# Patient Record
Sex: MALE | Race: WHITE | NOT HISPANIC OR LATINO | Employment: OTHER | URBAN - NONMETROPOLITAN AREA
[De-identification: names, ages, dates, MRNs, and addresses within clinical notes are randomized per-mention and may not be internally consistent; named-entity substitution may affect disease eponyms.]

---

## 2022-04-14 ENCOUNTER — OFFICE VISIT (OUTPATIENT)
Dept: CHIROPRACTIC MEDICINE | Facility: OTHER | Age: 57
End: 2022-04-14
Attending: CHIROPRACTOR
Payer: COMMERCIAL

## 2022-04-14 DIAGNOSIS — M99.03 SEGMENTAL AND SOMATIC DYSFUNCTION OF LUMBAR REGION: Primary | ICD-10-CM

## 2022-04-14 DIAGNOSIS — M99.02 SEGMENTAL AND SOMATIC DYSFUNCTION OF THORACIC REGION: ICD-10-CM

## 2022-04-14 DIAGNOSIS — M54.50 ACUTE BILATERAL LOW BACK PAIN WITHOUT SCIATICA: ICD-10-CM

## 2022-04-14 PROCEDURE — 98940 CHIROPRACT MANJ 1-2 REGIONS: CPT | Mod: AT | Performed by: CHIROPRACTOR

## 2022-04-14 PROCEDURE — 99202 OFFICE O/P NEW SF 15 MIN: CPT | Mod: 25 | Performed by: CHIROPRACTOR

## 2022-04-18 NOTE — PROGRESS NOTES
Subjective Finding:    Chief compalint: Patient presents with:  Back Pain  , Pain Scale: 5/10, Intensity: sharp, Duration: 2 months, Radiating: .    Date of injury:     Activities that the pain restricts:   Home/household/hobbies/social activities: yes.  Work duties: yes.  Sleep: yes.  Makes symptoms better: rest.  Makes symptoms worse: activity, lumbar extension and lumbar flexion.  Have you seen anyone else for the symptoms? No.  Work related: no.  Automobile related injury: no.    Objective and Assessment:    Posture Analysis:   High shoulder: .  Head tilt: .  High iliac crest: .  Head carriage: neutral.  Thoracic Kyphosis: neutral.  Lumbar Lordosis: forward.    Lumbar Range of Motion: extension decreased.  Cervical Range of Motion: .  Thoracic Range of Motion: .  Extremity Range of Motion: .    Palpation:   Quad lumb: bilateral, referred pain: no    Segmental dysfunction pre-treatment and treatment area: T5, T7, T12, L2 and L3.    Assessment post-treatment:  Cervical: ROM increased.  Thoracic: ROM increased.  Lumbar: ROM increased.    Comments: .      Complicating Factors: .    Procedure(s):  CMT:  99628 Chiropractic manipulative treatment 1-2 regions performed   Thoracic: Diversified, See above for level, Prone and Lumbar: Diversified, See above for level, Side posture    Modalities:  None performed this visit    Therapeutic procedures:  None    Plan:  Treatment plan: 2 times per week for 2 weeks.  Instructed patient: stretch as instructed at visit.  Short term goals: reduce pain.  Long term goals: increase ADL.  Prognosis: excellent.

## 2022-04-19 ENCOUNTER — OFFICE VISIT (OUTPATIENT)
Dept: CHIROPRACTIC MEDICINE | Facility: OTHER | Age: 57
End: 2022-04-19
Attending: CHIROPRACTOR
Payer: COMMERCIAL

## 2022-04-19 DIAGNOSIS — M99.01 SEGMENTAL AND SOMATIC DYSFUNCTION OF CERVICAL REGION: Primary | ICD-10-CM

## 2022-04-19 DIAGNOSIS — M99.03 SEGMENTAL AND SOMATIC DYSFUNCTION OF LUMBAR REGION: ICD-10-CM

## 2022-04-19 DIAGNOSIS — M54.2 CERVICALGIA: ICD-10-CM

## 2022-04-19 DIAGNOSIS — M99.02 SEGMENTAL AND SOMATIC DYSFUNCTION OF THORACIC REGION: ICD-10-CM

## 2022-04-19 PROCEDURE — 98940 CHIROPRACT MANJ 1-2 REGIONS: CPT | Mod: AT | Performed by: CHIROPRACTOR

## 2022-04-20 NOTE — PROGRESS NOTES
Subjective Finding:    Chief compalint: Patient presents with:  Back Pain: Neck pain    , Pain Scale: 5/10, Intensity: sharp, Duration: 2 months, Radiating: .    Date of injury:     Activities that the pain restricts:   Home/household/hobbies/social activities: yes.  Work duties: yes.  Sleep: yes.  Makes symptoms better: rest.  Makes symptoms worse: activity, lumbar extension and lumbar flexion.  Have you seen anyone else for the symptoms? No.  Work related: no.  Automobile related injury: no.    Objective and Assessment:    Posture Analysis:   High shoulder: .  Head tilt: .  High iliac crest: .  Head carriage: neutral.  Thoracic Kyphosis: neutral.  Lumbar Lordosis: forward.    Lumbar Range of Motion: extension decreased.  Cervical Range of Motion: .  Thoracic Range of Motion: .  Extremity Range of Motion: .    Palpation:   Quad lumb: bilateral, referred pain: no    Segmental dysfunction pre-treatment and treatment area: T5, T7, T12, L2 and L3.    Assessment post-treatment:  Cervical: ROM increased.  Thoracic: ROM increased.  Lumbar: ROM increased.    Comments: .      Complicating Factors: .    Procedure(s):  CMT:  36217 Chiropractic manipulative treatment 1-2 regions performed   Thoracic: Diversified, See above for level, Prone and Lumbar: Diversified, See above for level, Side posture    Modalities:  None performed this visit    Therapeutic procedures:  None    Plan:  Treatment plan: 2 times per week for 2 weeks.  Instructed patient: stretch as instructed at visit.  Short term goals: reduce pain.  Long term goals: increase ADL.  Prognosis: excellent.

## 2022-05-10 ENCOUNTER — OFFICE VISIT (OUTPATIENT)
Dept: CHIROPRACTIC MEDICINE | Facility: OTHER | Age: 57
End: 2022-05-10
Attending: CHIROPRACTOR
Payer: COMMERCIAL

## 2022-05-10 DIAGNOSIS — M54.50 ACUTE BILATERAL LOW BACK PAIN WITHOUT SCIATICA: ICD-10-CM

## 2022-05-10 DIAGNOSIS — M99.02 SEGMENTAL AND SOMATIC DYSFUNCTION OF THORACIC REGION: ICD-10-CM

## 2022-05-10 DIAGNOSIS — M99.03 SEGMENTAL AND SOMATIC DYSFUNCTION OF LUMBAR REGION: Primary | ICD-10-CM

## 2022-05-10 PROCEDURE — 98940 CHIROPRACT MANJ 1-2 REGIONS: CPT | Mod: AT | Performed by: CHIROPRACTOR

## 2022-05-11 NOTE — PROGRESS NOTES
Subjective Finding:    Chief compalint: Patient presents with:  Back Pain  , Pain Scale: 5/10, Intensity: sharp, Duration: 2 months, Radiating: .    Date of injury:     Activities that the pain restricts:   Home/household/hobbies/social activities: yes.  Work duties: yes.  Sleep: yes.  Makes symptoms better: rest.  Makes symptoms worse: activity, lumbar extension and lumbar flexion.  Have you seen anyone else for the symptoms? No.  Work related: no.  Automobile related injury: no.    Objective and Assessment:    Posture Analysis:   High shoulder: .  Head tilt: .  High iliac crest: .  Head carriage: neutral.  Thoracic Kyphosis: neutral.  Lumbar Lordosis: forward.    Lumbar Range of Motion: extension decreased.  Cervical Range of Motion: .  Thoracic Range of Motion: .  Extremity Range of Motion: .    Palpation:   Quad lumb: bilateral, referred pain: no    Segmental dysfunction pre-treatment and treatment area: T5, T7, T12, L2 and L3.    Assessment post-treatment:  Cervical: ROM increased.  Thoracic: ROM increased.  Lumbar: ROM increased.    Comments: .      Complicating Factors: .    Procedure(s):  CMT:  45336 Chiropractic manipulative treatment 1-2 regions performed   Thoracic: Diversified, See above for level, Prone and Lumbar: Diversified, See above for level, Side posture    Modalities:  None performed this visit    Therapeutic procedures:  None    Plan:  Treatment plan: 2 times per week for 2 weeks.  Instructed patient: stretch as instructed at visit.  Short term goals: reduce pain.  Long term goals: increase ADL.  Prognosis: excellent.

## 2022-10-18 ENCOUNTER — TRANSFERRED RECORDS (OUTPATIENT)
Dept: HEALTH INFORMATION MANAGEMENT | Facility: CLINIC | Age: 57
End: 2022-10-18

## 2022-10-18 LAB
ALT SERPL-CCNC: 7 U/L (ref 18–65)
AST SERPL-CCNC: 10 U/L (ref 10–40)
CHOLESTEROL (EXTERNAL): 172 MG/DL
CREATININE (EXTERNAL): 0.65 MG/DL (ref 0.8–1.5)
GFR ESTIMATED (EXTERNAL): >60 ML/MIN/1.73M2
GLUCOSE (EXTERNAL): 131 MG/DL (ref 60–99)
HDLC SERPL-MCNC: 30 MG/DL
LDL CHOLESTEROL CALCULATED (EXTERNAL): 122 MG/DL
POTASSIUM (EXTERNAL): 4.2 MMOL/L (ref 3.5–5.1)
TRIGLYCERIDES (EXTERNAL): 100 MG/DL
TSH SERPL-ACNC: 0.58 UIU/ML (ref 0.35–4.8)

## 2022-10-21 ENCOUNTER — TRANSFERRED RECORDS (OUTPATIENT)
Dept: HEALTH INFORMATION MANAGEMENT | Facility: CLINIC | Age: 57
End: 2022-10-21

## 2022-10-24 ENCOUNTER — TRANSFERRED RECORDS (OUTPATIENT)
Dept: HEALTH INFORMATION MANAGEMENT | Facility: CLINIC | Age: 57
End: 2022-10-24

## 2022-10-25 ENCOUNTER — TRANSFERRED RECORDS (OUTPATIENT)
Dept: HEALTH INFORMATION MANAGEMENT | Facility: CLINIC | Age: 57
End: 2022-10-25

## 2022-11-01 ENCOUNTER — TRANSFERRED RECORDS (OUTPATIENT)
Dept: HEALTH INFORMATION MANAGEMENT | Facility: CLINIC | Age: 57
End: 2022-11-01

## 2022-11-04 ENCOUNTER — TELEPHONE (OUTPATIENT)
Dept: ONCOLOGY | Facility: OTHER | Age: 57
End: 2022-11-04

## 2022-11-14 ENCOUNTER — TELEPHONE (OUTPATIENT)
Dept: ONCOLOGY | Facility: OTHER | Age: 57
End: 2022-11-14

## 2022-11-22 ENCOUNTER — CARE COORDINATION (OUTPATIENT)
Dept: ONCOLOGY | Facility: OTHER | Age: 57
End: 2022-11-22

## 2022-11-22 ENCOUNTER — TELEPHONE (OUTPATIENT)
Dept: ONCOLOGY | Facility: OTHER | Age: 57
End: 2022-11-22

## 2022-11-22 ENCOUNTER — ONCOLOGY VISIT (OUTPATIENT)
Dept: ONCOLOGY | Facility: OTHER | Age: 57
End: 2022-11-22
Attending: INTERNAL MEDICINE
Payer: COMMERCIAL

## 2022-11-22 VITALS
TEMPERATURE: 97.3 F | SYSTOLIC BLOOD PRESSURE: 100 MMHG | OXYGEN SATURATION: 99 % | HEIGHT: 68 IN | HEART RATE: 71 BPM | DIASTOLIC BLOOD PRESSURE: 60 MMHG | RESPIRATION RATE: 20 BRPM | BODY MASS INDEX: 21.15 KG/M2 | WEIGHT: 139.55 LBS

## 2022-11-22 DIAGNOSIS — C15.5 MALIGNANT NEOPLASM OF LOWER THIRD OF ESOPHAGUS (H): Primary | ICD-10-CM

## 2022-11-22 LAB
ALBUMIN SERPL BCG-MCNC: 3.6 G/DL (ref 3.5–5.2)
ALP SERPL-CCNC: 68 U/L (ref 40–129)
ALT SERPL W P-5'-P-CCNC: 9 U/L (ref 10–50)
ANION GAP SERPL CALCULATED.3IONS-SCNC: 9 MMOL/L (ref 7–15)
AST SERPL W P-5'-P-CCNC: 9 U/L (ref 10–50)
BASOPHILS # BLD AUTO: 0 10E3/UL (ref 0–0.2)
BASOPHILS NFR BLD AUTO: 0 %
BILIRUB SERPL-MCNC: 0.4 MG/DL
BUN SERPL-MCNC: 15 MG/DL (ref 8–23)
CALCIUM SERPL-MCNC: 9 MG/DL (ref 8.6–10)
CHLORIDE SERPL-SCNC: 103 MMOL/L (ref 98–107)
CREAT SERPL-MCNC: 0.59 MG/DL (ref 0.67–1.17)
DEPRECATED HCO3 PLAS-SCNC: 28 MMOL/L (ref 22–29)
EOSINOPHIL # BLD AUTO: 0.1 10E3/UL (ref 0–0.7)
EOSINOPHIL NFR BLD AUTO: 1 %
ERYTHROCYTE [DISTWIDTH] IN BLOOD BY AUTOMATED COUNT: 13.2 % (ref 10–15)
GFR SERPL CREATININE-BSD FRML MDRD: >90 ML/MIN/1.73M2
GLUCOSE SERPL-MCNC: 96 MG/DL (ref 70–99)
HCT VFR BLD AUTO: 37 % (ref 40–53)
HGB BLD-MCNC: 12.4 G/DL (ref 13.3–17.7)
IMM GRANULOCYTES # BLD: 0 10E3/UL
IMM GRANULOCYTES NFR BLD: 0 %
LYMPHOCYTES # BLD AUTO: 1.7 10E3/UL (ref 0.8–5.3)
LYMPHOCYTES NFR BLD AUTO: 15 %
MCH RBC QN AUTO: 30.8 PG (ref 26.5–33)
MCHC RBC AUTO-ENTMCNC: 33.5 G/DL (ref 31.5–36.5)
MCV RBC AUTO: 92 FL (ref 78–100)
MONOCYTES # BLD AUTO: 0.7 10E3/UL (ref 0–1.3)
MONOCYTES NFR BLD AUTO: 6 %
NEUTROPHILS # BLD AUTO: 9 10E3/UL (ref 1.6–8.3)
NEUTROPHILS NFR BLD AUTO: 78 %
NRBC # BLD AUTO: 0 10E3/UL
NRBC BLD AUTO-RTO: 0 /100
PLATELET # BLD AUTO: 321 10E3/UL (ref 150–450)
POTASSIUM SERPL-SCNC: 4.8 MMOL/L (ref 3.4–5.3)
PROT SERPL-MCNC: 6.6 G/DL (ref 6.4–8.3)
RBC # BLD AUTO: 4.02 10E6/UL (ref 4.4–5.9)
SODIUM SERPL-SCNC: 140 MMOL/L (ref 136–145)
WBC # BLD AUTO: 11.7 10E3/UL (ref 4–11)

## 2022-11-22 PROCEDURE — 36415 COLL VENOUS BLD VENIPUNCTURE: CPT | Performed by: INTERNAL MEDICINE

## 2022-11-22 PROCEDURE — 85025 COMPLETE CBC W/AUTO DIFF WBC: CPT | Performed by: INTERNAL MEDICINE

## 2022-11-22 PROCEDURE — 99205 OFFICE O/P NEW HI 60 MIN: CPT | Performed by: INTERNAL MEDICINE

## 2022-11-22 PROCEDURE — 80053 COMPREHEN METABOLIC PANEL: CPT | Performed by: INTERNAL MEDICINE

## 2022-11-22 RX ORDER — IBUPROFEN 200 MG
200 TABLET ORAL EVERY 4 HOURS PRN
COMMUNITY
End: 2023-06-28

## 2022-11-22 ASSESSMENT — PAIN SCALES - GENERAL: PAINLEVEL: SEVERE PAIN (7)

## 2022-11-22 ASSESSMENT — PATIENT HEALTH QUESTIONNAIRE - PHQ9: SUM OF ALL RESPONSES TO PHQ QUESTIONS 1-9: 11

## 2022-11-22 NOTE — NURSING NOTE
"Oncology Rooming Note    November 22, 2022 11:08 AM   Kenneth Matthew is a 57 year old male who presents for:    Chief Complaint   Patient presents with     Oncology Clinic Visit     New Consult Esophageal Cancer       Initial Vitals: /60   Pulse 71   Temp 97.3  F (36.3  C) (Tympanic)   Resp 20   Ht 1.715 m (5' 7.5\")   Wt 63.3 kg (139 lb 8.8 oz)   SpO2 99%   BMI 21.53 kg/m   Estimated body mass index is 21.53 kg/m  as calculated from the following:    Height as of this encounter: 1.715 m (5' 7.5\").    Weight as of this encounter: 63.3 kg (139 lb 8.8 oz). Body surface area is 1.74 meters squared.  Severe Pain (7) Comment: Data Unavailable   No LMP for male patient.  Allergies reviewed: Yes  Medications reviewed: Yes    Medications: Medication refills not needed today.  Pharmacy name entered into [employer]: [facility] [id number] [id number]       Patient was assessed using the NCCN psychosocial distress thermometer. Patient rated the score as a 6. Patient rated current stressors as per scanned form. Stressors will be brought to the attention of provider or Oncology RN Care Coordinator for a score of 6 or greater or per nurses discretion.               Yahaira Rodriguez LPN            "

## 2022-11-22 NOTE — PROGRESS NOTES
Follow up needed for patient's psychosocial distress screen of 6/10 related to eating, fatigue, pain, & tingling in hands/feet.  and other staff notified and will contact patient.     This patient also scored an 11 on his PHQ-9.     SW went and spoke with patient and explained role in the unit. He was quiet but stated he understood. He stated there were no issues or concerns at this time. Most of his issues are surrounding his diagnosis and what the treatment planning will be. SW gave him information on [facility] and explained each one. He nodded in acknowledgement.   Stated he would reach out with any other questions or concerns.  SW gave patient contact information.    ANDREWS Shukla

## 2022-11-22 NOTE — TELEPHONE ENCOUNTER
I called patient and they had a 5:15 appointment for Pet scan at [facility]. Patient declined getting pet scan done sooner as he would like to wait until the December 14th as previously scheduled.   Yahaira Rodriguez LPN

## 2022-11-22 NOTE — PROGRESS NOTES
MEDICAL ONCOLOGY CONSULT NOTE  Nov 22, 2022    Reason for consult: esophageal cancer    HISTORY OF PRESENT ILLNESS  Kenneth Matthew is a 57 year old male with PMH as stated below who is seen in the oncology clinic for consultation of esophageal cancer    His history in short is as follows    Mr. Matthew states he noticed difficulty swallowing initially starting June 2022. Over the last 1 year he has unintentionally lost 60 pounds.     10/21/2022: CT chest, abdomen and pelvis: Diffuse wall thickening of the distal esophagus with associated mediastinal adenopathy, and upper abdominal  Conglomerate lymphadenopathy. Constellation of findings are highly concerning for underlying esophageal carcinoma with metastatic disease.     11/1/2022: EGD: A large ulcerating mass with no bleeding and no stigmata of recent bleeding was found   Lower third from the incisors. The mass was partially obstructing and partially circumferential involving  two thirds.     Biopsy showed poorly differentiated favoring adenocarcinoma. Her 2 negative.     He denies any nausea or vomiting. He is not able to eat any solid food at this point. He is mostly eating semisolid and liquid food. Denies any coughing or choking. Denies any hemoptysis or hematemesis.   He is a current  per day smoker. He does complain of worsening back pain in the lower and midback.     REVIEW OF SYSTEMS  A 12-point ROS negative except as in HPI      Current Outpatient Medications   Medication Sig Dispense Refill     ibuprofen (ADVIL/MOTRIN) 200 MG tablet Take 200 mg by mouth every 4 hours as needed for pain         No Known Allergies  Immunization History   Administered Date(s) Administered     COVID-19 Vaccine 12+ (Pfizer) 08/28/2021, 09/18/2021     Tdap (Adacel,Boostrix) 12/10/2021       Past Medical History:   Diagnosis Date     Cancer (H)      Mixed hearing loss, bilateral        History reviewed. No pertinent surgical history.    SOCIAL HISTORY  History   Smoking Status  "    Every Day     Packs/day: 0.50     Years: 46.00     Types: Cigarettes     Start date: 1976   Smokeless Tobacco     Never    Social History    Substance and Sexual Activity      Alcohol use: Not Currently        Comment: no alcohol in last 6 months     History   Drug Use Unknown       FAMILY HISTORY  Family History   Problem Relation Age of Onset     Breast Cancer Mother      Hyperlipidemia Mother      Diabetes Mother      Coronary Artery Disease Mother      Cerebrovascular Disease Father      Hyperlipidemia Father      Coronary Artery Disease Father      Myocardial Infarction Father      Hyperlipidemia Brother      Hypertension Brother      Diabetes Brother      Asthma Sister      Breast Cancer Sister      Hyperlipidemia Sister      Diabetes Sister      Hyperlipidemia Sister      Diabetes Sister      Hyperlipidemia Sister      Suicide Sister      Anesthesia Reaction Sister      Breast Cancer Sister      Hyperlipidemia Sister      Diabetes Sister      Breast Cancer Niece      Skin Cancer Niece      Thyroid Disease Niece      Breast Cancer Niece      Leukemia Nephew      Colon Cancer No family hx of      Prostate Cancer No family hx of      Genetic Disorder No family hx of        PHYSICAL EXAMINATION  /60   Pulse 71   Temp 97.3  F (36.3  C) (Tympanic)   Resp 20   Ht 1.715 m (5' 7.5\")   Wt 63.3 kg (139 lb 8.8 oz)   SpO2 99%   BMI 21.53 kg/m    Wt Readings from Last 2 Encounters:   11/22/22 63.3 kg (139 lb 8.8 oz)     Physical Exam  Constitutional:       Appearance: Normal appearance.   Eyes:      Conjunctiva/sclera: Conjunctivae normal.   Pulmonary:      Effort: Pulmonary effort is normal.   Abdominal:      General: Abdomen is flat.      Palpations: Abdomen is soft.   Lymphadenopathy:      Cervical:      Right cervical: No superficial or deep cervical adenopathy.     Left cervical: No superficial or deep cervical adenopathy.      Upper Body:      Right upper body: No supraclavicular or axillary " adenopathy.      Left upper body: No supraclavicular or axillary adenopathy.   Neurological:      General: No focal deficit present.      Mental Status: He is alert and oriented to person, place, and time. Mental status is at baseline.     Laboratory and Imaging:     Latest Reference Range & Units 11/22/22 12:06   Sodium 136 - 145 mmol/L 140   Potassium 3.4 - 5.3 mmol/L 4.8   Urea Nitrogen 8.0 - 23.0 mg/dL 15.0   Creatinine 0.67 - 1.17 mg/dL 0.59 (L)   Alkaline Phosphatase 40 - 129 U/L 68   ALT 10 - 50 U/L 9 (L)   AST 10 - 50 U/L 9 (L)   WBC 4.0 - 11.0 10e3/uL 11.7 (H)   Hemoglobin 13.3 - 17.7 g/dL 12.4 (L)   Hematocrit 40.0 - 53.0 % 37.0 (L)   Platelet Count 150 - 450 10e3/uL 321   (L): Data is abnormally low  (H): Data is abnormally high    ASSESSMENT AND PLAN    1. Adenocarcinoma of the esophagus    Discussed imgaging and pathology. Given the findings of mediastinal adenopathy and upper abdominal adenopathy with primary in the lower third of the esophagus unlikely to be surgical. Will get PET scan for complete staging.     He is having difficulty swallowing and lost a significant amount of weight. Will refer for PEG tube placement and will also refer to radiation oncology for consideration for radiation. Nutrition consult also placed.     Dicussed options for treatment will most likely be systemic therapy with and without immunotherapy.      Follow up in 3 weeks.     Total time spent on the patient was 60 minutes which included time spent reviewing lab results, performing history and physical, reviewing imaging results with the patient, documenting history and physical and ordering followup labs and scans.      Nika Barnhart MD

## 2022-11-23 ENCOUNTER — VIRTUAL VISIT (OUTPATIENT)
Dept: RADIATION ONCOLOGY | Facility: HOSPITAL | Age: 57
End: 2022-11-23
Payer: COMMERCIAL

## 2022-11-23 DIAGNOSIS — C80.1 CANCER (H): Primary | ICD-10-CM

## 2022-11-23 DIAGNOSIS — C15.5 MALIGNANT NEOPLASM OF LOWER THIRD OF ESOPHAGUS (H): Primary | ICD-10-CM

## 2022-11-23 NOTE — PROGRESS NOTES
ANMOL from Dr. Barnhart to have pathology consult to review block and slide from [facility] on 11-1-22 specimen # [id number]. TC to [facility] pathology for clarification on path report. Awaiting return call.

## 2022-12-01 PROCEDURE — 88321 CONSLTJ&REPRT SLD PREP ELSWR: CPT | Performed by: PATHOLOGY

## 2022-12-03 NOTE — PROGRESS NOTES
[facility]    RADIATION ONCOLOGY CONSULTATION      Kenneth Matthew  is referred by Dr.Aparna Barnhart for a Radiation Oncology consultation.    PRIMARY PHYSICIAN: Rohan Garland     Cancer Staging   No matching staging information was found for the patient.    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 Completion of staging with a PET/CT is pending but his disease appears to be localized and as such, he is not a candidate for definitive/curative treatment.  An endoscopic ultrasound has not been done and so a true T stage has not been determined but he appears to have locally advanced disease with at least N2 disease based on imaging thus far. The indications for, logistics, risks, benefits and side effects (both short and long-term) of a course of radiotherapy with concurrent chemotherapy was explained to him in detail.  After full discussion, the patient verbalized understanding and acceptance of these and desire to move forward with treatment.  He understands that the caveat is we have to wait for the findings from his PET/CT before moving forward with any treatment planning or delivery.      PLAN: We will await the results of his PET/CT and as long as no metastatic disease is identified, schedule him for a CT simulation as quickly as possible thereafter.  We will coordinate with Dr. Barnhart for the delivery of concurrent chemotherapy. I anticipate his radiotherapy regimen will be 50.4 Gy delivered over 28 fractions of 1.8 Gy each using an IMRT technique and delivered with concurrent systemic therapy.    Nutrition visit completed 12/05/2022  PET scan pending 12/14- patient declined having scan rescheduled for sooner date   PEG tube placement ordered - appointment on 12/08/2022 to meet with surgery  ________________________________________________________________  HISTORY OF PRESENT ILLNESS: Miguel Abinh is a  57-year-old male patient who presents for radiation therapy consultation for poorly differentiated favoring adenocarcinoma of the distal esophagus with conglomerate lymphadenopathy within the upper abdomen.  History appears that he began to have difficulty swallowing in June 2022, reports a 60 pound weight loss over the last year.  He is a current tobacco user. Drinking 2 protein drinks per day. Eating soft foods. History as below    He presented with a 60 lb weight loss and reports of difficulty swallowing since 06/2022.     10/18/2022 chest x-ray: mediastinal adenopathy better seen on subsequent CT.  Small hiatal hernia.    10/21/2022 CT chest abdomen pelvis with IV and oral contrast: Diffuse esophageal wall thickening in the distal third of the esophagus with associated mediastinal lymphadenopathy, there is a subcarinal node measuring 30 x 31 mm.  No axillary or supraclavicular lymphadenopathy.  There is conglomerate lymphadenopathy in the upper abdomen involving the periaortic, portocaval, and lesser sac lymph nodes, one in which that measures 29 x 28 mm.  There is mild circumferential wall thickening of the urinary bladder.  No suspicious lytic or blastic lesions.    10/24/2022 abdomen pelvis radiograph 3 views: Unremarkable appearance of the visualized abdomen    11/01/2022 upper GI endoscopy: Stomach atrium biopsy, Stomach fundus biopsy negative.  Esophagus biopsy: Poorly differentiated non-small cell carcinoma, favor adenocarcinoma, HER2 negative by FISH.  Findings of a large ulcerating mass with no bleeding and no stigmata of recent bleeding was found in the lower third of the esophagus, 35 to 40 cm from the incisors.  Mass was partially obstructing and partially circumferential involving two thirds of the lumen circumference.  The mass just extends slightly below the GE junction but appears to arise from the esophagus    11/01/2022 Colonoscopy: Transverse colon, and sigmoid colon biopsies: Tubular  "adenoma.    11/22/2022 Medical oncology visit: Systemic therapy pending PET results/ complete staging, nutrition referral placed, PEG tube referral placed.     12/14/2022 PET scan scheduled, pending results. Patient offered and declined a sooner date.     Today, he reports feeling well overall. He is still smoking about half a pack per day. He declines assistance with smoking cessation today, favoring he will \"try to quit on his own\" first.     Scheduling Conflicts: chemotherapy  Systemic Therapy: pending  Port: pending  Pacemaker: no  Hx of autoimmune disorders: no  Previous Radiation Therapy: no    Past Medical History:   Diagnosis Date     Cancer (H)      Esophageal cancer (H) 11/01/2022     Esophageal thickening 10/25/2022     Mixed hearing loss, bilateral      Tobacco use disorder 12/27/2019       Past Surgical History:   Procedure Laterality Date     COLONOSCOPY  11/01/2022    [facility]     Upper GI Endoscopy  11/01/2022       Family History   Problem Relation Age of Onset     Breast Cancer Mother      Hyperlipidemia Mother      Diabetes Mother      Coronary Artery Disease Mother      Cerebrovascular Disease Father      Hyperlipidemia Father      Coronary Artery Disease Father      Myocardial Infarction Father      Hyperlipidemia Brother      Hypertension Brother      Diabetes Brother      Asthma Sister      Breast Cancer Sister      Hyperlipidemia Sister      Diabetes Sister      Hyperlipidemia Sister      Diabetes Sister      Hyperlipidemia Sister      Suicide Sister      Anesthesia Reaction Sister      Breast Cancer Sister      Hyperlipidemia Sister      Diabetes Sister      Breast Cancer Niece      Skin Cancer Niece      Thyroid Disease Niece      Breast Cancer Niece      Leukemia Nephew      Colon Cancer No family hx of      Prostate Cancer No family hx of      Genetic Disorder No family hx of        Social History     Tobacco Use     Smoking status: Every Day     Packs/day: 0.50     Years: 46.00     Pack " "years: 23.00     Types: Cigarettes     Start date: 1976     Smokeless tobacco: Never     Tobacco comments:     Trying to quit   Substance Use Topics     Alcohol use: Not Currently     Comment: no alcohol in last 6 months         CURRENT MEDICATIONS:   Current Outpatient Medications   Medication     ibuprofen (ADVIL/MOTRIN) 200 MG tablet     No current facility-administered medications for this visit.         ALLERGIES:  No Known Allergies    Review of Systems   Constitutional: Positive for malaise/fatigue and weight loss. Negative for chills and fever.   Respiratory: Negative for cough and shortness of breath.    Cardiovascular: Negative for chest pain.   Gastrointestinal: Positive for abdominal pain and heartburn. Negative for constipation, diarrhea, nausea and vomiting.   Musculoskeletal: Positive for back pain (\"directly behind tumor\").   Psychiatric/Behavioral: The patient has insomnia.      VITAL SIGNS:  There were no vitals taken for this visit.  Wt Readings from Last 4 Encounters:   11/22/22 63.3 kg (139 lb 8.8 oz)       PHYSICAL EXAM:  Constitutional: awake, alert, cooperative, no apparent distress, and appears stated age  Eyes: Lids and lashes normal  ENT: Normocephalic, without obvious abnormality, atraumatic  Hematologic / Lymphatic: no cervical lymphadenopathy and no supraclavicular lymphadenopathy  Respiratory: lungs diminished bilaterally, slight rhonchi present   Cardiovascular: Normal apical impulse, regular rate and rhythm, normal S1 and S2, no S3 or S4, and no murmur noted  GI: Normal bowel sounds, soft, non-distended, non-tender  Skin: no redness, warmth, or swelling  Musculoskeletal: Thin. Tone is normal.  Neurologic: Awake, alert, oriented to name, place and time. Gait is normal.  Neuropsychiatric: General: normal, calm and normal eye contact        NAATLEE Mao CNP, MD  Radiation Oncologist    I saw this patient while providing locum tenens coverage.         "

## 2022-12-03 NOTE — H&P (VIEW-ONLY)
[facility]    RADIATION ONCOLOGY CONSULTATION      Kenneth Matthew  is referred by Dr.Aparna Barnhart for a Radiation Oncology consultation.    PRIMARY PHYSICIAN: Rohan Garland     Cancer Staging   No matching staging information was found for the patient.    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 Completion of staging with a PET/CT is pending but his disease appears to be localized and as such, he is not a candidate for definitive/curative treatment.  An endoscopic ultrasound has not been done and so a true T stage has not been determined but he appears to have locally advanced disease with at least N2 disease based on imaging thus far. The indications for, logistics, risks, benefits and side effects (both short and long-term) of a course of radiotherapy with concurrent chemotherapy was explained to him in detail.  After full discussion, the patient verbalized understanding and acceptance of these and desire to move forward with treatment.  He understands that the caveat is we have to wait for the findings from his PET/CT before moving forward with any treatment planning or delivery.      PLAN: We will await the results of his PET/CT and as long as no metastatic disease is identified, schedule him for a CT simulation as quickly as possible thereafter.  We will coordinate with Dr. Barnhart for the delivery of concurrent chemotherapy. I anticipate his radiotherapy regimen will be 50.4 Gy delivered over 28 fractions of 1.8 Gy each using an IMRT technique and delivered with concurrent systemic therapy.    Nutrition visit completed 12/05/2022  PET scan pending 12/14- patient declined having scan rescheduled for sooner date   PEG tube placement ordered - appointment on 12/08/2022 to meet with surgery  ________________________________________________________________  HISTORY OF PRESENT ILLNESS: Miguel Abinh is a  57-year-old male patient who presents for radiation therapy consultation for poorly differentiated favoring adenocarcinoma of the distal esophagus with conglomerate lymphadenopathy within the upper abdomen.  History appears that he began to have difficulty swallowing in June 2022, reports a 60 pound weight loss over the last year.  He is a current tobacco user. Drinking 2 protein drinks per day. Eating soft foods. History as below    He presented with a 60 lb weight loss and reports of difficulty swallowing since 06/2022.     10/18/2022 chest x-ray: mediastinal adenopathy better seen on subsequent CT.  Small hiatal hernia.    10/21/2022 CT chest abdomen pelvis with IV and oral contrast: Diffuse esophageal wall thickening in the distal third of the esophagus with associated mediastinal lymphadenopathy, there is a subcarinal node measuring 30 x 31 mm.  No axillary or supraclavicular lymphadenopathy.  There is conglomerate lymphadenopathy in the upper abdomen involving the periaortic, portocaval, and lesser sac lymph nodes, one in which that measures 29 x 28 mm.  There is mild circumferential wall thickening of the urinary bladder.  No suspicious lytic or blastic lesions.    10/24/2022 abdomen pelvis radiograph 3 views: Unremarkable appearance of the visualized abdomen    11/01/2022 upper GI endoscopy: Stomach atrium biopsy, Stomach fundus biopsy negative.  Esophagus biopsy: Poorly differentiated non-small cell carcinoma, favor adenocarcinoma, HER2 negative by FISH.  Findings of a large ulcerating mass with no bleeding and no stigmata of recent bleeding was found in the lower third of the esophagus, 35 to 40 cm from the incisors.  Mass was partially obstructing and partially circumferential involving two thirds of the lumen circumference.  The mass just extends slightly below the GE junction but appears to arise from the esophagus    11/01/2022 Colonoscopy: Transverse colon, and sigmoid colon biopsies: Tubular  "adenoma.    11/22/2022 Medical oncology visit: Systemic therapy pending PET results/ complete staging, nutrition referral placed, PEG tube referral placed.     12/14/2022 PET scan scheduled, pending results. Patient offered and declined a sooner date.     Today, he reports feeling well overall. He is still smoking about half a pack per day. He declines assistance with smoking cessation today, favoring he will \"try to quit on his own\" first.     Scheduling Conflicts: chemotherapy  Systemic Therapy: pending  Port: pending  Pacemaker: no  Hx of autoimmune disorders: no  Previous Radiation Therapy: no    Past Medical History:   Diagnosis Date     Cancer (H)      Esophageal cancer (H) 11/01/2022     Esophageal thickening 10/25/2022     Mixed hearing loss, bilateral      Tobacco use disorder 12/27/2019       Past Surgical History:   Procedure Laterality Date     COLONOSCOPY  11/01/2022    [facility]     Upper GI Endoscopy  11/01/2022       Family History   Problem Relation Age of Onset     Breast Cancer Mother      Hyperlipidemia Mother      Diabetes Mother      Coronary Artery Disease Mother      Cerebrovascular Disease Father      Hyperlipidemia Father      Coronary Artery Disease Father      Myocardial Infarction Father      Hyperlipidemia Brother      Hypertension Brother      Diabetes Brother      Asthma Sister      Breast Cancer Sister      Hyperlipidemia Sister      Diabetes Sister      Hyperlipidemia Sister      Diabetes Sister      Hyperlipidemia Sister      Suicide Sister      Anesthesia Reaction Sister      Breast Cancer Sister      Hyperlipidemia Sister      Diabetes Sister      Breast Cancer Niece      Skin Cancer Niece      Thyroid Disease Niece      Breast Cancer Niece      Leukemia Nephew      Colon Cancer No family hx of      Prostate Cancer No family hx of      Genetic Disorder No family hx of        Social History     Tobacco Use     Smoking status: Every Day     Packs/day: 0.50     Years: 46.00     Pack " "years: 23.00     Types: Cigarettes     Start date: 1976     Smokeless tobacco: Never     Tobacco comments:     Trying to quit   Substance Use Topics     Alcohol use: Not Currently     Comment: no alcohol in last 6 months         CURRENT MEDICATIONS:   Current Outpatient Medications   Medication     ibuprofen (ADVIL/MOTRIN) 200 MG tablet     No current facility-administered medications for this visit.         ALLERGIES:  No Known Allergies    Review of Systems   Constitutional: Positive for malaise/fatigue and weight loss. Negative for chills and fever.   Respiratory: Negative for cough and shortness of breath.    Cardiovascular: Negative for chest pain.   Gastrointestinal: Positive for abdominal pain and heartburn. Negative for constipation, diarrhea, nausea and vomiting.   Musculoskeletal: Positive for back pain (\"directly behind tumor\").   Psychiatric/Behavioral: The patient has insomnia.      VITAL SIGNS:  There were no vitals taken for this visit.  Wt Readings from Last 4 Encounters:   11/22/22 63.3 kg (139 lb 8.8 oz)       PHYSICAL EXAM:  Constitutional: awake, alert, cooperative, no apparent distress, and appears stated age  Eyes: Lids and lashes normal  ENT: Normocephalic, without obvious abnormality, atraumatic  Hematologic / Lymphatic: no cervical lymphadenopathy and no supraclavicular lymphadenopathy  Respiratory: lungs diminished bilaterally, slight rhonchi present   Cardiovascular: Normal apical impulse, regular rate and rhythm, normal S1 and S2, no S3 or S4, and no murmur noted  GI: Normal bowel sounds, soft, non-distended, non-tender  Skin: no redness, warmth, or swelling  Musculoskeletal: Thin. Tone is normal.  Neurologic: Awake, alert, oriented to name, place and time. Gait is normal.  Neuropsychiatric: General: normal, calm and normal eye contact        NATALEE Mao CNP, MD  Radiation Oncologist    I saw this patient while providing locum tenens coverage.         "

## 2022-12-05 ENCOUNTER — ONCOLOGY VISIT (OUTPATIENT)
Dept: RADIATION ONCOLOGY | Facility: HOSPITAL | Age: 57
End: 2022-12-05
Attending: STUDENT IN AN ORGANIZED HEALTH CARE EDUCATION/TRAINING PROGRAM
Payer: COMMERCIAL

## 2022-12-05 ENCOUNTER — CARE COORDINATION (OUTPATIENT)
Dept: ONCOLOGY | Facility: OTHER | Age: 57
End: 2022-12-05

## 2022-12-05 ENCOUNTER — HOSPITAL ENCOUNTER (OUTPATIENT)
Dept: EDUCATION SERVICES | Facility: HOSPITAL | Age: 57
Discharge: HOME | End: 2022-12-05
Attending: FAMILY MEDICINE
Payer: COMMERCIAL

## 2022-12-05 VITALS
BODY MASS INDEX: 21.99 KG/M2 | HEART RATE: 65 BPM | DIASTOLIC BLOOD PRESSURE: 64 MMHG | WEIGHT: 140.1 LBS | HEIGHT: 67 IN | SYSTOLIC BLOOD PRESSURE: 108 MMHG | TEMPERATURE: 97.9 F | RESPIRATION RATE: 19 BRPM | OXYGEN SATURATION: 98 %

## 2022-12-05 VITALS — WEIGHT: 140.1 LBS | HEIGHT: 68 IN | BODY MASS INDEX: 21.23 KG/M2

## 2022-12-05 DIAGNOSIS — C15.5 MALIGNANT NEOPLASM OF LOWER THIRD OF ESOPHAGUS (H): Primary | ICD-10-CM

## 2022-12-05 PROCEDURE — 99204 OFFICE O/P NEW MOD 45 MIN: CPT | Performed by: STUDENT IN AN ORGANIZED HEALTH CARE EDUCATION/TRAINING PROGRAM

## 2022-12-05 PROCEDURE — 999N000107 HC STATISTIC NUTRITIONAL THERAPY NO CHARGE: Performed by: DIETITIAN, REGISTERED

## 2022-12-05 PROCEDURE — G0463 HOSPITAL OUTPT CLINIC VISIT: HCPCS

## 2022-12-05 ASSESSMENT — ENCOUNTER SYMPTOMS
ABDOMINAL PAIN: 1
DIARRHEA: 0
SHORTNESS OF BREATH: 0
HEARTBURN: 1
VOMITING: 0
INSOMNIA: 1
COUGH: 0
NAUSEA: 0
CHILLS: 0
FEVER: 0
CONSTIPATION: 0
WEIGHT LOSS: 1
BACK PAIN: 1

## 2022-12-05 ASSESSMENT — PAIN SCALES - GENERAL: PAINLEVEL: SEVERE PAIN (7)

## 2022-12-05 NOTE — PROGRESS NOTES
"[facility]  Medical Nutrition Therapy    Visit Type: Initial Assessment    Kenneth Matthew referred by Dr. Nika Barnhart  for MNT related to Malignant neoplasm of lower third of esophagus     Patient accompanied by himself.    Nutrition Assessment:  Anthropometrics  Height: .  171.5 cm (5' 7.5\") BMI:    21.62   Weight:  63.5 kg (140 lb 1.6 oz) BSA:  1.74   IBW (kg):  Male: 68         Wt Readings from Last 10 Encounters:   12/05/22 63.5 kg (140 lb 1.6 oz)   12/05/22 63.5 kg (140 lb 1.6 oz)   11/22/22 63.3 kg (139 lb 8.8 oz)       Nutrition History  Pt seen related to esophageal cancer, difficulty swallowing. Swallowing difficulty began in June 2022. Pt reports reduced intake and weight loss.. Certain foods are easier to swallow- mostly softer foods (Chicken and rice, oatmeal, premier protein shakes, peanut butter and crackers, cheesecake). Harder to tolerated really cold foods such as ice cream. Weight loss of about 70lbs in the last year. Pt reported weight in 12/2021 was 207lb (180-200lb usual weight). Weight stable since last appointment with oncology about 2 weeks ago. Pt has still been working at [employer]. He is trying to quit smoking- sucking on hard candy. Has appointment with surgery this week to discuss feeding tube placement.    Food Recall  Tries to drink 3 premier protein drinks per day. Morning, Lunch, supper (3 shakes will be 480 kcal, 90g protein)  Oatmeal- every night for supper  Maybe snacks- applesauce, pudding, cheese tesfaye,  Not much water intake    Physical Activity  Works at [employer]      Nutrition Prescription- weight used to estimate needs 63.5kg  Energy:   1900-2225kcal (30-35g/kg)   Protein:   75-95g (1.2-1.5g/kg)    Fluid:   1900-2225ml (1ml/kcal)          Nutrition Diagnosis:  Malnutrition related to swallowing difficulty/reduced intake as evidenced by weight loss of 32% weight loss in 1 year.    Nutrition Intervention:  Reviewed nutrition recommendations to increase " calorie/protein intake. Encouraged Ensure/Boost plus to get more calories compared to premier protein shakes. Reviewed feeding tube booklet- general review of use and care. Nutrition needs. Encouraged pt to continue to swallow as long as he is safe to do so.    Nutrition Goals:   Adequate intake oral/TF to meet nutrition needs to maintain weight/promote healthy weight gain    Nutrition Follow Up / Monitoring:   TF placement, intake, weight, labs    Nutrition Recommendations:  TF Recs  3 cartons of Isosource 1.5 daily- bolus feeds  1125kcal, 51g protein, 573ml free water  240ml water 3-4 times per day- depending on oral intake.  Plan to continue to follow pt and adjust as oral intake changes.    Will plan use [facility] as a formula supplier. Order to be placed once feeding tube is placed/scheduled.  TF bags, Would like home care nurse to help at the beginning       Patient to follow-up with RD in 1-3 weeks.  Patient has RD contact information to call/email if needed.

## 2022-12-05 NOTE — PROGRESS NOTES
"Radiation Oncology Rooming Note    December 5, 2022 10:45 AM   Kenneth Matthew is a 57 year old male who presents for:    Chief Complaint   Patient presents with     Consult     Radiation Oncology Consult     Initial Vitals: /64 (BP Location: Right arm, Patient Position: Chair, Cuff Size: Adult Regular)   Pulse 65   Temp 97.9  F (36.6  C) (Tympanic)   Resp 19   Ht 1.702 m (5' 7\")   Wt 63.5 kg (140 lb 1.6 oz)   SpO2 98%   BMI 21.94 kg/m   Estimated body mass index is 21.94 kg/m  as calculated from the following:    Height as of this encounter: 1.702 m (5' 7\").    Weight as of this encounter: 63.5 kg (140 lb 1.6 oz). Body surface area is 1.73 meters squared.  Severe Pain (7) Comment: Data Unavailable   No LMP for male patient.  Allergies reviewed: Yes  Medications reviewed: Yes      Patient was assessed using the NCCN psychosocial distress thermometer. Patient rated the score as a six. Patient rated current stressors as changes in urination, eating, fatigue, indigestion, pain, sleep, and tingling in hands/feet. Stressors will be brought to the attention of provider or Oncology RN Care Coordinator for a score of 6 or greater or per nurses discretion.     Patient received folder containing radiation therapy site specific informational pamphlet, radiation site specific side effects chart, radiation therapy web sites for patient research hand out, starting radiation treatment handout, what to expect with radiation handout, financial letter, vaccines during cancer treatment hand out, mental health services and providers packet, radiation therapy business card,  business card, and [employer] application. States he received advance directives information at the medical oncology consult and he is working on filling them out.    Patient also watched the RT answers head and neck cancer video while in clinic today. Patient also met with dietician today before his appointment.        Casie Mcgill, " LPN

## 2022-12-05 NOTE — PROGRESS NOTES
Care Coordination Note:    SW received call from patient's spouse. She was wondering about financial packets that patient had gotten at his med onc appointment the other week. We went through the forms and discussed some things to expect moving forward. Patient's spouse stated she would like to get the [employer] application as well because they will be on a very limited and low income. SW put the number for patient financial on the application and advised patient to call them with what income proofs would work for them, since she is private pay . She acknowledged her understanding.   Patient has a consult with RT today, so SW gave form to RT nurse to give to him.  Patient and his spouse have my contact information for any further questions or concerns.    ANDREWS Shukla

## 2022-12-06 ENCOUNTER — DOCUMENTATION ONLY (OUTPATIENT)
Dept: RADIATION ONCOLOGY | Facility: HOSPITAL | Age: 57
End: 2022-12-06

## 2022-12-07 DIAGNOSIS — C15.5 MALIGNANT NEOPLASM OF LOWER THIRD OF ESOPHAGUS (H): Primary | ICD-10-CM

## 2022-12-07 LAB — BKR LAB AP ADDL TEST(S) ADDED: YES

## 2022-12-08 ENCOUNTER — OFFICE VISIT (OUTPATIENT)
Dept: SURGERY | Facility: OTHER | Age: 57
End: 2022-12-08
Attending: INTERNAL MEDICINE
Payer: COMMERCIAL

## 2022-12-08 ENCOUNTER — PREP FOR PROCEDURE (OUTPATIENT)
Dept: SURGERY | Facility: OTHER | Age: 57
End: 2022-12-08

## 2022-12-08 VITALS
HEART RATE: 63 BPM | HEIGHT: 67 IN | WEIGHT: 140 LBS | BODY MASS INDEX: 21.97 KG/M2 | TEMPERATURE: 96.7 F | OXYGEN SATURATION: 99 %

## 2022-12-08 DIAGNOSIS — C15.5 MALIGNANT NEOPLASM OF LOWER THIRD OF ESOPHAGUS (H): ICD-10-CM

## 2022-12-08 DIAGNOSIS — C15.9 ESOPHAGEAL CANCER (H): Primary | ICD-10-CM

## 2022-12-08 DIAGNOSIS — F17.200 NICOTINE DEPENDENCE, UNCOMPLICATED, UNSPECIFIED NICOTINE PRODUCT TYPE: Primary | ICD-10-CM

## 2022-12-08 PROCEDURE — 99203 OFFICE O/P NEW LOW 30 MIN: CPT | Performed by: SURGERY

## 2022-12-08 ASSESSMENT — PAIN SCALES - GENERAL: PAINLEVEL: SEVERE PAIN (6)

## 2022-12-08 NOTE — PATIENT INSTRUCTIONS
Nicotine (Nicorette) Oral Lozenge     Uses  For quitting smoking.    Instructions  Dissolve the tablet completely in your mouth, and swallow the medicine as it dissolves. Do not chew or swallow the tablet whole.    Change the location of the medicine in the mouth with each dose to avoid irritation.    Do not eat or drink for 15 minutes before and while using this medicine.    Store at room temperature in a dry place. Do not keep in the bathroom.    Keep the medicine away from heat and light.    Please tell your doctor and pharmacist about all the medicines you take. Include both prescription and over-the-counter medicines. Also tell them about any vitamins, herbal medicines, or anything else you take for your health.    If your symptoms do not improve or they worsen while on this medicine, contact your doctor.    It is very important that you continue using this medicine for the full number of days that it is prescribed. Please do not stop the medicine even if you start to feel better after the first few days.    This medicine contains sodium. If you are on a low sodium diet, consider this as part of your total sodium diet.    Do not use more than 20 doses in 24 hours.    Cautions  Tell your doctor and pharmacist if you ever had an allergic reaction to a medicine. Symptoms of an allergic reaction can include trouble breathing, skin rash, itching, swelling, or severe dizziness.    Do not use the medication any more than instructed.    Avoid smoking while on this medicine. Smoking may increase your risk for stroke, heart attack, blood clots, high blood pressure, and other diseases of the heart and blood vessels.    Tell the doctor or pharmacist if you are pregnant, planning to be pregnant, or breastfeeding.    This medicine may contain aspartame (phenylalanine). Check with your doctor or pharmacist if you have a medical condition that requires you to limit or avoid this ingredient.    Ask your pharmacist if this  medicine can interact with any of your other medicines. Be sure to tell them about all the medicines you take.    Please tell all your doctors and dentists that you are on this medicine before they provide care.    Do not start or stop any other medicines without first speaking to your doctor or pharmacist.    Side Effects  The following is a list of some common side effects from this medicine. Please speak with your doctor about what you should do if you experience these or other side effects.      headaches    hiccups    mouth sores or irritation    nausea    sore throat    stomach upset or abdominal pain    Call your doctor or get medical help right away if you notice any of these more serious side effects:      chest pain    confusion    dizziness    swelling of the legs, feet, and hands    severe or persistent headache    fast or irregular heart beats    mood changes    feeling of numbness or tingling in your hands and feet    slurred speech    weakness on one side of the body    A few people may have an allergic reaction to this medicine. Symptoms can include difficulty breathing, skin rash, itching, swelling, or severe dizziness. If you notice any of these symptoms, seek medical help quickly.    Extra  Please speak with your doctor, nurse, or pharmacist if you have any questions about this medicine.        [email or web address]  IMPORTANT NOTE: This document tells you briefly how to take your medicine, but it does not tell you all there is to know about it. Your doctor or pharmacist may give you other documents about your medicine. Please talk to them if you have any questions. Always follow their advice. There is a more complete description of this medicine available in English. Scan this code on your smartphone or tablet or use the web address below. You can also ask your pharmacist for a printout. If you have any questions, please ask your pharmacist.   2021 [employer]      7537-0881 [employer]. All rights reserved. This information is not intended as a substitute for professional medical care. Always follow your healthcare professional's instructions.

## 2022-12-12 ENCOUNTER — TELEPHONE (OUTPATIENT)
Dept: SURGERY | Facility: OTHER | Age: 57
End: 2022-12-12

## 2022-12-12 NOTE — TELEPHONE ENCOUNTER
MTM referral from: [facility] visit (referral by provider)    MTM referral outreach attempt #2 on December 12, 2022 at 11:00 AM      Outcome: Patient not reachable after several attempts, will route to [facility] Pharmacist/Provider as an FYI.  [facility] scheduling number is [phone].  Thank you for the referral.    Loan Palomares [facility]

## 2022-12-13 ENCOUNTER — TELEPHONE (OUTPATIENT)
Dept: PET IMAGING | Facility: HOSPITAL | Age: 57
End: 2022-12-13

## 2022-12-14 ENCOUNTER — ANESTHESIA EVENT (OUTPATIENT)
Dept: SURGERY | Facility: HOSPITAL | Age: 57
End: 2022-12-14
Payer: COMMERCIAL

## 2022-12-14 ENCOUNTER — HOSPITAL ENCOUNTER (OUTPATIENT)
Dept: PET IMAGING | Facility: HOSPITAL | Age: 57
Discharge: HOME | End: 2022-12-14
Attending: INTERNAL MEDICINE | Admitting: INTERNAL MEDICINE
Payer: COMMERCIAL

## 2022-12-14 DIAGNOSIS — C15.5 MALIGNANT NEOPLASM OF LOWER THIRD OF ESOPHAGUS (H): ICD-10-CM

## 2022-12-14 PROCEDURE — 343N000001 HC RX 343: Performed by: INTERNAL MEDICINE

## 2022-12-14 PROCEDURE — A9552 F18 FDG: HCPCS | Performed by: INTERNAL MEDICINE

## 2022-12-14 PROCEDURE — 78815 PET IMAGE W/CT SKULL-THIGH: CPT | Mod: PI

## 2022-12-14 RX ORDER — POLYETHYLENE GLYCOL 3350 17 G
2 POWDER IN PACKET (EA) ORAL
Qty: 108 LOZENGE | Refills: 1 | Status: SHIPPED | OUTPATIENT
Start: 2022-12-14 | End: 2023-02-08

## 2022-12-14 RX ADMIN — FLUDEOXYGLUCOSE F-18 9.88 MCI.: 500 INJECTION, SOLUTION INTRAVENOUS at 09:45

## 2022-12-14 NOTE — PROGRESS NOTES
[facility] Surgery Consultation    CC:  Esophgeal cancer            Weight loss     HPI:  This 57 year old year old male is seen at the request of Dr. Barnhart for evaluation of weight loss, recent diagnosis of advanced stage adenocarcinoma of the esophagus. He had been noting some difficulty swallowing. This prompted him to have an upper endoscopy which showed a mass at GE junction. Was able to get by this with the endoscope to complete exam. He is a smoker, rarely drinks. This was done on 11/1. He has been seen by oncology and they would like him to have better feeding access. He has not been seen by a surgical oncologist. He is working at [employer]. He denies prior abdominal surgery.     Past Medical History:   Diagnosis Date     Cancer (H)      Esophageal cancer (H) 11/01/2022     Esophageal thickening 10/25/2022     Mixed hearing loss, bilateral      Tobacco use disorder 12/27/2019       Past Surgical History:   Procedure Laterality Date     COLONOSCOPY  11/01/2022    [facility]     Upper GI Endoscopy  11/01/2022       No Known Allergies    Current Outpatient Medications   Medication     nicotine (COMMIT) 2 MG lozenge     nicotine (NICORETTE) 4 MG lozenge     ibuprofen (ADVIL/MOTRIN) 200 MG tablet     No current facility-administered medications for this visit.       HABITS:    Social History     Tobacco Use     Smoking status: Every Day     Packs/day: 0.50     Years: 46.00     Pack years: 23.00     Types: Cigarettes     Start date: 1976     Smokeless tobacco: Never     Tobacco comments:     Trying to quit   Vaping Use     Vaping Use: Never used   Substance Use Topics     Alcohol use: Not Currently     Comment: no alcohol in last 6 months     Drug use: Never       Family History   Problem Relation Age of Onset     Breast Cancer Mother      Hyperlipidemia Mother      Diabetes Mother      Coronary Artery Disease Mother      Cerebrovascular Disease Father      Hyperlipidemia Father      Coronary Artery Disease  "Father      Myocardial Infarction Father      Hyperlipidemia Brother      Hypertension Brother      Diabetes Brother      Asthma Sister      Breast Cancer Sister      Hyperlipidemia Sister      Diabetes Sister      Hyperlipidemia Sister      Diabetes Sister      Hyperlipidemia Sister      Suicide Sister      Anesthesia Reaction Sister      Breast Cancer Sister      Hyperlipidemia Sister      Diabetes Sister      Breast Cancer Niece      Skin Cancer Niece      Thyroid Disease Niece      Breast Cancer Niece      Leukemia Nephew      Colon Cancer No family hx of      Prostate Cancer No family hx of      Genetic Disorder No family hx of        REVIEW OF SYSTEMS:  Ten point review of systems negative except those mentioned in the HPI.     OBJECTIVE:    Pulse 63   Temp (!) 96.7  F (35.9  C)   Ht 1.702 m (5' 7\")   Wt 63.5 kg (140 lb)   SpO2 99%   BMI 21.93 kg/m      GENERAL: Generally appears well, in no distress with appropriate affect.  HEENT:   Sclerae anicteric - normocephalic atraumatic   Respiratory:  No acute distress, no splinting   Cardiovascular:  Regular Rate and Rhythm  Abdomen: no scars.   :  deferred  Extremities:  Extremities normal. No deformities, edema, or skin discoloration.  Skin:  no suspicious lesions or rashes  Neurological: grossly intact  Psych:  Alert, oriented, affect appropriate with normal decision making ability.    IMPRESSION:    Advanced stage esophageal carcinoma with plans for neoadjuvant chemo-radiation. Will ask oncology if will need port as well. Discussed potential complications of feeding tube including leaking, dislodgement, injury to colon. He would like to proceed. I would think should still be able to pass endoscope as was able to get through a month ago.      PLAN:    PEG.     Nba Dalton MD,     12/14/2022  1:44 PM      "

## 2022-12-14 NOTE — ANESTHESIA PREPROCEDURE EVALUATION
Anesthesia Pre-Procedure Evaluation    Patient: Kenneth Matthew   MRN: [id number] : 1965        Procedure : Procedure(s):  INSERTION, PEG TUBE          Past Medical History:   Diagnosis Date     Cancer (H)      Esophageal cancer (H) 2022     Esophageal thickening 10/25/2022     Mixed hearing loss, bilateral      Tobacco use disorder 2019      Past Surgical History:   Procedure Laterality Date     COLONOSCOPY  2022    [facility]     Upper GI Endoscopy  2022      No Known Allergies   Social History     Tobacco Use     Smoking status: Every Day     Packs/day: 0.50     Years: 46.00     Pack years: 23.00     Types: Cigarettes     Start date:      Smokeless tobacco: Never     Tobacco comments:     Trying to quit   Substance Use Topics     Alcohol use: Not Currently     Comment: no alcohol in last 6 months      Wt Readings from Last 1 Encounters:   22 63.5 kg (140 lb)        Anesthesia Evaluation   Pt has had prior anesthetic. Type: MAC.    No history of anesthetic complications       ROS/MED HX  ENT/Pulmonary:     (+) CARYN risk factors, snores loudly, tobacco use (1/2 ppd), Current use, 23  Pack-Year Hx,      Neurologic:  - neg neurologic ROS     Cardiovascular:  - neg cardiovascular ROS     METS/Exercise Tolerance: 4 - Raking leaves, gardening    Hematologic:  - neg hematologic  ROS     Musculoskeletal:  - neg musculoskeletal ROS     GI/Hepatic: Comment: Dysphagia, difficulty swallowing    (+) esophageal disease,     Renal/Genitourinary:  - neg Renal ROS     Endo:  - neg endo ROS     Psychiatric/Substance Use:     (+) alcohol abuse (hx of; none since )     Infectious Disease:  - neg infectious disease ROS     Malignancy:   (+) Malignancy, History of Other.Other CA esophageal, plan for chemo and radiation Active status post Radiation and Chemo.    Other:  - neg other ROS          Physical Exam    Airway        Mallampati: II   TM distance: > 3 FB   Neck ROM: full   Mouth  opening: > 3 cm    Respiratory Devices and Support         Dental       (+) loose and missing    B=Bridge, C=Chipped, L=Loose, M=Missing    Cardiovascular   cardiovascular exam normal       Rhythm and rate: regular and normal     Pulmonary   pulmonary exam normal        breath sounds clear to auscultation           OUTSIDE LABS:  CBC:   Lab Results   Component Value Date    WBC 11.7 (H) 11/22/2022    HGB 12.4 (L) 11/22/2022    HCT 37.0 (L) 11/22/2022     11/22/2022     BMP:   Lab Results   Component Value Date     11/22/2022    POTASSIUM 4.8 11/22/2022    CHLORIDE 103 11/22/2022    CO2 28 11/22/2022    BUN 15.0 11/22/2022    CR 0.59 (L) 11/22/2022    GLC 96 11/22/2022     COAGS: No results found for: PTT, INR, FIBR  POC: No results found for: BGM, HCG, HCGS  HEPATIC:   Lab Results   Component Value Date    ALBUMIN 3.6 11/22/2022    PROTTOTAL 6.6 11/22/2022    ALT 9 (L) 11/22/2022    AST 9 (L) 11/22/2022    ALKPHOS 68 11/22/2022    BILITOTAL 0.4 11/22/2022     OTHER:   Lab Results   Component Value Date    NICHOLAS 9.0 11/22/2022       Anesthesia Plan    ASA Status:  4   NPO Status:  NPO Appropriate    Anesthesia Type: MAC.     - Reason for MAC: straight local not clinically adequate              Consents    Anesthesia Plan(s) and associated risks, benefits, and realistic alternatives discussed. Questions answered and patient/representative(s) expressed understanding.     - Discussed: Risks, Benefits and Alternatives for BOTH SEDATION and the PROCEDURE were discussed     - Discussed with:  Patient      - Extended Intubation/Ventilatory Support Discussed: No.      - Patient is DNR/DNI Status: No    Use of blood products discussed: No .     Postoperative Care    Pain management: IV analgesics.   PONV prophylaxis: Ondansetron (or other 5HT-3)     Comments:    Other Comments: Oncology H&P, no interval changes    Risks and benefits of MAC anesthetic discussed including dental damage, aspiration, loss of airway,  conversion to general anesthetic, CV complications, MI, stroke, death. Pt wishes to proceed.             NATALEE Argueta CRNA

## 2022-12-15 LAB
PATH REPORT.COMMENTS IMP SPEC: ABNORMAL
PATH REPORT.COMMENTS IMP SPEC: YES
PATH REPORT.FINAL DX SPEC: ABNORMAL
PATH REPORT.MICROSCOPIC SPEC OTHER STN: ABNORMAL
PATH REPORT.RELEVANT HX SPEC: ABNORMAL
PATH REPORT.SITE OF ORIGIN SPEC: ABNORMAL

## 2022-12-15 PROCEDURE — 88313 SPECIAL STAINS GROUP 2: CPT | Mod: 26 | Performed by: PATHOLOGY

## 2022-12-15 PROCEDURE — 88313 SPECIAL STAINS GROUP 2: CPT | Mod: TC

## 2022-12-15 PROCEDURE — 88360 TUMOR IMMUNOHISTOCHEM/MANUAL: CPT | Mod: 26 | Performed by: PATHOLOGY

## 2022-12-19 DIAGNOSIS — C15.5 MALIGNANT NEOPLASM OF LOWER THIRD OF ESOPHAGUS (H): Primary | ICD-10-CM

## 2022-12-19 NOTE — PROGRESS NOTES
Order placed for Brain MR to rule out metastatic disease to the brain, patient aware. Patient offered a simulation date on 12/29/22 at 10:30 am. He wishes to meet with medical oncology prior to making a decision regarding radiation therapy. He plans to discuss his decision tomorrow with us after he meets with Dr. Barnhart.

## 2022-12-20 ENCOUNTER — ONCOLOGY VISIT (OUTPATIENT)
Dept: ONCOLOGY | Facility: OTHER | Age: 57
End: 2022-12-20
Attending: INTERNAL MEDICINE
Payer: COMMERCIAL

## 2022-12-20 ENCOUNTER — ONCOLOGY VISIT (OUTPATIENT)
Dept: RADIATION ONCOLOGY | Facility: HOSPITAL | Age: 57
End: 2022-12-20
Payer: COMMERCIAL

## 2022-12-20 ENCOUNTER — TELEPHONE (OUTPATIENT)
Dept: NUTRITION | Facility: HOSPITAL | Age: 57
End: 2022-12-20

## 2022-12-20 ENCOUNTER — TRANSFERRED RECORDS (OUTPATIENT)
Dept: HEALTH INFORMATION MANAGEMENT | Facility: CLINIC | Age: 57
End: 2022-12-20

## 2022-12-20 VITALS
SYSTOLIC BLOOD PRESSURE: 118 MMHG | DIASTOLIC BLOOD PRESSURE: 72 MMHG | RESPIRATION RATE: 18 BRPM | TEMPERATURE: 99.3 F | WEIGHT: 136.69 LBS | BODY MASS INDEX: 20.72 KG/M2 | OXYGEN SATURATION: 98 % | HEIGHT: 68 IN | HEART RATE: 71 BPM

## 2022-12-20 DIAGNOSIS — C15.5 MALIGNANT NEOPLASM OF LOWER THIRD OF ESOPHAGUS (H): Primary | ICD-10-CM

## 2022-12-20 PROCEDURE — 99215 OFFICE O/P EST HI 40 MIN: CPT | Performed by: INTERNAL MEDICINE

## 2022-12-20 RX ORDER — NICOTINE 21 MG/24HR
1 PATCH, TRANSDERMAL 24 HOURS TRANSDERMAL EVERY 24 HOURS
Qty: 14 PATCH | Refills: 0 | Status: SHIPPED | OUTPATIENT
Start: 2022-12-20 | End: 2023-02-08 | Stop reason: DRUGHIGH

## 2022-12-20 RX ORDER — HYDROCODONE BITARTRATE AND ACETAMINOPHEN 5; 217 MG/10ML; MG/10ML
10 SOLUTION ORAL EVERY 8 HOURS
Qty: 473 ML | Refills: 0 | Status: ON HOLD | OUTPATIENT
Start: 2022-12-20 | End: 2022-12-22

## 2022-12-20 ASSESSMENT — PAIN SCALES - GENERAL: PAINLEVEL: SEVERE PAIN (7)

## 2022-12-20 NOTE — PROGRESS NOTES
Discussed patient's case with medical oncologist, decision to proceed with systemic therapy first was discussed due to metastatic disease shown on PET scan,  with the understanding that radiation therapy can be added and when necessary.  We did discuss this plan with patient who would like to proceed with systemic therapy prior to radiation.

## 2022-12-20 NOTE — Clinical Note
Order for FOLFOX nivo placed for Mr. Matthew  Can you please let Dr. Dalton office know that Mr. Matthew is ok to do the port same day as peg  Also he will need treatment teaching.   Follow up with provider before cycle 2.

## 2022-12-20 NOTE — NURSING NOTE
"Oncology Rooming Note    December 20, 2022 10:27 AM   Kenneth Matthew is a 57 year old male who presents for:    Chief Complaint   Patient presents with     Oncology Clinic Visit     Follow up. Malignant neoplasm of lower third of esophagus      Initial Vitals: /72   Pulse 71   Temp 99.3  F (37.4  C) (Tympanic)   Resp 18   Ht 1.715 m (5' 7.5\")   Wt 62 kg (136 lb 11 oz)   SpO2 98%   BMI 21.09 kg/m   Estimated body mass index is 21.09 kg/m  as calculated from the following:    Height as of this encounter: 1.715 m (5' 7.5\").    Weight as of this encounter: 62 kg (136 lb 11 oz). Body surface area is 1.72 meters squared.  Severe Pain (7) Comment: Data Unavailable   No LMP for male patient.  Allergies reviewed: Yes  Medications reviewed: Yes    Medications: Medication refills not needed today.  Pharmacy name entered into [employer] ([employer]viously [employer]): [facility] - [place], [place] - [address]     Clinical concerns: pharmacy has been out of nicotine gum, wondering if could get patches?  Dr. Barnhart was notified.      Syeda Muir LPN              "

## 2022-12-21 ENCOUNTER — CARE COORDINATION (OUTPATIENT)
Dept: ONCOLOGY | Facility: OTHER | Age: 57
End: 2022-12-21

## 2022-12-21 NOTE — PROGRESS NOTES
MEDICAL ONCOLOGY FOLLOW UP NOTE  Dec 20, 2022    Reason for Follow up: esophageal cancer    HISTORY OF PRESENT ILLNESS  Kenneth Matthew is a 57 year old male with PMH as stated below who is seen in the oncology clinic for consultation of esophageal cancer    His history in short is as follows    Mr. Matthew states he noticed difficulty swallowing initially starting June 2022. Over the last 1 year he has unintentionally lost 60 pounds.     10/21/2022: CT chest, abdomen and pelvis: Diffuse wall thickening of the distal esophagus with associated mediastinal adenopathy, and upper abdominal  Conglomerate lymphadenopathy. Constellation of findings are highly concerning for underlying esophageal carcinoma with metastatic disease.     11/1/2022: EGD: A large ulcerating mass with no bleeding and no stigmata of recent bleeding was found   Lower third from the incisors. The mass was partially obstructing and partially circumferential involving  two thirds.     Biopsy showed poorly differentiated favoring adenocarcinoma. Her 2 negative.     12/14/2022: PET scan: Distal esophageal neoplasm with metastatic involvement the  mediastinum, left hilum and retroperitoneal lymph nodes of the upperabdomen. Also likely localized metastatic involvement of thestomach/GE junction. 2 cm intramural distal esophageal/GE junction mass. Circumferential wall thickening with increased FDG uptake in the distal esophagus adjacent portions of the stomach/GE junction may represent localized disease however could also represent localized inflammation. Metastatic involvement to the subcarinal nodes, left infrahilar and  pericaval/paraesophageal lymph nodes of the lower thorax. Metastatic involvement to the retroperitoneal lymph nodes of the upper abdomen. These lymph nodes have increased in size dating back to be  10/21/2022 CT scan consistent with worsening disease. Representative  nodes are described above.    Interim history    Doing well. Denies any  abdominal pain. He has been trying to eat but has lost some weight. Continues to have back pain. Denies any nausea or vomiting.     REVIEW OF SYSTEMS  A 12-point ROS negative except as in HPI      Current Outpatient Medications   Medication Sig Dispense Refill     HYDROcodone-Acetaminophen 5-217 MG/10ML SOLN Take 10 mLs by mouth every 8 hours for 30 days 473 mL 0     ibuprofen (ADVIL/MOTRIN) 200 MG tablet Take 200 mg by mouth every 4 hours as needed for pain       nicotine (NICODERM CQ) 21 MG/24HR 24 hr patch Place 1 patch onto the skin every 24 hours 14 patch 0     nicotine (COMMIT) 2 MG lozenge Place 1 lozenge (2 mg) inside cheek every hour as needed for smoking cessation (Patient not taking: Reported on 12/20/2022) 108 lozenge 1     nicotine (NICORETTE) 4 MG lozenge Place 1 lozenge (4 mg) inside cheek every hour as needed for smoking cessation (Patient not taking: Reported on 12/20/2022) 108 lozenge 1       No Known Allergies  Immunization History   Administered Date(s) Administered     COVID-19 Vaccine 12+ (Pfizer) 08/28/2021, 09/18/2021     Tdap (Adacel,Boostrix) 12/10/2021       Past Medical History:   Diagnosis Date     Cancer (H)      Esophageal cancer (H) 11/01/2022     Esophageal thickening 10/25/2022     Mixed hearing loss, bilateral      Tobacco use disorder 12/27/2019       Past Surgical History:   Procedure Laterality Date     COLONOSCOPY  11/01/2022    [facility]     Upper GI Endoscopy  11/01/2022       SOCIAL HISTORY  History   Smoking Status     Every Day     Packs/day: 0.50     Years: 46.00     Types: Cigarettes     Start date: 1976   Smokeless Tobacco     Never    Social History    Substance and Sexual Activity      Alcohol use: Not Currently        Comment: no alcohol in last 6 months     History   Drug Use Unknown       FAMILY HISTORY  Family History   Problem Relation Age of Onset     Breast Cancer Mother      Hyperlipidemia Mother      Diabetes Mother      Coronary Artery Disease Mother       "Cerebrovascular Disease Father      Hyperlipidemia Father      Coronary Artery Disease Father      Myocardial Infarction Father      Hyperlipidemia Brother      Hypertension Brother      Diabetes Brother      Asthma Sister      Breast Cancer Sister      Hyperlipidemia Sister      Diabetes Sister      Hyperlipidemia Sister      Diabetes Sister      Hyperlipidemia Sister      Suicide Sister      Anesthesia Reaction Sister      Breast Cancer Sister      Hyperlipidemia Sister      Diabetes Sister      Breast Cancer Niece      Skin Cancer Niece      Thyroid Disease Niece      Breast Cancer Niece      Leukemia Nephew      Colon Cancer No family hx of      Prostate Cancer No family hx of      Genetic Disorder No family hx of        PHYSICAL EXAMINATION  /72   Pulse 71   Temp 99.3  F (37.4  C) (Tympanic)   Resp 18   Ht 1.715 m (5' 7.5\")   Wt 62 kg (136 lb 11 oz)   SpO2 98%   BMI 21.09 kg/m    Wt Readings from Last 2 Encounters:   12/20/22 62 kg (136 lb 11 oz)   12/08/22 63.5 kg (140 lb)     Physical Exam  Constitutional:       Appearance: Normal appearance.   Eyes:      Conjunctiva/sclera: Conjunctivae normal.   Pulmonary:      Effort: Pulmonary effort is normal.   Abdominal:      General: Abdomen is flat.      Palpations: Abdomen is soft.   Neurological:      General: No focal deficit present.      Mental Status: He is alert and oriented to person, place, and time. Mental status is at baseline.     Laboratory and Imaging:     Latest Reference Range & Units 11/22/22 12:06   WBC 4.0 - 11.0 10e3/uL 11.7 (H)   Hemoglobin 13.3 - 17.7 g/dL 12.4 (L)   Hematocrit 40.0 - 53.0 % 37.0 (L)   Platelet Count 150 - 450 10e3/uL 321   (H): Data is abnormally high  (L): Data is abnormally low    RESULT FOR IMMUNOHISTOCHEMICAL VENTANA CLONE  PD-L1 ASSAY  COMBINED POSITIVE SCORE (CPS): <1%    ASSESSMENT AND PLAN    1. Adenocarcinoma of the esophagus    Discussed imgaging and pathology. Given the findings of mediastinal " adenopathy and upper abdominal adenopathy with primary in the lower third of the esophagus unlikely to be surgical.     PET scan did show metastatic disease to non regional adenopathy therefore stage IV disease. PET scan was discussed with radiation oncology and not felt to be a candidate for concurrent chemoRT but palliative RT.     I would recommend starting with systemic therapy for now and then considering palliative RT down the line. He is getting a PEG with surgery on 12/21/2022.     Discussed options for systemic therapy and I would recommend FOLFOX. Discussed option of adding Nivolumab. Based on checkmate 649 there was some survival benefit with intention to treat population. Therefore recommended additing it. His PDL-1 CPS was <1%    Expected side effects which include but not limited to nasuea, vomiting, diarrhea, low blood counts, infection, kidney or liver toxicity, allergic reactions, neuropathy     Expected side effects of Nivolumab discussed with includes but not limited to pneumonitis, colitis, hepatitis, Encephalitis, myocarditis, adrenal insufficiency. He is agreeable to start. Orders for FOLFOX with NIvolumab placed.     He is scheduled to get PEG and PORT on 12/29/2022. Will also get treatment teaching.     Follow up after cycle 2.     Total time spent on the patient was 40 minutes which included time spent reviewing lab results, performing history and physical, reviewing imaging results with the patient, documenting history and physical and plan      Nika Barnhart MD

## 2022-12-21 NOTE — PROGRESS NOTES
Orders placed for TF formula and supplies. Will fax to [facility]  when signed by provider.    See RD note from 12/5/22 for assessment.    Nutrition Recommendations:  TF Recs  3 cartons of Isosource 1.5 daily- bolus feeds (1 cartons TID)  1125kcal, 51g protein, 573ml free water  240ml water 3-4 times per day- depending on oral intake.      Shelbie Pimentel, RD

## 2022-12-22 ENCOUNTER — APPOINTMENT (OUTPATIENT)
Dept: GENERAL RADIOLOGY | Facility: HOSPITAL | Age: 57
End: 2022-12-22
Attending: SURGERY
Payer: COMMERCIAL

## 2022-12-22 ENCOUNTER — HOSPITAL ENCOUNTER (OUTPATIENT)
Facility: HOSPITAL | Age: 57
Discharge: HOME | End: 2022-12-22
Attending: SURGERY | Admitting: SURGERY
Payer: COMMERCIAL

## 2022-12-22 ENCOUNTER — ANESTHESIA (OUTPATIENT)
Dept: SURGERY | Facility: HOSPITAL | Age: 57
End: 2022-12-22
Payer: COMMERCIAL

## 2022-12-22 VITALS
HEART RATE: 73 BPM | DIASTOLIC BLOOD PRESSURE: 50 MMHG | OXYGEN SATURATION: 99 % | WEIGHT: 136 LBS | SYSTOLIC BLOOD PRESSURE: 105 MMHG | RESPIRATION RATE: 18 BRPM | BODY MASS INDEX: 21.35 KG/M2 | HEIGHT: 67 IN | TEMPERATURE: 98.1 F

## 2022-12-22 DIAGNOSIS — C15.5 MALIGNANT NEOPLASM OF LOWER THIRD OF ESOPHAGUS (H): ICD-10-CM

## 2022-12-22 LAB — GLUCOSE BLDC GLUCOMTR-MCNC: 89 MG/DL (ref 70–99)

## 2022-12-22 PROCEDURE — 999N000141 HC STATISTIC PRE-PROCEDURE NURSING ASSESSMENT: Performed by: SURGERY

## 2022-12-22 PROCEDURE — 258N000003 HC RX IP 258 OP 636: Performed by: NURSE ANESTHETIST, CERTIFIED REGISTERED

## 2022-12-22 PROCEDURE — 250N000011 HC RX IP 250 OP 636: Performed by: NURSE ANESTHETIST, CERTIFIED REGISTERED

## 2022-12-22 PROCEDURE — 360N000083 HC SURGERY LEVEL 3 W/ FLUORO, PER MIN: Performed by: SURGERY

## 2022-12-22 PROCEDURE — 250N000011 HC RX IP 250 OP 636: Performed by: SURGERY

## 2022-12-22 PROCEDURE — 250N000013 HC RX MED GY IP 250 OP 250 PS 637: Performed by: SURGERY

## 2022-12-22 PROCEDURE — 36561 INSERT TUNNELED CV CATH: CPT | Performed by: SURGERY

## 2022-12-22 PROCEDURE — 250N000009 HC RX 250: Performed by: NURSE ANESTHETIST, CERTIFIED REGISTERED

## 2022-12-22 PROCEDURE — 999N000180 XR SURGERY CARM FLUORO LESS THAN 5 MIN: Mod: TC

## 2022-12-22 PROCEDURE — 258N000003 HC RX IP 258 OP 636: Performed by: SURGERY

## 2022-12-22 PROCEDURE — 272N000001 HC OR GENERAL SUPPLY STERILE: Performed by: SURGERY

## 2022-12-22 PROCEDURE — 710N000012 HC RECOVERY PHASE 2, PER MINUTE: Performed by: SURGERY

## 2022-12-22 PROCEDURE — C1788 PORT, INDWELLING, IMP: HCPCS | Performed by: SURGERY

## 2022-12-22 PROCEDURE — 250N000009 HC RX 250: Performed by: SURGERY

## 2022-12-22 PROCEDURE — 82962 GLUCOSE BLOOD TEST: CPT

## 2022-12-22 PROCEDURE — 370N000017 HC ANESTHESIA TECHNICAL FEE, PER MIN: Performed by: SURGERY

## 2022-12-22 PROCEDURE — 36561 INSERT TUNNELED CV CATH: CPT | Performed by: NURSE ANESTHETIST, CERTIFIED REGISTERED

## 2022-12-22 PROCEDURE — 43246 EGD PLACE GASTROSTOMY TUBE: CPT | Performed by: SURGERY

## 2022-12-22 DEVICE — PORT-IMPLANTABLE POWER PORT: Type: IMPLANTABLE DEVICE | Site: CHEST | Status: FUNCTIONAL

## 2022-12-22 RX ORDER — BUPIVACAINE HYDROCHLORIDE AND EPINEPHRINE 2.5; 5 MG/ML; UG/ML
INJECTION, SOLUTION INFILTRATION; PERINEURAL PRN
Status: DISCONTINUED | OUTPATIENT
Start: 2022-12-22 | End: 2022-12-22 | Stop reason: HOSPADM

## 2022-12-22 RX ORDER — FENTANYL CITRATE 50 UG/ML
25 INJECTION, SOLUTION INTRAMUSCULAR; INTRAVENOUS
Status: DISCONTINUED | OUTPATIENT
Start: 2022-12-22 | End: 2022-12-22 | Stop reason: HOSPADM

## 2022-12-22 RX ORDER — LIDOCAINE 40 MG/G
CREAM TOPICAL
Status: DISCONTINUED | OUTPATIENT
Start: 2022-12-22 | End: 2022-12-22 | Stop reason: HOSPADM

## 2022-12-22 RX ORDER — NALOXONE HYDROCHLORIDE 0.4 MG/ML
0.4 INJECTION, SOLUTION INTRAMUSCULAR; INTRAVENOUS; SUBCUTANEOUS
Status: DISCONTINUED | OUTPATIENT
Start: 2022-12-22 | End: 2022-12-22 | Stop reason: HOSPADM

## 2022-12-22 RX ORDER — BUPIVACAINE HYDROCHLORIDE AND EPINEPHRINE 2.5; 5 MG/ML; UG/ML
INJECTION, SOLUTION EPIDURAL; INFILTRATION; INTRACAUDAL; PERINEURAL
Status: DISCONTINUED
Start: 2022-12-22 | End: 2022-12-22 | Stop reason: HOSPADM

## 2022-12-22 RX ORDER — CEFAZOLIN SODIUM/WATER 2 G/20 ML
2 SYRINGE (ML) INTRAVENOUS SEE ADMIN INSTRUCTIONS
Status: DISCONTINUED | OUTPATIENT
Start: 2022-12-22 | End: 2022-12-22 | Stop reason: HOSPADM

## 2022-12-22 RX ORDER — CEFAZOLIN SODIUM/WATER 2 G/20 ML
2 SYRINGE (ML) INTRAVENOUS
Status: COMPLETED | OUTPATIENT
Start: 2022-12-22 | End: 2022-12-22

## 2022-12-22 RX ORDER — FENTANYL CITRATE 50 UG/ML
50 INJECTION, SOLUTION INTRAMUSCULAR; INTRAVENOUS EVERY 5 MIN PRN
Status: DISCONTINUED | OUTPATIENT
Start: 2022-12-22 | End: 2022-12-22 | Stop reason: HOSPADM

## 2022-12-22 RX ORDER — HEPARIN SODIUM 1000 [USP'U]/ML
INJECTION, SOLUTION INTRAVENOUS; SUBCUTANEOUS PRN
Status: DISCONTINUED | OUTPATIENT
Start: 2022-12-22 | End: 2022-12-22 | Stop reason: HOSPADM

## 2022-12-22 RX ORDER — SODIUM CHLORIDE, SODIUM LACTATE, POTASSIUM CHLORIDE, CALCIUM CHLORIDE 600; 310; 30; 20 MG/100ML; MG/100ML; MG/100ML; MG/100ML
INJECTION, SOLUTION INTRAVENOUS CONTINUOUS
Status: DISCONTINUED | OUTPATIENT
Start: 2022-12-22 | End: 2022-12-22 | Stop reason: HOSPADM

## 2022-12-22 RX ORDER — NALOXONE HYDROCHLORIDE 0.4 MG/ML
0.2 INJECTION, SOLUTION INTRAMUSCULAR; INTRAVENOUS; SUBCUTANEOUS
Status: DISCONTINUED | OUTPATIENT
Start: 2022-12-22 | End: 2022-12-22 | Stop reason: HOSPADM

## 2022-12-22 RX ORDER — LIDOCAINE HYDROCHLORIDE 20 MG/ML
INJECTION, SOLUTION INFILTRATION; PERINEURAL PRN
Status: DISCONTINUED | OUTPATIENT
Start: 2022-12-22 | End: 2022-12-22

## 2022-12-22 RX ORDER — ONDANSETRON 4 MG/1
4 TABLET, ORALLY DISINTEGRATING ORAL EVERY 30 MIN PRN
Status: DISCONTINUED | OUTPATIENT
Start: 2022-12-22 | End: 2022-12-22 | Stop reason: HOSPADM

## 2022-12-22 RX ORDER — FENTANYL CITRATE 50 UG/ML
25 INJECTION, SOLUTION INTRAMUSCULAR; INTRAVENOUS EVERY 5 MIN PRN
Status: DISCONTINUED | OUTPATIENT
Start: 2022-12-22 | End: 2022-12-22 | Stop reason: HOSPADM

## 2022-12-22 RX ORDER — FENTANYL CITRATE 50 UG/ML
50 INJECTION, SOLUTION INTRAMUSCULAR; INTRAVENOUS EVERY 5 MIN PRN
Status: COMPLETED | OUTPATIENT
Start: 2022-12-22 | End: 2022-12-22

## 2022-12-22 RX ORDER — ONDANSETRON 2 MG/ML
4 INJECTION INTRAMUSCULAR; INTRAVENOUS EVERY 30 MIN PRN
Status: DISCONTINUED | OUTPATIENT
Start: 2022-12-22 | End: 2022-12-22 | Stop reason: HOSPADM

## 2022-12-22 RX ORDER — OXYCODONE HCL 5 MG/5 ML
5 SOLUTION, ORAL ORAL EVERY 6 HOURS PRN
Qty: 100 ML | Refills: 0 | Status: SHIPPED | OUTPATIENT
Start: 2022-12-22 | End: 2023-01-09

## 2022-12-22 RX ORDER — PROPOFOL 10 MG/ML
INJECTION, EMULSION INTRAVENOUS CONTINUOUS PRN
Status: DISCONTINUED | OUTPATIENT
Start: 2022-12-22 | End: 2022-12-22

## 2022-12-22 RX ORDER — MEPERIDINE HYDROCHLORIDE 25 MG/ML
12.5 INJECTION INTRAMUSCULAR; INTRAVENOUS; SUBCUTANEOUS
Status: DISCONTINUED | OUTPATIENT
Start: 2022-12-22 | End: 2022-12-22 | Stop reason: HOSPADM

## 2022-12-22 RX ORDER — HYDRALAZINE HYDROCHLORIDE 20 MG/ML
2.5-5 INJECTION INTRAMUSCULAR; INTRAVENOUS EVERY 10 MIN PRN
Status: DISCONTINUED | OUTPATIENT
Start: 2022-12-22 | End: 2022-12-22 | Stop reason: HOSPADM

## 2022-12-22 RX ORDER — OXYCODONE HCL 5 MG/5 ML
5 SOLUTION, ORAL ORAL EVERY 4 HOURS PRN
Status: COMPLETED | OUTPATIENT
Start: 2022-12-22 | End: 2022-12-22

## 2022-12-22 RX ADMIN — HYDROMORPHONE HYDROCHLORIDE 0.5 MG: 1 INJECTION, SOLUTION INTRAMUSCULAR; INTRAVENOUS; SUBCUTANEOUS at 12:44

## 2022-12-22 RX ADMIN — PROPOFOL 100 MCG/KG/MIN: 10 INJECTION, EMULSION INTRAVENOUS at 12:44

## 2022-12-22 RX ADMIN — PROPOFOL 50 MG: 10 INJECTION, EMULSION INTRAVENOUS at 13:32

## 2022-12-22 RX ADMIN — FENTANYL CITRATE 50 MCG: 50 INJECTION INTRAMUSCULAR; INTRAVENOUS at 12:16

## 2022-12-22 RX ADMIN — LIDOCAINE HYDROCHLORIDE 40 MG: 20 INJECTION, SOLUTION INFILTRATION; PERINEURAL at 12:44

## 2022-12-22 RX ADMIN — SODIUM CHLORIDE, POTASSIUM CHLORIDE, SODIUM LACTATE AND CALCIUM CHLORIDE: 600; 310; 30; 20 INJECTION, SOLUTION INTRAVENOUS at 11:58

## 2022-12-22 RX ADMIN — OXYCODONE HYDROCHLORIDE 5 MG: 5 SOLUTION ORAL at 14:25

## 2022-12-22 RX ADMIN — Medication 2 G: at 12:33

## 2022-12-22 RX ADMIN — LIDOCAINE HYDROCHLORIDE 40 MG: 20 INJECTION, SOLUTION INFILTRATION; PERINEURAL at 12:43

## 2022-12-22 RX ADMIN — PROPOFOL 50 MG: 10 INJECTION, EMULSION INTRAVENOUS at 13:28

## 2022-12-22 RX ADMIN — PROPOFOL 50 MG: 10 INJECTION, EMULSION INTRAVENOUS at 13:36

## 2022-12-22 RX ADMIN — FENTANYL CITRATE 50 MCG: 50 INJECTION INTRAMUSCULAR; INTRAVENOUS at 11:55

## 2022-12-22 ASSESSMENT — ACTIVITIES OF DAILY LIVING (ADL)
ADLS_ACUITY_SCORE: 35
ADLS_ACUITY_SCORE: 33
ADLS_ACUITY_SCORE: 35

## 2022-12-22 ASSESSMENT — LIFESTYLE VARIABLES: TOBACCO_USE: 1

## 2022-12-22 NOTE — PLAN OF CARE
Patient and responsible adult given discharge instructions with no questions regarding instructions. Layton score 19. Pain level 0/10.  Discharged from unit via home. Patient discharged to home

## 2022-12-22 NOTE — DISCHARGE INSTRUCTIONS
You may clamp drainage bag after 4 hours which is 6 pm.     At 6 pm clamp the tube. Await home care for further instructions. Move clamp up and down the tube to move to different positions.               Post-Anesthesia Patient Instructions    IMMEDIATELY FOLLOWING SURGERY:  Do not drive or operate machinery for the first twenty four hours after surgery.  Do not make any important decisions for twenty four hours after surgery or while taking narcotic pain medications or sedatives.  If you develop intractable nausea and vomiting or a severe headache please notify your doctor immediately.    FOLLOW-UP:  Please make an appointment with your surgeon as instructed. You do not need to follow up with anesthesia unless specifically instructed to do so.    WOUND CARE INSTRUCTIONS (if applicable):  Keep a dry clean dressing on the anesthesia/puncture wound site if there is drainage.  Once the wound has quit draining you may leave it open to air.  Generally you should leave the bandage intact for twenty four hours unless there is drainage.  If the epidural site drains for more than 36-48 hours please call the anesthesia department.    QUESTIONS?:  Please feel free to call your physician or the hospital  if you have any questions, and they will be happy to assist you.

## 2022-12-22 NOTE — ANESTHESIA POSTPROCEDURE EVALUATION
Patient: Kenneth Matthew    Procedure: Procedure(s):  ESOPHAGOGASTRODUODENOSCOPY, WITH PERCUTANEOUS ENDOSCOPIC GASTROSTOMY TUBE INSERTION  POWER PORT PLACEMENT       Anesthesia Type:  MAC    Note:  Disposition: Outpatient   Postop Pain Control: Uneventful            Sign Out: Well controlled pain   PONV: No   Neuro/Psych: Uneventful            Sign Out: Acceptable/Baseline neuro status   Airway/Respiratory: Uneventful            Sign Out: Acceptable/Baseline resp. status   CV/Hemodynamics: Uneventful            Sign Out: Acceptable CV status; No obvious hypovolemia; No obvious fluid overload   Other NRE: NONE   DID A NON-ROUTINE EVENT OCCUR? No           Last vitals:  Vitals Value Taken Time   /50 12/22/22 1456   Temp 98.1  F (36.7  C) 12/22/22 1455   Pulse 73 12/22/22 1456   Resp 18 12/22/22 1455   SpO2 95 % 12/22/22 1457   Vitals shown include unvalidated device data.    Electronically Signed By: NATALEE Hawkins CRNA  December 22, 2022  3:23 PM

## 2022-12-22 NOTE — ANESTHESIA CARE TRANSFER NOTE
Patient: Kenneth Matthew    Procedure: Procedure(s):  ESOPHAGOGASTRODUODENOSCOPY, WITH PERCUTANEOUS ENDOSCOPIC GASTROSTOMY TUBE INSERTION  POWER PORT PLACEMENT       Diagnosis: Esophageal cancer (H) [C15.9]  Diagnosis Additional Information: No value filed.    Anesthesia Type:   MAC     Note:    Oropharynx: oropharynx clear of all foreign objects  Level of Consciousness: drowsy  Oxygen Supplementation: nasal cannula  Level of Supplemental Oxygen (L/min / FiO2): 2  Independent Airway: airway patency satisfactory and stable  Dentition: dentition unchanged  Vital Signs Stable: post-procedure vital signs reviewed and stable  Report to RN Given: handoff report given  Patient transferred to: Phase II    Handoff Report: Identifed the Patient, Identified the Reponsible Provider, Reviewed the pertinent medical history, Discussed the surgical course, Reviewed Intra-OP anesthesia mangement and issues during anesthesia, Set expectations for post-procedure period and Allowed opportunity for questions and acknowledgement of understanding      Vitals:  Vitals Value Taken Time   BP     Temp     Pulse     Resp     SpO2         Electronically Signed By: NATALEE Ordonez CRNA  December 22, 2022  1:51 PM

## 2022-12-22 NOTE — OP NOTE
[facility]   Operative Note    Pre-operative diagnosis: Esophageal cancer (H) [C15.9]   Post-operative diagnosis Same    Procedure: Procedure(s):  ESOPHAGOGASTRODUODENOSCOPY, WITH PERCUTANEOUS ENDOSCOPIC GASTROSTOMY TUBE INSERTION  POWER PORT PLACEMENT   Surgeon(s): Surgeon(s) and Role:     * Nba Dalton MD - Primary   Estimated blood loss: * No values recorded between 12/22/2022 12:56 PM and 12/22/2022  1:46 PM *    Specimens: * No specimens in log *   Findings: Port placed in right internal jugular without issue,  There was friable mass in esophagus was able to pass scope by with minimal difficulty. Bolster at aprox 2.5 cm.    .      DESCRIPTION OF PROCEDURE:  With the patient in the supine position on the operating table, general anesthesia was induced.  The requisite timeout pause was observed during which the patient's correct identity and planned procedure were confirmed by the operating room personnel in attendance. With the use of Ultrasound and with the patient in slight trendelenburg position the right  IJ was accessed and wire was confirmed to be in the SVC by XR. Then again with the use of local anesthetic a 5cm incision was made in the right chest and a subcutaneous pocket was created, then with the use of a tunneling device the catheter was tunneled to the neck incision. Then using seldinger technique the Right internal jugular vein was dilated under fluoroscopy and the split sheath left in place. The catheter was then introduced and pulled back into position right at the level of the right mainstem bronchus. The catheter aspirated easily. The port was then secured to the catheter with the provided locking system and secured in the pocket with 3-0 prolene suture. With the use of a smith needle the port was accessed and aspirated easily. The port was then flushed with heparinized saline.  The skin was then closed in layers with 3-0 vicryl and 4-0 monocryl suture and derma bond was  placed over the top.       We then turned our attention to the PEG tube placement.The fiberoptic endoscope was introduced and negotiated through the cricopharyngeus without difficulty.  The esophagus was examined and was noted to have a friable mass in the distal esophagus with some bleeding noted.  The stomach was entered and with insufflation, the site of the endoscope was identified by percutaneous illumination.  This site on the abdomen was prepped and draped sterilely and local anesthesia was obtained with infiltration of 1% Xylocaine.  A small stab incision was made and the needle was introduced percutaneously to the lumen of the stomach under direct vision.  A wire was passed and retrieved with the snare forceps and brought through the oropharynx in a retrograde fashion.  The gastrostomy tube was lubricated and passed over the wire in standard Seldinger fashion.  It was brought through the anterior abdominal wall and the mushroom seated firmly.  The bolster was applied at aprox 2.5 cm and affixed with 2-0 nylon sutures.  The endoscope was reintroduced and the site of gastrostomy placement was visualized and hemostasis was adequate.  Air was aspirated and the endoscope was withdrawn.  The wound site was cleansed and sterile dressings were applied.  There was minimal blood loss and there were no apparent complications.  The procedure was satisfactorily tolerated and the patient was returned to the recovery room in satisfactory condition.          Nba Dalton MD

## 2022-12-27 ENCOUNTER — TELEPHONE (OUTPATIENT)
Dept: NUTRITION | Facility: HOSPITAL | Age: 57
End: 2022-12-27

## 2022-12-27 NOTE — PROGRESS NOTES
Pt called in regards to swelling in his feet. He is questioning if is related to starting the tube feeding formula and increased sodium intake. He has a difficult time elevating his legs so he bought some compression socks to help. Wondering if he can soak his feet. Pt reports no hx of heart disease/failure.    Pt has worked up to 2-3 cartons of Isosource 1.5 daily (60ml free water flush before and after each bolus) and drinks about 2 protein drinks, some juice, 6oz gatorade per day. Minimal extra free water. Sometimes having some chicken and rice soup. Encouraged pt to find low sodium soup. Could try to cut back a little on formula or protein shake to see if there is any improvement.    Encouraged pt to call his PCP or oncology provider to notify them and ask about other recommendations for the swelling in his feet.    Shelbie Pimentel RD

## 2022-12-28 ENCOUNTER — TELEPHONE (OUTPATIENT)
Dept: SURGERY | Facility: OTHER | Age: 57
End: 2022-12-28

## 2022-12-28 NOTE — TELEPHONE ENCOUNTER
Patient called stating Dr Dalton put in a feeding tube and now is leaking around the base of it. Asking if he should be concerned? Please call patient back at [phone]

## 2023-01-01 ENCOUNTER — CARE COORDINATION (OUTPATIENT)
Dept: ONCOLOGY | Facility: OTHER | Age: 58
End: 2023-01-01

## 2023-01-01 ENCOUNTER — LAB (OUTPATIENT)
Dept: ONCOLOGY | Facility: OTHER | Age: 58
End: 2023-01-01
Attending: NURSE PRACTITIONER
Payer: COMMERCIAL

## 2023-01-01 ENCOUNTER — INFUSION THERAPY VISIT (OUTPATIENT)
Dept: INFUSION THERAPY | Facility: OTHER | Age: 58
End: 2023-01-01
Attending: NURSE PRACTITIONER
Payer: COMMERCIAL

## 2023-01-01 ENCOUNTER — PATIENT OUTREACH (OUTPATIENT)
Dept: ONCOLOGY | Facility: OTHER | Age: 58
End: 2023-01-01

## 2023-01-01 ENCOUNTER — INFUSION THERAPY VISIT (OUTPATIENT)
Dept: INFUSION THERAPY | Facility: OTHER | Age: 58
End: 2023-01-01
Payer: COMMERCIAL

## 2023-01-01 ENCOUNTER — INFUSION THERAPY VISIT (OUTPATIENT)
Dept: INFUSION THERAPY | Facility: OTHER | Age: 58
End: 2023-01-01
Attending: STUDENT IN AN ORGANIZED HEALTH CARE EDUCATION/TRAINING PROGRAM
Payer: COMMERCIAL

## 2023-01-01 ENCOUNTER — TELEPHONE (OUTPATIENT)
Dept: NUTRITION | Facility: HOSPITAL | Age: 58
End: 2023-01-01

## 2023-01-01 ENCOUNTER — ONCOLOGY VISIT (OUTPATIENT)
Dept: ONCOLOGY | Facility: OTHER | Age: 58
End: 2023-01-01
Attending: INTERNAL MEDICINE
Payer: COMMERCIAL

## 2023-01-01 ENCOUNTER — TELEPHONE (OUTPATIENT)
Dept: PET IMAGING | Facility: HOSPITAL | Age: 58
End: 2023-01-01

## 2023-01-01 ENCOUNTER — DOCUMENTATION ONLY (OUTPATIENT)
Dept: INFUSION THERAPY | Facility: OTHER | Age: 58
End: 2023-01-01

## 2023-01-01 ENCOUNTER — LAB (OUTPATIENT)
Dept: LAB | Facility: OTHER | Age: 58
End: 2023-01-01
Attending: NURSE PRACTITIONER
Payer: COMMERCIAL

## 2023-01-01 ENCOUNTER — INFUSION THERAPY VISIT (OUTPATIENT)
Dept: INFUSION THERAPY | Facility: OTHER | Age: 58
End: 2023-01-01
Attending: INTERNAL MEDICINE
Payer: COMMERCIAL

## 2023-01-01 ENCOUNTER — TELEPHONE (OUTPATIENT)
Dept: FAMILY MEDICINE | Facility: OTHER | Age: 58
End: 2023-01-01

## 2023-01-01 ENCOUNTER — TELEPHONE (OUTPATIENT)
Dept: ONCOLOGY | Facility: OTHER | Age: 58
End: 2023-01-01

## 2023-01-01 ENCOUNTER — OFFICE VISIT (OUTPATIENT)
Dept: SURGERY | Facility: OTHER | Age: 58
End: 2023-01-01
Attending: SURGERY
Payer: COMMERCIAL

## 2023-01-01 ENCOUNTER — ONCOLOGY VISIT (OUTPATIENT)
Dept: ONCOLOGY | Facility: OTHER | Age: 58
End: 2023-01-01
Attending: FAMILY MEDICINE
Payer: COMMERCIAL

## 2023-01-01 ENCOUNTER — HOSPITAL ENCOUNTER (OUTPATIENT)
Dept: PET IMAGING | Facility: HOSPITAL | Age: 58
Discharge: HOME | End: 2023-10-04
Attending: NURSE PRACTITIONER | Admitting: NURSE PRACTITIONER
Payer: COMMERCIAL

## 2023-01-01 ENCOUNTER — HOSPITAL ENCOUNTER (OUTPATIENT)
Dept: EDUCATION SERVICES | Facility: HOSPITAL | Age: 58
Discharge: HOME | End: 2023-12-20
Attending: INTERNAL MEDICINE
Payer: COMMERCIAL

## 2023-01-01 ENCOUNTER — HOSPITAL ENCOUNTER (OUTPATIENT)
Dept: EDUCATION SERVICES | Facility: HOSPITAL | Age: 58
Discharge: HOME | End: 2023-08-30
Attending: NURSE PRACTITIONER
Payer: COMMERCIAL

## 2023-01-01 ENCOUNTER — MEDICAL CORRESPONDENCE (OUTPATIENT)
Dept: HEALTH INFORMATION MANAGEMENT | Facility: HOSPITAL | Age: 58
End: 2023-01-01

## 2023-01-01 ENCOUNTER — HOSPITAL ENCOUNTER (OUTPATIENT)
Dept: EDUCATION SERVICES | Facility: HOSPITAL | Age: 58
Discharge: HOME | End: 2023-08-16
Attending: INTERNAL MEDICINE
Payer: COMMERCIAL

## 2023-01-01 VITALS
TEMPERATURE: 96.6 F | OXYGEN SATURATION: 94 % | DIASTOLIC BLOOD PRESSURE: 60 MMHG | BODY MASS INDEX: 20.7 KG/M2 | WEIGHT: 136.6 LBS | RESPIRATION RATE: 20 BRPM | HEIGHT: 68 IN | SYSTOLIC BLOOD PRESSURE: 100 MMHG | HEART RATE: 63 BPM

## 2023-01-01 VITALS
DIASTOLIC BLOOD PRESSURE: 62 MMHG | HEIGHT: 68 IN | BODY MASS INDEX: 20.88 KG/M2 | HEART RATE: 73 BPM | RESPIRATION RATE: 20 BRPM | SYSTOLIC BLOOD PRESSURE: 110 MMHG | OXYGEN SATURATION: 98 % | TEMPERATURE: 97.5 F | WEIGHT: 137.79 LBS

## 2023-01-01 VITALS — DIASTOLIC BLOOD PRESSURE: 74 MMHG | SYSTOLIC BLOOD PRESSURE: 134 MMHG | HEART RATE: 54 BPM

## 2023-01-01 VITALS
TEMPERATURE: 97.1 F | WEIGHT: 133.8 LBS | DIASTOLIC BLOOD PRESSURE: 60 MMHG | BODY MASS INDEX: 21 KG/M2 | HEART RATE: 64 BPM | OXYGEN SATURATION: 98 % | HEIGHT: 67 IN | SYSTOLIC BLOOD PRESSURE: 90 MMHG

## 2023-01-01 VITALS
HEART RATE: 65 BPM | DIASTOLIC BLOOD PRESSURE: 73 MMHG | WEIGHT: 139.77 LBS | BODY MASS INDEX: 21.57 KG/M2 | SYSTOLIC BLOOD PRESSURE: 127 MMHG

## 2023-01-01 VITALS
BODY MASS INDEX: 19.79 KG/M2 | DIASTOLIC BLOOD PRESSURE: 57 MMHG | HEIGHT: 67 IN | OXYGEN SATURATION: 98 % | TEMPERATURE: 97.8 F | HEART RATE: 71 BPM | SYSTOLIC BLOOD PRESSURE: 96 MMHG | WEIGHT: 126.1 LBS

## 2023-01-01 VITALS
DIASTOLIC BLOOD PRESSURE: 52 MMHG | OXYGEN SATURATION: 98 % | SYSTOLIC BLOOD PRESSURE: 96 MMHG | TEMPERATURE: 97.6 F | HEART RATE: 72 BPM

## 2023-01-01 VITALS — SYSTOLIC BLOOD PRESSURE: 114 MMHG | DIASTOLIC BLOOD PRESSURE: 74 MMHG | HEART RATE: 63 BPM

## 2023-01-01 VITALS
BODY MASS INDEX: 21.72 KG/M2 | HEART RATE: 58 BPM | DIASTOLIC BLOOD PRESSURE: 70 MMHG | RESPIRATION RATE: 18 BRPM | OXYGEN SATURATION: 96 % | TEMPERATURE: 98.2 F | WEIGHT: 140 LBS | SYSTOLIC BLOOD PRESSURE: 118 MMHG

## 2023-01-01 VITALS
HEIGHT: 67 IN | WEIGHT: 127.7 LBS | OXYGEN SATURATION: 95 % | TEMPERATURE: 96.8 F | HEART RATE: 65 BPM | BODY MASS INDEX: 20.04 KG/M2 | SYSTOLIC BLOOD PRESSURE: 101 MMHG | DIASTOLIC BLOOD PRESSURE: 64 MMHG

## 2023-01-01 VITALS
BODY MASS INDEX: 20.98 KG/M2 | SYSTOLIC BLOOD PRESSURE: 90 MMHG | OXYGEN SATURATION: 97 % | HEIGHT: 68 IN | TEMPERATURE: 98 F | DIASTOLIC BLOOD PRESSURE: 58 MMHG | WEIGHT: 138.45 LBS | HEART RATE: 67 BPM | RESPIRATION RATE: 20 BRPM

## 2023-01-01 VITALS
BODY MASS INDEX: 20.88 KG/M2 | DIASTOLIC BLOOD PRESSURE: 54 MMHG | TEMPERATURE: 97.1 F | OXYGEN SATURATION: 98 % | HEART RATE: 72 BPM | HEIGHT: 67 IN | SYSTOLIC BLOOD PRESSURE: 100 MMHG | WEIGHT: 133 LBS

## 2023-01-01 VITALS
DIASTOLIC BLOOD PRESSURE: 61 MMHG | HEART RATE: 67 BPM | SYSTOLIC BLOOD PRESSURE: 101 MMHG | WEIGHT: 128.97 LBS | BODY MASS INDEX: 19.9 KG/M2

## 2023-01-01 VITALS
RESPIRATION RATE: 20 BRPM | OXYGEN SATURATION: 96 % | TEMPERATURE: 97.1 F | DIASTOLIC BLOOD PRESSURE: 55 MMHG | WEIGHT: 132.4 LBS | HEIGHT: 68 IN | SYSTOLIC BLOOD PRESSURE: 86 MMHG | BODY MASS INDEX: 20.07 KG/M2 | HEART RATE: 66 BPM

## 2023-01-01 VITALS
HEART RATE: 80 BPM | TEMPERATURE: 99.4 F | OXYGEN SATURATION: 97 % | SYSTOLIC BLOOD PRESSURE: 104 MMHG | DIASTOLIC BLOOD PRESSURE: 62 MMHG | WEIGHT: 138.89 LBS | BODY MASS INDEX: 21.05 KG/M2 | HEIGHT: 68 IN | RESPIRATION RATE: 20 BRPM

## 2023-01-01 VITALS — WEIGHT: 138.89 LBS | HEIGHT: 67 IN | BODY MASS INDEX: 21.8 KG/M2

## 2023-01-01 VITALS
BODY MASS INDEX: 20.02 KG/M2 | WEIGHT: 132.1 LBS | DIASTOLIC BLOOD PRESSURE: 58 MMHG | HEIGHT: 68 IN | OXYGEN SATURATION: 92 % | HEART RATE: 65 BPM | SYSTOLIC BLOOD PRESSURE: 98 MMHG | TEMPERATURE: 97.3 F

## 2023-01-01 VITALS
HEART RATE: 63 BPM | DIASTOLIC BLOOD PRESSURE: 60 MMHG | SYSTOLIC BLOOD PRESSURE: 100 MMHG | OXYGEN SATURATION: 96 % | RESPIRATION RATE: 20 BRPM | WEIGHT: 138.67 LBS | BODY MASS INDEX: 21.02 KG/M2 | TEMPERATURE: 96.2 F | HEIGHT: 68 IN

## 2023-01-01 VITALS
DIASTOLIC BLOOD PRESSURE: 62 MMHG | BODY MASS INDEX: 19.25 KG/M2 | SYSTOLIC BLOOD PRESSURE: 110 MMHG | OXYGEN SATURATION: 98 % | HEART RATE: 72 BPM | HEIGHT: 68 IN | TEMPERATURE: 97.6 F | WEIGHT: 127 LBS

## 2023-01-01 VITALS — SYSTOLIC BLOOD PRESSURE: 125 MMHG | HEART RATE: 64 BPM | DIASTOLIC BLOOD PRESSURE: 79 MMHG

## 2023-01-01 VITALS
BODY MASS INDEX: 19.75 KG/M2 | OXYGEN SATURATION: 96 % | WEIGHT: 130.29 LBS | HEIGHT: 68 IN | DIASTOLIC BLOOD PRESSURE: 60 MMHG | TEMPERATURE: 96.8 F | RESPIRATION RATE: 20 BRPM | SYSTOLIC BLOOD PRESSURE: 100 MMHG | HEART RATE: 70 BPM

## 2023-01-01 VITALS
OXYGEN SATURATION: 98 % | HEART RATE: 54 BPM | RESPIRATION RATE: 19 BRPM | TEMPERATURE: 97 F | BODY MASS INDEX: 21.36 KG/M2 | WEIGHT: 138.45 LBS | SYSTOLIC BLOOD PRESSURE: 145 MMHG | DIASTOLIC BLOOD PRESSURE: 72 MMHG

## 2023-01-01 VITALS
SYSTOLIC BLOOD PRESSURE: 98 MMHG | OXYGEN SATURATION: 96 % | BODY MASS INDEX: 21.65 KG/M2 | TEMPERATURE: 97.8 F | HEART RATE: 69 BPM | WEIGHT: 139.55 LBS | DIASTOLIC BLOOD PRESSURE: 60 MMHG

## 2023-01-01 VITALS
WEIGHT: 139.55 LBS | SYSTOLIC BLOOD PRESSURE: 100 MMHG | BODY MASS INDEX: 21.15 KG/M2 | HEART RATE: 66 BPM | HEIGHT: 68 IN | TEMPERATURE: 96.8 F | DIASTOLIC BLOOD PRESSURE: 60 MMHG | OXYGEN SATURATION: 97 % | RESPIRATION RATE: 20 BRPM

## 2023-01-01 VITALS — WEIGHT: 127.65 LBS | BODY MASS INDEX: 19.7 KG/M2

## 2023-01-01 VITALS — DIASTOLIC BLOOD PRESSURE: 58 MMHG | HEART RATE: 51 BPM | SYSTOLIC BLOOD PRESSURE: 93 MMHG

## 2023-01-01 VITALS — DIASTOLIC BLOOD PRESSURE: 70 MMHG | SYSTOLIC BLOOD PRESSURE: 111 MMHG | HEART RATE: 52 BPM

## 2023-01-01 VITALS — HEART RATE: 55 BPM | DIASTOLIC BLOOD PRESSURE: 67 MMHG | SYSTOLIC BLOOD PRESSURE: 104 MMHG

## 2023-01-01 VITALS — HEART RATE: 56 BPM | SYSTOLIC BLOOD PRESSURE: 103 MMHG | DIASTOLIC BLOOD PRESSURE: 67 MMHG

## 2023-01-01 VITALS — DIASTOLIC BLOOD PRESSURE: 61 MMHG | SYSTOLIC BLOOD PRESSURE: 111 MMHG | HEART RATE: 55 BPM

## 2023-01-01 VITALS — WEIGHT: 138.45 LBS | BODY MASS INDEX: 21.36 KG/M2

## 2023-01-01 VITALS — BODY MASS INDEX: 21.2 KG/M2 | WEIGHT: 136.69 LBS

## 2023-01-01 VITALS
DIASTOLIC BLOOD PRESSURE: 61 MMHG | WEIGHT: 126.1 LBS | HEIGHT: 67 IN | SYSTOLIC BLOOD PRESSURE: 97 MMHG | HEART RATE: 51 BPM | BODY MASS INDEX: 19.79 KG/M2

## 2023-01-01 DIAGNOSIS — C15.5 MALIGNANT NEOPLASM OF LOWER THIRD OF ESOPHAGUS (H): Primary | ICD-10-CM

## 2023-01-01 DIAGNOSIS — R63.4 WEIGHT LOSS: ICD-10-CM

## 2023-01-01 DIAGNOSIS — C15.5 MALIGNANT NEOPLASM OF LOWER THIRD OF ESOPHAGUS (H): ICD-10-CM

## 2023-01-01 DIAGNOSIS — K21.9 GASTROESOPHAGEAL REFLUX DISEASE, UNSPECIFIED WHETHER ESOPHAGITIS PRESENT: ICD-10-CM

## 2023-01-01 DIAGNOSIS — G89.3 CANCER RELATED PAIN: ICD-10-CM

## 2023-01-01 DIAGNOSIS — C77.9 LYMPH NODE CANCER (H): ICD-10-CM

## 2023-01-01 DIAGNOSIS — G89.3 CANCER RELATED PAIN: Primary | ICD-10-CM

## 2023-01-01 DIAGNOSIS — C77.9 LYMPH NODE CANCER (H): Primary | ICD-10-CM

## 2023-01-01 DIAGNOSIS — R73.9 ELEVATED BLOOD SUGAR: ICD-10-CM

## 2023-01-01 DIAGNOSIS — G62.9 NEUROPATHY: ICD-10-CM

## 2023-01-01 DIAGNOSIS — G47.00 INSOMNIA, UNSPECIFIED TYPE: ICD-10-CM

## 2023-01-01 DIAGNOSIS — Z46.59 ENCOUNTER FOR CARE RELATED TO FEEDING TUBE: Primary | ICD-10-CM

## 2023-01-01 DIAGNOSIS — G62.9 NEUROPATHY: Primary | ICD-10-CM

## 2023-01-01 LAB
ALBUMIN SERPL BCG-MCNC: 3.2 G/DL (ref 3.5–5.2)
ALBUMIN SERPL BCG-MCNC: 3.6 G/DL (ref 3.5–5.2)
ALBUMIN SERPL BCG-MCNC: 3.7 G/DL (ref 3.5–5.2)
ALBUMIN SERPL BCG-MCNC: 3.7 G/DL (ref 3.5–5.2)
ALBUMIN SERPL BCG-MCNC: 3.8 G/DL (ref 3.5–5.2)
ALBUMIN SERPL BCG-MCNC: 3.8 G/DL (ref 3.5–5.2)
ALBUMIN SERPL BCG-MCNC: 3.9 G/DL (ref 3.5–5.2)
ALBUMIN SERPL BCG-MCNC: 4 G/DL (ref 3.5–5.2)
ALBUMIN UR-MCNC: 10 MG/DL
ALBUMIN UR-MCNC: 20 MG/DL
ALBUMIN UR-MCNC: 20 MG/DL
ALBUMIN UR-MCNC: 30 MG/DL
ALBUMIN UR-MCNC: NEGATIVE MG/DL
ALP SERPL-CCNC: 60 U/L (ref 40–129)
ALP SERPL-CCNC: 62 U/L (ref 40–129)
ALP SERPL-CCNC: 63 U/L (ref 40–150)
ALP SERPL-CCNC: 68 U/L (ref 40–150)
ALP SERPL-CCNC: 72 U/L (ref 40–129)
ALP SERPL-CCNC: 75 U/L (ref 40–129)
ALP SERPL-CCNC: 75 U/L (ref 40–129)
ALP SERPL-CCNC: 76 U/L (ref 40–150)
ALT SERPL W P-5'-P-CCNC: 10 U/L (ref 0–70)
ALT SERPL W P-5'-P-CCNC: 13 U/L (ref 0–70)
ALT SERPL W P-5'-P-CCNC: 13 U/L (ref 0–70)
ALT SERPL W P-5'-P-CCNC: 14 U/L (ref 0–70)
ALT SERPL W P-5'-P-CCNC: 7 U/L (ref 0–70)
ALT SERPL W P-5'-P-CCNC: 9 U/L (ref 0–70)
ANION GAP SERPL CALCULATED.3IONS-SCNC: 10 MMOL/L (ref 7–15)
ANION GAP SERPL CALCULATED.3IONS-SCNC: 7 MMOL/L (ref 7–15)
ANION GAP SERPL CALCULATED.3IONS-SCNC: 8 MMOL/L (ref 7–15)
ANION GAP SERPL CALCULATED.3IONS-SCNC: 8 MMOL/L (ref 7–15)
ANION GAP SERPL CALCULATED.3IONS-SCNC: 9 MMOL/L (ref 7–15)
ANION GAP SERPL CALCULATED.3IONS-SCNC: 9 MMOL/L (ref 7–15)
AST SERPL W P-5'-P-CCNC: 15 U/L (ref 0–45)
AST SERPL W P-5'-P-CCNC: 17 U/L (ref 0–45)
AST SERPL W P-5'-P-CCNC: 18 U/L (ref 0–45)
AST SERPL W P-5'-P-CCNC: 18 U/L (ref 0–45)
AST SERPL W P-5'-P-CCNC: 20 U/L (ref 0–45)
AST SERPL W P-5'-P-CCNC: 22 U/L (ref 0–45)
BASO+EOS+MONOS # BLD AUTO: ABNORMAL 10*3/UL
BASO+EOS+MONOS # BLD AUTO: ABNORMAL 10*3/UL
BASO+EOS+MONOS NFR BLD AUTO: ABNORMAL %
BASO+EOS+MONOS NFR BLD AUTO: ABNORMAL %
BASOPHILS # BLD AUTO: 0 10E3/UL (ref 0–0.2)
BASOPHILS # BLD AUTO: 0.1 10E3/UL (ref 0–0.2)
BASOPHILS NFR BLD AUTO: 0 %
BASOPHILS NFR BLD AUTO: 0 %
BASOPHILS NFR BLD AUTO: 1 %
BILIRUB DIRECT SERPL-MCNC: <0.2 MG/DL (ref 0–0.3)
BILIRUB DIRECT SERPL-MCNC: <0.2 MG/DL (ref 0–0.3)
BILIRUB SERPL-MCNC: 0.2 MG/DL
BILIRUB SERPL-MCNC: 0.4 MG/DL
BILIRUB SERPL-MCNC: 0.5 MG/DL
BUN SERPL-MCNC: 12.7 MG/DL (ref 8–23)
BUN SERPL-MCNC: 13.1 MG/DL (ref 8–23)
BUN SERPL-MCNC: 13.5 MG/DL (ref 8–23)
BUN SERPL-MCNC: 13.6 MG/DL (ref 8–23)
BUN SERPL-MCNC: 8.6 MG/DL (ref 8–23)
BUN SERPL-MCNC: 9.9 MG/DL (ref 8–23)
CALCIUM SERPL-MCNC: 8.9 MG/DL (ref 8.8–10.2)
CALCIUM SERPL-MCNC: 9 MG/DL (ref 8.8–10.2)
CALCIUM SERPL-MCNC: 9.2 MG/DL (ref 8.8–10.2)
CALCIUM SERPL-MCNC: 9.7 MG/DL (ref 8.8–10.2)
CHLORIDE SERPL-SCNC: 101 MMOL/L (ref 98–107)
CHLORIDE SERPL-SCNC: 101 MMOL/L (ref 98–107)
CHLORIDE SERPL-SCNC: 102 MMOL/L (ref 98–107)
CHLORIDE SERPL-SCNC: 102 MMOL/L (ref 98–107)
CHLORIDE SERPL-SCNC: 103 MMOL/L (ref 98–107)
CHLORIDE SERPL-SCNC: 104 MMOL/L (ref 98–107)
CREAT SERPL-MCNC: 0.56 MG/DL (ref 0.67–1.17)
CREAT SERPL-MCNC: 0.58 MG/DL (ref 0.67–1.17)
CREAT SERPL-MCNC: 0.62 MG/DL (ref 0.67–1.17)
CREAT SERPL-MCNC: 0.64 MG/DL (ref 0.67–1.17)
CREAT SERPL-MCNC: 0.66 MG/DL (ref 0.67–1.17)
CREAT SERPL-MCNC: 0.66 MG/DL (ref 0.67–1.17)
DEPRECATED HCO3 PLAS-SCNC: 25 MMOL/L (ref 22–29)
DEPRECATED HCO3 PLAS-SCNC: 26 MMOL/L (ref 22–29)
DEPRECATED HCO3 PLAS-SCNC: 26 MMOL/L (ref 22–29)
DEPRECATED HCO3 PLAS-SCNC: 28 MMOL/L (ref 22–29)
DEPRECATED HCO3 PLAS-SCNC: 28 MMOL/L (ref 22–29)
DEPRECATED HCO3 PLAS-SCNC: 29 MMOL/L (ref 22–29)
EGFRCR SERPLBLD CKD-EPI 2021: >90 ML/MIN/1.73M2
EOSINOPHIL # BLD AUTO: 0.1 10E3/UL (ref 0–0.7)
EOSINOPHIL # BLD AUTO: 0.2 10E3/UL (ref 0–0.7)
EOSINOPHIL # BLD AUTO: 0.2 10E3/UL (ref 0–0.7)
EOSINOPHIL NFR BLD AUTO: 1 %
EOSINOPHIL NFR BLD AUTO: 2 %
EOSINOPHIL NFR BLD AUTO: 3 %
ERYTHROCYTE [DISTWIDTH] IN BLOOD BY AUTOMATED COUNT: 13.3 % (ref 10–15)
ERYTHROCYTE [DISTWIDTH] IN BLOOD BY AUTOMATED COUNT: 13.6 % (ref 10–15)
ERYTHROCYTE [DISTWIDTH] IN BLOOD BY AUTOMATED COUNT: 14 % (ref 10–15)
ERYTHROCYTE [DISTWIDTH] IN BLOOD BY AUTOMATED COUNT: 14.1 % (ref 10–15)
ERYTHROCYTE [DISTWIDTH] IN BLOOD BY AUTOMATED COUNT: 15 % (ref 10–15)
ERYTHROCYTE [DISTWIDTH] IN BLOOD BY AUTOMATED COUNT: 15.1 % (ref 10–15)
ERYTHROCYTE [DISTWIDTH] IN BLOOD BY AUTOMATED COUNT: 15.5 % (ref 10–15)
ERYTHROCYTE [DISTWIDTH] IN BLOOD BY AUTOMATED COUNT: 15.5 % (ref 10–15)
ERYTHROCYTE [DISTWIDTH] IN BLOOD BY AUTOMATED COUNT: 15.6 % (ref 10–15)
EST. AVERAGE GLUCOSE BLD GHB EST-MCNC: 117 MG/DL
FOLATE RBC-MCNC: NORMAL NG/ML
GFR SERPL CREATININE-BSD FRML MDRD: >90 ML/MIN/1.73M2
GLUCOSE SERPL-MCNC: 118 MG/DL (ref 70–99)
GLUCOSE SERPL-MCNC: 122 MG/DL (ref 70–99)
GLUCOSE SERPL-MCNC: 122 MG/DL (ref 70–99)
GLUCOSE SERPL-MCNC: 144 MG/DL (ref 70–99)
GLUCOSE SERPL-MCNC: 150 MG/DL (ref 70–99)
GLUCOSE SERPL-MCNC: 53 MG/DL (ref 70–99)
HBA1C MFR BLD: 5.7 %
HCT VFR BLD AUTO: 25.6 % (ref 40–53)
HCT VFR BLD AUTO: 37.3 % (ref 40–53)
HCT VFR BLD AUTO: 38.7 % (ref 40–53)
HCT VFR BLD AUTO: 40.5 % (ref 40–53)
HCT VFR BLD AUTO: 40.9 % (ref 40–53)
HCT VFR BLD AUTO: 41.3 % (ref 40–53)
HCT VFR BLD AUTO: 41.3 % (ref 40–53)
HCT VFR BLD AUTO: 41.6 % (ref 40–53)
HCT VFR BLD AUTO: 42.7 % (ref 40–53)
HCT VFR BLD AUTO: 43 % (ref 40–53)
HCT VFR BLD AUTO: 43 % (ref 40–53)
HCT VFR BLD AUTO: 43.8 % (ref 40–53)
HCT VFR BLD AUTO: 44 % (ref 40–53)
HGB BLD-MCNC: 12.6 G/DL (ref 13.3–17.7)
HGB BLD-MCNC: 12.7 G/DL (ref 13.3–17.7)
HGB BLD-MCNC: 13.6 G/DL (ref 13.3–17.7)
HGB BLD-MCNC: 13.7 G/DL (ref 13.3–17.7)
HGB BLD-MCNC: 13.8 G/DL (ref 13.3–17.7)
HGB BLD-MCNC: 13.8 G/DL (ref 13.3–17.7)
HGB BLD-MCNC: 14.3 G/DL (ref 13.3–17.7)
HGB BLD-MCNC: 14.4 G/DL (ref 13.3–17.7)
HGB BLD-MCNC: 14.6 G/DL (ref 13.3–17.7)
HGB BLD-MCNC: 14.8 G/DL (ref 13.3–17.7)
HGB BLD-MCNC: 14.9 G/DL (ref 13.3–17.7)
HGB BLD-MCNC: 15 G/DL (ref 13.3–17.7)
HGB BLD-MCNC: 8.5 G/DL (ref 13.3–17.7)
HOLD SPECIMEN: NORMAL
IMM GRANULOCYTES # BLD: 0 10E3/UL
IMM GRANULOCYTES NFR BLD: 0 %
LYMPHOCYTES # BLD AUTO: 0.9 10E3/UL (ref 0.8–5.3)
LYMPHOCYTES # BLD AUTO: 1 10E3/UL (ref 0.8–5.3)
LYMPHOCYTES # BLD AUTO: 1.1 10E3/UL (ref 0.8–5.3)
LYMPHOCYTES # BLD AUTO: 1.1 10E3/UL (ref 0.8–5.3)
LYMPHOCYTES # BLD AUTO: 1.2 10E3/UL (ref 0.8–5.3)
LYMPHOCYTES # BLD AUTO: 1.3 10E3/UL (ref 0.8–5.3)
LYMPHOCYTES # BLD AUTO: 1.3 10E3/UL (ref 0.8–5.3)
LYMPHOCYTES # BLD AUTO: 1.4 10E3/UL (ref 0.8–5.3)
LYMPHOCYTES # BLD AUTO: 1.4 10E3/UL (ref 0.8–5.3)
LYMPHOCYTES # BLD AUTO: 1.5 10E3/UL (ref 0.8–5.3)
LYMPHOCYTES # BLD AUTO: 1.7 10E3/UL (ref 0.8–5.3)
LYMPHOCYTES # BLD AUTO: 1.8 10E3/UL (ref 0.8–5.3)
LYMPHOCYTES NFR BLD AUTO: 13 %
LYMPHOCYTES NFR BLD AUTO: 20 %
LYMPHOCYTES NFR BLD AUTO: 22 %
LYMPHOCYTES NFR BLD AUTO: 22 %
LYMPHOCYTES NFR BLD AUTO: 23 %
LYMPHOCYTES NFR BLD AUTO: 24 %
LYMPHOCYTES NFR BLD AUTO: 25 %
LYMPHOCYTES NFR BLD AUTO: 25 %
LYMPHOCYTES NFR BLD AUTO: 27 %
LYMPHOCYTES NFR BLD AUTO: 30 %
MCH RBC QN AUTO: 32.6 PG (ref 26.5–33)
MCH RBC QN AUTO: 32.7 PG (ref 26.5–33)
MCH RBC QN AUTO: 32.8 PG (ref 26.5–33)
MCH RBC QN AUTO: 32.9 PG (ref 26.5–33)
MCH RBC QN AUTO: 32.9 PG (ref 26.5–33)
MCH RBC QN AUTO: 33.3 PG (ref 26.5–33)
MCH RBC QN AUTO: 33.4 PG (ref 26.5–33)
MCH RBC QN AUTO: 33.4 PG (ref 26.5–33)
MCH RBC QN AUTO: 33.6 PG (ref 26.5–33)
MCH RBC QN AUTO: 33.8 PG (ref 26.5–33)
MCHC RBC AUTO-ENTMCNC: 32.8 G/DL (ref 31.5–36.5)
MCHC RBC AUTO-ENTMCNC: 32.9 G/DL (ref 31.5–36.5)
MCHC RBC AUTO-ENTMCNC: 33.2 G/DL (ref 31.5–36.5)
MCHC RBC AUTO-ENTMCNC: 33.2 G/DL (ref 31.5–36.5)
MCHC RBC AUTO-ENTMCNC: 33.3 G/DL (ref 31.5–36.5)
MCHC RBC AUTO-ENTMCNC: 33.4 G/DL (ref 31.5–36.5)
MCHC RBC AUTO-ENTMCNC: 33.5 G/DL (ref 31.5–36.5)
MCHC RBC AUTO-ENTMCNC: 33.8 G/DL (ref 31.5–36.5)
MCHC RBC AUTO-ENTMCNC: 33.8 G/DL (ref 31.5–36.5)
MCHC RBC AUTO-ENTMCNC: 34.1 G/DL (ref 31.5–36.5)
MCHC RBC AUTO-ENTMCNC: 34.2 G/DL (ref 31.5–36.5)
MCHC RBC AUTO-ENTMCNC: 34.7 G/DL (ref 31.5–36.5)
MCHC RBC AUTO-ENTMCNC: 35.6 G/DL (ref 31.5–36.5)
MCV RBC AUTO: 100 FL (ref 78–100)
MCV RBC AUTO: 95 FL (ref 78–100)
MCV RBC AUTO: 96 FL (ref 78–100)
MCV RBC AUTO: 97 FL (ref 78–100)
MCV RBC AUTO: 98 FL (ref 78–100)
MCV RBC AUTO: 98 FL (ref 78–100)
MCV RBC AUTO: 99 FL (ref 78–100)
MONOCYTES # BLD AUTO: 0.2 10E3/UL (ref 0–1.3)
MONOCYTES # BLD AUTO: 0.3 10E3/UL (ref 0–1.3)
MONOCYTES # BLD AUTO: 0.5 10E3/UL (ref 0–1.3)
MONOCYTES # BLD AUTO: 0.6 10E3/UL (ref 0–1.3)
MONOCYTES # BLD AUTO: 0.6 10E3/UL (ref 0–1.3)
MONOCYTES # BLD AUTO: 0.7 10E3/UL (ref 0–1.3)
MONOCYTES NFR BLD AUTO: 10 %
MONOCYTES NFR BLD AUTO: 13 %
MONOCYTES NFR BLD AUTO: 7 %
MONOCYTES NFR BLD AUTO: 8 %
MONOCYTES NFR BLD AUTO: 9 %
NEUTROPHILS # BLD AUTO: 1.8 10E3/UL (ref 1.6–8.3)
NEUTROPHILS # BLD AUTO: 2.1 10E3/UL (ref 1.6–8.3)
NEUTROPHILS # BLD AUTO: 2.8 10E3/UL (ref 1.6–8.3)
NEUTROPHILS # BLD AUTO: 2.9 10E3/UL (ref 1.6–8.3)
NEUTROPHILS # BLD AUTO: 3.3 10E3/UL (ref 1.6–8.3)
NEUTROPHILS # BLD AUTO: 3.5 10E3/UL (ref 1.6–8.3)
NEUTROPHILS # BLD AUTO: 3.9 10E3/UL (ref 1.6–8.3)
NEUTROPHILS # BLD AUTO: 4.3 10E3/UL (ref 1.6–8.3)
NEUTROPHILS # BLD AUTO: 4.6 10E3/UL (ref 1.6–8.3)
NEUTROPHILS # BLD AUTO: 4.7 10E3/UL (ref 1.6–8.3)
NEUTROPHILS # BLD AUTO: 5.1 10E3/UL (ref 1.6–8.3)
NEUTROPHILS # BLD AUTO: 6.5 10E3/UL (ref 1.6–8.3)
NEUTROPHILS NFR BLD AUTO: 56 %
NEUTROPHILS NFR BLD AUTO: 58 %
NEUTROPHILS NFR BLD AUTO: 61 %
NEUTROPHILS NFR BLD AUTO: 62 %
NEUTROPHILS NFR BLD AUTO: 62 %
NEUTROPHILS NFR BLD AUTO: 63 %
NEUTROPHILS NFR BLD AUTO: 64 %
NEUTROPHILS NFR BLD AUTO: 65 %
NEUTROPHILS NFR BLD AUTO: 66 %
NEUTROPHILS NFR BLD AUTO: 68 %
NEUTROPHILS NFR BLD AUTO: 72 %
NEUTROPHILS NFR BLD AUTO: 76 %
NRBC # BLD AUTO: 0 10E3/UL
NRBC BLD AUTO-RTO: 0 /100
PLATELET # BLD AUTO: 100 10E3/UL (ref 150–450)
PLATELET # BLD AUTO: 104 10E3/UL (ref 150–450)
PLATELET # BLD AUTO: 111 10E3/UL (ref 150–450)
PLATELET # BLD AUTO: 118 10E3/UL (ref 150–450)
PLATELET # BLD AUTO: 121 10E3/UL (ref 150–450)
PLATELET # BLD AUTO: 121 10E3/UL (ref 150–450)
PLATELET # BLD AUTO: 142 10E3/UL (ref 150–450)
PLATELET # BLD AUTO: 157 10E3/UL (ref 150–450)
PLATELET # BLD AUTO: 176 10E3/UL (ref 150–450)
PLATELET # BLD AUTO: 50 10E3/UL (ref 150–450)
PLATELET # BLD AUTO: 64 10E3/UL (ref 150–450)
PLATELET # BLD AUTO: 71 10E3/UL (ref 150–450)
PLATELET # BLD AUTO: 79 10E3/UL (ref 150–450)
POTASSIUM SERPL-SCNC: 4.1 MMOL/L (ref 3.4–5.3)
POTASSIUM SERPL-SCNC: 4.4 MMOL/L (ref 3.4–5.3)
POTASSIUM SERPL-SCNC: 4.5 MMOL/L (ref 3.4–5.3)
POTASSIUM SERPL-SCNC: 4.8 MMOL/L (ref 3.4–5.3)
PROT SERPL-MCNC: 6.2 G/DL (ref 6.4–8.3)
PROT SERPL-MCNC: 6.5 G/DL (ref 6.4–8.3)
PROT SERPL-MCNC: 6.7 G/DL (ref 6.4–8.3)
PROT SERPL-MCNC: 6.8 G/DL (ref 6.4–8.3)
PROT SERPL-MCNC: 7.1 G/DL (ref 6.4–8.3)
PROT SERPL-MCNC: 7.1 G/DL (ref 6.4–8.3)
PROT SERPL-MCNC: 7.3 G/DL (ref 6.4–8.3)
PROT SERPL-MCNC: 7.4 G/DL (ref 6.4–8.3)
RBC # BLD AUTO: 2.55 10E6/UL (ref 4.4–5.9)
RBC # BLD AUTO: 3.84 10E6/UL (ref 4.4–5.9)
RBC # BLD AUTO: 3.9 10E6/UL (ref 4.4–5.9)
RBC # BLD AUTO: 4.1 10E6/UL (ref 4.4–5.9)
RBC # BLD AUTO: 4.14 10E6/UL (ref 4.4–5.9)
RBC # BLD AUTO: 4.16 10E6/UL (ref 4.4–5.9)
RBC # BLD AUTO: 4.19 10E6/UL (ref 4.4–5.9)
RBC # BLD AUTO: 4.26 10E6/UL (ref 4.4–5.9)
RBC # BLD AUTO: 4.34 10E6/UL (ref 4.4–5.9)
RBC # BLD AUTO: 4.35 10E6/UL (ref 4.4–5.9)
RBC # BLD AUTO: 4.43 10E6/UL (ref 4.4–5.9)
RBC # BLD AUTO: 4.48 10E6/UL (ref 4.4–5.9)
RBC # BLD AUTO: 4.5 10E6/UL (ref 4.4–5.9)
SODIUM SERPL-SCNC: 136 MMOL/L (ref 135–145)
SODIUM SERPL-SCNC: 137 MMOL/L (ref 135–145)
SODIUM SERPL-SCNC: 137 MMOL/L (ref 135–145)
SODIUM SERPL-SCNC: 138 MMOL/L (ref 135–145)
SODIUM SERPL-SCNC: 139 MMOL/L (ref 135–145)
SODIUM SERPL-SCNC: 139 MMOL/L (ref 136–145)
VIT B12 SERPL-MCNC: 607 PG/ML (ref 232–1245)
VIT D+METAB SERPL-MCNC: 27 NG/ML (ref 20–50)
WBC # BLD AUTO: 3 10E3/UL (ref 4–11)
WBC # BLD AUTO: 3.8 10E3/UL (ref 4–11)
WBC # BLD AUTO: 4.3 10E3/UL (ref 4–11)
WBC # BLD AUTO: 4.4 10E3/UL (ref 4–11)
WBC # BLD AUTO: 4.7 10E3/UL (ref 4–11)
WBC # BLD AUTO: 5.2 10E3/UL (ref 4–11)
WBC # BLD AUTO: 5.6 10E3/UL (ref 4–11)
WBC # BLD AUTO: 5.8 10E3/UL (ref 4–11)
WBC # BLD AUTO: 6.7 10E3/UL (ref 4–11)
WBC # BLD AUTO: 6.8 10E3/UL (ref 4–11)
WBC # BLD AUTO: 7.2 10E3/UL (ref 4–11)
WBC # BLD AUTO: 7.2 10E3/UL (ref 4–11)
WBC # BLD AUTO: 8.4 10E3/UL (ref 4–11)

## 2023-01-01 PROCEDURE — 82247 BILIRUBIN TOTAL: CPT | Performed by: INTERNAL MEDICINE

## 2023-01-01 PROCEDURE — 99214 OFFICE O/P EST MOD 30 MIN: CPT | Performed by: NURSE PRACTITIONER

## 2023-01-01 PROCEDURE — 96375 TX/PRO/DX INJ NEW DRUG ADDON: CPT | Performed by: INTERNAL MEDICINE

## 2023-01-01 PROCEDURE — 85025 COMPLETE CBC W/AUTO DIFF WBC: CPT | Performed by: INTERNAL MEDICINE

## 2023-01-01 PROCEDURE — 85025 COMPLETE CBC W/AUTO DIFF WBC: CPT | Performed by: NURSE PRACTITIONER

## 2023-01-01 PROCEDURE — 81003 URINALYSIS AUTO W/O SCOPE: CPT | Performed by: NURSE PRACTITIONER

## 2023-01-01 PROCEDURE — 96377 APPLICATON ON-BODY INJECTOR: CPT | Mod: 59 | Performed by: INTERNAL MEDICINE

## 2023-01-01 PROCEDURE — 96413 CHEMO IV INFUSION 1 HR: CPT | Performed by: INTERNAL MEDICINE

## 2023-01-01 PROCEDURE — 96367 TX/PROPH/DG ADDL SEQ IV INF: CPT | Performed by: INTERNAL MEDICINE

## 2023-01-01 PROCEDURE — 96415 CHEMO IV INFUSION ADDL HR: CPT | Performed by: INTERNAL MEDICINE

## 2023-01-01 PROCEDURE — 99215 OFFICE O/P EST HI 40 MIN: CPT | Mod: 25 | Performed by: INTERNAL MEDICINE

## 2023-01-01 PROCEDURE — 36591 DRAW BLOOD OFF VENOUS DEVICE: CPT | Performed by: INTERNAL MEDICINE

## 2023-01-01 PROCEDURE — 96523 IRRIG DRUG DELIVERY DEVICE: CPT | Performed by: INTERNAL MEDICINE

## 2023-01-01 PROCEDURE — 96368 THER/DIAG CONCURRENT INF: CPT | Performed by: INTERNAL MEDICINE

## 2023-01-01 PROCEDURE — 80053 COMPREHEN METABOLIC PANEL: CPT | Performed by: INTERNAL MEDICINE

## 2023-01-01 PROCEDURE — S0028 INJECTION, FAMOTIDINE, 20 MG: HCPCS | Mod: JZ | Performed by: INTERNAL MEDICINE

## 2023-01-01 PROCEDURE — 80076 HEPATIC FUNCTION PANEL: CPT | Performed by: NURSE PRACTITIONER

## 2023-01-01 PROCEDURE — 78815 PET IMAGE W/CT SKULL-THIGH: CPT | Mod: PS

## 2023-01-01 PROCEDURE — 999N000107 HC STATISTIC NUTRITIONAL THERAPY NO CHARGE: Performed by: DIETITIAN, REGISTERED

## 2023-01-01 PROCEDURE — 96360 HYDRATION IV INFUSION INIT: CPT | Performed by: INTERNAL MEDICINE

## 2023-01-01 PROCEDURE — 96521 REFILL/MAINT PORTABLE PUMP: CPT | Mod: 59 | Performed by: INTERNAL MEDICINE

## 2023-01-01 PROCEDURE — 82306 VITAMIN D 25 HYDROXY: CPT | Performed by: INTERNAL MEDICINE

## 2023-01-01 PROCEDURE — 343N000001 HC RX 343: Performed by: NURSE PRACTITIONER

## 2023-01-01 PROCEDURE — 36415 COLL VENOUS BLD VENIPUNCTURE: CPT

## 2023-01-01 PROCEDURE — 81003 URINALYSIS AUTO W/O SCOPE: CPT | Performed by: INTERNAL MEDICINE

## 2023-01-01 PROCEDURE — 43762 RPLC GTUBE NO REVJ TRC: CPT | Performed by: SURGERY

## 2023-01-01 PROCEDURE — 99215 OFFICE O/P EST HI 40 MIN: CPT | Performed by: NURSE PRACTITIONER

## 2023-01-01 PROCEDURE — 99215 OFFICE O/P EST HI 40 MIN: CPT | Performed by: FAMILY MEDICINE

## 2023-01-01 PROCEDURE — 96417 CHEMO IV INFUS EACH ADDL SEQ: CPT | Performed by: INTERNAL MEDICINE

## 2023-01-01 PROCEDURE — 82747 ASSAY OF FOLIC ACID RBC: CPT | Mod: 90 | Performed by: INTERNAL MEDICINE

## 2023-01-01 PROCEDURE — 96411 CHEMO IV PUSH ADDL DRUG: CPT | Performed by: INTERNAL MEDICINE

## 2023-01-01 PROCEDURE — 85027 COMPLETE CBC AUTOMATED: CPT

## 2023-01-01 PROCEDURE — 36591 DRAW BLOOD OFF VENOUS DEVICE: CPT

## 2023-01-01 PROCEDURE — 99417 PROLNG OP E/M EACH 15 MIN: CPT | Performed by: FAMILY MEDICINE

## 2023-01-01 PROCEDURE — 80076 HEPATIC FUNCTION PANEL: CPT | Performed by: INTERNAL MEDICINE

## 2023-01-01 PROCEDURE — 83036 HEMOGLOBIN GLYCOSYLATED A1C: CPT

## 2023-01-01 PROCEDURE — 99214 OFFICE O/P EST MOD 30 MIN: CPT | Mod: 25 | Performed by: INTERNAL MEDICINE

## 2023-01-01 PROCEDURE — 80053 COMPREHEN METABOLIC PANEL: CPT

## 2023-01-01 PROCEDURE — A9552 F18 FDG: HCPCS | Performed by: NURSE PRACTITIONER

## 2023-01-01 PROCEDURE — 82607 VITAMIN B-12: CPT | Performed by: INTERNAL MEDICINE

## 2023-01-01 RX ORDER — HEPARIN SODIUM,PORCINE 10 UNIT/ML
5-20 VIAL (ML) INTRAVENOUS DAILY PRN
Status: CANCELLED | OUTPATIENT
Start: 2023-01-01

## 2023-01-01 RX ORDER — HEPARIN SODIUM (PORCINE) LOCK FLUSH IV SOLN 100 UNIT/ML 100 UNIT/ML
5 SOLUTION INTRAVENOUS
Status: CANCELLED | OUTPATIENT
Start: 2023-01-01

## 2023-01-01 RX ORDER — DIPHENHYDRAMINE HCL 50 MG
50 CAPSULE ORAL ONCE
Status: CANCELLED
Start: 2023-01-01

## 2023-01-01 RX ORDER — ALBUTEROL SULFATE 0.83 MG/ML
2.5 SOLUTION RESPIRATORY (INHALATION)
Status: CANCELLED | OUTPATIENT
Start: 2023-01-01

## 2023-01-01 RX ORDER — METHYLPREDNISOLONE SODIUM SUCCINATE 125 MG/2ML
125 INJECTION, POWDER, LYOPHILIZED, FOR SOLUTION INTRAMUSCULAR; INTRAVENOUS
Status: CANCELLED
Start: 2023-01-01

## 2023-01-01 RX ORDER — EPINEPHRINE 1 MG/ML
0.3 INJECTION, SOLUTION, CONCENTRATE INTRAVENOUS EVERY 5 MIN PRN
Status: CANCELLED | OUTPATIENT
Start: 2023-01-01

## 2023-01-01 RX ORDER — ATROPINE SULFATE 0.4 MG/ML
0.4 AMPUL (ML) INJECTION
Status: CANCELLED | OUTPATIENT
Start: 2023-01-01

## 2023-01-01 RX ORDER — LORAZEPAM 2 MG/ML
0.5 INJECTION INTRAMUSCULAR EVERY 4 HOURS PRN
Status: CANCELLED | OUTPATIENT
Start: 2023-01-01

## 2023-01-01 RX ORDER — MIRTAZAPINE 7.5 MG/1
7.5 TABLET, FILM COATED ORAL AT BEDTIME
Qty: 30 TABLET | Refills: 0 | Status: SHIPPED | OUTPATIENT
Start: 2023-01-01 | End: 2024-01-01

## 2023-01-01 RX ORDER — DIPHENHYDRAMINE HYDROCHLORIDE 50 MG/ML
50 INJECTION INTRAMUSCULAR; INTRAVENOUS
Status: CANCELLED
Start: 2023-01-01

## 2023-01-01 RX ORDER — HYDROCODONE BITARTRATE AND ACETAMINOPHEN 7.5; 325 MG/15ML; MG/15ML
10 SOLUTION ORAL EVERY 6 HOURS PRN
Qty: 473 ML | Refills: 0 | Status: SHIPPED | OUTPATIENT
Start: 2023-01-01 | End: 2023-01-01

## 2023-01-01 RX ORDER — ALBUTEROL SULFATE 90 UG/1
1-2 AEROSOL, METERED RESPIRATORY (INHALATION)
Status: CANCELLED
Start: 2023-01-01

## 2023-01-01 RX ORDER — MEPERIDINE HYDROCHLORIDE 25 MG/ML
25 INJECTION INTRAMUSCULAR; INTRAVENOUS; SUBCUTANEOUS EVERY 30 MIN PRN
Status: CANCELLED | OUTPATIENT
Start: 2023-01-01

## 2023-01-01 RX ORDER — PROCHLORPERAZINE MALEATE 10 MG
10 TABLET ORAL EVERY 6 HOURS PRN
COMMUNITY
End: 2023-01-01

## 2023-01-01 RX ORDER — HEPARIN SODIUM (PORCINE) LOCK FLUSH IV SOLN 100 UNIT/ML 100 UNIT/ML
5 SOLUTION INTRAVENOUS
Status: DISCONTINUED | OUTPATIENT
Start: 2023-01-01 | End: 2023-01-01 | Stop reason: HOSPADM

## 2023-01-01 RX ORDER — HYDROMORPHONE HYDROCHLORIDE 2 MG/1
2 TABLET ORAL EVERY 4 HOURS
Qty: 120 TABLET | Refills: 0 | Status: SHIPPED | OUTPATIENT
Start: 2023-01-01 | End: 2023-01-01

## 2023-01-01 RX ORDER — DIPHENHYDRAMINE HCL 50 MG
50 CAPSULE ORAL ONCE
Status: COMPLETED | OUTPATIENT
Start: 2023-01-01 | End: 2023-01-01

## 2023-01-01 RX ORDER — ONDANSETRON 8 MG/1
8 TABLET, FILM COATED ORAL EVERY 8 HOURS PRN
Qty: 60 TABLET | Refills: 1 | Status: SHIPPED | OUTPATIENT
Start: 2023-01-01 | End: 2023-01-01

## 2023-01-01 RX ORDER — PROCHLORPERAZINE MALEATE 10 MG
10 TABLET ORAL EVERY 6 HOURS PRN
Qty: 30 TABLET | Refills: 2 | Status: SHIPPED | OUTPATIENT
Start: 2023-01-01 | End: 2023-01-01

## 2023-01-01 RX ORDER — FLUOROURACIL 50 MG/ML
400 INJECTION, SOLUTION INTRAVENOUS ONCE
Status: CANCELLED
Start: 2023-01-01

## 2023-01-01 RX ORDER — HYDROCODONE BITARTRATE AND ACETAMINOPHEN 7.5; 325 MG/15ML; MG/15ML
10 SOLUTION ORAL EVERY 6 HOURS PRN
Qty: 473 ML | Refills: 0 | Status: SHIPPED | OUTPATIENT
Start: 2023-01-01 | End: 2024-01-01

## 2023-01-01 RX ORDER — OXYCODONE HCL 10 MG/1
10 TABLET, FILM COATED, EXTENDED RELEASE ORAL EVERY 12 HOURS
Qty: 60 TABLET | Refills: 0 | Status: SHIPPED | OUTPATIENT
Start: 2023-01-01 | End: 2023-01-01

## 2023-01-01 RX ORDER — ESZOPICLONE 1 MG/1
2 TABLET, FILM COATED ORAL AT BEDTIME
Qty: 30 TABLET | Refills: 0 | Status: SHIPPED | OUTPATIENT
Start: 2023-01-01 | End: 2023-01-01

## 2023-01-01 RX ORDER — MORPHINE SULFATE 15 MG/1
15 TABLET, FILM COATED, EXTENDED RELEASE ORAL EVERY 12 HOURS
Qty: 60 TABLET | Refills: 0 | Status: SHIPPED | OUTPATIENT
Start: 2023-01-01 | End: 2023-01-01

## 2023-01-01 RX ORDER — FLUOROURACIL 50 MG/ML
400 INJECTION, SOLUTION INTRAVENOUS ONCE
Status: COMPLETED | OUTPATIENT
Start: 2023-01-01 | End: 2023-01-01

## 2023-01-01 RX ORDER — GABAPENTIN 300 MG/1
300 CAPSULE ORAL 3 TIMES DAILY
Qty: 90 CAPSULE | Refills: 0 | Status: SHIPPED | OUTPATIENT
Start: 2023-01-01 | End: 2023-01-01

## 2023-01-01 RX ORDER — OXYCODONE 13.5 MG/1
13.5 CAPSULE, EXTENDED RELEASE ORAL EVERY 12 HOURS
Qty: 60 TABLET | Refills: 0 | Status: CANCELLED | OUTPATIENT
Start: 2023-01-01

## 2023-01-01 RX ORDER — HYDROCODONE BITARTRATE AND ACETAMINOPHEN 7.5; 325 MG/1; MG/1
1 TABLET ORAL EVERY 6 HOURS PRN
Qty: 60 TABLET | Refills: 0 | Status: SHIPPED | OUTPATIENT
Start: 2023-01-01 | End: 2023-01-01

## 2023-01-01 RX ORDER — OXYCODONE 13.5 MG/1
13.5 CAPSULE, EXTENDED RELEASE ORAL EVERY 12 HOURS
Qty: 60 TABLET | Refills: 0 | Status: SHIPPED | OUTPATIENT
Start: 2023-01-01 | End: 2023-01-01 | Stop reason: ALTCHOICE

## 2023-01-01 RX ORDER — ATROPINE SULFATE 0.4 MG/ML
0.4 AMPUL (ML) INJECTION
Status: DISCONTINUED | OUTPATIENT
Start: 2023-01-01 | End: 2023-01-01 | Stop reason: HOSPADM

## 2023-01-01 RX ORDER — DULOXETIN HYDROCHLORIDE 30 MG/1
30 CAPSULE, DELAYED RELEASE ORAL 2 TIMES DAILY
Qty: 30 CAPSULE | Refills: 1 | Status: SHIPPED | OUTPATIENT
Start: 2023-01-01 | End: 2023-01-01

## 2023-01-01 RX ORDER — OXYCODONE 13.5 MG/1
13.5 CAPSULE, EXTENDED RELEASE ORAL EVERY 12 HOURS
Qty: 60 TABLET | Refills: 0 | Status: SHIPPED | OUTPATIENT
Start: 2023-01-01 | End: 2023-01-01

## 2023-01-01 RX ORDER — PROCHLORPERAZINE MALEATE 10 MG
10 TABLET ORAL EVERY 6 HOURS PRN
Qty: 120 TABLET | Refills: 3 | Status: SHIPPED | OUTPATIENT
Start: 2023-01-01

## 2023-01-01 RX ORDER — ONDANSETRON 8 MG/1
8 TABLET, FILM COATED ORAL EVERY 8 HOURS PRN
Qty: 60 TABLET | Refills: 1 | Status: SHIPPED | OUTPATIENT
Start: 2023-01-01 | End: 2024-01-01

## 2023-01-01 RX ORDER — GABAPENTIN 300 MG/1
300 CAPSULE ORAL 3 TIMES DAILY
Qty: 270 CAPSULE | Refills: 1 | Status: SHIPPED | OUTPATIENT
Start: 2023-01-01 | End: 2024-01-01

## 2023-01-01 RX ORDER — OXYCODONE 13.5 MG/1
13.5 CAPSULE, EXTENDED RELEASE ORAL EVERY 12 HOURS
Qty: 60 TABLET | Refills: 0 | Status: SHIPPED | OUTPATIENT
Start: 2023-01-01

## 2023-01-01 RX ADMIN — Medication 250 ML: at 10:32

## 2023-01-01 RX ADMIN — FLUDEOXYGLUCOSE F-18 12.3 MILLICURIE: 500 INJECTION, SOLUTION INTRAVENOUS at 10:18

## 2023-01-01 RX ADMIN — Medication 250 ML: at 11:52

## 2023-01-01 RX ADMIN — HEPARIN SODIUM (PORCINE) LOCK FLUSH IV SOLN 100 UNIT/ML 5 ML: 100 SOLUTION at 14:51

## 2023-01-01 RX ADMIN — HEPARIN SODIUM (PORCINE) LOCK FLUSH IV SOLN 100 UNIT/ML 5 ML: 100 SOLUTION at 14:10

## 2023-01-01 RX ADMIN — HEPARIN SODIUM (PORCINE) LOCK FLUSH IV SOLN 100 UNIT/ML 5 ML: 100 SOLUTION at 15:13

## 2023-01-01 RX ADMIN — Medication 50 MG: at 10:57

## 2023-01-01 RX ADMIN — HEPARIN SODIUM (PORCINE) LOCK FLUSH IV SOLN 100 UNIT/ML 5 ML: 100 SOLUTION at 14:30

## 2023-01-01 RX ADMIN — Medication 250 ML: at 09:38

## 2023-01-01 RX ADMIN — Medication 250 ML: at 11:14

## 2023-01-01 RX ADMIN — Medication 50 MG: at 09:48

## 2023-01-01 RX ADMIN — Medication 250 ML: at 10:57

## 2023-01-01 RX ADMIN — HEPARIN SODIUM (PORCINE) LOCK FLUSH IV SOLN 100 UNIT/ML 5 ML: 100 SOLUTION at 14:41

## 2023-01-01 RX ADMIN — FLUOROURACIL 690 MG: 50 INJECTION, SOLUTION INTRAVENOUS at 14:01

## 2023-01-01 RX ADMIN — Medication 1000 ML: at 13:07

## 2023-01-01 RX ADMIN — Medication 1000 ML: at 09:38

## 2023-01-01 RX ADMIN — Medication 250 ML: at 09:47

## 2023-01-01 RX ADMIN — Medication 250 ML: at 11:11

## 2023-01-01 RX ADMIN — Medication 1000 ML: at 13:46

## 2023-01-01 RX ADMIN — Medication 250 ML: at 11:51

## 2023-01-01 RX ADMIN — Medication 1000 ML: at 13:12

## 2023-01-01 RX ADMIN — HEPARIN SODIUM (PORCINE) LOCK FLUSH IV SOLN 100 UNIT/ML 5 ML: 100 SOLUTION at 12:15

## 2023-01-01 RX ADMIN — HEPARIN SODIUM (PORCINE) LOCK FLUSH IV SOLN 100 UNIT/ML 5 ML: 100 SOLUTION at 13:09

## 2023-01-01 RX ADMIN — HEPARIN SODIUM (PORCINE) LOCK FLUSH IV SOLN 100 UNIT/ML 5 ML: 100 SOLUTION at 14:14

## 2023-01-01 RX ADMIN — HEPARIN SODIUM (PORCINE) LOCK FLUSH IV SOLN 100 UNIT/ML 5 ML: 100 SOLUTION at 10:33

## 2023-01-01 RX ADMIN — Medication 50 MG: at 11:58

## 2023-01-01 RX ADMIN — HEPARIN SODIUM (PORCINE) LOCK FLUSH IV SOLN 100 UNIT/ML 5 ML: 100 SOLUTION at 12:21

## 2023-01-01 RX ADMIN — Medication 1000 ML: at 13:13

## 2023-01-01 RX ADMIN — HEPARIN SODIUM (PORCINE) LOCK FLUSH IV SOLN 100 UNIT/ML 5 ML: 100 SOLUTION at 13:13

## 2023-01-01 RX ADMIN — Medication 50 MG: at 09:38

## 2023-01-01 RX ADMIN — Medication 0.4 MG: at 13:35

## 2023-01-01 RX ADMIN — HEPARIN SODIUM (PORCINE) LOCK FLUSH IV SOLN 100 UNIT/ML 5 ML: 100 SOLUTION at 10:48

## 2023-01-01 RX ADMIN — Medication 1000 ML: at 09:50

## 2023-01-01 RX ADMIN — Medication 1000 ML: at 09:25

## 2023-01-01 RX ADMIN — Medication 1000 ML: at 11:09

## 2023-01-01 RX ADMIN — Medication 1000 ML: at 14:07

## 2023-01-01 ASSESSMENT — ENCOUNTER SYMPTOMS
NAUSEA: 0
FATIGUE: 1
EXTREMITY WEAKNESS: 1
SLEEP DISTURBANCE: 0
ARTHRALGIAS: 1
EXTREMITY WEAKNESS: 1
ABDOMINAL PAIN: 0
LEG SWELLING: 0
DYSURIA: 0
DIARRHEA: 0
NECK PAIN: 0
DIZZINESS: 1
MYALGIAS: 1
COUGH: 1
DIFFICULTY URINATING: 0
WHEEZING: 0
CONSTIPATION: 1
NUMBNESS: 0
BLOOD IN STOOL: 0
CHEST TIGHTNESS: 0
WOUND: 0
VOICE CHANGE: 0
HEMOPTYSIS: 0
SEIZURES: 0
HEMATURIA: 0
VOMITING: 0
ABDOMINAL PAIN: 0
PALPITATIONS: 0
HEMOPTYSIS: 0
CONSTIPATION: 1
SHORTNESS OF BREATH: 1
VOMITING: 0
COUGH: 1
ARTHRALGIAS: 1
BLOOD IN STOOL: 0
PALPITATIONS: 0
FREQUENCY: 1
NAUSEA: 0
NECK PAIN: 0
UNEXPECTED WEIGHT CHANGE: 1
LEG SWELLING: 0
FREQUENCY: 1
DIZZINESS: 1
SLEEP DISTURBANCE: 0
DYSURIA: 0
BACK PAIN: 1
LIGHT-HEADEDNESS: 0
MYALGIAS: 1
TROUBLE SWALLOWING: 1
DIFFICULTY URINATING: 0
SHORTNESS OF BREATH: 1
NERVOUS/ANXIOUS: 0
EYE PROBLEMS: 0
SPEECH DIFFICULTY: 0
FATIGUE: 1
UNEXPECTED WEIGHT CHANGE: 1
ADENOPATHY: 0
APPETITE CHANGE: 1
WHEEZING: 0
NERVOUS/ANXIOUS: 0
WOUND: 0
DIARRHEA: 0
HEMATURIA: 0
SEIZURES: 0
NUMBNESS: 0
EYE PROBLEMS: 0
CHEST TIGHTNESS: 0
TROUBLE SWALLOWING: 1
APPETITE CHANGE: 1
DEPRESSION: 0
VOICE CHANGE: 0
SPEECH DIFFICULTY: 0
BACK PAIN: 1
DEPRESSION: 0
ADENOPATHY: 0
LIGHT-HEADEDNESS: 0

## 2023-01-01 ASSESSMENT — PAIN SCALES - GENERAL
PAINLEVEL: MODERATE PAIN (5)
PAINLEVEL: SEVERE PAIN (7)
PAINLEVEL: MODERATE PAIN (5)
PAINLEVEL: NO PAIN (0)
PAINLEVEL: MODERATE PAIN (5)
PAINLEVEL: NO PAIN (0)
PAINLEVEL: MODERATE PAIN (4)
PAINLEVEL: EXTREME PAIN (8)
PAINLEVEL: SEVERE PAIN (7)
PAINLEVEL: EXTREME PAIN (8)
PAINLEVEL: MODERATE PAIN (5)
PAINLEVEL: MILD PAIN (3)
PAINLEVEL: NO PAIN (0)

## 2023-01-03 ENCOUNTER — TELEPHONE (OUTPATIENT)
Dept: ONCOLOGY | Facility: OTHER | Age: 58
End: 2023-01-03

## 2023-01-03 NOTE — TELEPHONE ENCOUNTER
Confirming his appointment for 01/04 he wanted to let Martha know his ankles are swollen and is retaining water since before Krys.  He is wearing compression socks and is wondering if he could have an RX for the water retention in his ankles.  Pharmacy/[facility].

## 2023-01-04 ENCOUNTER — ONCOLOGY VISIT (OUTPATIENT)
Dept: ONCOLOGY | Facility: OTHER | Age: 58
End: 2023-01-04
Attending: NURSE PRACTITIONER
Payer: COMMERCIAL

## 2023-01-04 ENCOUNTER — TRANSFERRED RECORDS (OUTPATIENT)
Dept: HEALTH INFORMATION MANAGEMENT | Facility: CLINIC | Age: 58
End: 2023-01-04

## 2023-01-04 ENCOUNTER — PATIENT OUTREACH (OUTPATIENT)
Dept: ONCOLOGY | Facility: OTHER | Age: 58
End: 2023-01-04

## 2023-01-04 VITALS
RESPIRATION RATE: 20 BRPM | DIASTOLIC BLOOD PRESSURE: 60 MMHG | HEART RATE: 76 BPM | SYSTOLIC BLOOD PRESSURE: 100 MMHG | WEIGHT: 134.26 LBS | TEMPERATURE: 97.6 F | HEIGHT: 68 IN | OXYGEN SATURATION: 99 % | BODY MASS INDEX: 20.35 KG/M2

## 2023-01-04 DIAGNOSIS — C15.5 MALIGNANT NEOPLASM OF LOWER THIRD OF ESOPHAGUS (H): Primary | ICD-10-CM

## 2023-01-04 DIAGNOSIS — R60.9 EDEMA, UNSPECIFIED TYPE: ICD-10-CM

## 2023-01-04 DIAGNOSIS — D64.9 ANEMIA, UNSPECIFIED TYPE: ICD-10-CM

## 2023-01-04 DIAGNOSIS — G89.3 CANCER RELATED PAIN: ICD-10-CM

## 2023-01-04 DIAGNOSIS — R63.4 WEIGHT LOSS: ICD-10-CM

## 2023-01-04 DIAGNOSIS — Z72.0 TOBACCO ABUSE: ICD-10-CM

## 2023-01-04 LAB
ALBUMIN SERPL BCG-MCNC: 3.3 G/DL (ref 3.5–5.2)
ALP SERPL-CCNC: 75 U/L (ref 40–129)
ALT SERPL W P-5'-P-CCNC: 11 U/L (ref 10–50)
ANION GAP SERPL CALCULATED.3IONS-SCNC: 8 MMOL/L (ref 7–15)
AST SERPL W P-5'-P-CCNC: 15 U/L (ref 10–50)
BASOPHILS # BLD AUTO: 0 10E3/UL (ref 0–0.2)
BASOPHILS NFR BLD AUTO: 0 %
BILIRUB SERPL-MCNC: 0.4 MG/DL
BUN SERPL-MCNC: 14.2 MG/DL (ref 8–23)
CALCIUM SERPL-MCNC: 9.2 MG/DL (ref 8.6–10)
CHLORIDE SERPL-SCNC: 98 MMOL/L (ref 98–107)
CREAT SERPL-MCNC: 0.54 MG/DL (ref 0.67–1.17)
DEPRECATED HCO3 PLAS-SCNC: 29 MMOL/L (ref 22–29)
EOSINOPHIL # BLD AUTO: 0.1 10E3/UL (ref 0–0.7)
EOSINOPHIL NFR BLD AUTO: 1 %
ERYTHROCYTE [DISTWIDTH] IN BLOOD BY AUTOMATED COUNT: 13.1 % (ref 10–15)
FERRITIN SERPL-MCNC: 90 NG/ML (ref 31–409)
GFR SERPL CREATININE-BSD FRML MDRD: >90 ML/MIN/1.73M2
GLUCOSE SERPL-MCNC: 82 MG/DL (ref 70–99)
HCT VFR BLD AUTO: 35.2 % (ref 40–53)
HGB BLD-MCNC: 11.4 G/DL (ref 13.3–17.7)
IMM GRANULOCYTES # BLD: 0.1 10E3/UL
IMM GRANULOCYTES NFR BLD: 0 %
IRON BINDING CAPACITY (ROCHE): 185 UG/DL (ref 240–430)
IRON SATN MFR SERPL: 6 % (ref 15–46)
IRON SERPL-MCNC: 11 UG/DL (ref 61–157)
LDH SERPL L TO P-CCNC: 121 U/L (ref 0–250)
LYMPHOCYTES # BLD AUTO: 1.3 10E3/UL (ref 0.8–5.3)
LYMPHOCYTES NFR BLD AUTO: 10 %
MCH RBC QN AUTO: 29 PG (ref 26.5–33)
MCHC RBC AUTO-ENTMCNC: 32.4 G/DL (ref 31.5–36.5)
MCV RBC AUTO: 90 FL (ref 78–100)
MONOCYTES # BLD AUTO: 0.8 10E3/UL (ref 0–1.3)
MONOCYTES NFR BLD AUTO: 6 %
NEUTROPHILS # BLD AUTO: 10.8 10E3/UL (ref 1.6–8.3)
NEUTROPHILS NFR BLD AUTO: 83 %
NRBC # BLD AUTO: 0 10E3/UL
NRBC BLD AUTO-RTO: 0 /100
PLATELET # BLD AUTO: 359 10E3/UL (ref 150–450)
POTASSIUM SERPL-SCNC: 4.4 MMOL/L (ref 3.4–5.3)
PROT SERPL-MCNC: 6.8 G/DL (ref 6.4–8.3)
RBC # BLD AUTO: 3.93 10E6/UL (ref 4.4–5.9)
SODIUM SERPL-SCNC: 135 MMOL/L (ref 136–145)
WBC # BLD AUTO: 13.1 10E3/UL (ref 4–11)

## 2023-01-04 PROCEDURE — 85025 COMPLETE CBC W/AUTO DIFF WBC: CPT | Performed by: NURSE PRACTITIONER

## 2023-01-04 PROCEDURE — 99215 OFFICE O/P EST HI 40 MIN: CPT | Mod: 25 | Performed by: NURSE PRACTITIONER

## 2023-01-04 PROCEDURE — 83615 LACTATE (LD) (LDH) ENZYME: CPT | Performed by: NURSE PRACTITIONER

## 2023-01-04 PROCEDURE — 82728 ASSAY OF FERRITIN: CPT | Performed by: NURSE PRACTITIONER

## 2023-01-04 PROCEDURE — 36591 DRAW BLOOD OFF VENOUS DEVICE: CPT | Performed by: NURSE PRACTITIONER

## 2023-01-04 PROCEDURE — 83540 ASSAY OF IRON: CPT | Performed by: NURSE PRACTITIONER

## 2023-01-04 PROCEDURE — 83550 IRON BINDING TEST: CPT | Performed by: NURSE PRACTITIONER

## 2023-01-04 PROCEDURE — 80053 COMPREHEN METABOLIC PANEL: CPT | Performed by: NURSE PRACTITIONER

## 2023-01-04 PROCEDURE — 99417 PROLNG OP E/M EACH 15 MIN: CPT | Performed by: NURSE PRACTITIONER

## 2023-01-04 RX ORDER — LORAZEPAM 2 MG/ML
0.5 INJECTION INTRAMUSCULAR EVERY 4 HOURS PRN
Status: CANCELLED | OUTPATIENT
Start: 2023-01-04

## 2023-01-04 RX ORDER — METHYLPREDNISOLONE SODIUM SUCCINATE 125 MG/2ML
125 INJECTION, POWDER, LYOPHILIZED, FOR SOLUTION INTRAMUSCULAR; INTRAVENOUS
Status: CANCELLED
Start: 2023-01-04

## 2023-01-04 RX ORDER — MEPERIDINE HYDROCHLORIDE 25 MG/ML
25 INJECTION INTRAMUSCULAR; INTRAVENOUS; SUBCUTANEOUS EVERY 30 MIN PRN
Status: CANCELLED | OUTPATIENT
Start: 2023-01-04

## 2023-01-04 RX ORDER — ONDANSETRON 2 MG/ML
8 INJECTION INTRAMUSCULAR; INTRAVENOUS ONCE
Status: CANCELLED | OUTPATIENT
Start: 2023-01-11

## 2023-01-04 RX ORDER — HEPARIN SODIUM (PORCINE) LOCK FLUSH IV SOLN 100 UNIT/ML 100 UNIT/ML
5 SOLUTION INTRAVENOUS
Status: CANCELLED | OUTPATIENT
Start: 2023-01-04

## 2023-01-04 RX ORDER — HEPARIN SODIUM,PORCINE 10 UNIT/ML
5 VIAL (ML) INTRAVENOUS
Status: CANCELLED | OUTPATIENT
Start: 2023-01-13

## 2023-01-04 RX ORDER — DIPHENHYDRAMINE HYDROCHLORIDE 50 MG/ML
50 INJECTION INTRAMUSCULAR; INTRAVENOUS
Status: CANCELLED
Start: 2023-01-04

## 2023-01-04 RX ORDER — EPINEPHRINE 1 MG/ML
0.3 INJECTION, SOLUTION, CONCENTRATE INTRAVENOUS EVERY 5 MIN PRN
Status: CANCELLED | OUTPATIENT
Start: 2023-01-04

## 2023-01-04 RX ORDER — HEPARIN SODIUM,PORCINE 10 UNIT/ML
5 VIAL (ML) INTRAVENOUS
Status: CANCELLED | OUTPATIENT
Start: 2023-01-04

## 2023-01-04 RX ORDER — ONDANSETRON 8 MG/1
8 TABLET, FILM COATED ORAL EVERY 8 HOURS PRN
Qty: 30 TABLET | Refills: 2 | Status: SHIPPED | OUTPATIENT
Start: 2023-01-04 | End: 2023-03-22

## 2023-01-04 RX ORDER — ALBUTEROL SULFATE 0.83 MG/ML
2.5 SOLUTION RESPIRATORY (INHALATION)
Status: CANCELLED | OUTPATIENT
Start: 2023-01-04

## 2023-01-04 RX ORDER — HEPARIN SODIUM (PORCINE) LOCK FLUSH IV SOLN 100 UNIT/ML 100 UNIT/ML
5 SOLUTION INTRAVENOUS
Status: CANCELLED | OUTPATIENT
Start: 2023-01-13

## 2023-01-04 RX ORDER — FLUOROURACIL 50 MG/ML
400 INJECTION, SOLUTION INTRAVENOUS ONCE
Status: CANCELLED | OUTPATIENT
Start: 2023-01-04

## 2023-01-04 RX ORDER — PROCHLORPERAZINE MALEATE 10 MG
10 TABLET ORAL EVERY 6 HOURS PRN
Qty: 30 TABLET | Refills: 2 | Status: SHIPPED | OUTPATIENT
Start: 2023-01-04 | End: 2023-03-22

## 2023-01-04 RX ORDER — ALBUTEROL SULFATE 90 UG/1
1-2 AEROSOL, METERED RESPIRATORY (INHALATION)
Status: CANCELLED
Start: 2023-01-04

## 2023-01-04 ASSESSMENT — PAIN SCALES - GENERAL: PAINLEVEL: SEVERE PAIN (6)

## 2023-01-04 NOTE — PROGRESS NOTES
[facility]: Cancer Care                                                                                          Patient requesting more securing devise for PEG tube. TC to [facility] to request more. They will send out a box of flexi traps.  Signature:  Nenita Tatum RN

## 2023-01-04 NOTE — LETTER
[facility]  [address]  [place] [address]  [phone]      January 4, 2023      Kenneth Matthew  [address] [place]  [facility] [address]      EMERGENCY CARE PLAN  Presenting Problem Treatment Plan   Questions or concerns during clinic hours    24 hour Nurse line available - Call main clinic line and follow prompts I will call the clinic directly: [phone]    24 Hour Nurse Line [phone]- Follow prompts and press option for nurse advisor    [facility]  [address] [place] [address]   Patient needs to schedule an appointment  I will call the scheduling team during business hours at [phone]   Same day treatment   I will call the clinic first, then urgent care if needed   Clinic Care Coordinators [facility]  Oncology Care coordinator  Syeda Gutierres [phone]  [facility] [phone]   Crisis Services:  Behavioral or Mental Health [facility]  [phone]   Emergency treatment--Immediately CALL 911

## 2023-01-04 NOTE — PROGRESS NOTES
Oncology Treatment Initiation/Therapy Education Visit:  January 4, 2023    Reason for Visit:  Patient presents with:  Oncology Clinic Visit: Follow up Esophageal cancer - Chemo ED     Nursing Note and documentation reviewed:     HPI: This is a 57-year-old male patient who presents to the oncology clinic today in order to discuss upcoming treatment.  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  Plan is to initiate systemic therapy with nivolumab/FOLFOX with potential for palliative radiation therapy at some point.    He presents to the clinic today accompanied by his brother.  He states he is having a lot of pain in his upper mid back.  He is rating this at a 6-7.  He was using the oxycodone that was given to him after his Port-A-Cath was placed and states he has about 2 doses of this left as he has backed off on it as he was feeling better and did not think he needed it as often.  He states he is trying to go every 12 hours.  He admits he is sleeping very poorly and has to sleep in a chair related to the fact that he is unable to sleep on his back due to pain.      He also complains of swelling in his feet and ankles and states he would is planning to see his PCP today regarding this.  Once again, he sits sleeping in a chair and does not elevate his feet.  He is using compression socks.  Appetite has been very poor.  He puts 2 cans of feedings through the G-tube daily along with some water but admits he knows he is not getting enough water.  He also does try to drink 2 protein shakes a day.  He states he is able to drink Gatorade and apple juice fairly easily but he is not able to take in any solid foods related to food getting stuck and feeling as though he is choking.    He does continue to smoke about half pack per day or less.  He is planning to use nicotine patches.    He does question when the stitches can come out around the G-tube.  States he does have a little bit of  drainage around the G-tube and no obvious redness.    Oncologic History:     **Experienced difficulty swallowing and 60 pound weight loss  10/21/2022: CT chest, abdomen and pelvis: Diffuse wall thickening of the distal esophagus with associated mediastinal adenopathy, and upper abdominal  Conglomerate lymphadenopathy. Constellation of findings are highly concerning for underlying esophageal carcinoma with metastatic disease.   11/1/2022: Colonoscopy with polyp showing tubular adenoma and EGD: A large ulcerating mass with no bleeding and no stigmata of recent bleeding was found in the lower third of the esophagus, 35 to 40 cm from the incisors.  The mass was partially obstructing and partially circumferential involving  two thirds.    **Biopsy showed poorly differentiated favoring adenocarcinoma. Her 2 negative.   11/22/2002 patient met with Dr. Barnhart to discuss systemic therapy pending PET results/complete staging  12/5/2022 patient met with Dr. Spain with radiation oncology with possible plan to initiate radiation therapy pending results of PET scan  12/14/2022: PET scan: Distal esophageal neoplasm with metastatic involvement the  mediastinum, left hilum and retroperitoneal lymph nodes of the upperabdomen. Also likely localized metastatic involvement of thestomach/GE junction. 2 cm intramural distal esophageal/GE junction mass. Circumferential wall thickening with increased FDG uptake in the distal esophagus adjacent portions of the stomach/GE junction may represent localized disease however could also represent localized inflammation. Metastatic involvement to the subcarinal nodes, left infrahilar and  pericaval/paraesophageal lymph nodes of the lower thorax. Metastatic involvement to the retroperitoneal lymph nodes of the upper abdomen. These lymph nodes have increased in size dating back to be  10/21/2022 CT scan consistent with worsening disease. Representative  nodes are described above.  12/20/2022  He was  seen by Dr. Barnhart with plan to initiate palliative nivolumab/FOLFOX with potential for palliative Radiation at some point  12/22/2022: Port-A-Cath placement and gastrostomy tube insertion by Dr. Ziegler    RESULT FOR IMMUNOHISTOCHEMICAL VENTANA CLONE  PD-L1 ASSAY  COMBINED POSITIVE SCORE (CPS): <1%       Treatment Goal: Palliative    Planned TX: Nivolumab 240 mg/oxaliplatin 85 mg per metered squared/leucovorin 350 mg per metered squared/fluorouracil 400 mg per metered squared IV bolus/fluorouracil 2400 mg per metered squared via continuous infusion over 46 hours with cycle given every 14 days    Previous treatment:  N/A    Past Medical History:   Diagnosis Date     Cancer (H)      Esophageal cancer (H) 11/01/2022     Esophageal thickening 10/25/2022     Mixed hearing loss, bilateral      Tobacco use disorder 12/27/2019       Past Surgical History:   Procedure Laterality Date     COLONOSCOPY  11/01/2022    [facility]     INSERT PORT VASCULAR ACCESS N/A 12/22/2022    Procedure: POWER PORT PLACEMENT;  Surgeon: Nba Dalton MD;  Location: [facility] OR     Upper GI Endoscopy  11/01/2022       Family History   Problem Relation Age of Onset     Breast Cancer Mother      Hyperlipidemia Mother      Diabetes Mother      Coronary Artery Disease Mother      Cerebrovascular Disease Father      Hyperlipidemia Father      Coronary Artery Disease Father      Myocardial Infarction Father      Hyperlipidemia Brother      Hypertension Brother      Diabetes Brother      Asthma Sister      Breast Cancer Sister      Hyperlipidemia Sister      Diabetes Sister      Hyperlipidemia Sister      Diabetes Sister      Hyperlipidemia Sister      Suicide Sister      Anesthesia Reaction Sister      Breast Cancer Sister      Hyperlipidemia Sister      Diabetes Sister      Breast Cancer Niece      Skin Cancer Niece      Thyroid Disease Niece      Breast Cancer Niece      Leukemia Nephew      Colon Cancer No family hx of      Prostate Cancer No family  hx of      Genetic Disorder No family hx of        Social History     Socioeconomic History     Marital status:      Spouse name: Jazzy     Number of children: 2     Years of education: Not on file     Highest education level: Not on file   Occupational History     Not on file   Tobacco Use     Smoking status: Every Day     Packs/day: 0.50     Years: 46.00     Pack years: 23.00     Types: Cigarettes     Start date: 1976     Smokeless tobacco: Never     Tobacco comments:     Trying to quit   Vaping Use     Vaping Use: Never used   Substance and Sexual Activity     Alcohol use: Not Currently     Comment: no alcohol in last 6 months     Drug use: Never     Sexual activity: Not on file   Other Topics Concern     Parent/sibling w/ CABG, MI or angioplasty before 65F 55M? Yes     Comment: father   Social History Narrative     Not on file     Social Determinants of Health     Financial Resource Strain: Not on file   Food Insecurity: Not on file   Transportation Needs: Not on file   Physical Activity: Not on file   Stress: Not on file   Social Connections: Not on file   Intimate Partner Violence: Not on file   Housing Stability: Not on file       Current Outpatient Medications   Medication     ibuprofen (ADVIL/MOTRIN) 200 MG tablet     nicotine (COMMIT) 2 MG lozenge     nicotine (NICODERM CQ) 21 MG/24HR 24 hr patch     nicotine (NICORETTE) 4 MG lozenge     ondansetron (ZOFRAN) 8 MG tablet     prochlorperazine (COMPAZINE) 10 MG tablet     No current facility-administered medications for this visit.        No Known Allergies    Review Of Systems:  Constitutional:    denies fever, chills, and drenching night sweats.  Eyes:    denies blurred or double vision  Ears/Nose/Throat:   denies ear pain, nose problems, has some ringing in his ears long standing  Respiratory:   denies shortness of breath, no new cough-feels has a smokers cough  Skin:   denies rash, lesions  Cardiovascular:   Has occasional chest pain when swallows;  "denies palpitations, has edema to ankles and feet since starting the tube feedings  Gastrointestinal:   denies abdominal pain, bloating, nausea; no change in bowel habits or blood in stool  Genitourinary:   denies difficulty with urination, blood in urine  Musculoskeletal:   See hPI  Neurologic:   denies lightheadedness, frequent headaches, has mild numbness and tingling to fingers at times and feel cold  Endocrine:   has dry mouth      ECOG Performance Status: 2    Physical Exam:  /60   Pulse 76   Temp 97.6  F (36.4  C) (Tympanic)   Resp 20   Ht 1.715 m (5' 7.5\")   Wt 60.9 kg (134 lb 4.2 oz)   SpO2 99%   BMI 20.72 kg/m      GENERAL APPEARANCE: Thin, alert and in obvious discomfort-sits guardedly  HEENT: Normocephalic, Sclerae anicteric.   RESP:  respirations regular and easy  CARDIOVASCULAR: Regular rate and rhythm. Normal S1, S2; no murmur, gallop, or rub.  MUSCULOSKELETAL: Extremities without gross deformities noted. Mild edema of bilateral lower extremities.  SKIN: No suspicious lesions or rashes.  G-tube site without redness and small amount of clear/yellow drainage; tenderness around the sutures  NEURO: Alert and oriented x 3.    PSYCHIATRIC: Mentation and affect appear normal.  Mood appropriate.    Laboratory:     Results for orders placed or performed in visit on 01/04/23   Comprehensive metabolic panel     Status: Abnormal   Result Value Ref Range    Sodium 135 (L) 136 - 145 mmol/L    Potassium 4.4 3.4 - 5.3 mmol/L    Chloride 98 98 - 107 mmol/L    Carbon Dioxide (CO2) 29 22 - 29 mmol/L    Anion Gap 8 7 - 15 mmol/L    Urea Nitrogen 14.2 8.0 - 23.0 mg/dL    Creatinine 0.54 (L) 0.67 - 1.17 mg/dL    Calcium 9.2 8.6 - 10.0 mg/dL    Glucose 82 70 - 99 mg/dL    Alkaline Phosphatase 75 40 - 129 U/L    AST 15 10 - 50 U/L    ALT 11 10 - 50 U/L    Protein Total 6.8 6.4 - 8.3 g/dL    Albumin 3.3 (L) 3.5 - 5.2 g/dL    Bilirubin Total 0.4 <=1.2 mg/dL    GFR Estimate >90 >60 mL/min/1.73m2   Lactate " Dehydrogenase     Status: Normal   Result Value Ref Range    Lactate Dehydrogenase 121 0 - 250 U/L   CBC with platelets and differential     Status: Abnormal   Result Value Ref Range    WBC Count 13.1 (H) 4.0 - 11.0 10e3/uL    RBC Count 3.93 (L) 4.40 - 5.90 10e6/uL    Hemoglobin 11.4 (L) 13.3 - 17.7 g/dL    Hematocrit 35.2 (L) 40.0 - 53.0 %    MCV 90 78 - 100 fL    MCH 29.0 26.5 - 33.0 pg    MCHC 32.4 31.5 - 36.5 g/dL    RDW 13.1 10.0 - 15.0 %    Platelet Count 359 150 - 450 10e3/uL    % Neutrophils 83 %    % Lymphocytes 10 %    % Monocytes 6 %    % Eosinophils 1 %    % Basophils 0 %    % Immature Granulocytes 0 %    NRBCs per 100 WBC 0 <1 /100    Absolute Neutrophils 10.8 (H) 1.6 - 8.3 10e3/uL    Absolute Lymphocytes 1.3 0.8 - 5.3 10e3/uL    Absolute Monocytes 0.8 0.0 - 1.3 10e3/uL    Absolute Eosinophils 0.1 0.0 - 0.7 10e3/uL    Absolute Basophils 0.0 0.0 - 0.2 10e3/uL    Absolute Immature Granulocytes 0.1 <=0.4 10e3/uL    Absolute NRBCs 0.0 10e3/uL   Ferritin     Status: Normal   Result Value Ref Range    Ferritin 90 31 - 409 ng/mL   Iron & Iron Binding Capacity     Status: Abnormal   Result Value Ref Range    Iron 11 (L) 61 - 157 ug/dL    Iron Binding Capacity 185 (L) 240 - 430 ug/dL    Iron Sat Index 6 (L) 15 - 46 %   CBC with Platelets & Differential     Status: Abnormal    Narrative    The following orders were created for panel order CBC with Platelets & Differential.  Procedure                               Abnormality         Status                     ---------                               -----------         ------                     CBC with platelets and d...[[id number]]  Abnormal            Final result                 Please view results for these tests on the individual orders.       Imaging Studies:      PET ONCOLOGY (EYES TO THIGHS), 12/14/2022 10:50 AM  Initial      History: Male, age 57 years; Malignant neoplasm of lower third of  esophagus (H)     Comparison: CT scan chest and pelvis  10/21/2022.     Radiopharmaceutical: 9.88 millicuries F-18 fluorodeoxyglucose  Blood sugar: 1/2 mg/dL     FINDINGS:   Head and neck: No abnormal FDG uptake. No lymphadenopathy.     Thorax: There is a large, 4.4 transverse by 2.6 cm AP lobulated FDG  avid mass in the subcarinal region, likely representing an enlarged  collection of lymph nodes. This collection of nodes is increased  slightly in size dating back to be 10/21/2022 CT scan at which time it  measured approximate 4.2 x 2.4 cm.. Maximal SUV measurements in the  range of 16.     Enlarged, FDG avid lymph node is also seen along the caudal margin of  the left lower lobe bronchus measuring 2.2 x 1.8 cm in size slightly  larger when compared to prior study at which time it measured 2.1 x  1.5 cm in maximal SUV is approximately 17.5.     Other enlarged lymph nodes seen along the left lateral aspect of the  inferior vena cava are similar in size and demonstrate elevated FDG  uptake. This a nodular submucosal mass with increased FDG uptake of  the distal esophagus. There is also increased uptake extending into  the adjacent portions of the stomach.     Abdomen/pelvis:   Circumferential FDG uptake at the GE junction with a submucosal mass  in the distal esophagus measuring approximately 2 cm in diameter.  There is a 2.1 x 1.7 cm FDG avid lymph node located along the lesser  curvature of the stomach. A number of other enlarged FDG avid,  centrally necrotic nodes also seen in the tommy hepatis including an  enlarged 2.0 x 4.1 cm FDG avid portacaval lymph node which has  increased in size, previously measuring 3.4 x 2.0 cm.      Normal activity is seen within the liver, gallbladder, spleen,  pancreas and adrenal glands. Bilateral adrenal gland enlargement is  similar when compared to the prior study.     A 4.8 x 2.2 cm FDG avid collection of lymph nodes in the preaortic  region is also increased in size, previously measuring 3.8 x 1.7 cm.     Additional findings  "include diverticulosis of the colon and mild  ectasia of the distal aorta.     Bony structures: No abnormal uptake that would suggest malignant  involvement of the bony structures.                                                                 IMPRESSION: Distal esophageal neoplasm with metastatic involvement the  mediastinum, left hilum and retroperitoneal lymph nodes of the upper  abdomen. Also likely localized metastatic involvement of the  stomach/GE junction.     2 cm intramural distal esophageal/GE junction mass.     Circumferential wall thickening with increased FDG uptake in the  distal esophagus adjacent portions of the stomach/GE junction may  represent localized disease however could also represent localized  inflammation.     Metastatic involvement to the subcarinal nodes, left infrahilar and  pericaval/paraesophageal lymph nodes of the lower thorax.     Metastatic involvement to the retroperitoneal lymph nodes of the upper  abdomen. These lymph nodes have increased in size dating back to be  10/21/2022 CT scan consistent with worsening disease. Representative  nodes are described above.     ROBERTO REINOSO MD         Discussion:    Nutrition: Discussed  Cancer Rehab: Discussed     **Any Physical Limitations to exercise?  Pain  Palliative Care Program: Discussed  Health Care Directive: On file  Pharmacy consult review: To be reviewed  Importance of Central line care (port) or IV site care.  Discussed    Duration:  Cycles and rationale for strict adherence, specific supportive medication Compazine and zofran along with treatment medications side effects (including long-term and short-term) and management; skin changes/hand-foot syndrome, anemia, neutropenia, thrombocytopenia, diarrhea/constipation, hair loss syndrome, memory changes/ \"chemobrain\", mouth sores, taste changes, neuropathy, fatigue, infertility, myelosuppression, and risk of extravasation or infiltration.    Infection prevention and " monitoring of lab values, what lab tests and what changes of these values meant, along with the possibility of hydration or blood product transfusion, or the need to defer or hold treatment.      General Chemotherapy Information, including ways it is excreted from the body and cleaning and containment of vomitus or other bodily fluid, use of the bathroom, sexual health and intimacy, what to do if needing to miss a treatment, when to call a provider and the need for staff to wear protective equipment.    Hand Outs Given:    Medication information sheets from [employer]:  Nivolumab  Oxaliplatin  Leucovorin  Fluorouracil    Getting ready for chemotherapy: What to expect before, during and after treatment  Coping with cancer related fatigue  Coping with constipation  Coping with diarrhea  Coping with mouth and throat sores  Coping with nausea and vomiting    Bottle Pump Infuser: Yes Patient education sheet given to patient regarding how to use, care for, and monitor the infuser pump.  Patient also instructed to check to ensure balloon is deflating throughout the day. Model of bottle infuser shown to patient to allow for questions and for patient to become familiar with infuser pump.      ASSESSMENT/PLAN:    #1 Esophageal cancer: Stage IV adenocarcinoma of the esophagus diagnosed November 2022 with plan to initiate nivolumab/FOLFOX next week.  Somewhat of a concern in regards to his current performance status and lack of fluid intake.  We will set him up for some IV fluids this week and plan 2 times a week as needed.  I will plan to see him back prior to cycle 2 with his labs per treatment plan.    #2  Weight loss: We discussed the importance of proper nutrition while undergoing chemotherapy and my concerns in regards to his extensive weight loss.  We discussed the low albumin as an indication of nutritional status and also contribution to the edema.  I did highly recommend he get at least 4 cans of supplement in per  day via the G-tube or oral route and we discussed possibly doing only one half of a can at a time as he does need to also put water in the tube.  We will reassess throughout therapy.    #3  Anemia: We will run an iron/TIBC and ferritin today.    #4  Cancer related pain: Long discussion in regards to adequate pain control and the need to continue the pain meds if he is feeling more comfortable.  He will initiate the hydrocodone/acetaminophen that was given to him at his appointment with Dr. Barnhart.  I encouraged him to take 10 mils every 8 hours to try to get the pain under better control.  We also discussed that this may be too long to go in between doses and that if needed he could take it every 6 hours that he should call and let us know.  If the hydrocodone does not manage his pain well then we may need to consider switching to morphine.  We will call him on Friday to check pain status.    #5  Tobacco abuse: Patient plans to initiate nicotine patches.    #6  Edema: He will be seeing his PCP today.  Hesitant to start a water pill due to low blood pressure.  We did discuss the importance of increasing his protein.    I encouraged patient to call with any questions or concerns.    120 minutes spent in the patient's encounter today with time spent in review of the chart along with in chart preparation.  Time was spent in review of the treamtent plan.  Time was also spent obtaining a review of systems and performing a physical exam.  Time was spent in review of all planned medications for treatment with the patient and questions answered.  Extensive discussion in regards to proper nutrition and also adequate pain control.  We also discussed the importance of regular bowel movements while on opioid therapy.  Time was also spent in discussion of my recommendation for IV fluids.  We also spent time in discussion of the palliative care program information sheets were given on this.  Time was also spent in placing orders and  in chart documentation.    Delmy Valenzuela, NP  APRN, FNP-BC, AOCNP

## 2023-01-04 NOTE — PATIENT INSTRUCTIONS
We will schedule you for IV fluids this week and for Monday.  We will plan twice a week and if you feel you are getting enough fluids you may cancel.    Start the pain medication and take every 8 hours to get pain under control.  Use Miralax if needed for constipation.    Increase to at least 4 and try 5 cans of tube feeding/day-may need to do 1/2 can at a time.    We would like to see you back for chemotherapy as planned.     Your prescriptions for Zofran and compazine have been sent to: [facility].      When you are in need of a refill, please call your pharmacy and they will send us a request.     If you have any questions please call [phone]    Other instructions:       Miralax to use for constipation

## 2023-01-04 NOTE — NURSING NOTE
"Oncology Rooming Note    January 4, 2023 9:59 AM   Kenneth Matthew is a 57 year old male who presents for:    Chief Complaint   Patient presents with     Oncology Clinic Visit     Follow up Esophageal cancer - Chemo ED     Initial Vitals: /60   Pulse 76   Temp 97.6  F (36.4  C) (Tympanic)   Resp 20   Ht 1.715 m (5' 7.5\")   Wt 60.9 kg (134 lb 4.2 oz)   SpO2 99%   BMI 20.72 kg/m   Estimated body mass index is 20.72 kg/m  as calculated from the following:    Height as of this encounter: 1.715 m (5' 7.5\").    Weight as of this encounter: 60.9 kg (134 lb 4.2 oz). Body surface area is 1.7 meters squared.  Severe Pain (6) Comment: Data Unavailable   No LMP for male patient.  Allergies reviewed: Yes  Medications reviewed: Yes    Medications: Medication refills not needed today.  Pharmacy name entered into [facility]:    [id number] - [place], [place] - [address]   [facility] [place] - [id number]            "

## 2023-01-05 ENCOUNTER — DOCUMENTATION ONLY (OUTPATIENT)
Dept: ONCOLOGY | Facility: OTHER | Age: 58
End: 2023-01-05

## 2023-01-05 ENCOUNTER — INFUSION THERAPY VISIT (OUTPATIENT)
Dept: INFUSION THERAPY | Facility: OTHER | Age: 58
End: 2023-01-05
Attending: FAMILY MEDICINE
Payer: COMMERCIAL

## 2023-01-05 VITALS
HEART RATE: 60 BPM | TEMPERATURE: 97.7 F | RESPIRATION RATE: 20 BRPM | SYSTOLIC BLOOD PRESSURE: 101 MMHG | DIASTOLIC BLOOD PRESSURE: 57 MMHG | OXYGEN SATURATION: 99 %

## 2023-01-05 DIAGNOSIS — C15.5 MALIGNANT NEOPLASM OF LOWER THIRD OF ESOPHAGUS (H): Primary | ICD-10-CM

## 2023-01-05 PROCEDURE — 96360 HYDRATION IV INFUSION INIT: CPT | Performed by: INTERNAL MEDICINE

## 2023-01-05 RX ORDER — HEPARIN SODIUM (PORCINE) LOCK FLUSH IV SOLN 100 UNIT/ML 100 UNIT/ML
5 SOLUTION INTRAVENOUS
Status: DISCONTINUED | OUTPATIENT
Start: 2023-01-05 | End: 2023-01-05 | Stop reason: HOSPADM

## 2023-01-05 RX ORDER — HEPARIN SODIUM (PORCINE) LOCK FLUSH IV SOLN 100 UNIT/ML 100 UNIT/ML
5 SOLUTION INTRAVENOUS
Status: CANCELLED | OUTPATIENT
Start: 2023-01-05

## 2023-01-05 RX ADMIN — HEPARIN SODIUM (PORCINE) LOCK FLUSH IV SOLN 100 UNIT/ML 5 ML: 100 SOLUTION at 13:20

## 2023-01-05 RX ADMIN — Medication 1000 ML: at 12:14

## 2023-01-05 NOTE — PROGRESS NOTES
Patient is a 57 year old here accompanied by friend today for infusion of IVF per order of Martha Valenzuela. Patient identified with two identifiers, order verified, and verbal consent for today's infusion obtained from patient.      Patient denies changes to health hx, home medications, or allergies since provider visit yesterday.    Patient meets order parameters for today's treatment.     Patient denies questions or concerns regarding infusion and/or medication(s) being administered.    Patients port accessed using non-coring, 19  gauge, 3/4 inch needle. Port accessed per facility protocol. Port flushed easily, blood return noted. No signs and symptoms of infection or infiltration.      IV pump verified with IVF dose, drug, and rate of administration. Infusion administered per protocol. Patient tolerated infusion well, no signs or symptoms of adverse reaction noted.  Patient denies pain nor discomfort.     IV removed, catheter intact. Site clean, dry and intact. No signs or symptoms of infiltration or infection. Covered with a sterile bandage, slight pressure applied for 30 seconds. Pt instructed to leave bandage intact for a minimum of one hour, and to call with questions or concerns. Patient declines copy of appointments, discharge instructions, and after visit summary (AVS). Patient states understanding, discharged.

## 2023-01-05 NOTE — PROGRESS NOTES
[facility] / [facility] Chemotherapy Consult    The inpatient pharmacist has been consulted to review the patient's chart for  the following: any significant drug interactions between home medications, new take home medications, pre-medications, PRN medications, chemotherapy agents, and chemotherapy regimen.     Current Outpatient Medications   Medication Sig     ibuprofen (ADVIL/MOTRIN) 200 MG tablet Take 200 mg by mouth every 4 hours as needed for pain (Patient not taking: Reported on 1/4/2023)     nicotine (COMMIT) 2 MG lozenge Place 1 lozenge (2 mg) inside cheek every hour as needed for smoking cessation (Patient not taking: Reported on 1/4/2023)     nicotine (NICODERM CQ) 21 MG/24HR 24 hr patch Place 1 patch onto the skin every 24 hours (Patient not taking: Reported on 1/4/2023)     nicotine (NICORETTE) 4 MG lozenge Place 1 lozenge (4 mg) inside cheek every hour as needed for smoking cessation (Patient not taking: Reported on 1/4/2023)     ondansetron (ZOFRAN) 8 MG tablet Take 1 tablet (8 mg) by mouth every 8 hours as needed for nausea (vomiting)     prochlorperazine (COMPAZINE) 10 MG tablet Take 1 tablet (10 mg) by mouth every 6 hours as needed (Nausea/Vomiting)     No current facility-administered medications for this visit.               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 Standard Regimen: none     The following interactions were found and will require patient education:     Drug-Drug interactions:   1. Dexamethasone and ibuprofen can increase risk of GI bleed. If ibuprofen is taken, take with food.  2. Ondansetron, oxaliplatin, prochlorperazine can increase QTc interval. No EKG on file for baseline QTc; consider obtaining.    Drug-Allergy interactions: no allergies per chart review     Drug-Food interactions: none    Drug-Ethanol interactions:   1. Use with prochlorperazine can increase risk of CNS depression    Drug-Tobacco  interactions: current smoker per chart review  1. Zofran can have decreased effectiveness/exposure in smokers; monitor for efficacy    Other:   1. Patient has a G-tube per chart notes; clarify if oral medications are given via tube, or taken orally. Update PTA medication list accordingly.      If there are any additional questions or concerns, please contact the inpatient pharmacy ([phone]) for further review.     Regino Man, [facility]  January 5, 2023

## 2023-01-05 NOTE — PATIENT INSTRUCTIONS

## 2023-01-06 ENCOUNTER — TELEPHONE (OUTPATIENT)
Dept: ONCOLOGY | Facility: OTHER | Age: 58
End: 2023-01-06

## 2023-01-06 NOTE — TELEPHONE ENCOUNTER
Please call patient and assess his pain status.  Please ask how often he is taking the hydrocodone and how much at a time.  I'd like to know if he feels he is getting relief from the pain medication.  Also I am wondering how he felt after getting the IV fluids yesterday.

## 2023-01-06 NOTE — TELEPHONE ENCOUNTER
I spoke with patient and his wife and he states the hydrocodone does help with his back pain. He states he is sleeping 4 1/2 hours at night which is a improvement in his recliner. He also states Dr Garland placed him on water pill for his swelling in his ankles and will start that today. He is currently taking his hydrocodone every 8 hours and rates his pain level 4-5 in his back. The IV fluids did help him also feel better. He also bought and is using the tooth paste Martha recommended.  Yahaira Rodriguez LPN

## 2023-01-09 ENCOUNTER — INFUSION THERAPY VISIT (OUTPATIENT)
Dept: INFUSION THERAPY | Facility: OTHER | Age: 58
End: 2023-01-09
Attending: FAMILY MEDICINE
Payer: COMMERCIAL

## 2023-01-09 VITALS
SYSTOLIC BLOOD PRESSURE: 96 MMHG | RESPIRATION RATE: 18 BRPM | HEART RATE: 58 BPM | OXYGEN SATURATION: 98 % | WEIGHT: 137.79 LBS | DIASTOLIC BLOOD PRESSURE: 49 MMHG | BODY MASS INDEX: 21.26 KG/M2

## 2023-01-09 DIAGNOSIS — C15.5 MALIGNANT NEOPLASM OF LOWER THIRD OF ESOPHAGUS (H): Primary | ICD-10-CM

## 2023-01-09 DIAGNOSIS — C15.5 MALIGNANT NEOPLASM OF LOWER THIRD OF ESOPHAGUS (H): ICD-10-CM

## 2023-01-09 PROCEDURE — 96360 HYDRATION IV INFUSION INIT: CPT | Performed by: INTERNAL MEDICINE

## 2023-01-09 RX ORDER — HEPARIN SODIUM (PORCINE) LOCK FLUSH IV SOLN 100 UNIT/ML 100 UNIT/ML
5 SOLUTION INTRAVENOUS
Status: CANCELLED | OUTPATIENT
Start: 2023-01-09

## 2023-01-09 RX ORDER — HYDROCODONE BITARTRATE AND ACETAMINOPHEN 5; 217 MG/10ML; MG/10ML
10 SOLUTION ORAL EVERY 8 HOURS
Qty: 473 ML | Refills: 0 | Status: SHIPPED | OUTPATIENT
Start: 2023-01-09 | End: 2023-02-08

## 2023-01-09 RX ORDER — HEPARIN SODIUM (PORCINE) LOCK FLUSH IV SOLN 100 UNIT/ML 100 UNIT/ML
5 SOLUTION INTRAVENOUS
Status: DISCONTINUED | OUTPATIENT
Start: 2023-01-09 | End: 2023-01-09 | Stop reason: HOSPADM

## 2023-01-09 RX ADMIN — HEPARIN SODIUM (PORCINE) LOCK FLUSH IV SOLN 100 UNIT/ML 5 ML: 100 SOLUTION at 13:21

## 2023-01-09 RX ADMIN — Medication 1000 ML: at 12:14

## 2023-01-09 ASSESSMENT — PAIN SCALES - GENERAL: PAINLEVEL: SEVERE PAIN (7)

## 2023-01-09 NOTE — PROGRESS NOTES
Patient is 57 years old, here today for infusion of IVF.      Patient identified with two identifiers, order verified, and verbal consent for today's infusion obtained from patient.      Patients port accessed using non-coring, 19 gauge, 3/4 inch needle. Port accessed per facility protocol. Port flushed easily, blood return noted.  No signs and symptoms of infection or infiltration.      IV pump verified with dose, drug, and rate of administration.  Infusion administered per protocol.  Patient tolerated infusion well, no signs or symptoms of adverse reaction noted.  Patient denies pain nor discomfort.     IV removed, catheter intact.  Site clean, dry and intact.  No signs or symptoms of infiltration or infection.  Covered with a sterile bandage, slight pressure applied for 30 seconds.  Pt instructed to leave bandage intact for a minimum of one hour, and to call with questions or concerns. Patient states understanding, discharged.

## 2023-01-09 NOTE — TELEPHONE ENCOUNTER
Patient here for IVF today. He notes he is almost out of his liquid Hydrocodone. Notes it is effective. However, it is not current on his med list. Appears Dr Barnhart ordered it and Dr Dalton stopped it 2 days later. It appears Dr Dalton had added Oxycodone, and did not want patient taking both. Patient has NOT been, nor does he plan to take, Oxycodone.Dr Barnhart is not in office today.    Historical dose of Hydrocodone-Acetaminophen he wishes to have reordered today is 5-217 mg/10mL Solution, take 10mL by mouth every 8 hours, and a total of 473 mL was dispensed on 12-20-22.    Are you able/willing to refill this prescription for patient today?

## 2023-01-11 ENCOUNTER — HOSPITAL ENCOUNTER (OUTPATIENT)
Dept: EDUCATION SERVICES | Facility: HOSPITAL | Age: 58
Discharge: HOME | End: 2023-01-11
Attending: INTERNAL MEDICINE
Payer: COMMERCIAL

## 2023-01-11 ENCOUNTER — INFUSION THERAPY VISIT (OUTPATIENT)
Dept: INFUSION THERAPY | Facility: OTHER | Age: 58
End: 2023-01-11
Attending: INTERNAL MEDICINE
Payer: COMMERCIAL

## 2023-01-11 VITALS
HEART RATE: 55 BPM | SYSTOLIC BLOOD PRESSURE: 95 MMHG | BODY MASS INDEX: 21.53 KG/M2 | TEMPERATURE: 97 F | DIASTOLIC BLOOD PRESSURE: 57 MMHG | WEIGHT: 139.55 LBS | RESPIRATION RATE: 17 BRPM

## 2023-01-11 DIAGNOSIS — C15.5 MALIGNANT NEOPLASM OF LOWER THIRD OF ESOPHAGUS (H): Primary | ICD-10-CM

## 2023-01-11 LAB
ALBUMIN SERPL BCG-MCNC: 3.1 G/DL (ref 3.5–5.2)
ALP SERPL-CCNC: 65 U/L (ref 40–129)
ALT SERPL W P-5'-P-CCNC: 13 U/L (ref 10–50)
ANION GAP SERPL CALCULATED.3IONS-SCNC: 8 MMOL/L (ref 7–15)
AST SERPL W P-5'-P-CCNC: 13 U/L (ref 10–50)
BASOPHILS # BLD AUTO: 0 10E3/UL (ref 0–0.2)
BASOPHILS NFR BLD AUTO: 0 %
BILIRUB SERPL-MCNC: 0.2 MG/DL
BUN SERPL-MCNC: 15.8 MG/DL (ref 8–23)
CALCIUM SERPL-MCNC: 8.7 MG/DL (ref 8.6–10)
CHLORIDE SERPL-SCNC: 99 MMOL/L (ref 98–107)
CREAT SERPL-MCNC: 0.49 MG/DL (ref 0.67–1.17)
DEPRECATED HCO3 PLAS-SCNC: 30 MMOL/L (ref 22–29)
EOSINOPHIL # BLD AUTO: 0.1 10E3/UL (ref 0–0.7)
EOSINOPHIL NFR BLD AUTO: 1 %
ERYTHROCYTE [DISTWIDTH] IN BLOOD BY AUTOMATED COUNT: 13.4 % (ref 10–15)
GFR SERPL CREATININE-BSD FRML MDRD: >90 ML/MIN/1.73M2
GLUCOSE SERPL-MCNC: 69 MG/DL (ref 70–99)
HCT VFR BLD AUTO: 31.3 % (ref 40–53)
HGB BLD-MCNC: 10.1 G/DL (ref 13.3–17.7)
IMM GRANULOCYTES # BLD: 0 10E3/UL
IMM GRANULOCYTES NFR BLD: 0 %
LYMPHOCYTES # BLD AUTO: 1.4 10E3/UL (ref 0.8–5.3)
LYMPHOCYTES NFR BLD AUTO: 11 %
MCH RBC QN AUTO: 28.9 PG (ref 26.5–33)
MCHC RBC AUTO-ENTMCNC: 32.3 G/DL (ref 31.5–36.5)
MCV RBC AUTO: 89 FL (ref 78–100)
MONOCYTES # BLD AUTO: 0.9 10E3/UL (ref 0–1.3)
MONOCYTES NFR BLD AUTO: 8 %
NEUTROPHILS # BLD AUTO: 9.5 10E3/UL (ref 1.6–8.3)
NEUTROPHILS NFR BLD AUTO: 80 %
NRBC # BLD AUTO: 0 10E3/UL
NRBC BLD AUTO-RTO: 0 /100
PLATELET # BLD AUTO: 304 10E3/UL (ref 150–450)
POTASSIUM SERPL-SCNC: 4.5 MMOL/L (ref 3.4–5.3)
PROT SERPL-MCNC: 6.1 G/DL (ref 6.4–8.3)
RBC # BLD AUTO: 3.5 10E6/UL (ref 4.4–5.9)
SODIUM SERPL-SCNC: 137 MMOL/L (ref 136–145)
TSH SERPL DL<=0.005 MIU/L-ACNC: 0.82 UIU/ML (ref 0.3–4.2)
WBC # BLD AUTO: 11.9 10E3/UL (ref 4–11)

## 2023-01-11 PROCEDURE — 96521 REFILL/MAINT PORTABLE PUMP: CPT | Mod: 59 | Performed by: INTERNAL MEDICINE

## 2023-01-11 PROCEDURE — 36591 DRAW BLOOD OFF VENOUS DEVICE: CPT | Performed by: INTERNAL MEDICINE

## 2023-01-11 PROCEDURE — 96413 CHEMO IV INFUSION 1 HR: CPT | Performed by: INTERNAL MEDICINE

## 2023-01-11 PROCEDURE — 96367 TX/PROPH/DG ADDL SEQ IV INF: CPT | Performed by: INTERNAL MEDICINE

## 2023-01-11 PROCEDURE — 96411 CHEMO IV PUSH ADDL DRUG: CPT | Performed by: INTERNAL MEDICINE

## 2023-01-11 PROCEDURE — 96417 CHEMO IV INFUS EACH ADDL SEQ: CPT | Performed by: INTERNAL MEDICINE

## 2023-01-11 PROCEDURE — 96415 CHEMO IV INFUSION ADDL HR: CPT | Performed by: INTERNAL MEDICINE

## 2023-01-11 PROCEDURE — 999N000107 HC STATISTIC NUTRITIONAL THERAPY NO CHARGE: Performed by: DIETITIAN, REGISTERED

## 2023-01-11 PROCEDURE — 80050 GENERAL HEALTH PANEL: CPT | Performed by: INTERNAL MEDICINE

## 2023-01-11 PROCEDURE — 96368 THER/DIAG CONCURRENT INF: CPT | Performed by: INTERNAL MEDICINE

## 2023-01-11 PROCEDURE — 96375 TX/PRO/DX INJ NEW DRUG ADDON: CPT | Performed by: INTERNAL MEDICINE

## 2023-01-11 RX ORDER — HEPARIN SODIUM (PORCINE) LOCK FLUSH IV SOLN 100 UNIT/ML 100 UNIT/ML
5 SOLUTION INTRAVENOUS
Status: CANCELLED | OUTPATIENT
Start: 2023-01-11

## 2023-01-11 RX ORDER — HEPARIN SODIUM (PORCINE) LOCK FLUSH IV SOLN 100 UNIT/ML 100 UNIT/ML
5 SOLUTION INTRAVENOUS
Status: DISCONTINUED | OUTPATIENT
Start: 2023-01-11 | End: 2023-01-18 | Stop reason: HOSPADM

## 2023-01-11 RX ORDER — ONDANSETRON 2 MG/ML
8 INJECTION INTRAMUSCULAR; INTRAVENOUS ONCE
Status: COMPLETED | OUTPATIENT
Start: 2023-01-11 | End: 2023-01-11

## 2023-01-11 RX ORDER — FLUOROURACIL 50 MG/ML
400 INJECTION, SOLUTION INTRAVENOUS ONCE
Status: COMPLETED | OUTPATIENT
Start: 2023-01-11 | End: 2023-01-11

## 2023-01-11 RX ADMIN — Medication 1000 ML: at 09:43

## 2023-01-11 RX ADMIN — Medication 250 ML: at 11:18

## 2023-01-11 RX ADMIN — ONDANSETRON 8 MG: 2 INJECTION INTRAMUSCULAR; INTRAVENOUS at 10:08

## 2023-01-11 RX ADMIN — FLUOROURACIL 690 MG: 50 INJECTION, SOLUTION INTRAVENOUS at 14:42

## 2023-01-11 NOTE — PROGRESS NOTES
Martha Park NP aware of pt BP and BLood Glucose, going to give IV fluids from therapy plan and encouraged brother to go home and get tube feedings. Brother came back and administered tube feedings with oral instruction from pt    Patient is 57 years old, here today for infusion of folfox/nivo under the orders of Dr Barnhart.      Power port accessed with 19 gauge 3/4 inch non-coring needle.  Line flushed with 10 cc's normal saline.  Needle secured with sterile transparent dressing.  10 cc's blood discarded, and blood taken for ordered labs.  Patient tolerated well.  Denies pain and discomfort at this time.  Port flushes easily without resistance.    Hand hygiene performed: yes   Mask donned by caregiver: yes Site prepped with CHG: yes Labs drawn: yes Dressing applied using aseptic technique: yes     Lab values:    Latest Reference Range & Units 01/11/23 09:08   Sodium 136 - 145 mmol/L 137   Potassium 3.4 - 5.3 mmol/L 4.5   Chloride 98 - 107 mmol/L 99   Carbon Dioxide (CO2) 22 - 29 mmol/L 30 (H)   Urea Nitrogen 8.0 - 23.0 mg/dL 15.8   Creatinine 0.67 - 1.17 mg/dL 0.49 (L)   GFR Estimate >60 mL/min/1.73m2 >90   Calcium 8.6 - 10.0 mg/dL 8.7   Anion Gap 7 - 15 mmol/L 8   Albumin 3.5 - 5.2 g/dL 3.1 (L)   Protein Total 6.4 - 8.3 g/dL 6.1 (L)   Alkaline Phosphatase 40 - 129 U/L 65   ALT 10 - 50 U/L 13   AST 10 - 50 U/L 13   Bilirubin Total <=1.2 mg/dL 0.2   Glucose 70 - 99 mg/dL 69 (L)   TSH 0.30 - 4.20 uIU/mL 0.82   WBC 4.0 - 11.0 10e3/uL 11.9 (H)   Hemoglobin 13.3 - 17.7 g/dL 10.1 (L)   Hematocrit 40.0 - 53.0 % 31.3 (L)   Platelet Count 150 - 450 10e3/uL 304   RBC Count 4.40 - 5.90 10e6/uL 3.50 (L)   MCV 78 - 100 fL 89   MCH 26.5 - 33.0 pg 28.9   MCHC 31.5 - 36.5 g/dL 32.3   RDW 10.0 - 15.0 % 13.4   % Neutrophils % 80   % Lymphocytes % 11   % Monocytes % 8   % Eosinophils % 1   % Basophils % 0   Absolute Basophils 0.0 - 0.2 10e3/uL 0.0   Absolute Eosinophils 0.0 - 0.7 10e3/uL 0.1   Absolute Immature Granulocytes <=0.4  10e3/uL 0.0   Absolute Lymphocytes 0.8 - 5.3 10e3/uL 1.4   Absolute Monocytes 0.0 - 1.3 10e3/uL 0.9   % Immature Granulocytes % 0   Absolute Neutrophils 1.6 - 8.3 10e3/uL 9.5 (H)   Absolute NRBCs 10e3/uL 0.0   NRBCs per 100 WBC <1 /100 0   (H): Data is abnormally high  (L): Data is abnormally low    Patient meets parameters for today's infusion.  Denies questions or concerns regarding today's infusion and/or medications being administered.        Patient identified with two identifiers, order verified, and verbal consent for today's infusion obtained from patient.     1119 IV pump verified with opdivio dose, drug, and rate of administration. Infusion administered per protocol. Patient tolerated infusion well, no signs or symptoms of adverse reaction noted. Patient denies pain nor discomfort.   1219 IV pump verified with leucovorin dose, drug, and rate of administration. Infusion administered per protocol. Patient tolerated infusion well, no signs or symptoms of adverse reaction noted. Patient denies pain nor discomfort.   1227 IV pump verified with eloxatin dose, drug, and rate of administration. Infusion administered per protocol. Patient tolerated infusion well, no signs or symptoms of adverse reaction noted. Patient denies pain nor discomfort.   1442 IV pump verified with fluorouracil dose, drug, and rate of administration. Infusion administered per protocol. Patient tolerated infusion well, no signs or symptoms of adverse reaction noted. Patient denies pain nor discomfort.               1451 Fluorouracil home infuser verified with dose, drug, and rate of administration. Home infuser connected, clamps taped open, dressing reinforced. Patient denies pain or discomfort. No signs or symptoms of infiltration or infection. Patient discharged.

## 2023-01-11 NOTE — PROGRESS NOTES
.    1119 IV pump verified with nivolumab dose, drug, and rate of administration. Infusion administered per protocol.

## 2023-01-13 ENCOUNTER — INFUSION THERAPY VISIT (OUTPATIENT)
Dept: INFUSION THERAPY | Facility: OTHER | Age: 58
End: 2023-01-13
Attending: INTERNAL MEDICINE
Payer: COMMERCIAL

## 2023-01-13 VITALS
RESPIRATION RATE: 18 BRPM | WEIGHT: 140.87 LBS | BODY MASS INDEX: 21.74 KG/M2 | TEMPERATURE: 96.7 F | SYSTOLIC BLOOD PRESSURE: 82 MMHG | OXYGEN SATURATION: 98 % | HEART RATE: 55 BPM | DIASTOLIC BLOOD PRESSURE: 42 MMHG

## 2023-01-13 DIAGNOSIS — C15.5 MALIGNANT NEOPLASM OF LOWER THIRD OF ESOPHAGUS (H): Primary | ICD-10-CM

## 2023-01-13 PROCEDURE — 96360 HYDRATION IV INFUSION INIT: CPT | Performed by: INTERNAL MEDICINE

## 2023-01-13 RX ORDER — HEPARIN SODIUM (PORCINE) LOCK FLUSH IV SOLN 100 UNIT/ML 100 UNIT/ML
5 SOLUTION INTRAVENOUS
Status: DISCONTINUED | OUTPATIENT
Start: 2023-01-13 | End: 2023-01-13 | Stop reason: HOSPADM

## 2023-01-13 RX ORDER — HEPARIN SODIUM (PORCINE) LOCK FLUSH IV SOLN 100 UNIT/ML 100 UNIT/ML
5 SOLUTION INTRAVENOUS
Status: CANCELLED | OUTPATIENT
Start: 2023-01-13

## 2023-01-13 RX ORDER — FUROSEMIDE 20 MG
20 TABLET ORAL PRN
COMMUNITY
Start: 2023-01-04 | End: 2023-02-08

## 2023-01-13 RX ADMIN — Medication 1000 ML: at 09:19

## 2023-01-13 RX ADMIN — HEPARIN SODIUM (PORCINE) LOCK FLUSH IV SOLN 100 UNIT/ML 5 ML: 100 SOLUTION at 10:28

## 2023-01-13 ASSESSMENT — PAIN SCALES - GENERAL: PAINLEVEL: MODERATE PAIN (5)

## 2023-01-13 NOTE — PROGRESS NOTES
Patient is 57 years old, here today for home infuser pump off and IVF.    Infuser pump completed, 100 % infused.  Patient denies adverse effects from home infusion, nor mechanical issues.  Pump is intact.  Infuser disconnected from port.      Port flushed per MAR.  Immediate blood return noted.  Flushes easily, without resistance.      BP on low side of normal. Advised fluids would be beneficial and patient accepting of this. Standing orders on file.     IV pump verified with dose, drug, and rate of administration.  Infusion administered per protocol.

## 2023-01-13 NOTE — PATIENT INSTRUCTIONS
Your blood pressure was low after you had your fluids today.     If you experience symptoms such as light headedness, feeling dizzy, fatigue, dry mouth, dark urine, dizziness from a sitting to a standing position or laying to a sitting position go to the ER for an evaluation.       Hold your lasix until you hear from your PCP.

## 2023-01-13 NOTE — PROGRESS NOTES
"  Patient tolerated infusion well, no signs or symptoms of adverse reaction noted.  Patient denies pain nor discomfort.     Patients BP dropped from 92/46 to 82/42 after IVF administration. He denies dizziness, light headedness, dry mouth, fatigue. States, \"I feel fine.\" Called and spoke with Leena Morataya NP, she would like patient to monitor symptoms at home, if symptoms develop over the weekend he should go in to be evaluated. Patient then reports, \"I am on a water pill that my DrShashank Started me on.\" Advised to hold the water pill until he hears from his PCP. Leena agrees with patient holding lasix.  Pt. Verbalizes understanding. Patient scheduled for IVF. Patient reports that he is going to increase his fluid intake through his PEG over the weekend.     IV removed, catheter intact.  Site clean, dry and intact.  No signs or symptoms of infiltration or infection.  Covered with a sterile bandage, slight pressure applied for 30 seconds.  Pt instructed to leave bandage intact for a minimum of one hour, and to call with questions or concerns.  Copy of appointments, discharge instructions, and after visit summary (AVS) provided to patient.  Patient states understanding, discharged.    "

## 2023-01-15 ENCOUNTER — HEALTH MAINTENANCE LETTER (OUTPATIENT)
Age: 58
End: 2023-01-15

## 2023-01-16 ENCOUNTER — INFUSION THERAPY VISIT (OUTPATIENT)
Dept: INFUSION THERAPY | Facility: OTHER | Age: 58
End: 2023-01-16
Attending: INTERNAL MEDICINE
Payer: COMMERCIAL

## 2023-01-16 VITALS
HEART RATE: 65 BPM | BODY MASS INDEX: 22.15 KG/M2 | SYSTOLIC BLOOD PRESSURE: 109 MMHG | DIASTOLIC BLOOD PRESSURE: 65 MMHG | RESPIRATION RATE: 17 BRPM | TEMPERATURE: 98 F | OXYGEN SATURATION: 97 % | WEIGHT: 143.54 LBS

## 2023-01-16 DIAGNOSIS — C15.5 MALIGNANT NEOPLASM OF LOWER THIRD OF ESOPHAGUS (H): Primary | ICD-10-CM

## 2023-01-16 PROCEDURE — 96360 HYDRATION IV INFUSION INIT: CPT | Performed by: INTERNAL MEDICINE

## 2023-01-16 RX ORDER — HEPARIN SODIUM (PORCINE) LOCK FLUSH IV SOLN 100 UNIT/ML 100 UNIT/ML
5 SOLUTION INTRAVENOUS
Status: DISCONTINUED | OUTPATIENT
Start: 2023-01-16 | End: 2023-01-16 | Stop reason: HOSPADM

## 2023-01-16 RX ORDER — HEPARIN SODIUM (PORCINE) LOCK FLUSH IV SOLN 100 UNIT/ML 100 UNIT/ML
5 SOLUTION INTRAVENOUS
Status: CANCELLED | OUTPATIENT
Start: 2023-01-16

## 2023-01-16 RX ADMIN — HEPARIN SODIUM (PORCINE) LOCK FLUSH IV SOLN 100 UNIT/ML 5 ML: 100 SOLUTION at 13:21

## 2023-01-16 RX ADMIN — Medication 1000 ML: at 12:12

## 2023-01-16 NOTE — PATIENT INSTRUCTIONS

## 2023-01-16 NOTE — PROGRESS NOTES
Patient is 57 years old, here today for infusion of normal saline per order of Dr Barnhart.      Patient identified with two identifiers, order verified, and verbal consent for today's infusion obtained from patient.      Lab values:  n/a      Patient meets order parameters for today's treatment.         Patients port accessed using non-coring, 19 gauge, 3/4 needle. Port accessed per facility protocol. Port flushed easily, blood return noted.  No signs and symptoms of infection or infiltration.      IV pump verified with dose, drug, and rate of administration.  Infusion administered per protocol.  Patient tolerated infusion well, no signs or symptoms of adverse reaction noted.  Patient denies pain nor discomfort.

## 2023-01-18 ENCOUNTER — PATIENT OUTREACH (OUTPATIENT)
Dept: ONCOLOGY | Facility: OTHER | Age: 58
End: 2023-01-18

## 2023-01-18 NOTE — PROGRESS NOTES
[facility]: Cancer Care Long Assessment    Discussion with Patient:                                                      TC to patient for status check regarding the need for hydration and to see if he is having symptoms from treatment.   Antiemetics: Patient reports having nausea. He took both compazine and zofran at the same time with no relief. Education provided on alternating these medications to maximize relief. He verbalizes understanding. Encouraged to call us if he nausea worsens and he does not get relief from antiemetics. At the moment patient reports that he is taking in his feedings and feels that he is getting enough fluids so does not need to come in for hydration.                                      Dates of Treament:                                                      Infusion given in last 28 days     Administered MAR Action Medication Dose Rate Visit    01/11/2023 11:19 New Bag nivolumab (OPDIVO) 240 mg in sodium chloride 0.9 % 134 mL infusion 240 mg 268 mL/hr Infusion Therapy Visit on 01/11/2023 in [facility]    01/11/2023 12:19 New Bag leucovorin calcium 600 mg in D5W 140 mL infusion 600 mg 70 mL/hr Infusion Therapy Visit on 01/11/2023 in [facility]    01/11/2023 12:27 New Bag oxaliplatin (ELOXATIN) 150 mg in D5W 580 mL infusion 150 mg 290 mL/hr Infusion Therapy Visit on 01/11/2023 in [facility]    01/11/2023 14:42 Given fluorouracil (ADRUCIL) injection 690 mg 690 mg   Infusion Therapy Visit on 01/11/2023 in [facility]    01/11/2023 14:51 Given fluorouracil (ADRUCIL) 4,130 mg in sodium chloride 0.9 % 241 mL 46 hr HOME Infusion (via C-Series) 4,130 mg 5.2 mL/hr Infusion Therapy Visit on 01/11/2023 in [facility]          Assessment:                                                      Assessment completed with:: Patient    Constitutional  Patient Reported Constitutional Symptoms?: No  Fatigue: Absent or within normal  limits  Fever: Absent or within normal limits  Chills: Absent or within normal limits  Weight Gain: Absent or within normal limits  Weight Loss: Absent or within normal limits  Hot Flashes: Absent or within normal limits    Neurosensory  Patient Reported Neurosensory Symptoms?: No    Eye Disorders  Patient Reported Eye Disorders?: No    Ear Disorders  Ear Disorders  Patient Reported Ear Disorder?: No    Cardiovascular  Patient Reported Cardiovascular Symptoms?: No  Palpitations: Absent or within normal limits  Edema: Yes  Edema Limbs: 5 - 10% inter-limb discrepancy in volume or circumference at point of greatest visible difference OR swelling or obscuration of anatomic architecture on close inspection  Phlebitis: Absent or within normal limits  VTE History: 0-->indicator not present  Superficial Thrombophlebitis: Absent or within normal limits  Chest Pain - Cardiac: Absent or within normal limits    Respiratory  Patient Reported Respiratory Symptoms?: No  Cough: Absent or within normal limits  Dyspnea: Absent or within normal limits  Hypoxia: Absent or within normal limits    Gastrointestinal  Patient Reported Gastrointestinal Symptoms?: Yes  Anorexia: Absent or within normal limits  Nausea: Loss of appetite without alteration in eating habits  Vomiting: Absent or within normal limits  Dehydration: Absent or within normal limits  Dysgeusia: Absent or within normal limits  Dysphagia: Absent or within normal limits  Mucositis Oral: Absent or within normal limits  Esophagitis: Absent or within normal limits  Constipation: Absent or within normal limits  Diarrhea: Absent or within normal limits  Pharyngitis: Absent or within normal limits  Dry Mouth: Absent or within normal limits    Genitourinary  Patient Reported Genitourinary Symptoms?: No    Lymph System Disorders  Patient Reported Lymph System Disorders?: No    Musculoskeletal and Connective Tissue Disorders  Patient Reported Musculoskeletal or Connective Tissue  Disorders?: No  Arthralgia: Absent or within normal limits  Bone Pain: Absent or within normal limits  Generalized Muscle Weakness: Absent or within normal limits  Myalgia: Absent or within normal limits    Integumentary  Patient Reported Integumentary Symptoms?: No    Pain       Patient Coping  Accepting    Clinic Utilization       Other Patient Concerns           Intervention/Education provided during outreach:                                                       Education provided on antiemetics and the need for IV hydrations        Signature:  Nenita Tatum RN

## 2023-01-24 NOTE — PROGRESS NOTES
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  He initiated systemic therapy with nivolumab/FOLFOX 1/11/2023 with potential for palliative radiation therapy at some point.     He presents to the clinic today accompanied by his brother.  He states he is doing better than he was 3 weeks ago.  He continues to have pain rating this at about a 7 on a 0-to-10 scale and this is mainly located to his mid back.  He is no longer having any discomfort to the front of his chest.  He is using the hydrocodone in the morning and in the evening and when asked why he is not taking this more often he states he does not feel he needs it.  He did deal with some constipation last week and this is now resolved.  He is wondering what is okay to take for constipation.  States he has a bowel movement about every other day.  Does have some numbness to both hands at times which is most noticeable when he comes in from the cold.      He is dealing with some fatigue and admits that his sleep schedule is somewhat off and that he is sleeping during the day and not well at night.  He continues to sleep in a recliner related to the discomfort.  He does keep his legs elevated when he is sitting and states the edema is much improved when he keeps his legs elevated.  He has tried compression stockings but states at times they cause more of an indentation in his legs than regular socks.  He states the Lasix was discontinued by his PCP.    He did have some nausea after chemotherapy and did use the Compazine and Zofran at the same time and was instructed to take these at  different times and he states this did help.    Note he did quit smoking and has not had a cigarette for 2 weeks.  He is requesting a lower patch.    He is actually trying to take in some soft foods and is tolerating this okay without any episodes of choking.  He also uses 4 cans of the supplement through the G-tube on a daily basis.  He feels he is getting enough fluids in.  He is having some redness and tenderness around the G-tube site and states it is very uncomfortable.  He is putting antibiotic ointment around the tube site.  He admits he does not want to do too much coughing related to the discomfort.    Oncologic History:     **Experienced difficulty swallowing and 60 pound weight loss  10/21/2022: CT chest, abdomen and pelvis: Diffuse wall thickening of the distal esophagus with associated mediastinal adenopathy, and upper abdominal  Conglomerate lymphadenopathy. Constellation of findings are highly concerning for underlying esophageal carcinoma with metastatic disease.   11/1/2022: Colonoscopy with polyp showing tubular adenoma and EGD: A large ulcerating mass with no bleeding and no stigmata of recent bleeding was found in the lower third of the esophagus, 35 to 40 cm from the incisors.  The mass was partially obstructing and partially circumferential involving  two thirds.    **Biopsy showed poorly differentiated favoring adenocarcinoma. Her 2 negative.   11/22/2002 patient met with Dr. Barnhart to discuss systemic therapy pending PET results/complete staging  12/5/2022 patient met with Dr. Spain with radiation oncology with possible plan to initiate radiation therapy pending results of PET scan  12/14/2022: PET scan: Distal esophageal neoplasm with metastatic involvement the  mediastinum, left hilum and retroperitoneal lymph nodes of the upperabdomen. Also likely localized metastatic involvement of thestomach/GE junction. 2 cm intramural distal esophageal/GE junction mass. Circumferential wall thickening  with increased FDG uptake in the distal esophagus adjacent portions of the stomach/GE junction may represent localized disease however could also represent localized inflammation. Metastatic involvement to the subcarinal nodes, left infrahilar and  pericaval/paraesophageal lymph nodes of the lower thorax. Metastatic involvement to the retroperitoneal lymph nodes of the upper abdomen. These lymph nodes have increased in size dating back to be  10/21/2022 CT scan consistent with worsening disease. Representative  nodes are described above.  12/20/2022  He was seen by Dr. Barnhart with plan to initiate palliative nivolumab/FOLFOX with potential for palliative Radiation at some point  12/22/2022: Port-A-Cath placement and gastrostomy tube insertion by Dr. Dalton  1/11/2023 initiated treatment     RESULT FOR IMMUNOHISTOCHEMICAL VENTANA CLONE  PD-L1 ASSAY  COMBINED POSITIVE SCORE (CPS): <1%     Treatment Goal: Palliative     Planned TX: Nivolumab 240 mg/oxaliplatin 85 mg per metered squared/leucovorin 350 mg per metered squared/fluorouracil 400 mg per metered squared IV bolus/fluorouracil 2400 mg per metered squared via continuous infusion over 46 hours with cycle given every 14 days     Previous treatment:  N/A    Past Medical History:   Diagnosis Date     Cancer (H)      Esophageal cancer (H) 11/01/2022     Esophageal thickening 10/25/2022     Mixed hearing loss, bilateral      Tobacco use disorder 12/27/2019       Past Surgical History:   Procedure Laterality Date     COLONOSCOPY  11/01/2022    [facility]     INSERT PORT VASCULAR ACCESS N/A 12/22/2022    Procedure: POWER PORT PLACEMENT;  Surgeon: Nba Dalton MD;  Location: [facility]     Upper GI Endoscopy  11/01/2022       Family History   Problem Relation Age of Onset     Breast Cancer Mother      Hyperlipidemia Mother      Diabetes Mother      Coronary Artery Disease Mother      Cerebrovascular Disease Father      Hyperlipidemia Father      Coronary Artery Disease  Father      Myocardial Infarction Father      Hyperlipidemia Brother      Hypertension Brother      Diabetes Brother      Asthma Sister      Breast Cancer Sister      Hyperlipidemia Sister      Diabetes Sister      Hyperlipidemia Sister      Diabetes Sister      Hyperlipidemia Sister      Suicide Sister      Anesthesia Reaction Sister      Breast Cancer Sister      Hyperlipidemia Sister      Diabetes Sister      Breast Cancer Niece      Skin Cancer Niece      Thyroid Disease Niece      Breast Cancer Niece      Leukemia Nephew      Colon Cancer No family hx of      Prostate Cancer No family hx of      Genetic Disorder No family hx of        Social History     Socioeconomic History     Marital status:      Spouse name: Jazzy     Number of children: 2     Years of education: Not on file     Highest education level: Not on file   Occupational History     Not on file   Tobacco Use     Smoking status: Every Day     Packs/day: 0.50     Years: 46.00     Pack years: 23.00     Types: Cigarettes     Start date: 1976     Smokeless tobacco: Never     Tobacco comments:     Trying to quit   Vaping Use     Vaping Use: Never used   Substance and Sexual Activity     Alcohol use: Not Currently     Comment: no alcohol in last 6 months     Drug use: Never     Sexual activity: Not on file   Other Topics Concern     Parent/sibling w/ CABG, MI or angioplasty before 65F 55M? Yes     Comment: father   Social History Narrative     Not on file     Social Determinants of Health     Financial Resource Strain: Not on file   Food Insecurity: Not on file   Transportation Needs: Not on file   Physical Activity: Not on file   Stress: Not on file   Social Connections: Not on file   Intimate Partner Violence: Not on file   Housing Stability: Not on file       Current Outpatient Medications   Medication     HYDROcodone-Acetaminophen 5-217 MG/10ML SOLN     nicotine (NICODERM CQ) 21 MG/24HR 24 hr patch     ondansetron (ZOFRAN) 8 MG tablet      "prochlorperazine (COMPAZINE) 10 MG tablet     furosemide (LASIX) 20 MG tablet     ibuprofen (ADVIL/MOTRIN) 200 MG tablet     nicotine (COMMIT) 2 MG lozenge     nicotine (NICORETTE) 4 MG lozenge     No current facility-administered medications for this visit.        No Known Allergies    Review Of Systems:  Constitutional:    denies fever, chills, and night sweats.  Eyes:    denies blurred or double vision  Ears/Nose/Throat:   denies ear pain, has ongoing ringing in the ears no worse, denies nose problems, difficulty swallowing  Respiratory:   denies shortness of breath, cough   Skin:   Redness and discomfort around G-tube site  Cardiovascular:   denies chest pain, palpitations, ongoing edema to lower extremities  Gastrointestinal:   See hPi  Genitourinary:   denies difficulty with urination, blood in urine  Musculoskeletal:    Foot cramps at night  Neurologic:   denies lightheadedness, headaches  Psychiatric:   denies anxiety, depression  Hematologic/Lymphatic/Immunologic:   denies easy bruising, easy bleeding, lumps or bumps noted  Endocrine:   Denies increased thirst      ECOG Performance Status: 2 related to pain    Physical Exam:  /58   Pulse 65   Temp 97.5  F (36.4  C) (Tympanic)   Resp 20   Ht 1.715 m (5' 7.5\")   Wt 62.4 kg (137 lb 9.1 oz)   SpO2 99%   BMI 21.23 kg/m      GENERAL APPEARANCE: Thin, alert and in no acute distress.  HEENT: Normocephalic, Sclerae anicteric.  No oral lesions or thrush.  NECK:   No asymmetry or masses, no thyromegaly.  LYMPHATICS: No palpable cervical, supraclavicular, axillary, or inguinal nodes   RESP: Lungs with scattered coarse rhonchi bilaterally, respirations regular and easy; cough effort is poor related to discomfort  CARDIOVASCULAR: Regular rate and rhythm. Normal S1, S2; no murmur, gallop, or rub.  ABDOMEN: Soft, nontender. Bowel sounds auscultated all 4 quadrants. No palpable organomegaly or masses.  MUSCULOSKELETAL: Extremities without gross deformities " noted.  Mild edema of bilateral lower extremities.  SKIN: Area around G-tube erythematous and tender; bumper is tight to the skin  NEURO: Alert and oriented x 3.  Gait steady.  PSYCHIATRIC: Mentation and affect appear normal.  Mood appropriate.    Laboratory:  Results for orders placed or performed in visit on 01/25/23   Comprehensive metabolic panel     Status: Abnormal   Result Value Ref Range    Sodium 136 136 - 145 mmol/L    Potassium 4.4 3.4 - 5.3 mmol/L    Chloride 100 98 - 107 mmol/L    Carbon Dioxide (CO2) 29 22 - 29 mmol/L    Anion Gap 7 7 - 15 mmol/L    Urea Nitrogen 16.0 8.0 - 23.0 mg/dL    Creatinine 0.52 (L) 0.67 - 1.17 mg/dL    Calcium 8.8 8.6 - 10.0 mg/dL    Glucose 108 (H) 70 - 99 mg/dL    Alkaline Phosphatase 81 40 - 129 U/L    AST 15 10 - 50 U/L    ALT 9 (L) 10 - 50 U/L    Protein Total 6.5 6.4 - 8.3 g/dL    Albumin 3.3 (L) 3.5 - 5.2 g/dL    Bilirubin Total 0.2 <=1.2 mg/dL    GFR Estimate >90 >60 mL/min/1.73m2   TSH with free T4 reflex     Status: Normal   Result Value Ref Range    TSH 0.92 0.30 - 4.20 uIU/mL   CBC with platelets and differential     Status: Abnormal   Result Value Ref Range    WBC Count 5.3 4.0 - 11.0 10e3/uL    RBC Count 3.35 (L) 4.40 - 5.90 10e6/uL    Hemoglobin 9.6 (L) 13.3 - 17.7 g/dL    Hematocrit 29.5 (L) 40.0 - 53.0 %    MCV 88 78 - 100 fL    MCH 28.7 26.5 - 33.0 pg    MCHC 32.5 31.5 - 36.5 g/dL    RDW 14.0 10.0 - 15.0 %    Platelet Count 229 150 - 450 10e3/uL    % Neutrophils 63 %    % Lymphocytes 24 %    % Monocytes 10 %    % Eosinophils 2 %    % Basophils 1 %    % Immature Granulocytes 0 %    NRBCs per 100 WBC 0 <1 /100    Absolute Neutrophils 3.4 1.6 - 8.3 10e3/uL    Absolute Lymphocytes 1.3 0.8 - 5.3 10e3/uL    Absolute Monocytes 0.5 0.0 - 1.3 10e3/uL    Absolute Eosinophils 0.1 0.0 - 0.7 10e3/uL    Absolute Basophils 0.0 0.0 - 0.2 10e3/uL    Absolute Immature Granulocytes 0.0 <=0.4 10e3/uL    Absolute NRBCs 0.0 10e3/uL   CBC with platelets differential     Status:  Abnormal    Narrative    The following orders were created for panel order CBC with platelets differential.  Procedure                               Abnormality         Status                     ---------                               -----------         ------                     CBC with platelets and d...[[id number]]  Abnormal            Final result                 Please view results for these tests on the individual orders.       Imaging Studies: None completed for today's visit      ASSESSMENT/PLAN:    #1 Esophageal cancer: Stage IV adenocarcinoma of the esophagus diagnosed November 2022.  He initiated systemic therapy with nivolumab/FOLFOX 1/11/2023 with potential for palliative radiation therapy at some point.  Tolerated treatment fairly well.  We will go forth with cycle 2 therapy today.  I will see him prior to cycle 3 with labs per treatment plan.    #2  Weight loss:  Discussed again the need for increase calories and protein.  He does have some fluctuation related to the fluid in his legs.     #3  Anemia:  Iron TIBC and saturation were low with ferritin at 90.  I do question a component of iron deficiency related to poor dietary intake at diagnosis.  We will obtain a soluble transferrin receptor to evaluate further.     #4  Cancer related pain:  Will continue with the hydrocodone as needed for pain.  We did discuss improvement in pain control.     #5  Tobacco abuse:  He has been successful so far with tobacco use cessation.  We will send a  Nicotine patch for 7 mg.     #6  Edema:  Discussed the use of compression stockings.  He will continue to keep elevated when he is sitting.  We also discussed hopefully this may improve with improvement in nutritional status.  We will need to monitor.    #7 constipation: Prescription sent for MiraLAX.  We discussed using one half to 1 capful daily and adjust as needed depending on stools.    #8 skin infection at G-tube site: We will have Dr. Dalton take a look at this  area.    I encouraged patient to call with any questions or concerns.    60 minutes spent in the patient's encounter today with time spent in review of the chart along with in chart preparation.  Time was also spent in review of his treatment plan and signing his treatment plan time was also spent obtaining a review of systems and performing a physical exam along with review of his lab work in detail with he and his brother.  Time was also spent in discussion of his multiple side effects and recommendations for management.  Time was spent in assisting arranging appointments, placing orders and in chart documentation.    Delmy Valenzuela, NP  APRN, FNP-BC, AOCNP

## 2023-01-25 ENCOUNTER — HOSPITAL ENCOUNTER (OUTPATIENT)
Dept: EDUCATION SERVICES | Facility: HOSPITAL | Age: 58
Discharge: HOME | End: 2023-01-25
Attending: NURSE PRACTITIONER
Payer: COMMERCIAL

## 2023-01-25 ENCOUNTER — INFUSION THERAPY VISIT (OUTPATIENT)
Dept: INFUSION THERAPY | Facility: OTHER | Age: 58
End: 2023-01-25
Payer: COMMERCIAL

## 2023-01-25 ENCOUNTER — ONCOLOGY VISIT (OUTPATIENT)
Dept: ONCOLOGY | Facility: OTHER | Age: 58
End: 2023-01-25
Attending: NURSE PRACTITIONER
Payer: COMMERCIAL

## 2023-01-25 ENCOUNTER — OFFICE VISIT (OUTPATIENT)
Dept: SURGERY | Facility: OTHER | Age: 58
End: 2023-01-25
Attending: SURGERY
Payer: COMMERCIAL

## 2023-01-25 VITALS
SYSTOLIC BLOOD PRESSURE: 105 MMHG | HEART RATE: 58 BPM | WEIGHT: 138.9 LBS | DIASTOLIC BLOOD PRESSURE: 56 MMHG | BODY MASS INDEX: 21.43 KG/M2

## 2023-01-25 VITALS
HEART RATE: 65 BPM | BODY MASS INDEX: 20.76 KG/M2 | OXYGEN SATURATION: 99 % | HEIGHT: 68 IN | DIASTOLIC BLOOD PRESSURE: 58 MMHG | WEIGHT: 137 LBS | SYSTOLIC BLOOD PRESSURE: 100 MMHG | TEMPERATURE: 97.5 F

## 2023-01-25 VITALS
HEART RATE: 65 BPM | OXYGEN SATURATION: 99 % | DIASTOLIC BLOOD PRESSURE: 58 MMHG | BODY MASS INDEX: 20.85 KG/M2 | TEMPERATURE: 97.5 F | WEIGHT: 137.57 LBS | SYSTOLIC BLOOD PRESSURE: 100 MMHG | RESPIRATION RATE: 20 BRPM | HEIGHT: 68 IN

## 2023-01-25 DIAGNOSIS — D64.9 ANEMIA, UNSPECIFIED TYPE: ICD-10-CM

## 2023-01-25 DIAGNOSIS — C15.5 MALIGNANT NEOPLASM OF LOWER THIRD OF ESOPHAGUS (H): Primary | ICD-10-CM

## 2023-01-25 DIAGNOSIS — Z72.0 TOBACCO ABUSE: ICD-10-CM

## 2023-01-25 DIAGNOSIS — R60.9 EDEMA, UNSPECIFIED TYPE: ICD-10-CM

## 2023-01-25 DIAGNOSIS — Z51.89 VISIT FOR WOUND CHECK: ICD-10-CM

## 2023-01-25 DIAGNOSIS — K94.22 SKIN INFECTION AT GASTROSTOMY TUBE SITE (H): ICD-10-CM

## 2023-01-25 DIAGNOSIS — Z98.890 POSTOPERATIVE STATE: Primary | ICD-10-CM

## 2023-01-25 DIAGNOSIS — L08.9 SKIN INFECTION AT GASTROSTOMY TUBE SITE (H): ICD-10-CM

## 2023-01-25 DIAGNOSIS — R63.4 WEIGHT LOSS: ICD-10-CM

## 2023-01-25 DIAGNOSIS — G89.3 CANCER RELATED PAIN: ICD-10-CM

## 2023-01-25 DIAGNOSIS — K59.03 DRUG-INDUCED CONSTIPATION: ICD-10-CM

## 2023-01-25 LAB
ALBUMIN SERPL BCG-MCNC: 3.3 G/DL (ref 3.5–5.2)
ALP SERPL-CCNC: 81 U/L (ref 40–129)
ALT SERPL W P-5'-P-CCNC: 9 U/L (ref 10–50)
ANION GAP SERPL CALCULATED.3IONS-SCNC: 7 MMOL/L (ref 7–15)
AST SERPL W P-5'-P-CCNC: 15 U/L (ref 10–50)
BASOPHILS # BLD AUTO: 0 10E3/UL (ref 0–0.2)
BASOPHILS NFR BLD AUTO: 1 %
BILIRUB SERPL-MCNC: 0.2 MG/DL
BUN SERPL-MCNC: 16 MG/DL (ref 8–23)
CALCIUM SERPL-MCNC: 8.8 MG/DL (ref 8.6–10)
CHLORIDE SERPL-SCNC: 100 MMOL/L (ref 98–107)
CREAT SERPL-MCNC: 0.52 MG/DL (ref 0.67–1.17)
DEPRECATED HCO3 PLAS-SCNC: 29 MMOL/L (ref 22–29)
EOSINOPHIL # BLD AUTO: 0.1 10E3/UL (ref 0–0.7)
EOSINOPHIL NFR BLD AUTO: 2 %
ERYTHROCYTE [DISTWIDTH] IN BLOOD BY AUTOMATED COUNT: 14 % (ref 10–15)
GFR SERPL CREATININE-BSD FRML MDRD: >90 ML/MIN/1.73M2
GLUCOSE SERPL-MCNC: 108 MG/DL (ref 70–99)
HCT VFR BLD AUTO: 29.5 % (ref 40–53)
HGB BLD-MCNC: 9.6 G/DL (ref 13.3–17.7)
IMM GRANULOCYTES # BLD: 0 10E3/UL
IMM GRANULOCYTES NFR BLD: 0 %
LYMPHOCYTES # BLD AUTO: 1.3 10E3/UL (ref 0.8–5.3)
LYMPHOCYTES NFR BLD AUTO: 24 %
MCH RBC QN AUTO: 28.7 PG (ref 26.5–33)
MCHC RBC AUTO-ENTMCNC: 32.5 G/DL (ref 31.5–36.5)
MCV RBC AUTO: 88 FL (ref 78–100)
MONOCYTES # BLD AUTO: 0.5 10E3/UL (ref 0–1.3)
MONOCYTES NFR BLD AUTO: 10 %
NEUTROPHILS # BLD AUTO: 3.4 10E3/UL (ref 1.6–8.3)
NEUTROPHILS NFR BLD AUTO: 63 %
NRBC # BLD AUTO: 0 10E3/UL
NRBC BLD AUTO-RTO: 0 /100
PLATELET # BLD AUTO: 229 10E3/UL (ref 150–450)
POTASSIUM SERPL-SCNC: 4.4 MMOL/L (ref 3.4–5.3)
PROT SERPL-MCNC: 6.5 G/DL (ref 6.4–8.3)
RBC # BLD AUTO: 3.35 10E6/UL (ref 4.4–5.9)
SODIUM SERPL-SCNC: 136 MMOL/L (ref 136–145)
TSH SERPL DL<=0.005 MIU/L-ACNC: 0.92 UIU/ML (ref 0.3–4.2)
WBC # BLD AUTO: 5.3 10E3/UL (ref 4–11)

## 2023-01-25 PROCEDURE — 96415 CHEMO IV INFUSION ADDL HR: CPT | Performed by: INTERNAL MEDICINE

## 2023-01-25 PROCEDURE — 96375 TX/PRO/DX INJ NEW DRUG ADDON: CPT | Performed by: INTERNAL MEDICINE

## 2023-01-25 PROCEDURE — 96411 CHEMO IV PUSH ADDL DRUG: CPT | Performed by: INTERNAL MEDICINE

## 2023-01-25 PROCEDURE — 999N000107 HC STATISTIC NUTRITIONAL THERAPY NO CHARGE: Performed by: DIETITIAN, REGISTERED

## 2023-01-25 PROCEDURE — 96413 CHEMO IV INFUSION 1 HR: CPT | Performed by: INTERNAL MEDICINE

## 2023-01-25 PROCEDURE — 96367 TX/PROPH/DG ADDL SEQ IV INF: CPT | Performed by: INTERNAL MEDICINE

## 2023-01-25 PROCEDURE — 96417 CHEMO IV INFUS EACH ADDL SEQ: CPT | Performed by: INTERNAL MEDICINE

## 2023-01-25 PROCEDURE — 96521 REFILL/MAINT PORTABLE PUMP: CPT | Mod: 59 | Performed by: INTERNAL MEDICINE

## 2023-01-25 PROCEDURE — 99215 OFFICE O/P EST HI 40 MIN: CPT | Mod: 25 | Performed by: NURSE PRACTITIONER

## 2023-01-25 PROCEDURE — 99024 POSTOP FOLLOW-UP VISIT: CPT | Performed by: SURGERY

## 2023-01-25 PROCEDURE — 80050 GENERAL HEALTH PANEL: CPT | Performed by: NURSE PRACTITIONER

## 2023-01-25 PROCEDURE — 36591 DRAW BLOOD OFF VENOUS DEVICE: CPT | Performed by: NURSE PRACTITIONER

## 2023-01-25 PROCEDURE — 84238 ASSAY NONENDOCRINE RECEPTOR: CPT | Mod: 90 | Performed by: INTERNAL MEDICINE

## 2023-01-25 RX ORDER — HEPARIN SODIUM (PORCINE) LOCK FLUSH IV SOLN 100 UNIT/ML 100 UNIT/ML
5 SOLUTION INTRAVENOUS
Status: CANCELLED | OUTPATIENT
Start: 2023-01-27

## 2023-01-25 RX ORDER — ALBUTEROL SULFATE 90 UG/1
1-2 AEROSOL, METERED RESPIRATORY (INHALATION)
Status: CANCELLED
Start: 2023-01-25

## 2023-01-25 RX ORDER — LORAZEPAM 2 MG/ML
0.5 INJECTION INTRAMUSCULAR EVERY 4 HOURS PRN
Status: CANCELLED | OUTPATIENT
Start: 2023-01-25

## 2023-01-25 RX ORDER — METHYLPREDNISOLONE SODIUM SUCCINATE 125 MG/2ML
125 INJECTION, POWDER, LYOPHILIZED, FOR SOLUTION INTRAMUSCULAR; INTRAVENOUS
Status: CANCELLED
Start: 2023-01-25

## 2023-01-25 RX ORDER — FLUOROURACIL 50 MG/ML
400 INJECTION, SOLUTION INTRAVENOUS ONCE
Status: COMPLETED | OUTPATIENT
Start: 2023-01-25 | End: 2023-01-25

## 2023-01-25 RX ORDER — HEPARIN SODIUM,PORCINE 10 UNIT/ML
5 VIAL (ML) INTRAVENOUS
Status: CANCELLED | OUTPATIENT
Start: 2023-01-27

## 2023-01-25 RX ORDER — MEPERIDINE HYDROCHLORIDE 25 MG/ML
25 INJECTION INTRAMUSCULAR; INTRAVENOUS; SUBCUTANEOUS EVERY 30 MIN PRN
Status: CANCELLED | OUTPATIENT
Start: 2023-01-25

## 2023-01-25 RX ORDER — NICOTINE 21 MG/24HR
1 PATCH, TRANSDERMAL 24 HOURS TRANSDERMAL EVERY 24 HOURS
Qty: 14 PATCH | Refills: 0 | Status: SHIPPED | OUTPATIENT
Start: 2023-01-25 | End: 2023-02-08 | Stop reason: DRUGHIGH

## 2023-01-25 RX ORDER — HEPARIN SODIUM (PORCINE) LOCK FLUSH IV SOLN 100 UNIT/ML 100 UNIT/ML
5 SOLUTION INTRAVENOUS
Status: CANCELLED | OUTPATIENT
Start: 2023-01-25

## 2023-01-25 RX ORDER — EPINEPHRINE 1 MG/ML
0.3 INJECTION, SOLUTION, CONCENTRATE INTRAVENOUS EVERY 5 MIN PRN
Status: CANCELLED | OUTPATIENT
Start: 2023-01-25

## 2023-01-25 RX ORDER — DIPHENHYDRAMINE HYDROCHLORIDE 50 MG/ML
50 INJECTION INTRAMUSCULAR; INTRAVENOUS
Status: CANCELLED
Start: 2023-01-25

## 2023-01-25 RX ORDER — ALBUTEROL SULFATE 0.83 MG/ML
2.5 SOLUTION RESPIRATORY (INHALATION)
Status: CANCELLED | OUTPATIENT
Start: 2023-01-25

## 2023-01-25 RX ORDER — HEPARIN SODIUM,PORCINE 10 UNIT/ML
5 VIAL (ML) INTRAVENOUS
Status: CANCELLED | OUTPATIENT
Start: 2023-01-25

## 2023-01-25 RX ORDER — POLYETHYLENE GLYCOL 3350 17 G/17G
8.5-17 POWDER, FOR SOLUTION ORAL DAILY
Qty: 510 G | Refills: 1 | Status: SHIPPED | OUTPATIENT
Start: 2023-01-25 | End: 2023-01-01

## 2023-01-25 RX ORDER — ONDANSETRON 2 MG/ML
8 INJECTION INTRAMUSCULAR; INTRAVENOUS ONCE
Status: COMPLETED | OUTPATIENT
Start: 2023-01-25 | End: 2023-01-25

## 2023-01-25 RX ORDER — FLUOROURACIL 50 MG/ML
400 INJECTION, SOLUTION INTRAVENOUS ONCE
Status: CANCELLED | OUTPATIENT
Start: 2023-01-25

## 2023-01-25 RX ADMIN — ONDANSETRON 8 MG: 2 INJECTION INTRAMUSCULAR; INTRAVENOUS at 09:49

## 2023-01-25 RX ADMIN — FLUOROURACIL 690 MG: 50 INJECTION, SOLUTION INTRAVENOUS at 13:31

## 2023-01-25 RX ADMIN — Medication 250 ML: at 09:46

## 2023-01-25 ASSESSMENT — PAIN SCALES - GENERAL
PAINLEVEL: SEVERE PAIN (7)
PAINLEVEL: SEVERE PAIN (7)

## 2023-01-25 NOTE — PROGRESS NOTES
Patients power port accessed using non-coring, 19 gauge, 3/4 inch needle.    Mask donned by caregiver: yes Site prepped with CHG: yes Labs drawn: yes Dressing applied using aseptic technique: yes.     Port accessed per facility protocol. Port flushed easily, blood return noted.  No signs and symptoms of infection or infiltration.  Port flushed with 10mL normal saline, blood return noted, 10 mLs blood discarded. Blood taken for ordered labs, port flushed with 20mL normal saline.  Port left accessed as patient has appointment with Martha Valenzuela, and is then due for chemo if parameters are met.  Patient discharged with no complaints.

## 2023-01-25 NOTE — PATIENT INSTRUCTIONS

## 2023-01-25 NOTE — PATIENT INSTRUCTIONS
We will have you see Dr. Dalton today to evaluate the G-tube site.    We would like to see you back in 2 weeks per your schedule.     Your prescription for Nicotine patches and Miralax powder has been sent to: [facility].      When you are in need of a refill, please call your pharmacy and they will send us a request.     If you have any questions please call [phone]    Other instructions:      Try Miralax powder daily for constipation.  May use 1 capful or less depending on bowel habits.  Decrease amount or skip a day if loose stools.

## 2023-01-25 NOTE — PROGRESS NOTES
Patient is a 57 year old here accompanied by family today for infusion of Folfox/Nivo under the orders of Martha Valenzuela.      Component      Latest Ref Rng & Units 1/25/2023   WBC      4.0 - 11.0 10e3/uL 5.3   RBC Count      4.40 - 5.90 10e6/uL 3.35 (L)   Hemoglobin      13.3 - 17.7 g/dL 9.6 (L)   Hematocrit      40.0 - 53.0 % 29.5 (L)   MCV      78 - 100 fL 88   MCH      26.5 - 33.0 pg 28.7   MCHC      31.5 - 36.5 g/dL 32.5   RDW      10.0 - 15.0 % 14.0   Platelet Count      150 - 450 10e3/uL 229   % Neutrophils      % 63   % Lymphocytes      % 24   % Monocytes      % 10   % Eosinophils      % 2   % Basophils      % 1   % Immature Granulocytes      % 0   NRBCs per 100 WBC      <1 /100 0   Absolute Neutrophils      1.6 - 8.3 10e3/uL 3.4   Absolute Lymphocytes      0.8 - 5.3 10e3/uL 1.3   Absolute Monocytes      0.0 - 1.3 10e3/uL 0.5   Absolute Eosinophils      0.0 - 0.7 10e3/uL 0.1   Absolute Basophils      0.0 - 0.2 10e3/uL 0.0   Absolute Immature Granulocytes      <=0.4 10e3/uL 0.0   Absolute NRBCs      10e3/uL 0.0   Sodium      136 - 145 mmol/L 136   Potassium      3.4 - 5.3 mmol/L 4.4   Chloride      98 - 107 mmol/L 100   Carbon Dioxide (CO2)      22 - 29 mmol/L 29   Anion Gap      7 - 15 mmol/L 7   Urea Nitrogen      8.0 - 23.0 mg/dL 16.0   Creatinine      0.67 - 1.17 mg/dL 0.52 (L)   Calcium      8.6 - 10.0 mg/dL 8.8   Glucose      70 - 99 mg/dL 108 (H)   Alkaline Phosphatase      40 - 129 U/L 81   AST      10 - 50 U/L 15   ALT      10 - 50 U/L 9 (L)   Protein Total      6.4 - 8.3 g/dL 6.5   Albumin      3.5 - 5.2 g/dL 3.3 (L)   Bilirubin Total      <=1.2 mg/dL 0.2   GFR Estimate      >60 mL/min/1.73m2 >90   TSH      0.30 - 4.20 uIU/mL 0.92        Folfox/Nivo dose(s) verified with Meera BELL RN prior to release of drug.      Patient meets parameters for today's infusion.  Denies questions or concerns regarding today's infusion and/or medications being administered.      Patient identified with two identifiers,  order verified, and verbal consent for today's infusion obtained from patient.     1030 IV pump verified with Nivolumab dose, drug, and rate of administration. Infusion administered per protocol. Patient tolerated infusion well, no signs or symptoms of adverse reaction noted. Patient denies pain nor discomfort.     1120 IV pump verified with Leucovorin dose, drug, and rate of administration. Infusion administered per protocol. Patient tolerated infusion well, no signs or symptoms of adverse reaction noted. Patient denies pain nor discomfort.     1120 IV pump verified with Oxaliplatin dose, drug, and rate of administration. Infusion administered per protocol. Patient tolerated infusion well, no signs or symptoms of adverse reaction noted. Patient denies pain nor discomfort.     1331 IV Fluorouracil IV push verified dose, drug, and rate of administration. Patient tolerated infusion well, no signs or symptoms of adverse reaction noted. Patient denies pain or discomfort.    1333 Fluorouracil home infuser verified with dose, drug, and rate of administration. Home infuser connected, clamps taped open, dressing reinforced. Patient denies pain or discomfort. No signs or symptoms of infiltration or infection.     Patient discharged.

## 2023-01-25 NOTE — PROGRESS NOTES
"[facility]  Medical Nutrition Therapy    Visit Type: Reassessment       Miguel Abinh RAY Matthew referred by Dr. Nika Barnhart  for MNT related to Malignant neoplasm of lower third of esophagus        Patient accompanied by brother.    Nutrition Assessment:  Anthropometrics  Height: 5' 7.5\"    BMI:  21.23      Weight: 62.4 kg (137 lb 9.1 oz)    BSA: 1.72        Wt Readings from Last 10 Encounters:   01/25/23 62.4 kg (137 lb 9.1 oz)   01/16/23 65.1 kg (143 lb 8.7 oz)   01/13/23 63.9 kg (140 lb 14 oz)   01/11/23 63.3 kg (139 lb 8.8 oz)   01/09/23 62.5 kg (137 lb 12.6 oz)   01/04/23 60.9 kg (134 lb 4.2 oz)   12/22/22 61.7 kg (136 lb)   12/20/22 62 kg (136 lb 11 oz)   12/08/22 63.5 kg (140 lb)   12/05/22 63.5 kg (140 lb 1.6 oz)           Nutrition History  Swallowing- no pain. No teeth. Continues some oral intake of soft foods and liquids.  A little constipation last week. BM today. Fullness with TFs, can only tolerate 2/3 of a carton at a time right now. Getting in 4 cartons per day along with 2 protein shake and 2 small meals. Drinking plenty of fluids.    Food Record  4 cartons of formula, 2/3rd every 2-3 hours. 60ml flush before and after 1 can is too much right now.   Breakfast- Banana bread yesterday morning with lots of butter, oatmeal in the morning, try some grits  Dinner- Mac and cheese, tonight cheddar broccoli soup. Potato with top the tater  Beverages:  Dr pepper 8 oz  Cut down at least 2 protein shakes 10 oz maybe- will try to work back up to 3.   Drinking Gatorade 1/2 small bottle  apple juice 6 oz   16 oz water powerade powder      About 74+ oz of fluids/free water per day    Physical Activity   not addressed    Nutrition Prescription- weight used to estimate needs 63.5kg  Energy:   1900-2225kcal (30-35g/kg)    Protein:   75-95g (1.2-1.5g/kg)    Fluid:   1900-2225ml (1ml/kcal)            Nutrition Diagnosis:  Malnutrition related to swallowing difficulty/reduced intake as evidenced by weight loss " of 32% weight loss in 1 year.     Nutrition Intervention:  Continue with 4 cartons of formula. Try to increase caloricprotein intake by choosing foods/beverages high in calories. Reviewed some options. Flush tube with water until tube is cleared of formula- use warm water and message tube if clogged.     Nutrition Goals:   Adequate intake oral/TF to meet nutrition needs to maintain weight/promote healthy weight gain     Nutrition Follow Up / Monitoring:   TF placement, intake, weight, labs     Nutrition Recommendations:  TF Recs  4 cartons of Isosource 1.5 daily- bolus feeds  1500 kcal, 68 g protein, 764 ml free water  240ml water 2-3 times per day- depending on oral intake of fulids.  Plan to continue to follow pt and adjust as oral intake changes.        Patient to follow-up with RD in 2-4 weeks. 2/22/23  Patient has RD contact information to call/email if needed.

## 2023-01-25 NOTE — PATIENT INSTRUCTIONS
Thank you for allowing Dr. Dalton and our surgical team to participate in your care. Please call our health unit coordinator at [phone] with scheduling questions or the nurse at [phone] with any other questions or concerns.

## 2023-01-25 NOTE — NURSING NOTE
"Oncology Rooming Note    January 25, 2023 8:08 AM   Kenneth Matthew is a 57 year old male who presents for:    Chief Complaint   Patient presents with     Oncology Clinic Visit     Follow up Malignant neoplasm of lower third of esophagus      Initial Vitals: /58   Pulse 65   Temp 97.5  F (36.4  C) (Tympanic)   Resp 20   Ht 1.715 m (5' 7.5\")   Wt 62.4 kg (137 lb 9.1 oz)   SpO2 99%   BMI 21.23 kg/m   Estimated body mass index is 21.23 kg/m  as calculated from the following:    Height as of this encounter: 1.715 m (5' 7.5\").    Weight as of this encounter: 62.4 kg (137 lb 9.1 oz). Body surface area is 1.72 meters squared.  Severe Pain (7) Comment: Data Unavailable   No LMP for male patient.  Allergies reviewed: Yes  Medications reviewed: Yes    Medications: MEDICATION REFILLS NEEDED TODAY. Provider was notified.  Pharmacy name entered into [facility]:    [facility] - [place], [place] - [address] [address] [facility] [address]  [facility] - [place] - [address]    Clinical concerns: check feeding tube sit and needs a refill on his nicotine patch      Yahaira Rodriguez LPN            "

## 2023-01-27 ENCOUNTER — INFUSION THERAPY VISIT (OUTPATIENT)
Dept: INFUSION THERAPY | Facility: OTHER | Age: 58
End: 2023-01-27
Attending: INTERNAL MEDICINE
Payer: COMMERCIAL

## 2023-01-27 DIAGNOSIS — C15.5 MALIGNANT NEOPLASM OF LOWER THIRD OF ESOPHAGUS (H): Primary | ICD-10-CM

## 2023-01-27 LAB — STFR SERPL-MCNC: 3.8 MG/L

## 2023-01-27 RX ORDER — HEPARIN SODIUM (PORCINE) LOCK FLUSH IV SOLN 100 UNIT/ML 100 UNIT/ML
5 SOLUTION INTRAVENOUS
Status: CANCELLED | OUTPATIENT
Start: 2023-01-27

## 2023-01-27 RX ORDER — HEPARIN SODIUM (PORCINE) LOCK FLUSH IV SOLN 100 UNIT/ML 100 UNIT/ML
5 SOLUTION INTRAVENOUS
Status: DISCONTINUED | OUTPATIENT
Start: 2023-01-27 | End: 2023-01-27 | Stop reason: HOSPADM

## 2023-01-27 RX ADMIN — Medication 1000 ML: at 09:13

## 2023-01-27 RX ADMIN — HEPARIN SODIUM (PORCINE) LOCK FLUSH IV SOLN 100 UNIT/ML 5 ML: 100 SOLUTION at 10:19

## 2023-01-27 NOTE — PROGRESS NOTES
Intravenous fluids were administered, normal saline 1000 cc's.  Patient tolerated infusion well.  Patient port de accessed per facility policy.   Patient discharged.

## 2023-01-27 NOTE — PROGRESS NOTES
Patient is 57 years old, here today for home infuser pump off.    Infuser pump completed, 100 % infused.  Patient denies adverse effects from home infusion, nor mechanical issues.  Pump is intact.  Infuser disconnected from port.      Port flushed per MAR.  Immediate blood return noted.  Flushes easily, without resistance.      Port needle removed, needle intact.  Surrounding skin without redness, swelling, nor discoloration.  Skin warm and dry to touch.      Patient tolerated well.  Site covered with sterile bandage and slight pressure applied for 30 seconds. Instructed patient to leave bandage on for a minimum of one hour and to call with questions or concerns.    Education provided for signs and symptoms of infection and when to call the doctor.  Patient states understanding and is in agreement with this plan. Patient discharged.

## 2023-01-30 NOTE — PROGRESS NOTES
"[facility] Progress Note    HPI: 57 y.o. male undergoing neoadjuvant chemotherapy for high stage esophageal cancer.       S: Has had pain at g-tube site. Feels it has been to tight. Had G-tube placed on 12/22. Will have some effluent around tube at times. Weight has been stable.     O:   Vitals:  /58 (BP Location: Right arm, Patient Position: Sitting, Cuff Size: Adult Regular)   Pulse 65   Temp 97.5  F (36.4  C) (Tympanic)   Ht 1.715 m (5' 7.5\")   Wt 62.1 kg (137 lb)   SpO2 99%   BMI 21.14 kg/m        Physical Exam:  G: alert oriented, no acute distress   ENT: sclera non-icteric   Pulm: no respiratory distress   CVS: RRR  ABD: there is mild ulceration of skin and erythema surrounding the bumper of feeding tube.   Ext: WWP     Assessment/Plan:  Did loosen bumper to keep off of skin, discussed that he appears to be having some burn like irritation likely from some stomach acid contents getting on skin. With loosening bumper am able to get gauze under and stressed importance of changing if wet to avoid burn from stomach acid. He  He and brother acknowledged, can follow up with oncology. There is no concern for infection clinically.     Nba Dalton MD     "

## 2023-01-31 ENCOUNTER — CARE COORDINATION (OUTPATIENT)
Dept: ONCOLOGY | Facility: OTHER | Age: 58
End: 2023-01-31

## 2023-01-31 NOTE — PROGRESS NOTES
Care Coordination Note:    Patient's spouse reached out asking SW to check in of the [facility] Care application. She stated that the application was still pending and she was confused as to why. SW reached out to patient financial, they stated they would have someone work on this today and they would reach out to patient and spouse because a follow-up was listed as needed.  SW called spouse back and gave her this information. If they do not happen to call by end of day today, they will let SW know.  Will follow as needed.    ANDREWS Shukla

## 2023-02-06 NOTE — PROGRESS NOTES
Care Coordination Note:    SW was given patient's application for [facility] with a bunch of supporting documents as well. SW faxed over the papers to our Patient Financial services, will verify it went through or send another via e-mail. SW reached out to patient's spouse to inquire if they needed the forms returned to them, she stated she has other copies and does not need the forms back. SW told her I would hold onto them for a couple days to verify PF received them, and then shred them.   Spouse was very thankful for assistance, and denied needed any other help at this time. I did confirm they have my contact information if any other need arises.    ANDREWS Shukla  
DISPLAY PLAN FREE TEXT

## 2023-02-07 NOTE — PROGRESS NOTES
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  He initiated systemic therapy with nivolumab/FOLFOX 1/11/2023 with potential for palliative radiation therapy at some point.     He presents to the clinic today accompanied by his brother.  He states he is actually doing pretty good and has been more active and getting outside a bit.  He states the pain is much improved and he does continue to use the hydrocodone twice a day.  His main complaint is in regards to the G-tube and the discomfort that it causes in certain positions.  He has difficulty lying down because of it.  He did see Dr. Dalton 2 weeks ago in regards to the irritation around the G-tube and it was felt this was not infected and he was likely getting some burning of the skin related to gastric fluids.  The bumper was released some and he is able to get gauze under there and this area is healing well.    He continues to have some difficulty with swallowing certain foods and fluids.  He states milk is difficult.  He states he will at times develop what he feels is a large gas bubble.  He is taking some things orally and continues to use 4 cans through the G-tube on a daily basis.  States the edema to his lower extremities is basically resolved.  He does get occasional nausea and does use the Zofran and this works well.  He has been having a bowel movement daily.  He does admit to some increased fatigue and states he continues to not sleep well and he may sleep during the day due to not sleeping well at night.  He does request a lower nicotine patch and he has not smoked now since  starting therapy.     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    He states he would like to have his G-tube out by summer and he does question the plan for radiation therapy and brother has questions in regards to possible surgery.                                                                                             Oncologic History:     **Experienced difficulty swallowing and 60 pound weight loss  10/21/2022: CT chest, abdomen and pelvis: Diffuse wall thickening of the distal esophagus with associated mediastinal adenopathy, and upper abdominal  Conglomerate lymphadenopathy. Constellation of findings are highly concerning for underlying esophageal carcinoma with metastatic disease.   11/1/2022: Colonoscopy with polyp showing tubular adenoma and EGD: A large ulcerating mass with no bleeding and no stigmata of recent bleeding was found in the lower third of the esophagus, 35 to 40 cm from the incisors.  The mass was partially obstructing and partially circumferential involving  two thirds.    **Biopsy showed poorly differentiated favoring adenocarcinoma. Her 2 negative.   11/22/2002 patient met with Dr. Barnhart to discuss systemic therapy pending PET results/complete staging  12/5/2022 patient met with Dr. Spain with radiation oncology with possible plan to initiate radiation therapy pending results of PET scan  12/14/2022: PET scan: Distal esophageal neoplasm with metastatic involvement the  mediastinum, left hilum and retroperitoneal lymph nodes of the upperabdomen. Also likely localized metastatic involvement of thestomach/GE junction. 2 cm intramural distal esophageal/GE junction mass. Circumferential wall thickening with increased FDG uptake in the distal esophagus adjacent portions of the stomach/GE junction may represent localized disease however could also represent localized inflammation. Metastatic  involvement to the subcarinal nodes, left infrahilar and  pericaval/paraesophageal lymph nodes of the lower thorax. Metastatic involvement to the retroperitoneal lymph nodes of the upper abdomen. These lymph nodes have increased in size dating back to be  10/21/2022 CT scan consistent with worsening disease. Representative  nodes are described above.  12/20/2022  He was seen by Dr. Barnhart with plan to initiate palliative nivolumab/FOLFOX with potential for palliative Radiation at some point  12/22/2022: Port-A-Cath placement and gastrostomy tube insertion by Dr. Dalton  1/11/2023 initiated treatment     RESULT FOR IMMUNOHISTOCHEMICAL VENTANA CLONE  PD-L1 ASSAY  COMBINED POSITIVE SCORE (CPS): <1%     Treatment Goal: Palliative     Planned TX: Nivolumab 240 mg/oxaliplatin 85 mg per metered squared/leucovorin 350 mg per metered squared/fluorouracil 400 mg per metered squared IV bolus/fluorouracil 2400 mg per metered squared via continuous infusion over 46 hours with cycle given every 14 days     Previous treatment:  N/A     Past Medical History:   Diagnosis Date     Cancer (H)      Esophageal cancer (H) 11/01/2022     Esophageal thickening 10/25/2022     Mixed hearing loss, bilateral      Tobacco use disorder 12/27/2019       Past Surgical History:   Procedure Laterality Date     COLONOSCOPY  11/01/2022    [facility]     INSERT PORT VASCULAR ACCESS N/A 12/22/2022    Procedure: POWER PORT PLACEMENT;  Surgeon: Nba Dalton MD;  Location: [facility] OR     Upper GI Endoscopy  11/01/2022       Family History   Problem Relation Age of Onset     Breast Cancer Mother      Hyperlipidemia Mother      Diabetes Mother      Coronary Artery Disease Mother      Cerebrovascular Disease Father      Hyperlipidemia Father      Coronary Artery Disease Father      Myocardial Infarction Father      Hyperlipidemia Brother      Hypertension Brother      Diabetes Brother      Asthma Sister      Breast Cancer Sister      Hyperlipidemia Sister       Diabetes Sister      Hyperlipidemia Sister      Diabetes Sister      Hyperlipidemia Sister      Suicide Sister      Anesthesia Reaction Sister      Breast Cancer Sister      Hyperlipidemia Sister      Diabetes Sister      Breast Cancer Niece      Skin Cancer Niece      Thyroid Disease Niece      Breast Cancer Niece      Leukemia Nephew      Colon Cancer No family hx of      Prostate Cancer No family hx of      Genetic Disorder No family hx of        Social History     Socioeconomic History     Marital status:      Spouse name: Jazzy     Number of children: 2     Years of education: Not on file     Highest education level: Not on file   Occupational History     Not on file   Tobacco Use     Smoking status: Every Day     Packs/day: 0.50     Years: 46.00     Pack years: 23.00     Types: Cigarettes     Start date: 1976     Smokeless tobacco: Never     Tobacco comments:     Trying to quit   Vaping Use     Vaping Use: Never used   Substance and Sexual Activity     Alcohol use: Not Currently     Comment: no alcohol in last 6 months     Drug use: Never     Sexual activity: Not on file   Other Topics Concern     Parent/sibling w/ CABG, MI or angioplasty before 65F 55M? Yes     Comment: father   Social History Narrative     Not on file     Social Determinants of Health     Financial Resource Strain: Not on file   Food Insecurity: Not on file   Transportation Needs: Not on file   Physical Activity: Not on file   Stress: Not on file   Social Connections: Not on file   Intimate Partner Violence: Not on file   Housing Stability: Not on file       Current Outpatient Medications   Medication     HYDROcodone-Acetaminophen 5-217 MG/10ML SOLN     ibuprofen (ADVIL/MOTRIN) 200 MG tablet     nicotine (NICODERM CQ) 14 MG/24HR 24 hr patch     ondansetron (ZOFRAN) 8 MG tablet     polyethylene glycol (MIRALAX) 17 GM/Dose powder     prochlorperazine (COMPAZINE) 10 MG tablet     furosemide (LASIX) 20 MG tablet     nicotine (COMMIT) 2 MG  "lozenge     nicotine (NICODERM CQ) 21 MG/24HR 24 hr patch     nicotine (NICORETTE) 4 MG lozenge     No current facility-administered medications for this visit.        No Known Allergies    Review Of Systems:  Constitutional:    denies fever, chills, and night sweats.  Eyes:    denies blurred or double vision  Ears/Nose/Throat:   denies ear pain, runny nose;  see hPI  Respiratory:   denies shortness of breath, coughing a bit more  Skin:   denies rash, lesions  Cardiovascular:   denies chest pain, palpitations, mild edema to lower extremities  Gastrointestinal:  See hPI  Genitourinary:   denies difficulty with urination, blood in urine  Musculoskeletal:    denies new muscle pain, bone pain  Neurologic:   denies lightheadedness, headaches; see HPI  Psychiatric:   denies anxiety, depression  Hematologic/Lymphatic/Immunologic:   denies easy bruising, easy bleeding, lumps or bumps noted  Endocrine:   Denies increased thirst    ECOG Performance Status:     Physical Exam:  /60   Pulse 68   Temp 97.9  F (36.6  C) (Tympanic)   Resp 20   Ht 1.715 m (5' 7.5\")   Wt 62.1 kg (136 lb 14.5 oz)   SpO2 99%   BMI 21.13 kg/m      GENERAL APPEARANCE:  thin, alert and in no acute distress.  HEENT: Normocephalic, Sclerae anicteric.   NECK:   No asymmetry or masses, no thyromegaly.  LYMPHATICS: No palpable cervical, supraclavicular nodes   RESP: Lungs clear to auscultation bilaterally, dim throughout the lower lobes bilaterally; respirations regular and easy  CARDIOVASCULAR: Regular rate and rhythm. Normal S1, S2; no murmur, gallop, or rub.  NEURO: Alert and oriented x 3.  Gait steady.  PSYCHIATRIC: Mentation and affect appear normal.  Mood appropriate.    Laboratory:  Results for orders placed or performed in visit on 02/08/23   Comprehensive metabolic panel     Status: Abnormal   Result Value Ref [facility]    Sodium 136 136 - 145 mmol/L    Potassium 4.2 3.4 - 5.3 mmol/L    Chloride 101 98 - 107 mmol/L    Carbon Dioxide (CO2) 29 " 22 - 29 mmol/L    Anion Gap 6 (L) 7 - 15 mmol/L    Urea Nitrogen 14.1 8.0 - 23.0 mg/dL    Creatinine 0.53 (L) 0.67 - 1.17 mg/dL    Calcium 8.8 8.6 - 10.0 mg/dL    Glucose 109 (H) 70 - 99 mg/dL    Alkaline Phosphatase 84 40 - 129 U/L    AST 12 10 - 50 U/L    ALT 8 (L) 10 - 50 U/L    Protein Total 6.8 6.4 - 8.3 g/dL    Albumin 3.4 (L) 3.5 - 5.2 g/dL    Bilirubin Total 0.2 <=1.2 mg/dL    GFR Estimate >90 >60 mL/min/1.73m2   CBC with platelets and differential     Status: Abnormal   Result Value Ref [facility]    WBC Count 6.1 4.0 - 11.0 10e3/uL    RBC Count 3.46 (L) 4.40 - 5.90 10e6/uL    Hemoglobin 9.8 (L) 13.3 - 17.7 g/dL    Hematocrit 30.7 (L) 40.0 - 53.0 %    MCV 89 78 - 100 fL    MCH 28.3 26.5 - 33.0 pg    MCHC 31.9 31.5 - 36.5 g/dL    RDW 14.8 10.0 - 15.0 %    Platelet Count 227 150 - 450 10e3/uL    % Neutrophils 66 %    % Lymphocytes 21 %    % Monocytes 10 %    % Eosinophils 2 %    % Basophils 1 %    % Immature Granulocytes 0 %    NRBCs per 100 WBC 0 <1 /100    Absolute Neutrophils 4.1 1.6 - 8.3 10e3/uL    Absolute Lymphocytes 1.3 0.8 - 5.3 10e3/uL    Absolute Monocytes 0.6 0.0 - 1.3 10e3/uL    Absolute Eosinophils 0.1 0.0 - 0.7 10e3/uL    Absolute Basophils 0.0 0.0 - 0.2 10e3/uL    Absolute Immature Granulocytes 0.0 <=0.4 10e3/uL    Absolute NRBCs 0.0 10e3/uL   CBC with platelets differential     Status: Abnormal    Narrative    The following orders were created for panel order CBC with platelets differential.  Procedure                               Abnormality         Status                     ---------                               -----------         ------                     CBC with platelets and d...[[id number]]  Abnormal            Final result                 Please view results for these tests on the individual orders.       Imaging Studies:  None completed for today's visit      ASSESSMENT/PLAN:    #1 Esophageal cancer: Stage IV adenocarcinoma of the esophagus diagnosed November 2022.  He initiated  systemic therapy with nivolumab/FOLFOX 1/11/2023 with potential for palliative radiation therapy at some point.  Tolerated treatment fairly well.  We will go forth with cycle 3 therapy today.  I will see him prior to cycle 4 with labs per treatment plan.     #2  Anemia: Likely multifactorial including disease and chemotherapy.  Possibly a component of some iron deficiency though soluble transferrin receptor is within normal limits.     #3  Cancer related pain:  Will continue with the hydrocodone as needed for pain.      #4  Tobacco abuse:  He has been successful so far with tobacco use cessation.  We will send a  Nicotine patch for 7 mg.    I encouraged patient to call with any questions or concerns.    50 minutes spent in the patient's encounter today with time spent in review of the chart along with in chart preparation.  Time spent in review of his treatment plan.  Time was also spent obtaining a review of systems and performing a physical exam along with reviewing his lab work in detail with him.  Time was also spent in discussion of side effects and management.  Time was also spent in discussion of plan for future therapy with potential radiation timing of this.  We also did spend time in discussion of surgery not being an option at this point related to extent of disease.  Time was spent in placing orders and in chart documentation.    Delmy Valenzuela, NP  APRN, FNP-BC, AOCNP

## 2023-02-08 ENCOUNTER — ONCOLOGY VISIT (OUTPATIENT)
Dept: ONCOLOGY | Facility: OTHER | Age: 58
End: 2023-02-08
Attending: NURSE PRACTITIONER
Payer: COMMERCIAL

## 2023-02-08 ENCOUNTER — INFUSION THERAPY VISIT (OUTPATIENT)
Dept: INFUSION THERAPY | Facility: OTHER | Age: 58
End: 2023-02-08
Attending: NURSE PRACTITIONER
Payer: COMMERCIAL

## 2023-02-08 ENCOUNTER — LAB (OUTPATIENT)
Dept: ONCOLOGY | Facility: OTHER | Age: 58
End: 2023-02-08
Payer: COMMERCIAL

## 2023-02-08 VITALS — HEART RATE: 67 BPM | SYSTOLIC BLOOD PRESSURE: 110 MMHG | DIASTOLIC BLOOD PRESSURE: 65 MMHG

## 2023-02-08 VITALS
BODY MASS INDEX: 20.75 KG/M2 | HEART RATE: 68 BPM | TEMPERATURE: 97.9 F | SYSTOLIC BLOOD PRESSURE: 104 MMHG | OXYGEN SATURATION: 99 % | RESPIRATION RATE: 20 BRPM | DIASTOLIC BLOOD PRESSURE: 60 MMHG | WEIGHT: 136.91 LBS | HEIGHT: 68 IN

## 2023-02-08 DIAGNOSIS — C15.5 MALIGNANT NEOPLASM OF LOWER THIRD OF ESOPHAGUS (H): Primary | ICD-10-CM

## 2023-02-08 DIAGNOSIS — G89.3 CANCER RELATED PAIN: ICD-10-CM

## 2023-02-08 DIAGNOSIS — Z72.0 TOBACCO ABUSE: ICD-10-CM

## 2023-02-08 LAB
ALBUMIN SERPL BCG-MCNC: 3.4 G/DL (ref 3.5–5.2)
ALP SERPL-CCNC: 84 U/L (ref 40–129)
ALT SERPL W P-5'-P-CCNC: 8 U/L (ref 10–50)
ANION GAP SERPL CALCULATED.3IONS-SCNC: 6 MMOL/L (ref 7–15)
AST SERPL W P-5'-P-CCNC: 12 U/L (ref 10–50)
BASOPHILS # BLD AUTO: 0 10E3/UL (ref 0–0.2)
BASOPHILS NFR BLD AUTO: 1 %
BILIRUB SERPL-MCNC: 0.2 MG/DL
BUN SERPL-MCNC: 14.1 MG/DL (ref 8–23)
CALCIUM SERPL-MCNC: 8.8 MG/DL (ref 8.6–10)
CHLORIDE SERPL-SCNC: 101 MMOL/L (ref 98–107)
CREAT SERPL-MCNC: 0.53 MG/DL (ref 0.67–1.17)
DEPRECATED HCO3 PLAS-SCNC: 29 MMOL/L (ref 22–29)
EOSINOPHIL # BLD AUTO: 0.1 10E3/UL (ref 0–0.7)
EOSINOPHIL NFR BLD AUTO: 2 %
ERYTHROCYTE [DISTWIDTH] IN BLOOD BY AUTOMATED COUNT: 14.8 % (ref 10–15)
GFR SERPL CREATININE-BSD FRML MDRD: >90 ML/MIN/1.73M2
GLUCOSE SERPL-MCNC: 109 MG/DL (ref 70–99)
HCT VFR BLD AUTO: 30.7 % (ref 40–53)
HGB BLD-MCNC: 9.8 G/DL (ref 13.3–17.7)
IMM GRANULOCYTES # BLD: 0 10E3/UL
IMM GRANULOCYTES NFR BLD: 0 %
LYMPHOCYTES # BLD AUTO: 1.3 10E3/UL (ref 0.8–5.3)
LYMPHOCYTES NFR BLD AUTO: 21 %
MAGNESIUM SERPL-MCNC: 2.2 MG/DL (ref 1.7–2.3)
MCH RBC QN AUTO: 28.3 PG (ref 26.5–33)
MCHC RBC AUTO-ENTMCNC: 31.9 G/DL (ref 31.5–36.5)
MCV RBC AUTO: 89 FL (ref 78–100)
MONOCYTES # BLD AUTO: 0.6 10E3/UL (ref 0–1.3)
MONOCYTES NFR BLD AUTO: 10 %
NEUTROPHILS # BLD AUTO: 4.1 10E3/UL (ref 1.6–8.3)
NEUTROPHILS NFR BLD AUTO: 66 %
NRBC # BLD AUTO: 0 10E3/UL
NRBC BLD AUTO-RTO: 0 /100
PLATELET # BLD AUTO: 227 10E3/UL (ref 150–450)
POTASSIUM SERPL-SCNC: 4.2 MMOL/L (ref 3.4–5.3)
PROT SERPL-MCNC: 6.8 G/DL (ref 6.4–8.3)
RBC # BLD AUTO: 3.46 10E6/UL (ref 4.4–5.9)
SODIUM SERPL-SCNC: 136 MMOL/L (ref 136–145)
TSH SERPL DL<=0.005 MIU/L-ACNC: 0.93 UIU/ML (ref 0.3–4.2)
WBC # BLD AUTO: 6.1 10E3/UL (ref 4–11)

## 2023-02-08 PROCEDURE — 80050 GENERAL HEALTH PANEL: CPT | Performed by: NURSE PRACTITIONER

## 2023-02-08 PROCEDURE — 96368 THER/DIAG CONCURRENT INF: CPT | Performed by: INTERNAL MEDICINE

## 2023-02-08 PROCEDURE — 99215 OFFICE O/P EST HI 40 MIN: CPT | Mod: 25 | Performed by: NURSE PRACTITIONER

## 2023-02-08 PROCEDURE — 96413 CHEMO IV INFUSION 1 HR: CPT | Performed by: INTERNAL MEDICINE

## 2023-02-08 PROCEDURE — 96417 CHEMO IV INFUS EACH ADDL SEQ: CPT | Performed by: INTERNAL MEDICINE

## 2023-02-08 PROCEDURE — 96521 REFILL/MAINT PORTABLE PUMP: CPT | Mod: 59 | Performed by: INTERNAL MEDICINE

## 2023-02-08 PROCEDURE — 36591 DRAW BLOOD OFF VENOUS DEVICE: CPT | Performed by: NURSE PRACTITIONER

## 2023-02-08 PROCEDURE — 96367 TX/PROPH/DG ADDL SEQ IV INF: CPT | Performed by: INTERNAL MEDICINE

## 2023-02-08 PROCEDURE — 96415 CHEMO IV INFUSION ADDL HR: CPT | Performed by: INTERNAL MEDICINE

## 2023-02-08 PROCEDURE — 96375 TX/PRO/DX INJ NEW DRUG ADDON: CPT | Performed by: INTERNAL MEDICINE

## 2023-02-08 PROCEDURE — 83735 ASSAY OF MAGNESIUM: CPT | Performed by: INTERNAL MEDICINE

## 2023-02-08 RX ORDER — HEPARIN SODIUM,PORCINE 10 UNIT/ML
5 VIAL (ML) INTRAVENOUS
Status: CANCELLED | OUTPATIENT
Start: 2023-02-08

## 2023-02-08 RX ORDER — HEPARIN SODIUM,PORCINE 10 UNIT/ML
5 VIAL (ML) INTRAVENOUS
Status: CANCELLED | OUTPATIENT
Start: 2023-02-10

## 2023-02-08 RX ORDER — MEPERIDINE HYDROCHLORIDE 25 MG/ML
25 INJECTION INTRAMUSCULAR; INTRAVENOUS; SUBCUTANEOUS EVERY 30 MIN PRN
Status: CANCELLED | OUTPATIENT
Start: 2023-02-08

## 2023-02-08 RX ORDER — HYDROCODONE BITARTRATE AND ACETAMINOPHEN 5; 217 MG/10ML; MG/10ML
10 SOLUTION ORAL EVERY 8 HOURS
Qty: 473 ML | Refills: 0 | Status: SHIPPED | OUTPATIENT
Start: 2023-02-08 | End: 2023-06-28

## 2023-02-08 RX ORDER — HEPARIN SODIUM (PORCINE) LOCK FLUSH IV SOLN 100 UNIT/ML 100 UNIT/ML
5 SOLUTION INTRAVENOUS
Status: CANCELLED | OUTPATIENT
Start: 2023-02-10

## 2023-02-08 RX ORDER — DIPHENHYDRAMINE HYDROCHLORIDE 50 MG/ML
50 INJECTION INTRAMUSCULAR; INTRAVENOUS
Status: CANCELLED
Start: 2023-02-08

## 2023-02-08 RX ORDER — FLUOROURACIL 50 MG/ML
400 INJECTION, SOLUTION INTRAVENOUS ONCE
Status: COMPLETED | OUTPATIENT
Start: 2023-02-08 | End: 2023-02-08

## 2023-02-08 RX ORDER — METHYLPREDNISOLONE SODIUM SUCCINATE 125 MG/2ML
125 INJECTION, POWDER, LYOPHILIZED, FOR SOLUTION INTRAMUSCULAR; INTRAVENOUS
Status: CANCELLED
Start: 2023-02-08

## 2023-02-08 RX ORDER — HEPARIN SODIUM (PORCINE) LOCK FLUSH IV SOLN 100 UNIT/ML 100 UNIT/ML
5 SOLUTION INTRAVENOUS
Status: CANCELLED | OUTPATIENT
Start: 2023-02-08

## 2023-02-08 RX ORDER — ALBUTEROL SULFATE 0.83 MG/ML
2.5 SOLUTION RESPIRATORY (INHALATION)
Status: CANCELLED | OUTPATIENT
Start: 2023-02-08

## 2023-02-08 RX ORDER — ONDANSETRON 2 MG/ML
8 INJECTION INTRAMUSCULAR; INTRAVENOUS ONCE
Status: COMPLETED | OUTPATIENT
Start: 2023-02-08 | End: 2023-02-08

## 2023-02-08 RX ORDER — FLUOROURACIL 50 MG/ML
400 INJECTION, SOLUTION INTRAVENOUS ONCE
Status: CANCELLED | OUTPATIENT
Start: 2023-02-08

## 2023-02-08 RX ORDER — EPINEPHRINE 1 MG/ML
0.3 INJECTION, SOLUTION, CONCENTRATE INTRAVENOUS EVERY 5 MIN PRN
Status: CANCELLED | OUTPATIENT
Start: 2023-02-08

## 2023-02-08 RX ORDER — ALBUTEROL SULFATE 90 UG/1
1-2 AEROSOL, METERED RESPIRATORY (INHALATION)
Status: CANCELLED
Start: 2023-02-08

## 2023-02-08 RX ORDER — LORAZEPAM 2 MG/ML
0.5 INJECTION INTRAMUSCULAR EVERY 4 HOURS PRN
Status: CANCELLED | OUTPATIENT
Start: 2023-02-08

## 2023-02-08 RX ADMIN — ONDANSETRON 8 MG: 2 INJECTION INTRAMUSCULAR; INTRAVENOUS at 09:16

## 2023-02-08 RX ADMIN — FLUOROURACIL 690 MG: 50 INJECTION, SOLUTION INTRAVENOUS at 12:37

## 2023-02-08 RX ADMIN — Medication 250 ML: at 09:16

## 2023-02-08 ASSESSMENT — PAIN SCALES - GENERAL: PAINLEVEL: SEVERE PAIN (6)

## 2023-02-08 NOTE — PROGRESS NOTES
Patient is a 57 year old here accompanied by family today for infusion of Folfox/Nivolumab under the orders of Martha Valenzuela.    Component      Latest Ref Rng & Units 2/8/2023   WBC      4.0 - 11.0 10e3/uL 6.1   RBC Count      4.40 - 5.90 10e6/uL 3.46 (L)   Hemoglobin      13.3 - 17.7 g/dL 9.8 (L)   Hematocrit      40.0 - 53.0 % 30.7 (L)   MCV      78 - 100 fL 89   MCH      26.5 - 33.0 pg 28.3   MCHC      31.5 - 36.5 g/dL 31.9   RDW      10.0 - 15.0 % 14.8   Platelet Count      150 - 450 10e3/uL 227   % Neutrophils      % 66   % Lymphocytes      % 21   % Monocytes      % 10   % Eosinophils      % 2   % Basophils      % 1   % Immature Granulocytes      % 0   NRBCs per 100 WBC      <1 /100 0   Absolute Neutrophils      1.6 - 8.3 10e3/uL 4.1   Absolute Lymphocytes      0.8 - 5.3 10e3/uL 1.3   Absolute Monocytes      0.0 - 1.3 10e3/uL 0.6   Absolute Eosinophils      0.0 - 0.7 10e3/uL 0.1   Absolute Basophils      0.0 - 0.2 10e3/uL 0.0   Absolute Immature Granulocytes      <=0.4 10e3/uL 0.0   Absolute NRBCs      10e3/uL 0.0   Sodium      136 - 145 mmol/L 136   Potassium      3.4 - 5.3 mmol/L 4.2   Chloride      98 - 107 mmol/L 101   Carbon Dioxide (CO2)      22 - 29 mmol/L 29   Anion Gap      7 - 15 mmol/L 6 (L)   Urea Nitrogen      8.0 - 23.0 mg/dL 14.1   Creatinine      0.67 - 1.17 mg/dL 0.53 (L)   Calcium      8.6 - 10.0 mg/dL 8.8   Glucose      70 - 99 mg/dL 109 (H)   Alkaline Phosphatase      40 - 129 U/L 84   AST      10 - 50 U/L 12   ALT      10 - 50 U/L 8 (L)   Protein Total      6.4 - 8.3 g/dL 6.8   Albumin      3.5 - 5.2 g/dL 3.4 (L)   Bilirubin Total      <=1.2 mg/dL 0.2   GFR Estimate      >60 mL/min/1.73m2 >90   TSH      0.30 - 4.20 uIU/mL 0.93   Magnesium      1.7 - 2.3 mg/dL 2.2       Folfox/Nivolumab dose(s) verified with Meera BELL RN prior to release of drug.    Patient meets parameters for today's infusion.  Denies questions or concerns regarding today's infusion and/or medications being administered.       Patient identified with two identifiers, order verified, and verbal consent for today's infusion obtained from patient.    0952 IV pump verified with Nivolumab dose, drug, and rate of administration. Infusion administered per protocol. Patient tolerated infusion well, no signs or symptoms of adverse reaction noted. Patient denies pain nor discomfort.     1034 IV pump verified with Leucovorin dose, drug, and rate of administration. Infusion administered per protocol. Patient tolerated infusion well, no signs or symptoms of adverse reaction noted. Patient denies pain nor discomfort.     1034 IV pump verified with Oxaliplatin dose, drug, and rate of administration. Infusion administered per protocol. Patient tolerated infusion well, no signs or symptoms of adverse reaction noted. Patient denies pain nor discomfort.     1237 Fluorouracil IV push verified with dose, drug, and rate of administration. Patient tolerated infusion well, no signs, or symptoms of adverse reaction noted. Patient denies pain or discomfort.    1240 Fluorouracil home infuser verified with dose, drug, and rate of administration. Home infuser connected. Clamps taped open, dressing reinforced. Patient denies pain or discomfort. No signs or symptoms of infiltration or infection.    Patient discharged.

## 2023-02-08 NOTE — PATIENT INSTRUCTIONS
We would like to see you back per your schedule.     Your prescription for Hydrocodone and nicotine patches have been sent to: [facility].      When you are in need of a refill, please call your pharmacy and they will send us a request.     If you have any questions please call [phone]    Other instructions:  none

## 2023-02-08 NOTE — NURSING NOTE
"Oncology Rooming Note    February 8, 2023 8:10 AM   Kenneth Matthew is a 57 year old male who presents for:    Chief Complaint   Patient presents with     Oncology Clinic Visit     Follow up Malignant neoplasm of lower third of esophagus      Initial Vitals: /60   Pulse 68   Temp 97.9  F (36.6  C) (Tympanic)   Resp 20   Ht 1.715 m (5' 7.5\")   Wt 62.1 kg (136 lb 14.5 oz)   SpO2 99%   BMI 21.13 kg/m   Estimated body mass index is 21.13 kg/m  as calculated from the following:    Height as of this encounter: 1.715 m (5' 7.5\").    Weight as of this encounter: 62.1 kg (136 lb 14.5 oz). Body surface area is 1.72 meters squared.  Severe Pain (6) Comment: Data Unavailable   No LMP for male patient.  Allergies reviewed: Yes  Medications reviewed: Yes    Medications: MEDICATION REFILLS NEEDED TODAY. Provider was notified.  Pharmacy name entered into [facility]:    [id number] - [place], [place] - [address] [facility] [address]  [facility] - [place], [place] - [address]    Clinical concerns: neuropathy in fingers.      Yahaira Rodriguez LPN            "

## 2023-02-08 NOTE — PATIENT INSTRUCTIONS

## 2023-02-10 ENCOUNTER — INFUSION THERAPY VISIT (OUTPATIENT)
Dept: INFUSION THERAPY | Facility: OTHER | Age: 58
End: 2023-02-10
Attending: FAMILY MEDICINE
Payer: COMMERCIAL

## 2023-02-10 VITALS — BODY MASS INDEX: 21.28 KG/M2 | WEIGHT: 137.9 LBS

## 2023-02-10 DIAGNOSIS — C15.5 MALIGNANT NEOPLASM OF LOWER THIRD OF ESOPHAGUS (H): Primary | ICD-10-CM

## 2023-02-10 PROCEDURE — 96360 HYDRATION IV INFUSION INIT: CPT | Performed by: INTERNAL MEDICINE

## 2023-02-10 RX ORDER — HEPARIN SODIUM (PORCINE) LOCK FLUSH IV SOLN 100 UNIT/ML 100 UNIT/ML
5 SOLUTION INTRAVENOUS
Status: DISCONTINUED | OUTPATIENT
Start: 2023-02-10 | End: 2023-02-10 | Stop reason: HOSPADM

## 2023-02-10 RX ORDER — HEPARIN SODIUM (PORCINE) LOCK FLUSH IV SOLN 100 UNIT/ML 100 UNIT/ML
5 SOLUTION INTRAVENOUS
Status: CANCELLED | OUTPATIENT
Start: 2023-02-10

## 2023-02-10 RX ADMIN — Medication 1000 ML: at 09:18

## 2023-02-10 RX ADMIN — HEPARIN SODIUM (PORCINE) LOCK FLUSH IV SOLN 100 UNIT/ML 5 ML: 100 SOLUTION at 10:33

## 2023-02-10 NOTE — PROGRESS NOTES
Patient requested IVF, Patient did have a PRN order.  Order activated.  1000 ml IV NS administered to patient.

## 2023-02-10 NOTE — PROGRESS NOTES
Patient tolerated infusion well, no signs or symptoms of adverse reaction noted.  Patient denies pain nor discomfort.     IV removed, catheter intact.  Site clean, dry and intact.  No signs or symptoms of infiltration or infection.  Covered with a sterile bandage, slight pressure applied for 30 seconds.  Pt instructed to leave bandage intact for a minimum of one hour, and to call with questions or concerns.  Copy of appointments, discharge instructions, and after visit summary (AVS) provided to patient.  Patient states understanding, discharged.

## 2023-02-21 RX ORDER — ONDANSETRON 2 MG/ML
8 INJECTION INTRAMUSCULAR; INTRAVENOUS ONCE
Status: CANCELLED | OUTPATIENT
Start: 2023-02-22

## 2023-02-22 ENCOUNTER — INFUSION THERAPY VISIT (OUTPATIENT)
Dept: INFUSION THERAPY | Facility: OTHER | Age: 58
End: 2023-02-22
Attending: NURSE PRACTITIONER
Payer: COMMERCIAL

## 2023-02-22 ENCOUNTER — ONCOLOGY VISIT (OUTPATIENT)
Dept: ONCOLOGY | Facility: OTHER | Age: 58
End: 2023-02-22
Attending: NURSE PRACTITIONER
Payer: COMMERCIAL

## 2023-02-22 VITALS
OXYGEN SATURATION: 97 % | HEART RATE: 73 BPM | DIASTOLIC BLOOD PRESSURE: 68 MMHG | SYSTOLIC BLOOD PRESSURE: 133 MMHG | TEMPERATURE: 97.8 F | RESPIRATION RATE: 16 BRPM

## 2023-02-22 VITALS
HEART RATE: 60 BPM | DIASTOLIC BLOOD PRESSURE: 58 MMHG | SYSTOLIC BLOOD PRESSURE: 96 MMHG | HEIGHT: 68 IN | RESPIRATION RATE: 20 BRPM | OXYGEN SATURATION: 98 % | TEMPERATURE: 98.1 F | BODY MASS INDEX: 20.72 KG/M2 | WEIGHT: 136.69 LBS

## 2023-02-22 DIAGNOSIS — C15.5 MALIGNANT NEOPLASM OF LOWER THIRD OF ESOPHAGUS (H): Primary | ICD-10-CM

## 2023-02-22 DIAGNOSIS — D64.9 ANEMIA, UNSPECIFIED TYPE: ICD-10-CM

## 2023-02-22 DIAGNOSIS — Z72.0 TOBACCO ABUSE: ICD-10-CM

## 2023-02-22 DIAGNOSIS — G89.3 CANCER RELATED PAIN: ICD-10-CM

## 2023-02-22 LAB
ALBUMIN SERPL BCG-MCNC: 3.4 G/DL (ref 3.5–5.2)
ALP SERPL-CCNC: 79 U/L (ref 40–129)
ALT SERPL W P-5'-P-CCNC: 12 U/L (ref 10–50)
ANION GAP SERPL CALCULATED.3IONS-SCNC: 7 MMOL/L (ref 7–15)
AST SERPL W P-5'-P-CCNC: 14 U/L (ref 10–50)
BASOPHILS # BLD AUTO: 0 10E3/UL (ref 0–0.2)
BASOPHILS NFR BLD AUTO: 1 %
BILIRUB SERPL-MCNC: 0.3 MG/DL
BUN SERPL-MCNC: 8.6 MG/DL (ref 8–23)
CALCIUM SERPL-MCNC: 9.1 MG/DL (ref 8.6–10)
CHLORIDE SERPL-SCNC: 103 MMOL/L (ref 98–107)
CREAT SERPL-MCNC: 0.58 MG/DL (ref 0.67–1.17)
DEPRECATED HCO3 PLAS-SCNC: 30 MMOL/L (ref 22–29)
EOSINOPHIL # BLD AUTO: 0.1 10E3/UL (ref 0–0.7)
EOSINOPHIL NFR BLD AUTO: 2 %
ERYTHROCYTE [DISTWIDTH] IN BLOOD BY AUTOMATED COUNT: 16.7 % (ref 10–15)
GFR SERPL CREATININE-BSD FRML MDRD: >90 ML/MIN/1.73M2
GLUCOSE SERPL-MCNC: 101 MG/DL (ref 70–99)
HCT VFR BLD AUTO: 32.6 % (ref 40–53)
HGB BLD-MCNC: 10.4 G/DL (ref 13.3–17.7)
IMM GRANULOCYTES # BLD: 0 10E3/UL
IMM GRANULOCYTES NFR BLD: 0 %
LYMPHOCYTES # BLD AUTO: 1.3 10E3/UL (ref 0.8–5.3)
LYMPHOCYTES NFR BLD AUTO: 27 %
MCH RBC QN AUTO: 28.4 PG (ref 26.5–33)
MCHC RBC AUTO-ENTMCNC: 31.9 G/DL (ref 31.5–36.5)
MCV RBC AUTO: 89 FL (ref 78–100)
MONOCYTES # BLD AUTO: 0.6 10E3/UL (ref 0–1.3)
MONOCYTES NFR BLD AUTO: 13 %
NEUTROPHILS # BLD AUTO: 2.9 10E3/UL (ref 1.6–8.3)
NEUTROPHILS NFR BLD AUTO: 57 %
NRBC # BLD AUTO: 0 10E3/UL
NRBC BLD AUTO-RTO: 0 /100
PLATELET # BLD AUTO: 121 10E3/UL (ref 150–450)
POTASSIUM SERPL-SCNC: 4.6 MMOL/L (ref 3.4–5.3)
PROT SERPL-MCNC: 6.6 G/DL (ref 6.4–8.3)
RBC # BLD AUTO: 3.66 10E6/UL (ref 4.4–5.9)
SODIUM SERPL-SCNC: 140 MMOL/L (ref 136–145)
TSH SERPL DL<=0.005 MIU/L-ACNC: 0.78 UIU/ML (ref 0.3–4.2)
WBC # BLD AUTO: 5 10E3/UL (ref 4–11)

## 2023-02-22 PROCEDURE — 36591 DRAW BLOOD OFF VENOUS DEVICE: CPT | Performed by: NURSE PRACTITIONER

## 2023-02-22 PROCEDURE — 96413 CHEMO IV INFUSION 1 HR: CPT | Performed by: INTERNAL MEDICINE

## 2023-02-22 PROCEDURE — 96417 CHEMO IV INFUS EACH ADDL SEQ: CPT | Performed by: INTERNAL MEDICINE

## 2023-02-22 PROCEDURE — 96415 CHEMO IV INFUSION ADDL HR: CPT | Performed by: INTERNAL MEDICINE

## 2023-02-22 PROCEDURE — 99215 OFFICE O/P EST HI 40 MIN: CPT | Mod: 25 | Performed by: NURSE PRACTITIONER

## 2023-02-22 PROCEDURE — S0028 INJECTION, FAMOTIDINE, 20 MG: HCPCS | Performed by: INTERNAL MEDICINE

## 2023-02-22 PROCEDURE — 96367 TX/PROPH/DG ADDL SEQ IV INF: CPT | Performed by: INTERNAL MEDICINE

## 2023-02-22 PROCEDURE — 96375 TX/PRO/DX INJ NEW DRUG ADDON: CPT | Performed by: INTERNAL MEDICINE

## 2023-02-22 PROCEDURE — 80050 GENERAL HEALTH PANEL: CPT | Performed by: NURSE PRACTITIONER

## 2023-02-22 PROCEDURE — 96521 REFILL/MAINT PORTABLE PUMP: CPT | Mod: 59 | Performed by: INTERNAL MEDICINE

## 2023-02-22 RX ORDER — ONDANSETRON 2 MG/ML
8 INJECTION INTRAMUSCULAR; INTRAVENOUS ONCE
Status: COMPLETED | OUTPATIENT
Start: 2023-02-22 | End: 2023-02-22

## 2023-02-22 RX ORDER — METHYLPREDNISOLONE SODIUM SUCCINATE 125 MG/2ML
125 INJECTION, POWDER, LYOPHILIZED, FOR SOLUTION INTRAMUSCULAR; INTRAVENOUS
Status: DISCONTINUED | OUTPATIENT
Start: 2023-02-22 | End: 2023-02-22 | Stop reason: HOSPADM

## 2023-02-22 RX ORDER — HEPARIN SODIUM (PORCINE) LOCK FLUSH IV SOLN 100 UNIT/ML 100 UNIT/ML
5 SOLUTION INTRAVENOUS
Status: CANCELLED | OUTPATIENT
Start: 2023-02-22

## 2023-02-22 RX ORDER — ALBUTEROL SULFATE 0.83 MG/ML
2.5 SOLUTION RESPIRATORY (INHALATION)
Status: CANCELLED | OUTPATIENT
Start: 2023-02-22

## 2023-02-22 RX ORDER — HEPARIN SODIUM (PORCINE) LOCK FLUSH IV SOLN 100 UNIT/ML 100 UNIT/ML
5 SOLUTION INTRAVENOUS
Status: DISCONTINUED | OUTPATIENT
Start: 2023-02-22 | End: 2023-02-22 | Stop reason: HOSPADM

## 2023-02-22 RX ORDER — LORAZEPAM 2 MG/ML
0.5 INJECTION INTRAMUSCULAR EVERY 4 HOURS PRN
Status: DISCONTINUED | OUTPATIENT
Start: 2023-02-22 | End: 2023-02-22 | Stop reason: HOSPADM

## 2023-02-22 RX ORDER — EPINEPHRINE 1 MG/ML
0.3 INJECTION, SOLUTION, CONCENTRATE INTRAVENOUS EVERY 5 MIN PRN
Status: CANCELLED | OUTPATIENT
Start: 2023-02-22

## 2023-02-22 RX ORDER — FLUOROURACIL 50 MG/ML
400 INJECTION, SOLUTION INTRAVENOUS ONCE
Status: COMPLETED | OUTPATIENT
Start: 2023-02-22 | End: 2023-02-22

## 2023-02-22 RX ORDER — HEPARIN SODIUM,PORCINE 10 UNIT/ML
5 VIAL (ML) INTRAVENOUS
Status: CANCELLED | OUTPATIENT
Start: 2023-02-22

## 2023-02-22 RX ORDER — DIPHENHYDRAMINE HYDROCHLORIDE 50 MG/ML
50 INJECTION INTRAMUSCULAR; INTRAVENOUS
Status: CANCELLED
Start: 2023-02-22

## 2023-02-22 RX ORDER — LORAZEPAM 2 MG/ML
0.5 INJECTION INTRAMUSCULAR EVERY 4 HOURS PRN
Status: CANCELLED | OUTPATIENT
Start: 2023-02-22

## 2023-02-22 RX ORDER — ALBUTEROL SULFATE 90 UG/1
1-2 AEROSOL, METERED RESPIRATORY (INHALATION)
Status: CANCELLED
Start: 2023-02-22

## 2023-02-22 RX ORDER — FLUOROURACIL 50 MG/ML
400 INJECTION, SOLUTION INTRAVENOUS ONCE
Status: CANCELLED | OUTPATIENT
Start: 2023-02-22

## 2023-02-22 RX ORDER — METHYLPREDNISOLONE SODIUM SUCCINATE 125 MG/2ML
125 INJECTION, POWDER, LYOPHILIZED, FOR SOLUTION INTRAMUSCULAR; INTRAVENOUS
Status: CANCELLED
Start: 2023-02-22

## 2023-02-22 RX ORDER — MEPERIDINE HYDROCHLORIDE 25 MG/ML
25 INJECTION INTRAMUSCULAR; INTRAVENOUS; SUBCUTANEOUS EVERY 30 MIN PRN
Status: CANCELLED | OUTPATIENT
Start: 2023-02-22

## 2023-02-22 RX ORDER — HEPARIN SODIUM (PORCINE) LOCK FLUSH IV SOLN 100 UNIT/ML 100 UNIT/ML
5 SOLUTION INTRAVENOUS
Status: CANCELLED | OUTPATIENT
Start: 2023-02-24

## 2023-02-22 RX ORDER — HEPARIN SODIUM,PORCINE 10 UNIT/ML
5 VIAL (ML) INTRAVENOUS
Status: CANCELLED | OUTPATIENT
Start: 2023-02-24

## 2023-02-22 RX ADMIN — Medication 250 ML: at 10:32

## 2023-02-22 RX ADMIN — LORAZEPAM 0.5 MG: 2 INJECTION INTRAMUSCULAR at 15:23

## 2023-02-22 RX ADMIN — METHYLPREDNISOLONE SODIUM SUCCINATE 125 MG: 125 INJECTION INTRAMUSCULAR; INTRAVENOUS at 15:12

## 2023-02-22 RX ADMIN — ONDANSETRON 8 MG: 2 INJECTION INTRAMUSCULAR; INTRAVENOUS at 10:18

## 2023-02-22 RX ADMIN — FLUOROURACIL 690 MG: 50 INJECTION, SOLUTION INTRAVENOUS at 15:47

## 2023-02-22 ASSESSMENT — PAIN SCALES - GENERAL: PAINLEVEL: NO PAIN (0)

## 2023-02-22 NOTE — PATIENT INSTRUCTIONS
We would like to see you back per your calender.     Your prescription for Prilosec has been sent to: [facility].      When you are in need of a refill, please call your pharmacy and they will send us a request.     If you have any questions please call [phone]    Other instructions:  none

## 2023-02-22 NOTE — PROGRESS NOTES
Patients port accessed using non-coring, 19 gauge, 3/4 needle, per facility protocol.     Hand hygiene performed: yes   Mask donned by caregiver: yes  Site prepped with CHG: yes   Labs drawn: yes   Dressing applied using aseptic technique: yes     Port flushed easily, without resistance. Flushed with 10 cc's normal saline.   Immediate blood return noted. 10 cc blood discarded.  Ordered labs obtained and sent to lab.  Port flushed per orders/MAR. .   Patient tolerated port flush well, denies pain nor discomfort at this time.

## 2023-02-22 NOTE — PROGRESS NOTES
"At end of oxaliplatin pt bp was 161/94, pt reports feeling 'gas bubble' in abd area with mild nausea. Pt having audible burping and some scant spit up. Martha Valenzuela NP called orders received, solu medrol given, awaiting ativan from pharmacy ativan given see mireille Garrido to proceed with fluorouracil injection and fluorouracil home pump per Martha MERCER.   Pt reports having no nausea only \"gas bubble\" pressure  "

## 2023-02-22 NOTE — PROGRESS NOTES
Radha noting nausea has returned and BP climbing again. Call to Martha Valenzuela NP and updated. TORB to release/give the PRN Ativan in tx plan, and to also give Solumedrol 125mg dose. Patient will need to wait on unit until he is feeling better before remaining treatment is given/attached. Radha, patient and brother updated on all.

## 2023-02-22 NOTE — PROGRESS NOTES
Patient is 57 years old, here today for infusion of opdivo, oxaliplatin, fluuroricil injection and home infuser           Latest Reference Range & Units 02/22/23 08:31   Sodium 136 - 145 mmol/L 140   Potassium 3.4 - 5.3 mmol/L 4.6   Chloride 98 - 107 mmol/L 103   Carbon Dioxide (CO2) 22 - 29 mmol/L 30 (H)   Urea Nitrogen 8.0 - 23.0 mg/dL 8.6   Creatinine 0.67 - 1.17 mg/dL 0.58 (L)   GFR Estimate >60 mL/min/1.73m2 >90   Calcium 8.6 - 10.0 mg/dL 9.1   Anion Gap 7 - 15 mmol/L 7   Albumin 3.5 - 5.2 g/dL 3.4 (L)   Protein Total 6.4 - 8.3 g/dL 6.6   Alkaline Phosphatase 40 - 129 U/L 79   ALT 10 - 50 U/L 12   AST 10 - 50 U/L 14   Bilirubin Total <=1.2 mg/dL 0.3   Glucose 70 - 99 mg/dL 101 (H)   TSH 0.30 - 4.20 uIU/mL 0.78   WBC 4.0 - 11.0 10e3/uL 5.0   Hemoglobin 13.3 - 17.7 g/dL 10.4 (L)   Hematocrit 40.0 - 53.0 % 32.6 (L)   Platelet Count 150 - 450 10e3/uL 121 (L)   RBC Count 4.40 - 5.90 10e6/uL 3.66 (L)   MCV 78 - 100 fL 89   MCH 26.5 - 33.0 pg 28.4   MCHC 31.5 - 36.5 g/dL 31.9   RDW 10.0 - 15.0 % 16.7 (H)   % Neutrophils % 57   % Lymphocytes % 27   % Monocytes % 13   % Eosinophils % 2   % Basophils % 1   Absolute Basophils 0.0 - 0.2 10e3/uL 0.0   Absolute Eosinophils 0.0 - 0.7 10e3/uL 0.1   Absolute Immature Granulocytes <=0.4 10e3/uL 0.0   Absolute Lymphocytes 0.8 - 5.3 10e3/uL 1.3   Absolute Monocytes 0.0 - 1.3 10e3/uL 0.6   % Immature Granulocytes % 0   Absolute Neutrophils 1.6 - 8.3 10e3/uL 2.9   Absolute NRBCs 10e3/uL 0.0   NRBCs per 100 WBC <1 /100 0   (H): Data is abnormally high  (L): Data is abnormally low  Lab values:     Patient meets parameters for today's infusion.  Denies questions or concerns regarding today's infusion and/or medications being administered.        1347IV pump verified with opdivo dose, drug, and rate of administration. Infusion administered per protocol. Patient tolerated infusion well, no signs or symptoms of adverse reaction noted. Patient denies pain nor discomfort.   1203 IV pump  verified with oxaliplatin dose, drug, and rate of administration. Infusion administered per protocol. Patient tolerated infusion well, no signs or symptoms of adverse reaction noted. Patient denies pain nor discomfort.   1547 IV pump verified with fluorouracil dose, drug, and rate of administration. Infusion administered per protocol. Patient tolerated infusion well, no signs or symptoms of adverse reaction noted. Patient denies pain nor discomfort.

## 2023-02-22 NOTE — PROGRESS NOTES
Pt having active vomiting and nausea. Oxaliplatin and leucovorin stopped  At 1347 and normal saline turned on. VS as charted pepcid given. Pt states he feels its from eating his eggs and food getting stuck in esophagus as this has happened before at home. Denies any other complaints. VS as charted and pt reports nausea is mostly gone and no active emesis at this point. 1429 oxaliplatin and leucovorin restarted at this time. VSS pt has no complaints.

## 2023-02-22 NOTE — PROGRESS NOTES
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  He initiated systemic therapy with nivolumab/FOLFOX 1/11/2023 with potential for palliative radiation therapy at some point.     He presents to the clinic today stating he is doing well.  He feels he is tolerating therapy fairly well.  He continues to attempt to take foods and fluids orally and denies any choking. He does continue to have some difficulty with swallowing certain things feeling as though food gets stuck in the midesophagus area.   He does continue on the Prilosec as recommended at our last visit.  Does have some occasional nausea and does use the nausea medication that does help somewhat.  He also drinks ginger ale which he states helps.  He has not been able to drink any protein shakes as he has difficulty with the thick fluids.  He has been doing 3 full cans of the protein supplement through his G-tube.  He continues to have a goal of having the G-tube out and wants to get back to work.    He essentially  has no pain today.  This is much improved overall.  He does use the hydrocodone once daily and that is mainly for the discomfort he has to his sacral area.  He has some tenderness there and is very cognizant of changing positions and using a donut when he is sitting.    He continues to not smoke cigarettes and denies any issues with shortness of breath but states he does cough on occasion still.  He does have dry itchy skin.  The area around the G-tube is essentially healed but he does have what appears to be a tape burn from the G-tube irvin that is also healing.  He  states he rotates that G-tube irvin.    He continues to have bowel movement daily.    He continues to sleep in the recliner and os hoping to sleep in bed this weekend.    Oncologic History:     Experienced difficulty swallowing and 60 pound weight loss  10/21/2022: CT chest, abdomen and pelvis: Diffuse wall thickening of the distal esophagus with associated mediastinal adenopathy, and upper abdominal  Conglomerate lymphadenopathy. Constellation of findings are highly concerning for underlying esophageal carcinoma with metastatic disease.   11/1/2022: Colonoscopy with polyp showing tubular adenoma and EGD: A large ulcerating mass with no bleeding and no stigmata of recent bleeding was found in the lower third of the esophagus, 35 to 40 cm from the incisors.  The mass was partially obstructing and partially circumferential involving  two thirds.    **Biopsy showed poorly differentiated favoring adenocarcinoma. Her 2 negative.   11/22/2002 patient met with Dr. Barnhart to discuss systemic therapy pending PET results/complete staging  12/5/2022 patient met with Dr. Spain with radiation oncology with possible plan to initiate radiation therapy pending results of PET scan  12/14/2022: PET scan: Distal esophageal neoplasm with metastatic involvement the  mediastinum, left hilum and retroperitoneal lymph nodes of the upperabdomen. Also likely localized metastatic involvement of thestomach/GE junction. 2 cm intramural distal esophageal/GE junction mass. Circumferential wall thickening with increased FDG uptake in the distal esophagus adjacent portions of the stomach/GE junction may represent localized disease however could also represent localized inflammation. Metastatic involvement to the subcarinal nodes, left infrahilar and  pericaval/paraesophageal lymph nodes of the lower thorax. Metastatic involvement to the retroperitoneal lymph nodes of the upper abdomen. These lymph nodes have increased in size dating back to  be  10/21/2022 CT scan consistent with worsening disease. Representative  nodes are described above.  12/20/2022  He was seen by Dr. Barnhart with plan to initiate palliative nivolumab/FOLFOX with potential for palliative Radiation at some point  12/22/2022: Port-A-Cath placement and gastrostomy tube insertion by Dr. Dalton  1/11/2023 initiated treatment     RESULT FOR IMMUNOHISTOCHEMICAL VENTANA CLONE  PD-L1 ASSAY  COMBINED POSITIVE SCORE (CPS): <1%     Treatment Goal: Palliative     TX: Nivolumab 240 mg/oxaliplatin 85 mg per metered squared/leucovorin 350 mg per metered squared/fluorouracil 400 mg per metered squared IV bolus/fluorouracil 2400 mg per metered squared via continuous infusion over 46 hours with cycle given every 14 days     Previous treatment:  N/A    Past Medical History:   Diagnosis Date     Cancer (H)      Esophageal cancer (H) 11/01/2022     Esophageal thickening 10/25/2022     Mixed hearing loss, bilateral      Tobacco use disorder 12/27/2019       Past Surgical History:   Procedure Laterality Date     COLONOSCOPY  11/01/2022    [facility]     INSERT PORT VASCULAR ACCESS N/A 12/22/2022    Procedure: POWER PORT PLACEMENT;  Surgeon: Nba Dalton MD;  Location: [facility] OR     Upper GI Endoscopy  11/01/2022       Family History   Problem Relation Age of Onset     Breast Cancer Mother      Hyperlipidemia Mother      Diabetes Mother      Coronary Artery Disease Mother      Cerebrovascular Disease Father      Hyperlipidemia Father      Coronary Artery Disease Father      Myocardial Infarction Father      Hyperlipidemia Brother      Hypertension Brother      Diabetes Brother      Asthma Sister      Breast Cancer Sister      Hyperlipidemia Sister      Diabetes Sister      Hyperlipidemia Sister      Diabetes Sister      Hyperlipidemia Sister      Suicide Sister      Anesthesia Reaction Sister      Breast Cancer Sister      Hyperlipidemia Sister      Diabetes Sister      Breast Cancer Niece      Skin Cancer  Niece      Thyroid Disease Niece      Breast Cancer Niece      Leukemia Nephew      Colon Cancer No family hx of      Prostate Cancer No family hx of      Genetic Disorder No family hx of        Social History     Socioeconomic History     Marital status:      Spouse name: Jazzy     Number of children: 2     Years of education: Not on file     Highest education level: Not on file   Occupational History     Not on file   Tobacco Use     Smoking status: Every Day     Packs/day: 0.50     Years: 46.00     Pack years: 23.00     Types: Cigarettes     Start date: 1976     Smokeless tobacco: Never     Tobacco comments:     Trying to quit   Vaping Use     Vaping Use: Never used   Substance and Sexual Activity     Alcohol use: Not Currently     Comment: no alcohol in last 6 months     Drug use: Never     Sexual activity: Not on file   Other Topics Concern     Parent/sibling w/ CABG, MI or angioplasty before 65F 55M? Yes     Comment: father   Social History Narrative     Not on file     Social Determinants of Health     Financial Resource Strain: Not on file   Food Insecurity: Not on file   Transportation Needs: Not on file   Physical Activity: Not on file   Stress: Not on file   Social Connections: Not on file   Intimate Partner Violence: Not on file   Housing Stability: Not on file       Current Outpatient Medications   Medication     HYDROcodone-Acetaminophen 5-217 MG/10ML SOLN     nicotine (NICODERM CQ) 7 MG/24HR 24 hr patch     omeprazole (PRILOSEC) 2 mg/mL suspension     ondansetron (ZOFRAN) 8 MG tablet     polyethylene glycol (MIRALAX) 17 GM/Dose powder     prochlorperazine (COMPAZINE) 10 MG tablet     ibuprofen (ADVIL/MOTRIN) 200 MG tablet     No current facility-administered medications for this visit.        No Known Allergies    Review Of Systems:  Constitutional:    denies fever, chills, and night sweats; weight is stable.  Eyes:    denies blurred or double vision  Ears/Nose/Throat:   denies ear pain, nose  "problems, difficulty swallowing  Respiratory:  See HPI  Skin:   denies new rash, lesions  Cardiovascular:   denies chest pain, palpitations, mild edema to lower extremities  Gastrointestinal:   denies abdominal pain; no change in bowel habits or blood in stool  Genitourinary:   denies difficulty with urination, blood in urine  Musculoskeletal:    denies new muscle pain, bone pain  Neurologic:   denies lightheadedness, headaches; ongoing numbness and tingling to his finger tips mainly last 2 fingers right hand  Psychiatric:   denies anxiety, depression  Hematologic/Lymphatic/Immunologic:   denies easy bruising, easy bleeding, lumps or bumps noted  Endocrine:   Denies increased thirst    ECOG Performance Status: 2    Physical Exam:  BP 96/58   Pulse 60   Temp 98.1  F (36.7  C) (Tympanic)   Resp 20   Ht 1.715 m (5' 7.5\")   Wt 62 kg (136 lb 11 oz)   SpO2 98%   BMI 21.09 kg/m      GENERAL APPEARANCE: alert and in no acute distress.  HEENT: Normocephalic, Sclerae anicteric.   NECK:   No asymmetry or masses, no thyromegaly.  LYMPHATICS: No palpable cervical, supraclavicular, axillary, or inguinal nodes   RESP: Lungs clear to auscultation bilaterally, respirations regular and easy  CARDIOVASCULAR: Regular rate and rhythm. Normal S1, S2  ABDOMEN: Soft, nontender. Bowel sounds auscultated all 4 quadrants. No palpable organomegaly or masses.  MUSCULOSKELETAL: Extremities without gross deformities noted.   SKIN: No suspicious lesions or rashes to exposed skin  NEURO: Alert and oriented x 3.  Gait steady.  PSYCHIATRIC: Mentation and affect appear normal.  Mood appropriate.    Laboratory:  Results for orders placed or performed in visit on 02/22/23   Comprehensive metabolic panel     Status: Abnormal   Result Value Ref Range    Sodium 140 136 - 145 mmol/L    Potassium 4.6 3.4 - 5.3 mmol/L    Chloride 103 98 - 107 mmol/L    Carbon Dioxide (CO2) 30 (H) 22 - 29 mmol/L    Anion Gap 7 7 - 15 mmol/L    Urea Nitrogen 8.6 8.0 - " 23.0 mg/dL    Creatinine 0.58 (L) 0.67 - 1.17 mg/dL    Calcium 9.1 8.6 - 10.0 mg/dL    Glucose 101 (H) 70 - 99 mg/dL    Alkaline Phosphatase 79 40 - 129 U/L    AST 14 10 - 50 U/L    ALT 12 10 - 50 U/L    Protein Total 6.6 6.4 - 8.3 g/dL    Albumin 3.4 (L) 3.5 - 5.2 g/dL    Bilirubin Total 0.3 <=1.2 mg/dL    GFR Estimate >90 >60 mL/min/1.73m2   TSH with free T4 reflex     Status: Normal   Result Value Ref Range    TSH 0.78 0.30 - 4.20 uIU/mL   CBC with platelets and differential     Status: Abnormal   Result Value Ref Range    WBC Count 5.0 4.0 - 11.0 10e3/uL    RBC Count 3.66 (L) 4.40 - 5.90 10e6/uL    Hemoglobin 10.4 (L) 13.3 - 17.7 g/dL    Hematocrit 32.6 (L) 40.0 - 53.0 %    MCV 89 78 - 100 fL    MCH 28.4 26.5 - 33.0 pg    MCHC 31.9 31.5 - 36.5 g/dL    RDW 16.7 (H) 10.0 - 15.0 %    Platelet Count 121 (L) 150 - 450 10e3/uL    % Neutrophils 57 %    % Lymphocytes 27 %    % Monocytes 13 %    % Eosinophils 2 %    % Basophils 1 %    % Immature Granulocytes 0 %    NRBCs per 100 WBC 0 <1 /100    Absolute Neutrophils 2.9 1.6 - 8.3 10e3/uL    Absolute Lymphocytes 1.3 0.8 - 5.3 10e3/uL    Absolute Monocytes 0.6 0.0 - 1.3 10e3/uL    Absolute Eosinophils 0.1 0.0 - 0.7 10e3/uL    Absolute Basophils 0.0 0.0 - 0.2 10e3/uL    Absolute Immature Granulocytes 0.0 <=0.4 10e3/uL    Absolute NRBCs 0.0 10e3/uL   CBC with platelets differential     Status: Abnormal    Narrative    The following orders were created for panel order CBC with platelets differential.  Procedure                               Abnormality         Status                     ---------                               -----------         ------                     CBC with platelets and d...[[id number]]  Abnormal            Final result                 Please view results for these tests on the individual orders.       Imaging Studies:  None completed for today's visit      ASSESSMENT/PLAN:    #1 Esophageal cancer: Stage IV adenocarcinoma of the esophagus diagnosed  November 2022.  He initiated systemic therapy with nivolumab/FOLFOX 1/11/2023 with potential for palliative radiation therapy at some point.  Has tolerated treatment fairly well and performance status has improved.  We will go forth with cycle 4 therapy today.  I will see him prior to cycle 5 with labs per treatment plan.     #2  Anemia: Likely multifactorial including disease and chemotherapy.  Possibly a component of some iron deficiency though soluble transferrin receptor is within normal limits.     #3  Cancer related pain:  Will continue with the hydrocodone as needed for pain.       #4  Tobacco abuse:  He has been successful so far with tobacco use cessation. Commended.    I encouraged patient to call with any questions or concerns.    50 minutes spent in the patient's encounter today with time spent in review of the chart along with in chart preparation. Time was also spent in review of his treatment plan. Time was also spent obtaining a review of systems and performing a physical exam along with review of his labwork.  Time was spent in placing orders and in chart documentation.    Delmy Valenzuela, NP  APRN, FNP-BC, AOCNP

## 2023-02-22 NOTE — PROGRESS NOTES
"1345: Call to Martha Valenzuela NP ONC on behalf of primary RN Radha re vomitting and gastric upset. Alerted patient reporting that he got dry scrambled eggs \"kind of stuck\" and tried to clear it, started vomiting. He and brother report this is not unusal for him. He denies any other sx associated. Alerted Pepcid released and to be given, primary RN running vitals now. Oxaliplatin stopped immediately on RN entering room. Per Martha, ok for Pepcid, ok to resume Oxaliplatin after sx resolve and call with any other changes.     1408: Paged Martha. Updated that pressure elevated but had just come from restroom and is slowly coming down some, though not quickly. Alerted to where he started, where he is running, and where he usually runs. Alerted patient reporting to Radha that he is slowly feeling better. Per Martha, ok to proceed as above, likely related to stress of vomiting. Radha and patient updated.   "

## 2023-02-22 NOTE — PROGRESS NOTES
No barcode to scan on Ativan. Double check of MAR/orders with primary RN Radha complete prior to admin/documentation by Radha.

## 2023-02-22 NOTE — NURSING NOTE
"Oncology Rooming Note    February 22, 2023 9:12 AM   Kenneth Matthew is a 57 year old male who presents for:    Chief Complaint   Patient presents with     Oncology Clinic Visit     Follow up Malignant neoplasm of lower third of esophagus        Initial Vitals: BP 96/58   Pulse 60   Temp 98.1  F (36.7  C) (Tympanic)   Resp 20   Ht 1.715 m (5' 7.5\")   Wt 62 kg (136 lb 11 oz)   SpO2 98%   BMI 21.09 kg/m   Estimated body mass index is 21.09 kg/m  as calculated from the following:    Height as of this encounter: 1.715 m (5' 7.5\").    Weight as of this encounter: 62 kg (136 lb 11 oz). Body surface area is 1.72 meters squared.  No Pain (0) Comment: Data Unavailable   No LMP for male patient.  Allergies reviewed: Yes  Medications reviewed: Yes    Medications: Medication refills not needed today.  Pharmacy name entered into [facility]:    [id number] - [place] - [id number]   [facility] - [place] - [id number]            "

## 2023-02-24 ENCOUNTER — INFUSION THERAPY VISIT (OUTPATIENT)
Dept: INFUSION THERAPY | Facility: OTHER | Age: 58
End: 2023-02-24
Attending: FAMILY MEDICINE
Payer: COMMERCIAL

## 2023-02-24 VITALS
OXYGEN SATURATION: 98 % | BODY MASS INDEX: 21.06 KG/M2 | TEMPERATURE: 97.9 F | DIASTOLIC BLOOD PRESSURE: 58 MMHG | SYSTOLIC BLOOD PRESSURE: 111 MMHG | WEIGHT: 136.47 LBS | HEART RATE: 58 BPM

## 2023-02-24 DIAGNOSIS — C15.5 MALIGNANT NEOPLASM OF LOWER THIRD OF ESOPHAGUS (H): Primary | ICD-10-CM

## 2023-02-24 PROCEDURE — 96360 HYDRATION IV INFUSION INIT: CPT | Performed by: INTERNAL MEDICINE

## 2023-02-24 RX ORDER — HEPARIN SODIUM (PORCINE) LOCK FLUSH IV SOLN 100 UNIT/ML 100 UNIT/ML
5 SOLUTION INTRAVENOUS
Status: CANCELLED | OUTPATIENT
Start: 2023-02-24

## 2023-02-24 RX ORDER — HEPARIN SODIUM (PORCINE) LOCK FLUSH IV SOLN 100 UNIT/ML 100 UNIT/ML
5 SOLUTION INTRAVENOUS
Status: DISCONTINUED | OUTPATIENT
Start: 2023-02-24 | End: 2023-02-24 | Stop reason: HOSPADM

## 2023-02-24 RX ADMIN — Medication 1000 ML: at 09:27

## 2023-02-24 RX ADMIN — HEPARIN SODIUM (PORCINE) LOCK FLUSH IV SOLN 100 UNIT/ML 5 ML: 100 SOLUTION at 10:34

## 2023-02-24 ASSESSMENT — PAIN SCALES - GENERAL: PAINLEVEL: MODERATE PAIN (4)

## 2023-02-24 NOTE — PROGRESS NOTES
Patient is 57 years old, here today for home infuser pump off.    Infuser pump completed, 100 % infused.  Patient denies adverse effects from home infusion, nor mechanical issues.  Pump is intact.  Infuser disconnected from port.      Port flushed per MAR.  Immediate blood return noted.  Flushes easily, without resistance.      Patient reporting he felt a bit nauseated and a bit of that gas bubble feeling yesterday but much better today. No further vomiting after leaving unit Wednesday. Denies any other symptoms or concerns, except notes he feels he should have some IVF today. Pressure is low, do have standing orders for fluids, administered per MAR.     Port needle removed, needle intact.  Surrounding skin without redness, swelling, nor discoloration.  Skin warm and dry to touch.      Patient tolerated well.  Site covered with sterile bandage and slight pressure applied for 30 seconds. Instructed patient to leave bandage on for a minimum of one hour and to call with questions or concerns.    Education provided for signs and symptoms of infection and when to call the doctor.  Patient states understanding and is in agreement with this plan. Patient discharged.

## 2023-03-08 ENCOUNTER — ONCOLOGY VISIT (OUTPATIENT)
Dept: ONCOLOGY | Facility: OTHER | Age: 58
End: 2023-03-08
Attending: NURSE PRACTITIONER
Payer: COMMERCIAL

## 2023-03-08 ENCOUNTER — LAB (OUTPATIENT)
Dept: ONCOLOGY | Facility: OTHER | Age: 58
End: 2023-03-08
Payer: COMMERCIAL

## 2023-03-08 ENCOUNTER — HOSPITAL ENCOUNTER (OUTPATIENT)
Dept: EDUCATION SERVICES | Facility: HOSPITAL | Age: 58
Discharge: HOME | End: 2023-03-08
Payer: COMMERCIAL

## 2023-03-08 ENCOUNTER — INFUSION THERAPY VISIT (OUTPATIENT)
Dept: INFUSION THERAPY | Facility: OTHER | Age: 58
End: 2023-03-08
Payer: COMMERCIAL

## 2023-03-08 VITALS
OXYGEN SATURATION: 99 % | SYSTOLIC BLOOD PRESSURE: 118 MMHG | WEIGHT: 136.47 LBS | HEART RATE: 63 BPM | DIASTOLIC BLOOD PRESSURE: 60 MMHG | TEMPERATURE: 97 F | HEIGHT: 68 IN | BODY MASS INDEX: 20.68 KG/M2 | RESPIRATION RATE: 19 BRPM

## 2023-03-08 VITALS
OXYGEN SATURATION: 98 % | SYSTOLIC BLOOD PRESSURE: 129 MMHG | HEART RATE: 58 BPM | TEMPERATURE: 97.3 F | DIASTOLIC BLOOD PRESSURE: 70 MMHG

## 2023-03-08 DIAGNOSIS — C15.5 MALIGNANT NEOPLASM OF LOWER THIRD OF ESOPHAGUS (H): Primary | ICD-10-CM

## 2023-03-08 DIAGNOSIS — Z72.0 TOBACCO ABUSE: ICD-10-CM

## 2023-03-08 DIAGNOSIS — R21 RASH: ICD-10-CM

## 2023-03-08 DIAGNOSIS — G89.3 CANCER RELATED PAIN: ICD-10-CM

## 2023-03-08 LAB
ALBUMIN SERPL BCG-MCNC: 3.4 G/DL (ref 3.5–5.2)
ALP SERPL-CCNC: 79 U/L (ref 40–129)
ALT SERPL W P-5'-P-CCNC: 29 U/L (ref 10–50)
ANION GAP SERPL CALCULATED.3IONS-SCNC: 7 MMOL/L (ref 7–15)
AST SERPL W P-5'-P-CCNC: 24 U/L (ref 10–50)
BASOPHILS # BLD AUTO: 0 10E3/UL (ref 0–0.2)
BASOPHILS NFR BLD AUTO: 1 %
BILIRUB SERPL-MCNC: 0.3 MG/DL
BUN SERPL-MCNC: 8.3 MG/DL (ref 8–23)
CALCIUM SERPL-MCNC: 9 MG/DL (ref 8.6–10)
CHLORIDE SERPL-SCNC: 100 MMOL/L (ref 98–107)
CREAT SERPL-MCNC: 0.52 MG/DL (ref 0.67–1.17)
DEPRECATED HCO3 PLAS-SCNC: 29 MMOL/L (ref 22–29)
EOSINOPHIL # BLD AUTO: 0.2 10E3/UL (ref 0–0.7)
EOSINOPHIL NFR BLD AUTO: 4 %
ERYTHROCYTE [DISTWIDTH] IN BLOOD BY AUTOMATED COUNT: 18.2 % (ref 10–15)
GFR SERPL CREATININE-BSD FRML MDRD: >90 ML/MIN/1.73M2
GLUCOSE SERPL-MCNC: 119 MG/DL (ref 70–99)
HCT VFR BLD AUTO: 33.9 % (ref 40–53)
HGB BLD-MCNC: 11 G/DL (ref 13.3–17.7)
IMM GRANULOCYTES # BLD: 0 10E3/UL
IMM GRANULOCYTES NFR BLD: 0 %
LYMPHOCYTES # BLD AUTO: 1.6 10E3/UL (ref 0.8–5.3)
LYMPHOCYTES NFR BLD AUTO: 34 %
MCH RBC QN AUTO: 28.4 PG (ref 26.5–33)
MCHC RBC AUTO-ENTMCNC: 32.4 G/DL (ref 31.5–36.5)
MCV RBC AUTO: 87 FL (ref 78–100)
MONOCYTES # BLD AUTO: 0.5 10E3/UL (ref 0–1.3)
MONOCYTES NFR BLD AUTO: 12 %
NEUTROPHILS # BLD AUTO: 2.3 10E3/UL (ref 1.6–8.3)
NEUTROPHILS NFR BLD AUTO: 49 %
NRBC # BLD AUTO: 0 10E3/UL
NRBC BLD AUTO-RTO: 0 /100
PLATELET # BLD AUTO: 92 10E3/UL (ref 150–450)
POTASSIUM SERPL-SCNC: 4.2 MMOL/L (ref 3.4–5.3)
PROT SERPL-MCNC: 6.5 G/DL (ref 6.4–8.3)
RBC # BLD AUTO: 3.88 10E6/UL (ref 4.4–5.9)
SODIUM SERPL-SCNC: 136 MMOL/L (ref 136–145)
TSH SERPL DL<=0.005 MIU/L-ACNC: 0.84 UIU/ML (ref 0.3–4.2)
WBC # BLD AUTO: 4.6 10E3/UL (ref 4–11)

## 2023-03-08 PROCEDURE — 96368 THER/DIAG CONCURRENT INF: CPT | Performed by: INTERNAL MEDICINE

## 2023-03-08 PROCEDURE — 96367 TX/PROPH/DG ADDL SEQ IV INF: CPT | Performed by: INTERNAL MEDICINE

## 2023-03-08 PROCEDURE — 96521 REFILL/MAINT PORTABLE PUMP: CPT | Mod: 59 | Performed by: INTERNAL MEDICINE

## 2023-03-08 PROCEDURE — 36591 DRAW BLOOD OFF VENOUS DEVICE: CPT | Performed by: NURSE PRACTITIONER

## 2023-03-08 PROCEDURE — 96375 TX/PRO/DX INJ NEW DRUG ADDON: CPT | Performed by: INTERNAL MEDICINE

## 2023-03-08 PROCEDURE — 999N000107 HC STATISTIC NUTRITIONAL THERAPY NO CHARGE: Performed by: DIETITIAN, REGISTERED

## 2023-03-08 PROCEDURE — 96413 CHEMO IV INFUSION 1 HR: CPT | Performed by: INTERNAL MEDICINE

## 2023-03-08 PROCEDURE — 80050 GENERAL HEALTH PANEL: CPT | Performed by: NURSE PRACTITIONER

## 2023-03-08 PROCEDURE — 96417 CHEMO IV INFUS EACH ADDL SEQ: CPT | Performed by: INTERNAL MEDICINE

## 2023-03-08 PROCEDURE — 99215 OFFICE O/P EST HI 40 MIN: CPT | Mod: 25 | Performed by: NURSE PRACTITIONER

## 2023-03-08 PROCEDURE — 96415 CHEMO IV INFUSION ADDL HR: CPT | Performed by: INTERNAL MEDICINE

## 2023-03-08 RX ORDER — EPINEPHRINE 1 MG/ML
0.3 INJECTION, SOLUTION, CONCENTRATE INTRAVENOUS EVERY 5 MIN PRN
Status: CANCELLED | OUTPATIENT
Start: 2023-03-08

## 2023-03-08 RX ORDER — DIPHENHYDRAMINE HYDROCHLORIDE 50 MG/ML
50 INJECTION INTRAMUSCULAR; INTRAVENOUS
Status: CANCELLED
Start: 2023-03-08

## 2023-03-08 RX ORDER — HEPARIN SODIUM,PORCINE 10 UNIT/ML
5 VIAL (ML) INTRAVENOUS
Status: CANCELLED | OUTPATIENT
Start: 2023-03-10

## 2023-03-08 RX ORDER — HEPARIN SODIUM,PORCINE 10 UNIT/ML
5 VIAL (ML) INTRAVENOUS
Status: DISCONTINUED | OUTPATIENT
Start: 2023-03-08 | End: 2023-03-08 | Stop reason: HOSPADM

## 2023-03-08 RX ORDER — ONDANSETRON 2 MG/ML
8 INJECTION INTRAMUSCULAR; INTRAVENOUS ONCE
Status: COMPLETED | OUTPATIENT
Start: 2023-03-08 | End: 2023-03-08

## 2023-03-08 RX ORDER — LORAZEPAM 2 MG/ML
0.5 INJECTION INTRAMUSCULAR EVERY 4 HOURS PRN
Status: CANCELLED | OUTPATIENT
Start: 2023-03-08

## 2023-03-08 RX ORDER — HEPARIN SODIUM,PORCINE 10 UNIT/ML
5 VIAL (ML) INTRAVENOUS
Status: CANCELLED | OUTPATIENT
Start: 2023-03-08

## 2023-03-08 RX ORDER — BENZOCAINE/MENTHOL 6 MG-10 MG
LOZENGE MUCOUS MEMBRANE 2 TIMES DAILY
Qty: 30 G | Refills: 1 | Status: SHIPPED | OUTPATIENT
Start: 2023-03-08

## 2023-03-08 RX ORDER — FLUOROURACIL 50 MG/ML
400 INJECTION, SOLUTION INTRAVENOUS ONCE
Status: COMPLETED | OUTPATIENT
Start: 2023-03-08 | End: 2023-03-08

## 2023-03-08 RX ORDER — MEPERIDINE HYDROCHLORIDE 25 MG/ML
25 INJECTION INTRAMUSCULAR; INTRAVENOUS; SUBCUTANEOUS EVERY 30 MIN PRN
Status: CANCELLED | OUTPATIENT
Start: 2023-03-08

## 2023-03-08 RX ORDER — ALBUTEROL SULFATE 0.83 MG/ML
2.5 SOLUTION RESPIRATORY (INHALATION)
Status: CANCELLED | OUTPATIENT
Start: 2023-03-08

## 2023-03-08 RX ORDER — HEPARIN SODIUM (PORCINE) LOCK FLUSH IV SOLN 100 UNIT/ML 100 UNIT/ML
5 SOLUTION INTRAVENOUS
Status: CANCELLED | OUTPATIENT
Start: 2023-03-10

## 2023-03-08 RX ORDER — FLUOROURACIL 50 MG/ML
400 INJECTION, SOLUTION INTRAVENOUS ONCE
Status: CANCELLED | OUTPATIENT
Start: 2023-03-08

## 2023-03-08 RX ORDER — HEPARIN SODIUM (PORCINE) LOCK FLUSH IV SOLN 100 UNIT/ML 100 UNIT/ML
5 SOLUTION INTRAVENOUS
Status: CANCELLED | OUTPATIENT
Start: 2023-03-08

## 2023-03-08 RX ORDER — METHYLPREDNISOLONE SODIUM SUCCINATE 125 MG/2ML
125 INJECTION, POWDER, LYOPHILIZED, FOR SOLUTION INTRAMUSCULAR; INTRAVENOUS
Status: CANCELLED
Start: 2023-03-08

## 2023-03-08 RX ORDER — ALBUTEROL SULFATE 90 UG/1
1-2 AEROSOL, METERED RESPIRATORY (INHALATION)
Status: CANCELLED
Start: 2023-03-08

## 2023-03-08 RX ORDER — HEPARIN SODIUM (PORCINE) LOCK FLUSH IV SOLN 100 UNIT/ML 100 UNIT/ML
5 SOLUTION INTRAVENOUS
Status: DISCONTINUED | OUTPATIENT
Start: 2023-03-08 | End: 2023-03-08 | Stop reason: HOSPADM

## 2023-03-08 RX ADMIN — FLUOROURACIL 690 MG: 50 INJECTION, SOLUTION INTRAVENOUS at 15:18

## 2023-03-08 RX ADMIN — ONDANSETRON 8 MG: 2 INJECTION INTRAMUSCULAR; INTRAVENOUS at 10:46

## 2023-03-08 RX ADMIN — Medication 250 ML: at 10:47

## 2023-03-08 ASSESSMENT — PAIN SCALES - GENERAL: PAINLEVEL: MILD PAIN (3)

## 2023-03-08 NOTE — PROGRESS NOTES
Patient is 57 years old, here today for infusion of Folfox nivo.    Lab values:   Component      Latest Ref Rng & Units 3/8/2023   WBC      4.0 - 11.0 10e3/uL 4.6   RBC Count      4.40 - 5.90 10e6/uL 3.88 (L)   Hemoglobin      13.3 - 17.7 g/dL 11.0 (L)   Hematocrit      40.0 - 53.0 % 33.9 (L)   MCV      78 - 100 fL 87   MCH      26.5 - 33.0 pg 28.4   MCHC      31.5 - 36.5 g/dL 32.4   RDW      10.0 - 15.0 % 18.2 (H)   Platelet Count      150 - 450 10e3/uL 92 (L)   % Neutrophils      % 49   % Lymphocytes      % 34   % Monocytes      % 12   % Eosinophils      % 4   % Basophils      % 1   % Immature Granulocytes      % 0   NRBCs per 100 WBC      <1 /100 0   Absolute Neutrophils      1.6 - 8.3 10e3/uL 2.3   Absolute Lymphocytes      0.8 - 5.3 10e3/uL 1.6   Absolute Monocytes      0.0 - 1.3 10e3/uL 0.5   Absolute Eosinophils      0.0 - 0.7 10e3/uL 0.2   Absolute Basophils      0.0 - 0.2 10e3/uL 0.0   Absolute Immature Granulocytes      <=0.4 10e3/uL 0.0   Absolute NRBCs      10e3/uL 0.0   Sodium      136 - 145 mmol/L 136   Potassium      3.4 - 5.3 mmol/L 4.2   Chloride      98 - 107 mmol/L 100   Carbon Dioxide (CO2)      22 - 29 mmol/L 29   Anion Gap      7 - 15 mmol/L 7   Urea Nitrogen      8.0 - 23.0 mg/dL 8.3   Creatinine      0.67 - 1.17 mg/dL 0.52 (L)   Calcium      8.6 - 10.0 mg/dL 9.0   Glucose      70 - 99 mg/dL 119 (H)   Alkaline Phosphatase      40 - 129 U/L 79   AST      10 - 50 U/L 24   ALT      10 - 50 U/L 29   Protein Total      6.4 - 8.3 g/dL 6.5   Albumin      3.5 - 5.2 g/dL 3.4 (L)   Bilirubin Total      <=1.2 mg/dL 0.3   GFR Estimate      >60 mL/min/1.73m2 >90   TSH      0.30 - 4.20 uIU/mL 0.84       Patient meets parameters for today's infusion.  Denies questions or concerns regarding today's infusion and/or medications being administered.        Patient identified with two identifiers, order verified, and verbal consent for today's infusion obtained from patient.       1139 IV pump verified with  nivolumab dose, drug, and rate of administration. Infusion administered per protocol. Patient tolerated infusion well, no signs or symptoms of adverse reaction noted. Patient denies pain nor discomfort.     Patient denies questions or concerns regarding infusion and/or medication(s) being administered.  Patient identified with two identifiers, order verified, and verbal consent for today's infusion obtained from patient.     1307 IV pump verified with oxaliplatin dose, drug, and rate of administration. Infusion administered per protocol. Patient tolerated infusion well, no signs or symptoms of adverse reaction noted. Patient denies pain or discomfort.     1314 IV pump verified with leucovorin calcium dose, drug, and rate of administration. Infusion administered per protocol. Patient tolerated infusion well, no signs or symptoms of adverse reaction noted. Patient denies pain or discomfort.     1518 Fluorouracil IV syringe verified with dose, drug, and rate of administration. Infusion administered per protocol. Patient tolerated infusion well, no signs or symptoms of adverse reaction noted. Patient denies pain or discomfort.     1522 Fluorouracil home infuser verified with dose, drug, and rate of administration. Home infuser connected, clamps taped open, dressing reinforced. Patient denies pain or discomfort. No signs or symptoms of infiltration or infection. Patient discharged.

## 2023-03-08 NOTE — NURSING NOTE
"Oncology Rooming Note    March 8, 2023 9:32 AM   Kenneth Matthew is a 57 year old male who presents for:    Chief Complaint   Patient presents with     Oncology Clinic Visit     Follow up. Malignant neoplasm of lower third of esophagus      Initial Vitals: /60   Pulse 63   Temp 97  F (36.1  C) (Tympanic)   Resp 19   Ht 1.715 m (5' 7.5\")   Wt 61.9 kg (136 lb 7.4 oz)   SpO2 99%   BMI 21.06 kg/m   Estimated body mass index is 21.06 kg/m  as calculated from the following:    Height as of this encounter: 1.715 m (5' 7.5\").    Weight as of this encounter: 61.9 kg (136 lb 7.4 oz). Body surface area is 1.72 meters squared.  Mild Pain (3) Comment: Data Unavailable   No LMP for male patient.  Allergies reviewed: Yes  Medications reviewed: Yes    Medications: Medication refills not needed today.  Pharmacy name entered into [employer]:    [id number] - [place], [place] - [phone] [address]  [facility] - [place], [place] - [address]    Clinical concerns: doing ok, reports had a cigarette yesterday for the first time in 6 weeks, has run out of his nicotine patches but feels was due to stress that's why he only had one.  Martha Valenzuela  was notified.      Syeda Muir LPN              "

## 2023-03-08 NOTE — PROGRESS NOTES
Oncology Follow-up Visit:  March 8, 2023    Reason for Visit:  Patient presents with:  Oncology Clinic Visit: Follow up. 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  He initiated systemic therapy with nivolumab/FOLFOX 1/11/2023 with potential for palliative radiation therapy at some point.      He presents to the Oncology clinic today accompanied by his brother.  He states he has been feeling really good.  He is not having much for back pain and is actually sleeping in his bed.  He does have some discomfort in the sacral area and will take occasional hydrocodone for this.  Rates his pain at about a 3-4 right now.  He does request another prescription for the nicotine patch and is very upset with himself in that he had 1 cigarette yesterday as he was feeling pretty down.  He has been having more difficulty with swallowing solids.  He describes it as a discomfort of feeling as though food will go down midway through his esophagus.  He states he  will start coughing, retching and coughs up clear fluid but does not cough or vomit up any food .  He states ginger ale and apple juice seem to go down fine.  He has been doing about 3 cans of tube feedings through his G-tube.  He is also on Prilosec and feels this may be helping somewhat.  He has a few pinpoint rash areas to where the old tube feeding strap was on his stomach and these areas do itch.  He questions what he can put on this.    Oncologic History:     Experienced difficulty swallowing and 60 pound weight loss  10/21/2022: CT chest, abdomen and pelvis: Diffuse wall thickening of the distal esophagus with associated mediastinal adenopathy, and upper abdominal  Conglomerate lymphadenopathy. Constellation of  findings are highly concerning for underlying esophageal carcinoma with metastatic disease.   11/1/2022: Colonoscopy with polyp showing tubular adenoma and EGD: A large ulcerating mass with no bleeding and no stigmata of recent bleeding was found in the lower third of the esophagus, 35 to 40 cm from the incisors.  The mass was partially obstructing and partially circumferential involving  two thirds.    **Biopsy showed poorly differentiated favoring adenocarcinoma. Her 2 negative.   11/22/2002 patient met with Dr. Barnhart to discuss systemic therapy pending PET results/complete staging  12/5/2022 patient met with Dr. Spain with radiation oncology with possible plan to initiate radiation therapy pending results of PET scan  12/14/2022: PET scan: Distal esophageal neoplasm with metastatic involvement the  mediastinum, left hilum and retroperitoneal lymph nodes of the upperabdomen. Also likely localized metastatic involvement of thestomach/GE junction. 2 cm intramural distal esophageal/GE junction mass. Circumferential wall thickening with increased FDG uptake in the distal esophagus adjacent portions of the stomach/GE junction may represent localized disease however could also represent localized inflammation. Metastatic involvement to the subcarinal nodes, left infrahilar and  pericaval/paraesophageal lymph nodes of the lower thorax. Metastatic involvement to the retroperitoneal lymph nodes of the upper abdomen. These lymph nodes have increased in size dating back to be  10/21/2022 CT scan consistent with worsening disease. Representative  nodes are described above.  12/20/2022  He was seen by Dr. Barnhart with plan to initiate palliative nivolumab/FOLFOX with potential for palliative Radiation at some point  12/22/2022: Port-A-Cath placement and gastrostomy tube insertion by Dr. Dalton  1/11/2023 initiated treatment     RESULT FOR IMMUNOHISTOCHEMICAL VENTANA CLONE  PD-L1 ASSAY  COMBINED POSITIVE SCORE (CPS):  <1%     Treatment Goal: Palliative     TX: Nivolumab 240 mg/oxaliplatin 85 mg per metered squared/leucovorin 350 mg per metered squared/fluorouracil 400 mg per metered squared IV bolus/fluorouracil 2400 mg per metered squared via continuous infusion over 46 hours with cycle given every 14 days     Previous treatment:  N/A    Past Medical History:   Diagnosis Date     Cancer (H)      Esophageal cancer (H) 11/01/2022     Esophageal thickening 10/25/2022     Mixed hearing loss, bilateral      Tobacco abuse 1/4/2023     Tobacco use disorder 12/27/2019       Past Surgical History:   Procedure Laterality Date     COLONOSCOPY  11/01/2022    [facility]     INSERT PORT VASCULAR ACCESS N/A 12/22/2022    Procedure: POWER PORT PLACEMENT;  Surgeon: Nba Dalton MD;  Location: [facility]     Upper GI Endoscopy  11/01/2022       Family History   Problem Relation Age of Onset     Breast Cancer Mother      Hyperlipidemia Mother      Diabetes Mother      Coronary Artery Disease Mother      Cerebrovascular Disease Father      Hyperlipidemia Father      Coronary Artery Disease Father      Myocardial Infarction Father      Hyperlipidemia Brother      Hypertension Brother      Diabetes Brother      Asthma Sister      Breast Cancer Sister      Hyperlipidemia Sister      Diabetes Sister      Hyperlipidemia Sister      Diabetes Sister      Hyperlipidemia Sister      Suicide Sister      Anesthesia Reaction Sister      Breast Cancer Sister      Hyperlipidemia Sister      Diabetes Sister      Breast Cancer Niece      Skin Cancer Niece      Thyroid Disease Niece      Breast Cancer Niece      Leukemia Nephew      Colon Cancer No family hx of      Prostate Cancer No family hx of      Genetic Disorder No family hx of        Social History     Socioeconomic History     Marital status:      Spouse name: Jazzy     Number of children: 2     Years of education: Not on file     Highest education level: Not on file   Occupational History     Not on  file   Tobacco Use     Smoking status: Every Day     Packs/day: 0.50     Years: 46.00     Pack years: 23.00     Types: Cigarettes     Start date: 1976     Smokeless tobacco: Never     Tobacco comments:     Trying to quit   Vaping Use     Vaping Use: Never used   Substance and Sexual Activity     Alcohol use: Not Currently     Comment: no alcohol in last 6 months     Drug use: Never     Sexual activity: Not on file   Other Topics Concern     Parent/sibling w/ CABG, MI or angioplasty before 65F 55M? Yes     Comment: father   Social History Narrative     Not on file     Social Determinants of Health     Financial Resource Strain: Not on file   Food Insecurity: Not on file   Transportation Needs: Not on file   Physical Activity: Not on file   Stress: Not on file   Social Connections: Not on file   Intimate Partner Violence: Not on file   Housing Stability: Not on file       Current Outpatient Medications   Medication     hydrocortisone (CORTAID) 1 % external cream     omeprazole (PRILOSEC) 20 MG DR capsule     ondansetron (ZOFRAN) 8 MG tablet     prochlorperazine (COMPAZINE) 10 MG tablet     HYDROcodone-Acetaminophen 5-217 MG/10ML SOLN     ibuprofen (ADVIL/MOTRIN) 200 MG tablet     nicotine (NICODERM CQ) 7 MG/24HR 24 hr patch     polyethylene glycol (MIRALAX) 17 GM/Dose powder     No current facility-administered medications for this visit.        No Known Allergies    Review Of Systems:  Constitutional:    denies fever, weight changes, chills, and night sweats.  Eyes:    denies blurred or double vision  Ears/Nose/Throat:   denies ear pain, difficulty swallowing; has nasal congestion  Respiratory:   denies shortness of breath, cough   Skin:   denies new rash, lesions- see HPI  Cardiovascular:   denies chest pain, palpitations, edema  Gastrointestinal:   denies abdominal pain, bloating, early satiety; no change in bowel habits or blood in stool  Genitourinary:   denies difficulty with urination, blood in  "urine  Musculoskeletal:    denies new muscle pain, bone pain-ongoing in sacral area  Neurologic:   denies lightheadedness, headaches, numbness or tingling  Psychiatric:   denies anxiety, depression-feels done at times  Hematologic/Lymphatic/Immunologic:   denies easy bruising, easy bleeding, lumps or bumps noted  Endocrine:   Denies increased thirst      ECOG Performance Status: 1    Physical Exam:  /60   Pulse 63   Temp 97  F (36.1  C) (Tympanic)   Resp 19   Ht 1.715 m (5' 7.5\")   Wt 61.9 kg (136 lb 7.4 oz)   SpO2 99%   BMI 21.06 kg/m      GENERAL APPEARANCE:  Thin, alert and in no acute distress.  HEENT: Normocephalic, Sclerae anicteric.  No oral lesions or thrush.  NECK:   No asymmetry or masses, no thyromegaly.  LYMPHATICS: No palpable cervical, supraclavicular, axillary, or inguinal nodes   RESP: Lungs clear to auscultation bilaterally dim to the lower lobes bilaterally, respirations regular and easy  CARDIOVASCULAR: Regular rate and rhythm. Normal S1, S2; no murmur, gallop, or rub.  ABDOMEN: Soft, nontender. Bowel sounds auscultated all 4 quadrants. No palpable organomegaly or masses.  MUSCULOSKELETAL: Extremities without gross deformities noted.   SKIN: Few small scabbed areas to the abdomen couple inches from the umbilicus  NEURO: Alert and oriented x 3.  Gait steady.  PSYCHIATRIC: Mentation and affect appear normal.  Mood appropriate.    Laboratory:  Results for orders placed or performed in visit on 03/08/23   Comprehensive metabolic panel     Status: Abnormal   Result Value Ref Range    Sodium 136 136 - 145 mmol/L    Potassium 4.2 3.4 - 5.3 mmol/L    Chloride 100 98 - 107 mmol/L    Carbon Dioxide (CO2) 29 22 - 29 mmol/L    Anion Gap 7 7 - 15 mmol/L    Urea Nitrogen 8.3 8.0 - 23.0 mg/dL    Creatinine 0.52 (L) 0.67 - 1.17 mg/dL    Calcium 9.0 8.6 - 10.0 mg/dL    Glucose 119 (H) 70 - 99 mg/dL    Alkaline Phosphatase 79 40 - 129 U/L    AST 24 10 - 50 U/L    ALT 29 10 - 50 U/L    Protein Total 6.5 " 6.4 - 8.3 g/dL    Albumin 3.4 (L) 3.5 - 5.2 g/dL    Bilirubin Total 0.3 <=1.2 mg/dL    GFR Estimate >90 >60 mL/min/1.73m2   TSH with free T4 reflex     Status: Normal   Result Value Ref Range    TSH 0.84 0.30 - 4.20 uIU/mL   CBC with platelets and differential     Status: Abnormal   Result Value Ref Range    WBC Count 4.6 4.0 - 11.0 10e3/uL    RBC Count 3.88 (L) 4.40 - 5.90 10e6/uL    Hemoglobin 11.0 (L) 13.3 - 17.7 g/dL    Hematocrit 33.9 (L) 40.0 - 53.0 %    MCV 87 78 - 100 fL    MCH 28.4 26.5 - 33.0 pg    MCHC 32.4 31.5 - 36.5 g/dL    RDW 18.2 (H) 10.0 - 15.0 %    Platelet Count 92 (L) 150 - 450 10e3/uL    % Neutrophils 49 %    % Lymphocytes 34 %    % Monocytes 12 %    % Eosinophils 4 %    % Basophils 1 %    % Immature Granulocytes 0 %    NRBCs per 100 WBC 0 <1 /100    Absolute Neutrophils 2.3 1.6 - 8.3 10e3/uL    Absolute Lymphocytes 1.6 0.8 - 5.3 10e3/uL    Absolute Monocytes 0.5 0.0 - 1.3 10e3/uL    Absolute Eosinophils 0.2 0.0 - 0.7 10e3/uL    Absolute Basophils 0.0 0.0 - 0.2 10e3/uL    Absolute Immature Granulocytes 0.0 <=0.4 10e3/uL    Absolute NRBCs 0.0 10e3/uL   CBC with platelets differential     Status: Abnormal    Narrative    The following orders were created for panel order CBC with platelets differential.  Procedure                               Abnormality         Status                     ---------                               -----------         ------                     CBC with platelets and d...[[id number]]  Abnormal            Final result                 Please view results for these tests on the individual orders.       Imaging Studies:  None completed for today's visit       ASSESSMENT/PLAN:    #1 Esophageal cancer: Stage IV adenocarcinoma of the esophagus diagnosed November 2022.  He initiated systemic therapy with nivolumab/FOLFOX 1/11/2023 with potential for palliative radiation therapy at some point.  Has tolerated treatment fairly well and performance status has improved. We will go  forth with cycle 5 therapy today.  We will see him prior to cycle 6 with labs per treatment plan.     #2  Anemia: Likely multifactorial including disease and chemotherapy.  Possibly a component of some iron deficiency though soluble transferrin receptor is within normal limits.     #3  Cancer related pain:  Will continue with the hydrocodone as needed for pain.      #4 tobacco use: I did send him in another nicotine patch low-dose.    I encouraged patient to call with any questions or concerns.    40 minutes spent in the patient's encounter today with time spent in review of the chart along with in chart preparation.  Time was also spent in review of his treatment plan and signing of his treatment plan.  Time was also spent obtaining a review of systems and performing a physical exam along with review of all lab work in detail.  Time was also spent in planning his next follow-up, placing orders and in chart documentation.    Delmy Valenzuela NP  APRN, FNP-BC, AOCNP

## 2023-03-08 NOTE — PROGRESS NOTES
"[facility]  Medical Nutrition Therapy  Kenneth Matthew referred by Dr. Nika Barnhart  for MNT related to Malignant neoplasm of lower third of esophagus         Patient accompanied by brother.    Nutrition Assessment:  Anthropometrics  Height: 5' 7.5\"    BMI:  21.06      Weight: 661.9 kg (136 lb 7.4 oz)    BSA: 1.72          Nutrition History  Pt reports a reduced appetite since his last infusion. Can't take much through the mouth anymore. No sore throat. No nausea. He reports most foods and thicker liquids are not going down well. Discomfort as food goes down, then starts coughing up clear fluid. On week days 3 cartons of formula due to wife's work/sleep schedule, weekends he is getting 4, but feels very full.  Weight has been stable    Food Recall  - 3-4 cartons of ensure daily  - juice, water, ginger ale, coffee, Gatorade, broth, reeses peanut butter cups, yareli's chocolate     Physical Activity   not addressed     Nutrition Prescription- weight used to estimate needs 63.5kg  Energy:   1900-2225kcal (30-35g/kg)    Protein:   75-95g (1.2-1.5g/kg)    Fluid:   1900-2225ml (1ml/kcal)            Nutrition Diagnosis:  Malnutrition related to swallowing difficulty/reduced intake as evidenced by weight loss of 32% weight loss in 1 year.     Nutrition Intervention:  Continue with 4 cartons of formula. Oral intake has able. Focus on liquids with calories. Independent tube feedings to try for 3.5 -4 cartons during the week.     Nutrition Goals:   Adequate intake oral/TF to meet nutrition needs to maintain weight/promote healthy weight gain     Nutrition Follow Up / Monitoring:   TF placement, intake, weight, labs     Nutrition Recommendations:  TF Recs  4 cartons of Isosource 1.5 daily- bolus feeds  1500 kcal, 68 g protein, 764 ml free water  240ml water 2-3 times per day- depending on oral intake of fulids.  Plan to continue to follow pt and adjust as oral intake changes.        Patient to follow-up with RD " in 2-4 weeks.   Patient has RD contact information to call/email if needed.

## 2023-03-10 ENCOUNTER — INFUSION THERAPY VISIT (OUTPATIENT)
Dept: INFUSION THERAPY | Facility: OTHER | Age: 58
End: 2023-03-10
Payer: COMMERCIAL

## 2023-03-10 VITALS
OXYGEN SATURATION: 98 % | RESPIRATION RATE: 18 BRPM | WEIGHT: 139.99 LBS | TEMPERATURE: 97.4 F | HEART RATE: 52 BPM | BODY MASS INDEX: 21.6 KG/M2 | DIASTOLIC BLOOD PRESSURE: 54 MMHG | SYSTOLIC BLOOD PRESSURE: 117 MMHG

## 2023-03-10 DIAGNOSIS — C15.5 MALIGNANT NEOPLASM OF LOWER THIRD OF ESOPHAGUS (H): Primary | ICD-10-CM

## 2023-03-10 PROCEDURE — 96360 HYDRATION IV INFUSION INIT: CPT | Performed by: INTERNAL MEDICINE

## 2023-03-10 RX ORDER — HEPARIN SODIUM (PORCINE) LOCK FLUSH IV SOLN 100 UNIT/ML 100 UNIT/ML
5 SOLUTION INTRAVENOUS
Status: CANCELLED | OUTPATIENT
Start: 2023-03-10

## 2023-03-10 RX ORDER — HEPARIN SODIUM (PORCINE) LOCK FLUSH IV SOLN 100 UNIT/ML 100 UNIT/ML
5 SOLUTION INTRAVENOUS
Status: DISCONTINUED | OUTPATIENT
Start: 2023-03-10 | End: 2023-03-10 | Stop reason: HOSPADM

## 2023-03-10 RX ADMIN — Medication 1000 ML: at 09:37

## 2023-03-10 RX ADMIN — HEPARIN SODIUM (PORCINE) LOCK FLUSH IV SOLN 100 UNIT/ML 5 ML: 100 SOLUTION at 10:51

## 2023-03-10 ASSESSMENT — PAIN SCALES - GENERAL: PAINLEVEL: NO PAIN (0)

## 2023-03-10 NOTE — PROGRESS NOTES
Patient is 57 years old, here today for home infuser pump off.    Infuser pump completed, 100 % infused.  Patient denies adverse effects from home infusion, nor mechanical issues.  Pump is intact.  Infuser disconnected from port.      Port flushed per MAR.  Immediate blood return noted.  Flushes easily, without resistance.      Patient requesting his PRN fluids today. BP indicates they are needed as well.     Patient identified with two identifiers, order verified, and verbal consent for today's infusion obtained from patient.      IV pump verified with dose, drug, and rate of administration.  Infusion administered per protocol.  Patient tolerated infusion well, no signs or symptoms of adverse reaction noted.  Patient denies pain nor discomfort.     Port needle removed, needle intact.  Surrounding skin without redness, swelling, nor discoloration.  Skin warm and dry to touch.      Patient tolerated well.  Site covered with sterile bandage and slight pressure applied for 30 seconds. Instructed patient to leave bandage on for a minimum of one hour and to call with questions or concerns.    Education provided for signs and symptoms of infection and when to call the doctor.  Patient states understanding and is in agreement with this plan. Patient discharged.

## 2023-03-21 RX ORDER — ONDANSETRON 2 MG/ML
8 INJECTION INTRAMUSCULAR; INTRAVENOUS ONCE
Status: CANCELLED | OUTPATIENT
Start: 2023-03-22

## 2023-03-22 ENCOUNTER — ONCOLOGY VISIT (OUTPATIENT)
Dept: ONCOLOGY | Facility: OTHER | Age: 58
End: 2023-03-22
Attending: INTERNAL MEDICINE
Payer: COMMERCIAL

## 2023-03-22 ENCOUNTER — HOSPITAL ENCOUNTER (OUTPATIENT)
Dept: EDUCATION SERVICES | Facility: HOSPITAL | Age: 58
Discharge: HOME | End: 2023-03-22
Attending: INTERNAL MEDICINE
Payer: COMMERCIAL

## 2023-03-22 ENCOUNTER — LAB (OUTPATIENT)
Dept: ONCOLOGY | Facility: OTHER | Age: 58
End: 2023-03-22
Payer: COMMERCIAL

## 2023-03-22 ENCOUNTER — INFUSION THERAPY VISIT (OUTPATIENT)
Dept: INFUSION THERAPY | Facility: OTHER | Age: 58
End: 2023-03-22
Payer: COMMERCIAL

## 2023-03-22 VITALS
RESPIRATION RATE: 20 BRPM | TEMPERATURE: 97.2 F | WEIGHT: 138.45 LBS | DIASTOLIC BLOOD PRESSURE: 60 MMHG | HEIGHT: 68 IN | SYSTOLIC BLOOD PRESSURE: 110 MMHG | HEART RATE: 70 BPM | OXYGEN SATURATION: 99 % | BODY MASS INDEX: 20.98 KG/M2

## 2023-03-22 VITALS — HEART RATE: 52 BPM | SYSTOLIC BLOOD PRESSURE: 105 MMHG | DIASTOLIC BLOOD PRESSURE: 74 MMHG

## 2023-03-22 DIAGNOSIS — C15.5 MALIGNANT NEOPLASM OF LOWER THIRD OF ESOPHAGUS (H): Primary | ICD-10-CM

## 2023-03-22 LAB
ALBUMIN SERPL BCG-MCNC: 3.4 G/DL (ref 3.5–5.2)
ALP SERPL-CCNC: 74 U/L (ref 40–129)
ALT SERPL W P-5'-P-CCNC: 13 U/L (ref 10–50)
ANION GAP SERPL CALCULATED.3IONS-SCNC: 9 MMOL/L (ref 7–15)
AST SERPL W P-5'-P-CCNC: 18 U/L (ref 10–50)
BASOPHILS # BLD AUTO: 0 10E3/UL (ref 0–0.2)
BASOPHILS NFR BLD AUTO: 1 %
BILIRUB SERPL-MCNC: 0.3 MG/DL
BUN SERPL-MCNC: 8.2 MG/DL (ref 8–23)
CALCIUM SERPL-MCNC: 9.1 MG/DL (ref 8.8–10.2)
CHLORIDE SERPL-SCNC: 103 MMOL/L (ref 98–107)
CREAT SERPL-MCNC: 0.55 MG/DL (ref 0.67–1.17)
DEPRECATED HCO3 PLAS-SCNC: 29 MMOL/L (ref 22–29)
EOSINOPHIL # BLD AUTO: 0.2 10E3/UL (ref 0–0.7)
EOSINOPHIL NFR BLD AUTO: 3 %
ERYTHROCYTE [DISTWIDTH] IN BLOOD BY AUTOMATED COUNT: 19.8 % (ref 10–15)
GFR SERPL CREATININE-BSD FRML MDRD: >90 ML/MIN/1.73M2
GLUCOSE SERPL-MCNC: 97 MG/DL (ref 70–99)
HCT VFR BLD AUTO: 36.8 % (ref 40–53)
HGB BLD-MCNC: 11.7 G/DL (ref 13.3–17.7)
IMM GRANULOCYTES # BLD: 0 10E3/UL
IMM GRANULOCYTES NFR BLD: 0 %
LYMPHOCYTES # BLD AUTO: 1.6 10E3/UL (ref 0.8–5.3)
LYMPHOCYTES NFR BLD AUTO: 31 %
MCH RBC QN AUTO: 28.2 PG (ref 26.5–33)
MCHC RBC AUTO-ENTMCNC: 31.8 G/DL (ref 31.5–36.5)
MCV RBC AUTO: 89 FL (ref 78–100)
MONOCYTES # BLD AUTO: 0.7 10E3/UL (ref 0–1.3)
MONOCYTES NFR BLD AUTO: 14 %
NEUTROPHILS # BLD AUTO: 2.7 10E3/UL (ref 1.6–8.3)
NEUTROPHILS NFR BLD AUTO: 51 %
NRBC # BLD AUTO: 0 10E3/UL
NRBC BLD AUTO-RTO: 0 /100
PLATELET # BLD AUTO: 93 10E3/UL (ref 150–450)
POTASSIUM SERPL-SCNC: 4.4 MMOL/L (ref 3.4–5.3)
PROT SERPL-MCNC: 6.3 G/DL (ref 6.4–8.3)
RBC # BLD AUTO: 4.15 10E6/UL (ref 4.4–5.9)
SODIUM SERPL-SCNC: 141 MMOL/L (ref 136–145)
TSH SERPL DL<=0.005 MIU/L-ACNC: 0.44 UIU/ML (ref 0.3–4.2)
WBC # BLD AUTO: 5.3 10E3/UL (ref 4–11)

## 2023-03-22 PROCEDURE — 36591 DRAW BLOOD OFF VENOUS DEVICE: CPT | Performed by: INTERNAL MEDICINE

## 2023-03-22 PROCEDURE — 96415 CHEMO IV INFUSION ADDL HR: CPT

## 2023-03-22 PROCEDURE — 96417 CHEMO IV INFUS EACH ADDL SEQ: CPT

## 2023-03-22 PROCEDURE — 99215 OFFICE O/P EST HI 40 MIN: CPT | Mod: 25 | Performed by: INTERNAL MEDICINE

## 2023-03-22 PROCEDURE — 96521 REFILL/MAINT PORTABLE PUMP: CPT | Mod: 59

## 2023-03-22 PROCEDURE — 96375 TX/PRO/DX INJ NEW DRUG ADDON: CPT

## 2023-03-22 PROCEDURE — 96367 TX/PROPH/DG ADDL SEQ IV INF: CPT

## 2023-03-22 PROCEDURE — 96413 CHEMO IV INFUSION 1 HR: CPT

## 2023-03-22 PROCEDURE — 80050 GENERAL HEALTH PANEL: CPT | Performed by: INTERNAL MEDICINE

## 2023-03-22 PROCEDURE — 999N000107 HC STATISTIC NUTRITIONAL THERAPY NO CHARGE: Performed by: DIETITIAN, REGISTERED

## 2023-03-22 RX ORDER — LORAZEPAM 2 MG/ML
0.5 INJECTION INTRAMUSCULAR EVERY 4 HOURS PRN
Status: CANCELLED | OUTPATIENT
Start: 2023-03-22

## 2023-03-22 RX ORDER — MEPERIDINE HYDROCHLORIDE 25 MG/ML
25 INJECTION INTRAMUSCULAR; INTRAVENOUS; SUBCUTANEOUS EVERY 30 MIN PRN
Status: CANCELLED | OUTPATIENT
Start: 2023-03-22

## 2023-03-22 RX ORDER — HEPARIN SODIUM,PORCINE 10 UNIT/ML
5 VIAL (ML) INTRAVENOUS
Status: CANCELLED | OUTPATIENT
Start: 2023-03-22

## 2023-03-22 RX ORDER — EPINEPHRINE 1 MG/ML
0.3 INJECTION, SOLUTION, CONCENTRATE INTRAVENOUS EVERY 5 MIN PRN
Status: CANCELLED | OUTPATIENT
Start: 2023-03-22

## 2023-03-22 RX ORDER — PROCHLORPERAZINE MALEATE 10 MG
10 TABLET ORAL EVERY 6 HOURS PRN
Qty: 30 TABLET | Refills: 2 | Status: SHIPPED | OUTPATIENT
Start: 2023-03-22 | End: 2023-05-03

## 2023-03-22 RX ORDER — FLUOROURACIL 50 MG/ML
400 INJECTION, SOLUTION INTRAVENOUS ONCE
Status: CANCELLED | OUTPATIENT
Start: 2023-03-22

## 2023-03-22 RX ORDER — ONDANSETRON 2 MG/ML
8 INJECTION INTRAMUSCULAR; INTRAVENOUS ONCE
Status: COMPLETED | OUTPATIENT
Start: 2023-03-22 | End: 2023-03-22

## 2023-03-22 RX ORDER — DIPHENHYDRAMINE HYDROCHLORIDE 50 MG/ML
50 INJECTION INTRAMUSCULAR; INTRAVENOUS
Status: CANCELLED
Start: 2023-03-22

## 2023-03-22 RX ORDER — FLUOROURACIL 50 MG/ML
400 INJECTION, SOLUTION INTRAVENOUS ONCE
Status: COMPLETED | OUTPATIENT
Start: 2023-03-22 | End: 2023-03-22

## 2023-03-22 RX ORDER — HEPARIN SODIUM,PORCINE 10 UNIT/ML
5 VIAL (ML) INTRAVENOUS
Status: CANCELLED | OUTPATIENT
Start: 2023-03-24

## 2023-03-22 RX ORDER — HEPARIN SODIUM (PORCINE) LOCK FLUSH IV SOLN 100 UNIT/ML 100 UNIT/ML
5 SOLUTION INTRAVENOUS
Status: CANCELLED | OUTPATIENT
Start: 2023-03-22

## 2023-03-22 RX ORDER — METHYLPREDNISOLONE SODIUM SUCCINATE 125 MG/2ML
125 INJECTION, POWDER, LYOPHILIZED, FOR SOLUTION INTRAMUSCULAR; INTRAVENOUS
Status: CANCELLED
Start: 2023-03-22

## 2023-03-22 RX ORDER — ALBUTEROL SULFATE 90 UG/1
1-2 AEROSOL, METERED RESPIRATORY (INHALATION)
Status: CANCELLED
Start: 2023-03-22

## 2023-03-22 RX ORDER — ALBUTEROL SULFATE 0.83 MG/ML
2.5 SOLUTION RESPIRATORY (INHALATION)
Status: CANCELLED | OUTPATIENT
Start: 2023-03-22

## 2023-03-22 RX ORDER — HEPARIN SODIUM (PORCINE) LOCK FLUSH IV SOLN 100 UNIT/ML 100 UNIT/ML
5 SOLUTION INTRAVENOUS
Status: CANCELLED | OUTPATIENT
Start: 2023-03-24

## 2023-03-22 RX ORDER — ONDANSETRON 8 MG/1
8 TABLET, FILM COATED ORAL EVERY 8 HOURS PRN
Qty: 30 TABLET | Refills: 2 | Status: SHIPPED | OUTPATIENT
Start: 2023-03-22 | End: 2023-05-03

## 2023-03-22 RX ADMIN — Medication 250 ML: at 10:21

## 2023-03-22 RX ADMIN — ONDANSETRON 8 MG: 2 INJECTION INTRAMUSCULAR; INTRAVENOUS at 10:25

## 2023-03-22 RX ADMIN — FLUOROURACIL 690 MG: 50 INJECTION, SOLUTION INTRAVENOUS at 14:27

## 2023-03-22 ASSESSMENT — PAIN SCALES - GENERAL: PAINLEVEL: NO PAIN (0)

## 2023-03-22 NOTE — PROGRESS NOTES
Assessment & Plan     Encounter to establish care  Patient is a 58 year old male with history of esophageal cancer who presents to clinic today to establish care.  Medical, surgical, family, social history reviewed and updated today    Cancer related pain  Stage IV esophageal adenocarcinoma    Malignant neoplasm of lower third of esophagus (H)  Diagnosed November of 2022.  Stage IV adenocarcinoma of the esophagus  He is undergoing chemotherapy for this with potential for palliative radiation therapy at some point in the future.    States he is overall doing okay.  He is taking foods mostly via G-tube.  Is experiencing swallowing difficulty and is on mostly pureed diet.  Scheduled to undergo EGD with Dr. Dalton on 4.25            Nicotine/Tobacco Cessation:  He reports that he has been smoking cigarettes. He started smoking about 47 years ago. He has a 23.00 pack-year smoking history. He has never used smokeless tobacco.  Nicotine/Tobacco Cessation Plan:   Self help information given to patient      Return in about 4 weeks (around 4/21/2023) for Follow up.    Chata Smith MD  [facility] - [facility]   Kenneth is a 58 year old, presenting for the following health issues:  No chief complaint on file.  No flowsheet data found.  HPI     Kenneth Matthew is a very pleasant 58 year old male with history of stage IV adenocarcinoma of the lower third of his esophagus.  He has a G-tube and takes most of his food via G-tube now.  He follows closely with oncology and is receiving chemotherapy.  Reports no significant side effects from the medication.  Overall doing well.  He is having some difficulty with swallowing foods and is on mostly a pureed diet.        Review of Systems   Constitutional, HEENT, cardiovascular, pulmonary, gi and gu systems are negative, except as otherwise noted.      Objective    BP 94/50   Temp 98.2  F (36.8  C)   Resp 18   Wt 64.5 kg (142 lb 3.2 oz)   BMI 21.94 kg/m     Body mass index is 21.94 kg/m .  Physical Exam   GENERAL: healthy, alert and no distress  EYES: Eyes grossly normal to inspection, PERRL and conjunctivae and sclerae normal  NECK: no adenopathy, no asymmetry, masses, or scars and thyroid normal to palpation  RESP: lungs clear to auscultation - no rales, rhonchi or wheezes  CV: regular rate and rhythm, normal S1 S2, no S3 or S4, no murmur, click or rub, no peripheral edema and peripheral pulses strong  ABDOMEN: soft, nontender, no hepatosplenomegaly, no masses and bowel sounds normal  MS: no gross musculoskeletal defects noted, no edema  SKIN: no suspicious lesions or rashes  PSYCH: mentation appears normal, affect normal/bright          Answers for HPI/ROS submitted by the patient on 3/24/2023  If you checked off any problems, how difficult have these problems made it for you to do your work, take care of things at home, or get along with other people?: Not difficult at all  PHQ9 TOTAL SCORE: 5  JULISA 7 TOTAL SCORE: 3

## 2023-03-22 NOTE — PATIENT INSTRUCTIONS

## 2023-03-22 NOTE — NURSING NOTE
"Oncology Rooming Note    March 22, 2023 9:20 AM   Kenneth Matthew is a 58 year old male who presents for:    Chief Complaint   Patient presents with     Oncology Clinic Visit     Follow up Malignant neoplasm of lower third of esophagus        Initial Vitals: /60   Pulse 70   Temp 97.2  F (36.2  C) (Tympanic)   Resp 20   Ht 1.715 m (5' 7.5\")   Wt 62.8 kg (138 lb 7.2 oz)   SpO2 99%   BMI 21.36 kg/m   Estimated body mass index is 21.36 kg/m  as calculated from the following:    Height as of this encounter: 1.715 m (5' 7.5\").    Weight as of this encounter: 62.8 kg (138 lb 7.2 oz). Body surface area is 1.73 meters squared.  No Pain (0) Comment: Data Unavailable   No LMP for male patient.  Allergies reviewed: Yes  Medications reviewed: Yes    Medications: MEDICATION REFILLS NEEDED TODAY. Provider was notified.  Pharmacy name entered into [facility]:    [id number] - [place], [place] - [address] [address]  [facility] [place] - [address]            "

## 2023-03-22 NOTE — PROGRESS NOTES
Patients power port accessed using non-coring, 19 gauge, 3/4 inch needle.    Mask donned by caregiver: yes Site prepped with CHG: yes Labs drawn: yes Dressing applied using aseptic technique: yes.     Port accessed per facility protocol. Port flushed easily, blood return noted.  No signs and symptoms of infection or infiltration.  Port flushed with 10mL normal saline, blood return noted, 10 mLs blood discarded. Blood taken for ordered labs, port flushed with 20mL normal saline.  Port left accessed as patient has appointment with / [facility], and is then due for chemo if parameters are met.  Patient discharged with no complaints.

## 2023-03-22 NOTE — PROGRESS NOTES
Patient is a 58 year old here accompanied by family today for infusion of Folfox/Nivolumab under the orders of Dr. Barnhart.    Component      Latest Ref Rng & Units 3/22/2023   WBC      4.0 - 11.0 10e3/uL 5.3   RBC Count      4.40 - 5.90 10e6/uL 4.15 (L)   Hemoglobin      13.3 - 17.7 g/dL 11.7 (L)   Hematocrit      40.0 - 53.0 % 36.8 (L)   MCV      78 - 100 fL 89   MCH      26.5 - 33.0 pg 28.2   MCHC      31.5 - 36.5 g/dL 31.8   RDW      10.0 - 15.0 % 19.8 (H)   Platelet Count      150 - 450 10e3/uL 93 (L)   % Neutrophils      % 51   % Lymphocytes      % 31   % Monocytes      % 14   % Eosinophils      % 3   % Basophils      % 1   % Immature Granulocytes      % 0   NRBCs per 100 WBC      <1 /100 0   Absolute Neutrophils      1.6 - 8.3 10e3/uL 2.7   Absolute Lymphocytes      0.8 - 5.3 10e3/uL 1.6   Absolute Monocytes      0.0 - 1.3 10e3/uL 0.7   Absolute Eosinophils      0.0 - 0.7 10e3/uL 0.2   Absolute Basophils      0.0 - 0.2 10e3/uL 0.0   Absolute Immature Granulocytes      <=0.4 10e3/uL 0.0   Absolute NRBCs      10e3/uL 0.0   Sodium      136 - 145 mmol/L 141   Potassium      3.4 - 5.3 mmol/L 4.4   Chloride      98 - 107 mmol/L 103   Carbon Dioxide (CO2)      22 - 29 mmol/L 29   Anion Gap      7 - 15 mmol/L 9   Urea Nitrogen      8.0 - 23.0 mg/dL 8.2   Creatinine      0.67 - 1.17 mg/dL 0.55 (L)   Calcium      8.8 - 10.2 mg/dL 9.1   Glucose      70 - 99 mg/dL 97   Alkaline Phosphatase      40 - 129 U/L 74   AST      10 - 50 U/L 18   ALT      10 - 50 U/L 13   Protein Total      6.4 - 8.3 g/dL 6.3 (L)   Albumin      3.5 - 5.2 g/dL 3.4 (L)   Bilirubin Total      <=1.2 mg/dL 0.3   GFR Estimate      >60 mL/min/1.73m2 >90   TSH      0.30 - 4.20 uIU/mL 0.44       Folfox/Nivolumab dose(s) verified with Virginia TORRES RN prior to release of drug.    Patient meets parameters for today's infusion.  Denies questions or concerns regarding today's infusion and/or medications being administered.      Patient identified with two identifiers,  order verified, and verbal consent for today's infusion obtained from patient.    1115 IV pump verified with Nivolumab dose, drug, and rate of administration. Infusion administered per protocol. Patient tolerated infusion well, no signs or symptoms of adverse reaction noted. Patient denies pain nor discomfort.     1210 IV pump verified with leucovorin dose, drug, and rate of administration. Infusion administered per protocol. Patient tolerated infusion well, no signs or symptoms of adverse reaction noted. Patient denies pain nor discomfort.     1210 IV pump verified with oxaliplatin dose, drug, and rate of administration. Infusion administered per protocol. Patient tolerated infusion well, no signs or symptoms of adverse reaction noted. Patient denies pain nor discomfort.     1427 Fluorouracil home infuser verified with dose, drug, and rate of administration. Home infuser connected, clamps taped open, dressing reinforced. Patient denies pain or discomfort. No signs or symptoms of infiltration or infection.  Patient discharged.

## 2023-03-22 NOTE — PROGRESS NOTES
"[facility]  Medical Nutrition Therapy    Visit Type: Reassessment    Miguel Abinh RAY Matthew referred by Dr. Nika Barnhart  for MNT related to Malignant neoplasm of lower third of esophagus      Patient accompanied by .    Nutrition Assessment:  Anthropometrics  Height: 5' 7.5\"    BMI:  21.36      Weight: 138 lb 7.2 oz    BSA: 1.73        Wt Readings from Last 10 Encounters:   03/22/23 62.8 kg (138 lb 7.2 oz)   03/10/23 63.5 kg (139 lb 15.9 oz)   03/08/23 61.9 kg (136 lb 7.4 oz)   02/24/23 61.9 kg (136 lb 7.4 oz)   02/22/23 62 kg (136 lb 11 oz)   02/10/23 62.6 kg (137 lb 14.4 oz)   02/08/23 62.1 kg (136 lb 14.5 oz)   01/25/23 62.1 kg (137 lb)   01/25/23 63 kg (138 lb 14.4 oz)   01/25/23 62.4 kg (137 lb 9.1 oz)       Nutrition History  Still having difficulty swallowing. No discomfort. Refluxing clear liquid after eating most foods. No vomiting. Some taste change- some loss of taste. Still can eat chocolate and liquids. Ensure makes him feel nauseous. Eating some baby food, oatmeal, desserts. [facility], they made some suggestions/recipes to try. Also is able to get some TF formula and supplies through the [facility]. [facility] providing patient with TwoCal. No constipation or diarrhea. Usual weight was around 180-200lb prior to cancer diagnosis. Pt would feel good with his weight being around 170-180lb    Food Recall   3 cartons of Isosource 1.5 per day. 60ml water before and after bolus. Ginger ale, apple juice, some water, dr pepper.    Physical Activity   walking around.    Nutrition Prescription- weight used to estimate needs 63.5kg  Energy:   1900-2225kcal (30-35g/kg)    Protein:   75-95g (1.2-1.5g/kg)    Fluid:   1900-2225ml (1ml/kcal)         Nutrition Diagnosis:  Malnutrition related to swallowing difficulty/reduced intake as evidenced by weight loss of 32% weight loss in 1 year.  120 with each bolus. twocal     Nutrition Intervention:  Alternate between Isosource and TwoCal. Try for 1.25-1.5 carton " boluses. Keep trying foods.    Nutrition Goals:   Adequate intake oral/TF to meet nutrition needs to maintain weight/promote healthy weight gain     Nutrition Follow Up / Monitoring:   TF placement, intake, weight, labs.     Nutrition Recommendations:  TF Recs  4 cartons of Isosource 1.5 daily- bolus feeds  1500 kcal, 68 g protein, 764 ml free water  240ml water 2-3 times per day- depending on oral intake of fulids.  Plan to continue to follow pt and adjust as oral intake changes.     Time spent with pt - 15 min     Patient to follow-up with RD in 2-4 weeks.   Patient has RD contact information to call/email if needed.

## 2023-03-22 NOTE — PROGRESS NOTES
[facility] delivery ticket [facility] to [facility].    [facility] delivery ticket sent to scanning.

## 2023-03-22 NOTE — PROGRESS NOTES
MEDICAL ONCOLOGY FOLLOW UP NOTE  Mar 22, 2023    Reason for Follow up: esophageal cancer    HISTORY OF PRESENT ILLNESS  Kenneth Matthew is a 58 year old male with PMH as stated below who is seen in the oncology clinic for consultation of esophageal cancer    His history in short is as follows    Mr. Matthew states he noticed difficulty swallowing initially starting June 2022. Over the last 1 year he has unintentionally lost 60 pounds.     10/21/2022: CT chest, abdomen and pelvis: Diffuse wall thickening of the distal esophagus with associated mediastinal adenopathy, and upper abdominal  Conglomerate lymphadenopathy. Constellation of findings are highly concerning for underlying esophageal carcinoma with metastatic disease.     11/1/2022: EGD: A large ulcerating mass with no bleeding and no stigmata of recent bleeding was found   Lower third from the incisors. The mass was partially obstructing and partially circumferential involving  two thirds.     Biopsy showed poorly differentiated favoring adenocarcinoma. Her 2 negative.     12/14/2022: PET scan: Distal esophageal neoplasm with metastatic involvement the  mediastinum, left hilum and retroperitoneal lymph nodes of the upperabdomen. Also likely localized metastatic involvement of thestomach/GE junction. 2 cm intramural distal esophageal/GE junction mass. Circumferential wall thickening with increased FDG uptake in the distal esophagus adjacent portions of the stomach/GE junction may represent localized disease however could also represent localized inflammation. Metastatic involvement to the subcarinal nodes, left infrahilar and  pericaval/paraesophageal lymph nodes of the lower thorax. Metastatic involvement to the retroperitoneal lymph nodes of the upper abdomen. These lymph nodes have increased in size dating back to be  10/21/2022 CT scan consistent with worsening disease. Representative  nodes are described above.    Interim history    Doing well. Overall he  is doing well. He states back pain has improved. Denies any mid abdominal pain. Denies any fever or chills, denies any rash, denies any worsening fatigue. He is currently using PEG tube for nutrition mostly and is able to keep down liquids. Not able to eat solids.     REVIEW OF SYSTEMS  A 12-point ROS negative except as in HPI      Current Outpatient Medications   Medication Sig Dispense Refill     hydrocortisone (CORTAID) 1 % external cream Apply topically 2 times daily 30 g 1     nicotine (NICODERM CQ) 7 MG/24HR 24 hr patch Place 1 patch onto the skin every 24 hours 14 patch 1     omeprazole (PRILOSEC) 20 MG DR capsule Take 1 capsule (20 mg) by mouth daily 30 capsule 0     ondansetron (ZOFRAN) 8 MG tablet Take 1 tablet (8 mg) by mouth every 8 hours as needed for nausea (vomiting) 30 tablet 2     polyethylene glycol (MIRALAX) 17 GM/Dose powder Take 9-17 g by mouth daily 510 g 1     prochlorperazine (COMPAZINE) 10 MG tablet Take 1 tablet (10 mg) by mouth every 6 hours as needed (Nausea/Vomiting) 30 tablet 2     HYDROcodone-Acetaminophen 5-217 MG/10ML SOLN Take 10 mLs by mouth every 8 hours (Patient not taking: Reported on 3/22/2023) 473 mL 0     ibuprofen (ADVIL/MOTRIN) 200 MG tablet Take 200 mg by mouth every 4 hours as needed for pain (Patient not taking: Reported on 2/22/2023)         No Known Allergies  Immunization History   Administered Date(s) Administered     COVID-19 Vaccine 12+ (Pfizer) 08/28/2021, 09/18/2021     TDAP (Adacel,Boostrix) 12/10/2021       Past Medical History:   Diagnosis Date     Cancer (H)      Esophageal cancer (H) 11/01/2022     Esophageal thickening 10/25/2022     Mixed hearing loss, bilateral      Tobacco abuse 1/4/2023     Tobacco use disorder 12/27/2019       Past Surgical History:   Procedure Laterality Date     COLONOSCOPY  11/01/2022    [facility]     INSERT PORT VASCULAR ACCESS N/A 12/22/2022    Procedure: POWER PORT PLACEMENT;  Surgeon: Nba Dalton MD;  Location: [facility] OR      "Upper GI Endoscopy  11/01/2022       SOCIAL HISTORY  History   Smoking Status     Every Day     Packs/day: 0.50     Years: 46.00     Types: Cigarettes     Start date: 1976   Smokeless Tobacco     Never    Social History    Substance and Sexual Activity      Alcohol use: Not Currently        Comment: no alcohol in last 6 months     History   Drug Use Unknown       FAMILY HISTORY  Family History   Problem Relation Age of Onset     Breast Cancer Mother      Hyperlipidemia Mother      Diabetes Mother      Coronary Artery Disease Mother      Cerebrovascular Disease Father      Hyperlipidemia Father      Coronary Artery Disease Father      Myocardial Infarction Father      Hyperlipidemia Brother      Hypertension Brother      Diabetes Brother      Asthma Sister      Breast Cancer Sister      Hyperlipidemia Sister      Diabetes Sister      Hyperlipidemia Sister      Diabetes Sister      Hyperlipidemia Sister      Suicide Sister      Anesthesia Reaction Sister      Breast Cancer Sister      Hyperlipidemia Sister      Diabetes Sister      Breast Cancer Niece      Skin Cancer Niece      Thyroid Disease Niece      Breast Cancer Niece      Leukemia Nephew      Colon Cancer No family hx of      Prostate Cancer No family hx of      Genetic Disorder No family hx of        PHYSICAL EXAMINATION  /60   Pulse 70   Temp 97.2  F (36.2  C) (Tympanic)   Resp 20   Ht 1.715 m (5' 7.5\")   Wt 62.8 kg (138 lb 7.2 oz)   SpO2 99%   BMI 21.36 kg/m    Wt Readings from Last 2 Encounters:   03/22/23 62.8 kg (138 lb 7.2 oz)   03/10/23 63.5 kg (139 lb 15.9 oz)     Physical Exam  Constitutional:       Appearance: Normal appearance.   Eyes:      Conjunctiva/sclera: Conjunctivae normal.   Pulmonary:      Effort: Pulmonary effort is normal.   Abdominal:      General: Abdomen is flat.      Palpations: Abdomen is soft.   Neurological:      General: No focal deficit present.      Mental Status: He is alert and oriented to person, place, and " time. Mental status is at baseline.     Laboratory and Imaging:     Latest Reference Range & Units 03/22/23 08:53   WBC 4.0 - 11.0 10e3/uL 5.3   Hemoglobin 13.3 - 17.7 g/dL 11.7 (L)   Hematocrit 40.0 - 53.0 % 36.8 (L)   Platelet Count 150 - 450 10e3/uL 93 (L)   (L): Data is abnormally low    RESULT FOR IMMUNOHISTOCHEMICAL VENTANA CLONE  PD-L1 ASSAY  COMBINED POSITIVE SCORE (CPS): <1%    ASSESSMENT AND PLAN    1. Adenocarcinoma of the esophagus    Discussed imgaging and pathology. Given the findings of mediastinal adenopathy and upper abdominal adenopathy with primary in the lower third of the esophagus unlikely to be surgical.     PET scan did show metastatic disease to non regional adenopathy therefore stage IV disease. PET scan was discussed with radiation oncology and not felt to be a candidate for concurrent chemoRT but palliative RT.     I had previously discussed recommend starting with systemic therapy for now and then considering palliative RT down the line.    He is currently getting his nutrition through his PEG. He does want to try to eat through his mouth more and get his PEG out. He is also unable to go back to work with the PEG so that is also his motivation to get the PEG out. I discussed that once we get his staging PET scan which was scheduled on 3/29/2023 and it shows good response we can refer him back to radiation for consideration of palliative Radiation. Will have to hold systemic therapy at that time.     He will proceed to cycle 6 today. His platelet count has been slowly decreasing. If continues to be thrombocytopenic will remove bolus 5FU from plan. PET scan planned in I week. Will call him with report of PET scan.     Follow up in 2 weeks prior to next cycle.     Addendum: PET scan done on 3/29/2023 reviewed. Shows very good response in primary mass and distant disease. Mr. Matthew had previously mentioned his wish to get the feeding tube out and therefore will refer to radiation to  discuss palliative RT. I will also refer him to surgery for consideration of repeat EGD given the response seen on PET scan. Will continue with systemic therapy till   Total time spent on the patient on day of encounter was 40 minutes doing chart review, review of test results, interpretation of results, patient visit and documentation.    Nika Barnhart MD

## 2023-03-24 ENCOUNTER — INFUSION THERAPY VISIT (OUTPATIENT)
Dept: INFUSION THERAPY | Facility: OTHER | Age: 58
End: 2023-03-24
Payer: COMMERCIAL

## 2023-03-24 ENCOUNTER — OFFICE VISIT (OUTPATIENT)
Dept: FAMILY MEDICINE | Facility: OTHER | Age: 58
End: 2023-03-24
Attending: STUDENT IN AN ORGANIZED HEALTH CARE EDUCATION/TRAINING PROGRAM
Payer: COMMERCIAL

## 2023-03-24 VITALS
TEMPERATURE: 97.2 F | OXYGEN SATURATION: 98 % | HEART RATE: 66 BPM | BODY MASS INDEX: 21.94 KG/M2 | RESPIRATION RATE: 18 BRPM | SYSTOLIC BLOOD PRESSURE: 108 MMHG | WEIGHT: 142.2 LBS | DIASTOLIC BLOOD PRESSURE: 65 MMHG

## 2023-03-24 VITALS
TEMPERATURE: 98.2 F | RESPIRATION RATE: 18 BRPM | BODY MASS INDEX: 21.94 KG/M2 | DIASTOLIC BLOOD PRESSURE: 50 MMHG | WEIGHT: 142.2 LBS | SYSTOLIC BLOOD PRESSURE: 94 MMHG

## 2023-03-24 DIAGNOSIS — C15.5 MALIGNANT NEOPLASM OF LOWER THIRD OF ESOPHAGUS (H): ICD-10-CM

## 2023-03-24 DIAGNOSIS — Z76.89 ENCOUNTER TO ESTABLISH CARE: Primary | ICD-10-CM

## 2023-03-24 DIAGNOSIS — C15.5 MALIGNANT NEOPLASM OF LOWER THIRD OF ESOPHAGUS (H): Primary | ICD-10-CM

## 2023-03-24 DIAGNOSIS — G89.3 CANCER RELATED PAIN: ICD-10-CM

## 2023-03-24 PROCEDURE — 99213 OFFICE O/P EST LOW 20 MIN: CPT | Performed by: STUDENT IN AN ORGANIZED HEALTH CARE EDUCATION/TRAINING PROGRAM

## 2023-03-24 RX ORDER — HEPARIN SODIUM (PORCINE) LOCK FLUSH IV SOLN 100 UNIT/ML 100 UNIT/ML
5 SOLUTION INTRAVENOUS
Status: DISCONTINUED | OUTPATIENT
Start: 2023-03-24 | End: 2023-03-24 | Stop reason: HOSPADM

## 2023-03-24 RX ADMIN — HEPARIN SODIUM (PORCINE) LOCK FLUSH IV SOLN 100 UNIT/ML 5 ML: 100 SOLUTION at 09:35

## 2023-03-24 ASSESSMENT — ANXIETY QUESTIONNAIRES
5. BEING SO RESTLESS THAT IT IS HARD TO SIT STILL: MORE THAN HALF THE DAYS
1. FEELING NERVOUS, ANXIOUS, OR ON EDGE: NOT AT ALL
IF YOU CHECKED OFF ANY PROBLEMS ON THIS QUESTIONNAIRE, HOW DIFFICULT HAVE THESE PROBLEMS MADE IT FOR YOU TO DO YOUR WORK, TAKE CARE OF THINGS AT HOME, OR GET ALONG WITH OTHER PEOPLE: SOMEWHAT DIFFICULT
GAD7 TOTAL SCORE: 3
7. FEELING AFRAID AS IF SOMETHING AWFUL MIGHT HAPPEN: NOT AT ALL
8. IF YOU CHECKED OFF ANY PROBLEMS, HOW DIFFICULT HAVE THESE MADE IT FOR YOU TO DO YOUR WORK, TAKE CARE OF THINGS AT HOME, OR GET ALONG WITH OTHER PEOPLE?: SOMEWHAT DIFFICULT
4. TROUBLE RELAXING: SEVERAL DAYS
3. WORRYING TOO MUCH ABOUT DIFFERENT THINGS: NOT AT ALL
GAD7 TOTAL SCORE: 3
GAD7 TOTAL SCORE: 3
7. FEELING AFRAID AS IF SOMETHING AWFUL MIGHT HAPPEN: NOT AT ALL
6. BECOMING EASILY ANNOYED OR IRRITABLE: NOT AT ALL
2. NOT BEING ABLE TO STOP OR CONTROL WORRYING: NOT AT ALL

## 2023-03-24 ASSESSMENT — PAIN SCALES - GENERAL
PAINLEVEL: NO PAIN (0)
PAINLEVEL: NO PAIN (0)

## 2023-03-24 ASSESSMENT — PATIENT HEALTH QUESTIONNAIRE - PHQ9
10. IF YOU CHECKED OFF ANY PROBLEMS, HOW DIFFICULT HAVE THESE PROBLEMS MADE IT FOR YOU TO DO YOUR WORK, TAKE CARE OF THINGS AT HOME, OR GET ALONG WITH OTHER PEOPLE: NOT DIFFICULT AT ALL
SUM OF ALL RESPONSES TO PHQ QUESTIONS 1-9: 5
SUM OF ALL RESPONSES TO PHQ QUESTIONS 1-9: 5

## 2023-03-24 NOTE — PROGRESS NOTES
Diagnosed with esophageal cancer I  Has a PEG tube and port in place.  Taking most food via PEG.    Has been feeling well after chemo.  Teeth loosening and tingling in the fingers.    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    Works at [employer],  and .  Also works in a machine shop.    2 children.  37 and 27.   Has a   PET scan next week to check response to chemotherapy.    Smoothies and baby food.  Can drink ginger ale  Plain water is hard to drink- cold hurts down the esophagus.      Smoking 3-4 cigarettes.  Went 7 weeks recently.      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      Pain is well controlled.      Likes to work with models- cars, trucks.  Will be picking up some paints at [employer] tomorrow.      Wife runs in [employer] - will be 63 year.    Has gained 4 pounds.  Increased his tube feedings.  2500 calories per day.

## 2023-03-28 ENCOUNTER — TELEPHONE (OUTPATIENT)
Dept: PET IMAGING | Facility: HOSPITAL | Age: 58
End: 2023-03-28

## 2023-03-29 ENCOUNTER — HOSPITAL ENCOUNTER (OUTPATIENT)
Dept: PET IMAGING | Facility: HOSPITAL | Age: 58
Discharge: HOME | End: 2023-03-29
Attending: NURSE PRACTITIONER | Admitting: NURSE PRACTITIONER
Payer: COMMERCIAL

## 2023-03-29 DIAGNOSIS — C15.5 MALIGNANT NEOPLASM OF LOWER THIRD OF ESOPHAGUS (H): ICD-10-CM

## 2023-03-29 PROCEDURE — 343N000001 HC RX 343: Performed by: NURSE PRACTITIONER

## 2023-03-29 PROCEDURE — 78815 PET IMAGE W/CT SKULL-THIGH: CPT | Mod: PS

## 2023-03-29 PROCEDURE — A9552 F18 FDG: HCPCS | Performed by: NURSE PRACTITIONER

## 2023-03-29 RX ADMIN — FLUDEOXYGLUCOSE F-18 13.02 MILLICURIE: 500 INJECTION, SOLUTION INTRAVENOUS at 07:37

## 2023-03-30 ENCOUNTER — DOCUMENTATION ONLY (OUTPATIENT)
Dept: RADIATION ONCOLOGY | Facility: HOSPITAL | Age: 58
End: 2023-03-30
Payer: COMMERCIAL

## 2023-03-30 NOTE — NURSING NOTE
[facility] [place].  Awaiting [facility] prior authorization.  Patient is aware that once prior authorization is received we will schedule him for a follow-up and possible simulation.      Natasha Cunningham RN

## 2023-04-04 ENCOUNTER — DOCUMENTATION ONLY (OUTPATIENT)
Dept: RADIATION ONCOLOGY | Facility: HOSPITAL | Age: 58
End: 2023-04-04

## 2023-04-04 NOTE — PROGRESS NOTES
Received VA authorization today.  VA authorization #[id number].  Expiration date is 9-.  Sent VA authorization packet to prior authorization team on 4/04/2023.

## 2023-04-05 ENCOUNTER — LAB (OUTPATIENT)
Dept: ONCOLOGY | Facility: OTHER | Age: 58
End: 2023-04-05
Payer: COMMERCIAL

## 2023-04-05 ENCOUNTER — ONCOLOGY VISIT (OUTPATIENT)
Dept: ONCOLOGY | Facility: OTHER | Age: 58
End: 2023-04-05
Attending: NURSE PRACTITIONER
Payer: COMMERCIAL

## 2023-04-05 ENCOUNTER — INFUSION THERAPY VISIT (OUTPATIENT)
Dept: INFUSION THERAPY | Facility: OTHER | Age: 58
End: 2023-04-05
Payer: COMMERCIAL

## 2023-04-05 ENCOUNTER — HOSPITAL ENCOUNTER (OUTPATIENT)
Dept: EDUCATION SERVICES | Facility: HOSPITAL | Age: 58
Discharge: HOME | End: 2023-04-05
Attending: NURSE PRACTITIONER
Payer: COMMERCIAL

## 2023-04-05 VITALS — DIASTOLIC BLOOD PRESSURE: 67 MMHG | SYSTOLIC BLOOD PRESSURE: 117 MMHG | HEART RATE: 71 BPM

## 2023-04-05 VITALS
DIASTOLIC BLOOD PRESSURE: 60 MMHG | TEMPERATURE: 97.4 F | HEIGHT: 68 IN | SYSTOLIC BLOOD PRESSURE: 100 MMHG | HEART RATE: 71 BPM | RESPIRATION RATE: 20 BRPM | OXYGEN SATURATION: 97 % | BODY MASS INDEX: 21.78 KG/M2 | WEIGHT: 143.74 LBS

## 2023-04-05 DIAGNOSIS — C15.5 MALIGNANT NEOPLASM OF LOWER THIRD OF ESOPHAGUS (H): Primary | ICD-10-CM

## 2023-04-05 LAB
ALBUMIN SERPL BCG-MCNC: 3.5 G/DL (ref 3.5–5.2)
ALP SERPL-CCNC: 66 U/L (ref 40–129)
ALT SERPL W P-5'-P-CCNC: 12 U/L (ref 10–50)
ANION GAP SERPL CALCULATED.3IONS-SCNC: 7 MMOL/L (ref 7–15)
AST SERPL W P-5'-P-CCNC: 21 U/L (ref 10–50)
BASOPHILS # BLD AUTO: 0 10E3/UL (ref 0–0.2)
BASOPHILS NFR BLD AUTO: 1 %
BILIRUB SERPL-MCNC: 0.3 MG/DL
BUN SERPL-MCNC: 9.7 MG/DL (ref 8–23)
CALCIUM SERPL-MCNC: 8.7 MG/DL (ref 8.8–10.2)
CHLORIDE SERPL-SCNC: 105 MMOL/L (ref 98–107)
CREAT SERPL-MCNC: 0.5 MG/DL (ref 0.67–1.17)
DEPRECATED HCO3 PLAS-SCNC: 28 MMOL/L (ref 22–29)
EOSINOPHIL # BLD AUTO: 0.2 10E3/UL (ref 0–0.7)
EOSINOPHIL NFR BLD AUTO: 5 %
ERYTHROCYTE [DISTWIDTH] IN BLOOD BY AUTOMATED COUNT: 20.7 % (ref 10–15)
GFR SERPL CREATININE-BSD FRML MDRD: >90 ML/MIN/1.73M2
GLUCOSE SERPL-MCNC: 109 MG/DL (ref 70–99)
HCT VFR BLD AUTO: 35.6 % (ref 40–53)
HGB BLD-MCNC: 11.3 G/DL (ref 13.3–17.7)
IMM GRANULOCYTES # BLD: 0 10E3/UL
IMM GRANULOCYTES NFR BLD: 0 %
LYMPHOCYTES # BLD AUTO: 1.3 10E3/UL (ref 0.8–5.3)
LYMPHOCYTES NFR BLD AUTO: 34 %
MCH RBC QN AUTO: 28.6 PG (ref 26.5–33)
MCHC RBC AUTO-ENTMCNC: 31.7 G/DL (ref 31.5–36.5)
MCV RBC AUTO: 90 FL (ref 78–100)
MONOCYTES # BLD AUTO: 0.6 10E3/UL (ref 0–1.3)
MONOCYTES NFR BLD AUTO: 16 %
NEUTROPHILS # BLD AUTO: 1.7 10E3/UL (ref 1.6–8.3)
NEUTROPHILS NFR BLD AUTO: 44 %
NRBC # BLD AUTO: 0 10E3/UL
NRBC BLD AUTO-RTO: 0 /100
PLATELET # BLD AUTO: 89 10E3/UL (ref 150–450)
POTASSIUM SERPL-SCNC: 4.7 MMOL/L (ref 3.4–5.3)
PROT SERPL-MCNC: 6.6 G/DL (ref 6.4–8.3)
RBC # BLD AUTO: 3.95 10E6/UL (ref 4.4–5.9)
SODIUM SERPL-SCNC: 140 MMOL/L (ref 136–145)
TSH SERPL DL<=0.005 MIU/L-ACNC: 0.76 UIU/ML (ref 0.3–4.2)
WBC # BLD AUTO: 3.9 10E3/UL (ref 4–11)

## 2023-04-05 PROCEDURE — 999N000107 HC STATISTIC NUTRITIONAL THERAPY NO CHARGE: Performed by: DIETITIAN, REGISTERED

## 2023-04-05 PROCEDURE — 96411 CHEMO IV PUSH ADDL DRUG: CPT | Performed by: INTERNAL MEDICINE

## 2023-04-05 PROCEDURE — 99417 PROLNG OP E/M EACH 15 MIN: CPT | Performed by: NURSE PRACTITIONER

## 2023-04-05 PROCEDURE — 96413 CHEMO IV INFUSION 1 HR: CPT | Performed by: INTERNAL MEDICINE

## 2023-04-05 PROCEDURE — 96368 THER/DIAG CONCURRENT INF: CPT | Performed by: INTERNAL MEDICINE

## 2023-04-05 PROCEDURE — 36591 DRAW BLOOD OFF VENOUS DEVICE: CPT | Performed by: INTERNAL MEDICINE

## 2023-04-05 PROCEDURE — 96415 CHEMO IV INFUSION ADDL HR: CPT | Performed by: INTERNAL MEDICINE

## 2023-04-05 PROCEDURE — 99215 OFFICE O/P EST HI 40 MIN: CPT | Performed by: NURSE PRACTITIONER

## 2023-04-05 PROCEDURE — 80050 GENERAL HEALTH PANEL: CPT | Performed by: INTERNAL MEDICINE

## 2023-04-05 PROCEDURE — 96417 CHEMO IV INFUS EACH ADDL SEQ: CPT | Performed by: INTERNAL MEDICINE

## 2023-04-05 PROCEDURE — 96367 TX/PROPH/DG ADDL SEQ IV INF: CPT | Performed by: INTERNAL MEDICINE

## 2023-04-05 PROCEDURE — 96521 REFILL/MAINT PORTABLE PUMP: CPT | Mod: 59 | Performed by: INTERNAL MEDICINE

## 2023-04-05 RX ORDER — HEPARIN SODIUM,PORCINE 10 UNIT/ML
5 VIAL (ML) INTRAVENOUS
Status: CANCELLED | OUTPATIENT
Start: 2023-04-05

## 2023-04-05 RX ORDER — HEPARIN SODIUM (PORCINE) LOCK FLUSH IV SOLN 100 UNIT/ML 100 UNIT/ML
5 SOLUTION INTRAVENOUS
Status: CANCELLED | OUTPATIENT
Start: 2023-04-07

## 2023-04-05 RX ORDER — FLUOROURACIL 50 MG/ML
400 INJECTION, SOLUTION INTRAVENOUS ONCE
Status: COMPLETED | OUTPATIENT
Start: 2023-04-05 | End: 2023-04-05

## 2023-04-05 RX ORDER — METHYLPREDNISOLONE SODIUM SUCCINATE 125 MG/2ML
125 INJECTION, POWDER, LYOPHILIZED, FOR SOLUTION INTRAMUSCULAR; INTRAVENOUS
Status: CANCELLED
Start: 2023-04-05

## 2023-04-05 RX ORDER — FLUOROURACIL 50 MG/ML
400 INJECTION, SOLUTION INTRAVENOUS ONCE
Status: CANCELLED | OUTPATIENT
Start: 2023-04-05

## 2023-04-05 RX ORDER — LORAZEPAM 2 MG/ML
0.5 INJECTION INTRAMUSCULAR EVERY 4 HOURS PRN
Status: CANCELLED | OUTPATIENT
Start: 2023-04-05

## 2023-04-05 RX ORDER — ONDANSETRON 2 MG/ML
8 INJECTION INTRAMUSCULAR; INTRAVENOUS ONCE
Status: COMPLETED | OUTPATIENT
Start: 2023-04-05 | End: 2023-04-05

## 2023-04-05 RX ORDER — DIPHENHYDRAMINE HYDROCHLORIDE 50 MG/ML
50 INJECTION INTRAMUSCULAR; INTRAVENOUS
Status: CANCELLED
Start: 2023-04-05

## 2023-04-05 RX ORDER — EPINEPHRINE 1 MG/ML
0.3 INJECTION, SOLUTION, CONCENTRATE INTRAVENOUS EVERY 5 MIN PRN
Status: CANCELLED | OUTPATIENT
Start: 2023-04-05

## 2023-04-05 RX ORDER — HEPARIN SODIUM (PORCINE) LOCK FLUSH IV SOLN 100 UNIT/ML 100 UNIT/ML
5 SOLUTION INTRAVENOUS
Status: CANCELLED | OUTPATIENT
Start: 2023-04-05

## 2023-04-05 RX ORDER — ALBUTEROL SULFATE 0.83 MG/ML
2.5 SOLUTION RESPIRATORY (INHALATION)
Status: CANCELLED | OUTPATIENT
Start: 2023-04-05

## 2023-04-05 RX ORDER — MEPERIDINE HYDROCHLORIDE 25 MG/ML
25 INJECTION INTRAMUSCULAR; INTRAVENOUS; SUBCUTANEOUS EVERY 30 MIN PRN
Status: CANCELLED | OUTPATIENT
Start: 2023-04-05

## 2023-04-05 RX ORDER — NICOTINE 21 MG/24HR
PATCH, TRANSDERMAL 24 HOURS TRANSDERMAL
COMMUNITY
Start: 2023-03-29 | End: 2023-01-01

## 2023-04-05 RX ORDER — ALBUTEROL SULFATE 90 UG/1
1-2 AEROSOL, METERED RESPIRATORY (INHALATION)
Status: CANCELLED
Start: 2023-04-05

## 2023-04-05 RX ORDER — HEPARIN SODIUM,PORCINE 10 UNIT/ML
5 VIAL (ML) INTRAVENOUS
Status: CANCELLED | OUTPATIENT
Start: 2023-04-07

## 2023-04-05 RX ADMIN — Medication 250 ML: at 11:10

## 2023-04-05 RX ADMIN — FLUOROURACIL 690 MG: 50 INJECTION, SOLUTION INTRAVENOUS at 14:38

## 2023-04-05 RX ADMIN — ONDANSETRON 8 MG: 2 INJECTION INTRAMUSCULAR; INTRAVENOUS at 11:10

## 2023-04-05 ASSESSMENT — PAIN SCALES - GENERAL: PAINLEVEL: MODERATE PAIN (4)

## 2023-04-05 NOTE — NURSING NOTE
"Oncology Rooming Note    April 5, 2023 9:32 AM   Kenneth Matthew is a 58 year old male who presents for:    Chief Complaint   Patient presents with     Oncology Clinic Visit     Follow up Malignant neoplasm of lower third of esophagus     Initial Vitals: /60   Pulse 71   Temp 97.4  F (36.3  C) (Tympanic)   Resp 20   Ht 1.715 m (5' 7.5\")   Wt 65.2 kg (143 lb 11.8 oz)   SpO2 97%   BMI 22.18 kg/m   Estimated body mass index is 22.18 kg/m  as calculated from the following:    Height as of this encounter: 1.715 m (5' 7.5\").    Weight as of this encounter: 65.2 kg (143 lb 11.8 oz). Body surface area is 1.76 meters squared.  Moderate Pain (4) Comment: G tube site abdominal   No LMP for male patient.  Allergies reviewed: Yes  Medications reviewed: Yes    Medications: Medication refills not needed today.  Pharmacy name entered into [facility]:    [id number] - [place], [place] - [address]  [facility] [place] - [id number]            "

## 2023-04-05 NOTE — PROGRESS NOTES
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  He initiated systemic therapy with nivolumab/FOLFOX 1/11/2023 with potential for palliative radiation therapy at some point.     He presents to the clinic today with his brother stating he is doing okay.  He does have a few questions and would like to review his PET scan.  He was called with his PET scan results by Dr. Barnhart on 3/29/2023 with plan to undergo EGD due to ongoing swallowing difficulty and also have a consultation with the Radiation Oncologist.    He is having some discomfort again around the G-tube site stating that it is not red or irritated but feels it pushing more on his abdomen.  He does admit to gaining some weight so he is thinking maybe the bumper needs to be released.  He does question whether or not he will have his G-tube changed and undergo an EGD on 4/12/2023 as he is supposed to see Dr. Dalton.  He also is set up to see radiation therapy on 4/10/2023 and is wondering if the EGD should be done prior to this.    He continues to have difficulty with swallowing solids stating he is able to drink smoothies and able to drink water.  More solid foods continue to be difficult stating that he is able to pass the solids but then starts vomiting up fluid.  He is on the Prilosec and states he thinks it may be helping somewhat.  He does also use Compazine for a couple of days and then Zofran for a couple of days taking something on a daily basis for nausea.  He is putting about 1-1/2 cans of tube feeding through his G-tube 3 times a day.    He does have increased fatigue stating he is  not sleeping well at night.  His pain is under better control having very little pain in his back but does have discomfort around the G-tube as stated previously.    He did start smoking again and is smoking about 6 to 7 cigarettes/day.  He does plan to initiate the patch again.    Oncologic History:     Experienced difficulty swallowing and 60 pound weight loss  10/21/2022: CT chest, abdomen and pelvis: Diffuse wall thickening of the distal esophagus with associated mediastinal adenopathy, and upper abdominal  Conglomerate lymphadenopathy. Constellation of findings are highly concerning for underlying esophageal carcinoma with metastatic disease.   11/1/2022: Colonoscopy with polyp showing tubular adenoma and EGD: A large ulcerating mass with no bleeding and no stigmata of recent bleeding was found in the lower third of the esophagus, 35 to 40 cm from the incisors.  The mass was partially obstructing and partially circumferential involving  two thirds.    **Biopsy showed poorly differentiated favoring adenocarcinoma. Her 2 negative.   11/22/2002 patient met with Dr. Barnhart to discuss systemic therapy pending PET results/complete staging  12/5/2022 patient met with Dr. Spain with radiation oncology with possible plan to initiate radiation therapy pending results of PET scan  12/14/2022: PET scan: Distal esophageal neoplasm with metastatic involvement the  mediastinum, left hilum and retroperitoneal lymph nodes of the upperabdomen. Also likely localized metastatic involvement of thestomach/GE junction. 2 cm intramural distal esophageal/GE junction mass. Circumferential wall thickening with increased FDG uptake in the distal esophagus adjacent portions of the stomach/GE junction may represent localized disease however could also represent localized inflammation. Metastatic involvement to the subcarinal nodes, left infrahilar and  pericaval/paraesophageal lymph nodes of the lower thorax. Metastatic involvement to the  retroperitoneal lymph nodes of the upper abdomen. These lymph nodes have increased in size dating back to be  10/21/2022 CT scan consistent with worsening disease. Representative  nodes are described above.  12/20/2022  He was seen by Dr. Barnhart with plan to initiate palliative nivolumab/FOLFOX with potential for palliative Radiation at some point  12/22/2022: Port-A-Cath placement and gastrostomy tube insertion by Dr. Dalton  1/11/2023 initiated treatment  3/29/2023  PET scan:    IMPRESSION:   Significant interval improvement.     Marked decrease in size of distal esophageal/GE junction masses with  near complete resolution of enlarged, FDG avid subcarinal,  paraesophageal and retroperitoneal lymph nodes.      Normal-sized lymph nodes throughout the head and neck, chest, abdomen  and pelvis now demonstrate FDG uptake, the majority of which do not  exceed background suggesting diffuse inflammatory response, possibly  related to therapy.      **Dr Barnhart reviewed with patient and recommended an EGD and discussed radiation consult.         RESULT FOR IMMUNOHISTOCHEMICAL VENTANA CLONE  PD-L1 ASSAY  COMBINED POSITIVE SCORE (CPS): <1%     Treatment Goal: Palliative     TX: Nivolumab 240 mg/oxaliplatin 85 mg per metered squared/leucovorin 350 mg per metered squared/fluorouracil 400 mg per metered squared IV bolus/fluorouracil 2400 mg per metered squared via continuous infusion over 46 hours with cycle given every 14 days     Previous treatment:  N/A     Past Medical History:   Diagnosis Date     Cancer (H)      Esophageal cancer (H) 11/01/2022     Esophageal thickening 10/25/2022     Mixed hearing loss, bilateral      Tobacco abuse 1/4/2023     Tobacco use disorder 12/27/2019       Past Surgical History:   Procedure Laterality Date     COLONOSCOPY  11/01/2022    [facility]     INSERT PORT VASCULAR ACCESS N/A 12/22/2022    Procedure: POWER PORT PLACEMENT;  Surgeon: Nba Dalton MD;  Location: [facility] OR     Upper GI Endoscopy   11/01/2022       Family History   Problem Relation Age of Onset     Breast Cancer Mother      Hyperlipidemia Mother      Diabetes Mother      Coronary Artery Disease Mother      Cerebrovascular Disease Father      Hyperlipidemia Father      Coronary Artery Disease Father      Myocardial Infarction Father      Hyperlipidemia Brother      Hypertension Brother      Diabetes Brother      Asthma Sister      Breast Cancer Sister      Hyperlipidemia Sister      Diabetes Sister      Hyperlipidemia Sister      Diabetes Sister      Hyperlipidemia Sister      Suicide Sister      Anesthesia Reaction Sister      Breast Cancer Sister      Hyperlipidemia Sister      Diabetes Sister      Breast Cancer Niece      Skin Cancer Niece      Thyroid Disease Niece      Breast Cancer Niece      Leukemia Nephew      Colon Cancer No family hx of      Prostate Cancer No family hx of      Genetic Disorder No family hx of        Social History     Socioeconomic History     Marital status:      Spouse name: Jazzy     Number of children: 2     Years of education: Not on file     Highest education level: Not on file   Occupational History     Not on file   Tobacco Use     Smoking status: Every Day     Packs/day: 0.50     Years: 46.00     Pack years: 23.00     Types: Cigarettes     Start date: 1976     Smokeless tobacco: Never     Tobacco comments:     Trying to quit   Vaping Use     Vaping status: Never Used   Substance and Sexual Activity     Alcohol use: Not Currently     Comment: no alcohol in last 6 months     Drug use: Never     Sexual activity: Not on file   Other Topics Concern     Parent/sibling w/ CABG, MI or angioplasty before 65F 55M? Yes     Comment: father   Social History Narrative     Not on file     Social Determinants of Health     Financial Resource Strain: Not on file   Food Insecurity: Not on file   Transportation Needs: Not on file   Physical Activity: Not on file   Stress: Not on file   Social Connections: Not on file  "  Intimate Partner Violence: Not on file   Housing Stability: Not on file       Current Outpatient Medications   Medication     ibuprofen (ADVIL/MOTRIN) 200 MG tablet     omeprazole (PRILOSEC) 20 MG DR capsule     ondansetron (ZOFRAN) 8 MG tablet     prochlorperazine (COMPAZINE) 10 MG tablet     HYDROcodone-Acetaminophen 5-217 MG/10ML SOLN     hydrocortisone (CORTAID) 1 % external cream     nicotine (NICODERM CQ) 14 MG/24HR 24 hr patch     nicotine (NICODERM CQ) 7 MG/24HR 24 hr patch     nicotine (NICORETTE) 2 MG gum     polyethylene glycol (MIRALAX) 17 GM/Dose powder     No current facility-administered medications for this visit.        No Known Allergies    Review Of Systems:  Constitutional:    denies fever, weight changes, chills, and night sweats.  Eyes:    denies blurred or double vision  Ears/Nose/Throat:   denies ear pain, difficulty swallowing; gets nose bleed at times with episodes of vomiting  Respiratory:   denies shortness of breath, cough   Skin:   denies rash, lesions  Cardiovascular:   denies chest pain, palpitations, slight edema to lower extremities  Gastrointestinal:   denies abdominal pain, bloating-see hPI; no change in bowel habits or blood in stool  Genitourinary:   denies difficulty with urination, blood in urine  Musculoskeletal:    denies new muscle pain, bone pain  Neurologic:   denies lightheadedness, denies headaches, denies continuous numbness or tingling  Psychiatric:   denies anxiety, depression  Hematologic/Lymphatic/Immunologic:   denies easy bruising, easy bleeding, lumps or bumps noted  Endocrine:   Denies increased thirst      ECOG Performance Status: 1    Physical Exam:  /60   Pulse 71   Temp 97.4  F (36.3  C) (Tympanic)   Resp 20   Ht 1.715 m (5' 7.5\")   Wt 65.2 kg (143 lb 11.8 oz)   SpO2 97%   BMI 22.18 kg/m      GENERAL APPEARANCE: Healthy, alert and in no acute distress.  HEENT: Normocephalic, Sclerae anicteric.  No obvious oral lesions or thrush  NECK:   No " asymmetry or masses, no thyromegaly.  LYMPHATICS: No palpable cervical, supraclavicular, axillary, or inguinal nodes   RESP: Lungs clear to auscultation bilaterally, respirations regular and easy  CARDIOVASCULAR: Regular rate and rhythm. Normal S1, S2; no murmur, gallop, or rub.  MUSCULOSKELETAL: Extremities without gross deformities noted. mild edema of bilateral lower legs  SKIN: No suspicious lesions or rashes to exposed skin  NEURO: Alert and oriented x 3.  Gait steady.  PSYCHIATRIC: Mentation and affect appear normal.  Mood appropriate.    Laboratory:  Results for orders placed or performed in visit on 04/05/23   Comprehensive metabolic panel     Status: Abnormal   Result Value Ref Range    Sodium 140 136 - 145 mmol/L    Potassium 4.7 3.4 - 5.3 mmol/L    Chloride 105 98 - 107 mmol/L    Carbon Dioxide (CO2) 28 22 - 29 mmol/L    Anion Gap 7 7 - 15 mmol/L    Urea Nitrogen 9.7 8.0 - 23.0 mg/dL    Creatinine 0.50 (L) 0.67 - 1.17 mg/dL    Calcium 8.7 (L) 8.8 - 10.2 mg/dL    Glucose 109 (H) 70 - 99 mg/dL    Alkaline Phosphatase 66 40 - 129 U/L    AST 21 10 - 50 U/L    ALT 12 10 - 50 U/L    Protein Total 6.6 6.4 - 8.3 g/dL    Albumin 3.5 3.5 - 5.2 g/dL    Bilirubin Total 0.3 <=1.2 mg/dL    GFR Estimate >90 >60 mL/min/1.73m2   TSH with free T4 reflex     Status: Normal   Result Value Ref Range    TSH 0.76 0.30 - 4.20 uIU/mL   CBC with platelets and differential     Status: Abnormal   Result Value Ref Range    WBC Count 3.9 (L) 4.0 - 11.0 10e3/uL    RBC Count 3.95 (L) 4.40 - 5.90 10e6/uL    Hemoglobin 11.3 (L) 13.3 - 17.7 g/dL    Hematocrit 35.6 (L) 40.0 - 53.0 %    MCV 90 78 - 100 fL    MCH 28.6 26.5 - 33.0 pg    MCHC 31.7 31.5 - 36.5 g/dL    RDW 20.7 (H) 10.0 - 15.0 %    Platelet Count 89 (L) 150 - 450 10e3/uL    % Neutrophils 44 %    % Lymphocytes 34 %    % Monocytes 16 %    % Eosinophils 5 %    % Basophils 1 %    % Immature Granulocytes 0 %    NRBCs per 100 WBC 0 <1 /100    Absolute Neutrophils 1.7 1.6 - 8.3 10e3/uL     Absolute Lymphocytes 1.3 0.8 - 5.3 10e3/uL    Absolute Monocytes 0.6 0.0 - 1.3 10e3/uL    Absolute Eosinophils 0.2 0.0 - 0.7 10e3/uL    Absolute Basophils 0.0 0.0 - 0.2 10e3/uL    Absolute Immature Granulocytes 0.0 <=0.4 10e3/uL    Absolute NRBCs 0.0 10e3/uL   CBC with platelets differential     Status: Abnormal    Narrative    The following orders were created for panel order CBC with platelets differential.  Procedure                               Abnormality         Status                     ---------                               -----------         ------                     CBC with platelets and d...[[id number]]  Abnormal            Final result                 Please view results for these tests on the individual orders.       Imaging Studies:      Results for orders placed or performed during the hospital encounter of 03/29/23   PET Oncology (Eyes to Thighs)    Narrative    PET ONCOLOGY (EYES TO THIGHS), 3/29/2023 8:50 AM  Subsequent     History: Male, age 58 years; Response to therapy stage 4 esophageal  cancer; Malignant neoplasm of lower third of esophagus (H)    Comparison: PET CT 12/14/2022    Radiopharmaceutical: 13.02 millicuries F-18 fluorodeoxyglucose  Blood sugar: 106 mg/dL    FINDINGS:   Head and neck:   Normal-sized lymph nodes throughout the neck and increased slightly in  size and FDG uptake. None of these nodes is pathologically enlarged  however some nodes do demonstrate uptake that exceeds background. A  right jugulodigastric node demonstrates a maximum SUV of 4.    Thorax:   The large FDG avid mass seen previously subcarinal region is no longer  appreciated. A normal sized collection of nodes measuring 9.8 mm in  short axis is seen in this location with mildly elevated FDG uptake,  max SUV of 4.1.    Normal-sized lymph nodes in the left and right axilla now demonstrate  increased FDG uptake which does not exceed background. Similar  findings are also seen within the mediastinum.    An  area of circumferential wall thickening of the distal esophagus has  decreased in severity. The AP thickness of the distal esophagus in  this location currently measures 8 mm, producing measuring approximate  14 mm. Current max SUV in this region is 16.    Enlarged, FDG avid lymph nodes in the left paraesophageal region are  no longer appreciated.    A 2.1 x 1.6 cm FDG avid lesion in the left lateral aspect of the  distal esophagus is decreased significantly in size, previously  measuring approximately 2.8 x 2.2 cm when measured in a similar  fashion. Current max SUV is 12.1.      Abdomen/pelvis:   Circumferential wall thickening involving the GE junction is also much  less evident. Previously, the affected area measured approximately 5 x  5.4 cm in size. Currently, only a 18 x 17 mm localized focus of  mucosal thickening is seen. Current maximum SUV of 16.4.     FDG avid lymph nodes along the lesser curvature of the stomach of also  decreased in number and size. Currently, a 12.5 x 12.5 mm collection  of nodes is seen along the lesser curvature, previously this focus  measured approximately 25 x 20 mm in size.    Numerous enlarged, FDG avid lymph nodes lying near and adjacent to the  head and uncinate process of the pancreas have normalized in size. FDG  uptake of these lymph nodes is not excluded background.  There is normal FDG uptake seen throughout the liver, gallbladder,  spleen, pancreas and adrenal glands. Enlargement of the adrenal glands  is similar in appearance. Normal activity also seen throughout the GI  tract, and the kidneys and the urinary bladder.    Normal-sized lymph nodes within the left groin demonstrate FDG uptake  which does not exceed background.        Bony structures: No abnormal FDG uptake that would suggest malignant  involvement of the bony structures. A.m. the middle          Impression    IMPRESSION:   Significant interval improvement.    Marked decrease in size of distal  esophageal/GE junction masses with  near complete resolution of enlarged, FDG avid subcarinal,  paraesophageal and retroperitoneal lymph nodes.     Normal-sized lymph nodes throughout the head and neck, chest, abdomen  and pelvis now demonstrate FDG uptake, the majority of which do not  exceed background suggesting diffuse inflammatory response, possibly  related to therapy.    ROBERTO REINOSO MD         SYSTEM ID:  [id number]       ASSESSMENT/PLAN:    #1 Esophageal cancer: Stage IV adenocarcinoma of the esophagus diagnosed November 2022.  He initiated systemic therapy with nivolumab/FOLFOX 1/11/2023 with potential for palliative radiation therapy at some point.  Has tolerated treatment fairly well and performance status has improved. We will go forth with cycle 7 therapy today.  We will see him prior to cycle 8 with labs per treatment plan.  RT consult to be arranged after EGD.     #2  Anemia: Likely multifactorial including disease and chemotherapy.  Possibly a component of some iron deficiency though soluble transferrin receptor is within normal limits.     #3  Cancer related pain:  Will continue with the hydrocodone as needed for pain.       #4 tobacco use: discussed briefly.  He is planning to initiate the patch again.    #5  Reflux:  Unsure of why he is continuing to get the episodes of reflux of fluids after attempting to eat solids.  He's had good response on PET but continued tumor.  EGD planned and may tell us more.  Will hold off on RT consult until after EGD.    I encouraged patient to call with any questions or concerns.    70 minutes spent in the patient's encounter today with time spent in review of the chart along with in chart preparation.  Time was also spent in review of treatment plan.  Time was also spent obtaining a review of systems and performing a physical exam along with review of his lab work and also his PET scan in detail.  Time was also spent in discussion of side effects and management.   Time spent in discussion of plan for EGD and radiation therapy in sequence.  Time spent in communicating with Dr. Dalton and also in communicating with radiation therapy in regards to plan.  Time spent in dictating a letter.  Time spent in placing orders and in chart documentation.    Delmy Valenzuela, NP  APRN, FNP-BC, AOCNP

## 2023-04-05 NOTE — PROGRESS NOTES
Patient is 58  years old, here today for infusion of folfox.     Patients port accessed using non-coring, 19 gauge, 3/4 inch needle. Port accessed per facility protocol. Port flushed easily, blood return noted. No signs and symptoms of infection or infiltration.     Hand hygiene performed: yes   Mask donned by caregiver: yes   Site prepped with CHG: yes   Labs drawn: yes   Dressing applied using aseptic technique: yes     Lab values: With in treatment parameters     Patient meets parameters for today's infusion.   Patient denies questions or concerns regarding infusion and/or medication(s) being administered.  Patient identified with two identifiers, order verified, and verbal consent for today's infusion obtained from patient.       1143  IV pump verified with Opdivo dose, drug, and rate of administration. Infusion administered per protocol. Patient tolerated infusion well, no signs or symptoms of adverse reaction noted. Patient denies pain or discomfort      1230 IV pump verified with oxaliplatin and leucovorin dose, drug, and rate of administration. Infusion administered per protocol. Patient tolerated infusion well, no signs or symptoms of adverse reaction noted. Patient denies pain or discomfort.          1438  Fluorouracil IV syringe verified with dose, drug, and rate of administration. Infusion administered per protocol. Patient tolerated infusion well, no signs or symptoms of adverse reaction noted. Patient denies pain or discomfort.     1443  Fluorouracil home infuser verified with dose, drug, and rate of administration. Home infuser connected, clamps taped open, dressing reinforced. Patient denies pain or discomfort. No signs or symptoms of infiltration or infection. Patient discharged.

## 2023-04-05 NOTE — PROGRESS NOTES
"[facility]  Medical Nutrition Therapy    Visit Type: Reassessment    Kenneth BELL Vipul referred by Dr. Nika Barnhart  for MNT related to Malignant neoplasm of lower third of esophagus      Patient accompanied by his brother.    Nutrition Assessment:  Anthropometrics  Height: 5' 7.5\"  BMI:  22.18      Weight: 143lb 11.8 oz    BSA: 1.76        Wt Readings from Last 10 Encounters:   04/05/23 65.2 kg (143 lb 11.8 oz)   03/24/23 64.5 kg (142 lb 3.2 oz)   03/24/23 64.5 kg (142 lb 3.2 oz)   03/22/23 62.8 kg (138 lb 7.2 oz)   03/10/23 63.5 kg (139 lb 15.9 oz)   03/08/23 61.9 kg (136 lb 7.4 oz)   02/24/23 61.9 kg (136 lb 7.4 oz)   02/22/23 62 kg (136 lb 11 oz)   02/10/23 62.6 kg (137 lb 14.4 oz)   02/08/23 62.1 kg (136 lb 14.5 oz)       Nutrition History  Pt has been able to increase his intake of formula to 1.5 cartons 3x daily consistently. He is drinking smoothies with whey powder, v8 juice, 4 Ensure apple juice. Eating some desserts. Tried mac and cheese with veggies- but after a few bites got sick. [facility] the TwoCal formula. But he is getting the Ensure clear apple [facility] the [facility]. Still having difficulty with swallowing. [facility] a [facility] diet[facility] coming up, he is hoping to get more ensure clear.     Food Recall  1.5 isocource- 1.5 cans 3x per day  Smoothie with whey  3 ensure clear apple  V8  Candy- chocolate    Physical Activity  movement around the house     Nutrition Prescription- weight used to estimate needs 63.5kg  Energy:   1900-2225kcal (30-35g/kg)    Protein:   75-95g (1.2-1.5g/kg)    Fluid:   1900-2225ml (1ml/kcal)         Nutrition Diagnosis:  Malnutrition related to swallowing difficulty/reduced intake as evidenced by weight loss of 32% weight loss in 1 year.       Nutrition Intervention:  Continue with 4.5 cartons of Isosource 1.5 and oral intake as tolerated- shakes, smoothies.      Nutrition Goals:   Adequate intake oral/TF to meet nutrition needs to maintain " weight/promote healthy weight gain     Nutrition Follow Up / Monitoring:   TF placement, intake, weight, labs.     Nutrition Recommendations:  TF Recs  4.5 cartons of Isosource 1.5 daily- bolus feeds  1688 kcal, 76.5 g protein, 859.5 ml free water  Flush tube with 30-60ml water before and after formula bolus.  Plan to continue to follow pt and adjust as oral intake changes.    Will place new order for increased formula use.    May need to increase further depending on oral intake as he receives radiation thearpy.      Time spent with pt - 15 min     Patient to follow-up with RD in 2-4 weeks.   Patient has RD contact information to call/email if needed.

## 2023-04-05 NOTE — LETTER
2023      RE:  Kenneth Matthew  :  1965      To whom it May Concern,    Kenneth will not be able to return to work until he is reassessed in 3 months, due to ongoing medical treatment and experiencing side effects.  The planned date to return to work is unknown at this time.    Sincerely,         Martha ALBRIGHT, FNP-BC, AOCNP  Medical Oncology  [phone]

## 2023-04-07 ENCOUNTER — DOCUMENTATION ONLY (OUTPATIENT)
Dept: ONCOLOGY | Facility: OTHER | Age: 58
End: 2023-04-07

## 2023-04-07 ENCOUNTER — INFUSION THERAPY VISIT (OUTPATIENT)
Dept: INFUSION THERAPY | Facility: OTHER | Age: 58
End: 2023-04-07
Payer: COMMERCIAL

## 2023-04-07 DIAGNOSIS — C15.5 MALIGNANT NEOPLASM OF LOWER THIRD OF ESOPHAGUS (H): Primary | ICD-10-CM

## 2023-04-07 PROCEDURE — 96360 HYDRATION IV INFUSION INIT: CPT | Performed by: INTERNAL MEDICINE

## 2023-04-07 RX ORDER — HEPARIN SODIUM (PORCINE) LOCK FLUSH IV SOLN 100 UNIT/ML 100 UNIT/ML
5 SOLUTION INTRAVENOUS
Status: DISCONTINUED | OUTPATIENT
Start: 2023-04-07 | End: 2023-04-07 | Stop reason: HOSPADM

## 2023-04-07 RX ORDER — HEPARIN SODIUM (PORCINE) LOCK FLUSH IV SOLN 100 UNIT/ML 100 UNIT/ML
5 SOLUTION INTRAVENOUS
Status: CANCELLED | OUTPATIENT
Start: 2023-04-07

## 2023-04-07 RX ADMIN — HEPARIN SODIUM (PORCINE) LOCK FLUSH IV SOLN 100 UNIT/ML 5 ML: 100 SOLUTION at 10:45

## 2023-04-07 RX ADMIN — Medication 1000 ML: at 09:38

## 2023-04-07 NOTE — PROGRESS NOTES
Patient stopped down here after infusion.  He questions whether or not he will be seeing radiation therapy on Monday.  We discussed again having the EGD performed before radiation consult so we will cancel his radiation therapy consult on Monday.  He will see Dr. Dalton on Wednesday as planned as he would like to talk to him about the G-tube bumper.  Hopefully he will get scheduled for the EGD.  We will make a plan for radiation therapy consult after the EGD is performed.

## 2023-04-07 NOTE — PROGRESS NOTES
Intravenous fluids were administered, normal saline 1000 cc's.  Per facility policy.  22 angio cath placed in right antecubital fossa with out issue or incident.  Patient tolerated infusion well.

## 2023-04-07 NOTE — PROGRESS NOTES
Patient arrived to facility CADD PUMP C series was not completely empty.  Patient did agree to have IV started to initiate IV fluids.  And wait for pump to empty more.  Once IV fluids were completed patient had an estimated 6-10 mls remaining in pump.  This writer offered for patient to remain on site for pump to continue infusing.  Patient did not wish to stay.  Pump disconnected at his request.  Provider updated.    Patient is 58 years old, here today for home infuser pump off.     Patient denies adverse effects from home infusion, nor mechanical issues.  Pump is intact.  Infuser disconnected from port.      Port flushed per MAR.  Immediate blood return noted.  Flushes easily, without resistance.      Port needle removed, needle intact.  Surrounding skin without redness, swelling, nor discoloration.  Skin warm and dry to touch.      Patient tolerated well.  Site covered with sterile bandage and slight pressure applied for 30 seconds. Instructed patient to leave bandage on for a minimum of one hour and to call with questions or concerns.    Education provided for signs and symptoms of infection and when to call the doctor.  Patient states understanding and is in agreement with this plan. Patient discharged.

## 2023-04-12 ENCOUNTER — PREP FOR PROCEDURE (OUTPATIENT)
Dept: SURGERY | Facility: OTHER | Age: 58
End: 2023-04-12

## 2023-04-12 ENCOUNTER — OFFICE VISIT (OUTPATIENT)
Dept: SURGERY | Facility: OTHER | Age: 58
End: 2023-04-12
Attending: SURGERY
Payer: COMMERCIAL

## 2023-04-12 VITALS
BODY MASS INDEX: 21.67 KG/M2 | DIASTOLIC BLOOD PRESSURE: 56 MMHG | HEIGHT: 68 IN | WEIGHT: 143 LBS | TEMPERATURE: 97.6 F | HEART RATE: 79 BPM | SYSTOLIC BLOOD PRESSURE: 94 MMHG | OXYGEN SATURATION: 99 %

## 2023-04-12 DIAGNOSIS — C15.5 MALIGNANT NEOPLASM OF LOWER THIRD OF ESOPHAGUS (H): ICD-10-CM

## 2023-04-12 DIAGNOSIS — C15.9 ESOPHAGEAL CANCER (H): Primary | ICD-10-CM

## 2023-04-12 PROCEDURE — 99213 OFFICE O/P EST LOW 20 MIN: CPT | Performed by: SURGERY

## 2023-04-12 ASSESSMENT — PAIN SCALES - GENERAL: PAINLEVEL: NO PAIN (0)

## 2023-04-12 NOTE — PATIENT INSTRUCTIONS
Thank you for allowing Dr. Dalton and our surgical team to participate in your care. Please call our health unit coordinator at [phone] with scheduling questions or the nurse at [phone] with any other questions or concerns.      You have been scheduled for: Upper endoscopy, with feeding tube exchange with  on 4/25/23.     Please see handout for additional instruction.  You don't need a pre-operative appointment with your primary care provider.  You may call [phone] or [phone] with any questions.

## 2023-04-13 NOTE — PROGRESS NOTES
[facility] Surgery Consultation    CC:  Dysphagia    HPI:  This 58 year old year old male is seen at the request of Martha Negro for evaluation of dysphagia. Currently undergoing chemotherapy for esophageal cancer. He is gaining weight but is still having issues with swallowing. He is unsure if he would like to proceed on with radiation. He is still eating pureed foods. Though seems when he drinks liquids he can be sitting up and have it come right back up for him. He also notes some pain in his abdomen depending on the position of his G-tube. Would like to discuss replacement.     Past Medical History:   Diagnosis Date     Cancer (H)      Esophageal cancer (H) 11/01/2022     Esophageal thickening 10/25/2022     Mixed hearing loss, bilateral      Tobacco abuse 1/4/2023     Tobacco use disorder 12/27/2019       Past Surgical History:   Procedure Laterality Date     COLONOSCOPY  11/01/2022    [facility]     INSERT PORT VASCULAR ACCESS N/A 12/22/2022    Procedure: POWER PORT PLACEMENT;  Surgeon: Nba Dalton MD;  Location: [facility] OR     Upper GI Endoscopy  11/01/2022       No Known Allergies    Current Outpatient Medications   Medication     omeprazole (PRILOSEC) 20 MG DR capsule     ondansetron (ZOFRAN) 8 MG tablet     prochlorperazine (COMPAZINE) 10 MG tablet     HYDROcodone-Acetaminophen 5-217 MG/10ML SOLN     hydrocortisone (CORTAID) 1 % external cream     ibuprofen (ADVIL/MOTRIN) 200 MG tablet     nicotine (NICODERM CQ) 14 MG/24HR 24 hr patch     nicotine (NICODERM CQ) 7 MG/24HR 24 hr patch     nicotine (NICORETTE) 2 MG gum     polyethylene glycol (MIRALAX) 17 GM/Dose powder     No current facility-administered medications for this visit.       HABITS:    Social History     Tobacco Use     Smoking status: Every Day     Packs/day: 0.50     Years: 46.00     Pack years: 23.00     Types: Cigarettes     Start date: 1976     Smokeless tobacco: Never     Tobacco comments:     Trying to quit   Vaping Use     Vaping  "status: Never Used   Substance Use Topics     Alcohol use: Not Currently     Comment: no alcohol in last 6 months     Drug use: Never       Family History   Problem Relation Age of Onset     Breast Cancer Mother      Hyperlipidemia Mother      Diabetes Mother      Coronary Artery Disease Mother      Cerebrovascular Disease Father      Hyperlipidemia Father      Coronary Artery Disease Father      Myocardial Infarction Father      Hyperlipidemia Brother      Hypertension Brother      Diabetes Brother      Asthma Sister      Breast Cancer Sister      Hyperlipidemia Sister      Diabetes Sister      Hyperlipidemia Sister      Diabetes Sister      Hyperlipidemia Sister      Suicide Sister      Anesthesia Reaction Sister      Breast Cancer Sister      Hyperlipidemia Sister      Diabetes Sister      Breast Cancer Niece      Skin Cancer Niece      Thyroid Disease Niece      Breast Cancer Niece      Leukemia Nephew      Colon Cancer No family hx of      Prostate Cancer No family hx of      Genetic Disorder No family hx of        REVIEW OF SYSTEMS:  Ten point review of systems negative except those mentioned in the HPI.     OBJECTIVE:    BP 94/56   Pulse 79   Temp 97.6  F (36.4  C)   Ht 1.715 m (5' 7.5\")   Wt 64.9 kg (143 lb)   SpO2 99%   BMI 22.07 kg/m      GENERAL: Generally appears well, in no distress with appropriate affect.  HEENT:   Sclerae anicteric - normocephalic atraumatic   Respiratory:  No acute distress, no splinting, CTAB   Cardiovascular:  Regular Rate and Rhythm, no murmur   Abdomen: scar tissue surrounding PEG tube site.   :  deferred  Extremities:  Extremities normal. No deformities, edema, or skin discoloration.  Skin:  no suspicious lesions or rashes  Neurological: grossly intact  Psych:  Alert, oriented, affect appropriate with normal decision making ability.    IMPRESSION:    58 y.o. male with esophageal cancer currently undergoing chemotherapy with radiographic evidence of improvement. " Continues to have dysphagia and regurgitation. Oncology would like to repeat his endoscopy prior to possibly initiation of radiation.     PLAN:    EGD   Will plan to replace PEG tube with a gastrostomy tube under same sedation.     Nba Dalton MD,     4/13/2023  8:16 AM

## 2023-04-18 ENCOUNTER — ANESTHESIA EVENT (OUTPATIENT)
Dept: SURGERY | Facility: HOSPITAL | Age: 58
End: 2023-04-18
Payer: COMMERCIAL

## 2023-04-18 ASSESSMENT — LIFESTYLE VARIABLES: TOBACCO_USE: 1

## 2023-04-18 NOTE — ANESTHESIA PREPROCEDURE EVALUATION
Anesthesia Pre-Procedure Evaluation    Patient: Kenneth Matthew   MRN: [id number] : 1965        Procedure : Procedure(s):  Upper Endoscopy with Gastrostomy Tube Exchange          Past Medical History:   Diagnosis Date     Cancer (H)      Esophageal cancer (H) 2022     Esophageal thickening 10/25/2022     Mixed hearing loss, bilateral      Tobacco abuse 2023     Tobacco use disorder 2019      Past Surgical History:   Procedure Laterality Date     COLONOSCOPY  2022    [facility]     INSERT PORT VASCULAR ACCESS N/A 2022    Procedure: POWER PORT PLACEMENT;  Surgeon: Nba Dalton MD;  Location: [facility]     Upper GI Endoscopy  2022      No Known Allergies   Social History     Tobacco Use     Smoking status: Every Day     Packs/day: 0.50     Years: 46.00     Pack years: 23.00     Types: Cigarettes     Start date:      Smokeless tobacco: Never     Tobacco comments:     Trying to quit   Vaping Use     Vaping status: Never Used   Substance Use Topics     Alcohol use: Not Currently     Comment: no alcohol in last 6 months      Wt Readings from Last 1 Encounters:   23 64.9 kg (143 lb)        Anesthesia Evaluation   Pt has had prior anesthetic. Type: MAC.    No history of anesthetic complications       ROS/MED HX  ENT/Pulmonary:     (+) CARYN risk factors, snores loudly, tobacco use (1/2 ppd), Current use, 23  Pack-Year Hx,      Neurologic:  - neg neurologic ROS     Cardiovascular:  - neg cardiovascular ROS     METS/Exercise Tolerance: 4 - Raking leaves, gardening    Hematologic:  - neg hematologic  ROS     Musculoskeletal:  - neg musculoskeletal ROS     GI/Hepatic: Comment: Dysphagia, difficulty swallowing    (+) esophageal disease, Stricture (esophageal cancer),     Renal/Genitourinary:  - neg Renal ROS     Endo:  - neg endo ROS     Psychiatric/Substance Use:     (+) alcohol abuse (hx of; none since )     Infectious Disease:  - neg infectious disease ROS      Malignancy:   (+) Malignancy, History of Other.Other CA esophageal, plan for chemo and radiation Active status post Chemo.    Other:  - neg other ROS          Physical Exam    Airway        Mallampati: I   TM distance: > 3 FB   Neck ROM: limited   Mouth opening: > 3 cm    Respiratory Devices and Support         Dental       (+) Modest Abnormalities - crowns, retainers, 1 or 2 missing teeth      Cardiovascular   cardiovascular exam normal       Rhythm and rate: regular and normal     Pulmonary           (+) decreased breath sounds           OUTSIDE LABS:  CBC:   Lab Results   Component Value Date    WBC 3.9 (L) 04/05/2023    WBC 5.3 03/22/2023    HGB 11.3 (L) 04/05/2023    HGB 11.7 (L) 03/22/2023    HCT 35.6 (L) 04/05/2023    HCT 36.8 (L) 03/22/2023    PLT 89 (L) 04/05/2023    PLT 93 (L) 03/22/2023     BMP:   Lab Results   Component Value Date     04/05/2023     03/22/2023    POTASSIUM 4.7 04/05/2023    POTASSIUM 4.4 03/22/2023    CHLORIDE 105 04/05/2023    CHLORIDE 103 03/22/2023    CO2 28 04/05/2023    CO2 29 03/22/2023    BUN 9.7 04/05/2023    BUN 8.2 03/22/2023    CR 0.50 (L) 04/05/2023    CR 0.55 (L) 03/22/2023     (H) 04/05/2023    GLC 97 03/22/2023     COAGS: No results found for: PTT, INR, FIBR  POC: No results found for: BGM, HCG, HCGS  HEPATIC:   Lab Results   Component Value Date    ALBUMIN 3.5 04/05/2023    PROTTOTAL 6.6 04/05/2023    ALT 12 04/05/2023    AST 21 04/05/2023    ALKPHOS 66 04/05/2023    BILITOTAL 0.3 04/05/2023     OTHER:   Lab Results   Component Value Date    NICHOLAS 8.7 (L) 04/05/2023    MAG 2.2 02/08/2023    TSH 0.76 04/05/2023       Anesthesia Plan    ASA Status:  3   NPO Status:  NPO Appropriate    Anesthesia Type: MAC.     - Reason for MAC: chronic cardiopulmonary disease, straight local not clinically adequate   Induction: Intravenous, Propofol.   Maintenance: Balanced.        Consents    Anesthesia Plan(s) and associated risks, benefits, and realistic alternatives  discussed. Questions answered and patient/representative(s) expressed understanding.     - Discussed: Risks, Benefits and Alternatives for BOTH SEDATION and the PROCEDURE were discussed     - Discussed with:  Patient      - Extended Intubation/Ventilatory Support Discussed: No.      - Patient is DNR/DNI Status: No    Use of blood products discussed: No .     Postoperative Care            Comments:    Other Comments: Sha note 4-13-23            NATALEE Ordonez CRNA

## 2023-04-19 ENCOUNTER — LAB (OUTPATIENT)
Dept: ONCOLOGY | Facility: OTHER | Age: 58
End: 2023-04-19
Payer: COMMERCIAL

## 2023-04-19 ENCOUNTER — HOSPITAL ENCOUNTER (OUTPATIENT)
Dept: EDUCATION SERVICES | Facility: HOSPITAL | Age: 58
Discharge: HOME | End: 2023-04-19
Attending: NURSE PRACTITIONER | Admitting: INTERNAL MEDICINE
Payer: COMMERCIAL

## 2023-04-19 ENCOUNTER — INFUSION THERAPY VISIT (OUTPATIENT)
Dept: INFUSION THERAPY | Facility: OTHER | Age: 58
End: 2023-04-19
Payer: COMMERCIAL

## 2023-04-19 ENCOUNTER — ONCOLOGY VISIT (OUTPATIENT)
Dept: ONCOLOGY | Facility: OTHER | Age: 58
End: 2023-04-19
Attending: NURSE PRACTITIONER
Payer: COMMERCIAL

## 2023-04-19 VITALS
TEMPERATURE: 96.3 F | BODY MASS INDEX: 21.42 KG/M2 | DIASTOLIC BLOOD PRESSURE: 58 MMHG | HEIGHT: 68 IN | RESPIRATION RATE: 20 BRPM | HEART RATE: 73 BPM | SYSTOLIC BLOOD PRESSURE: 98 MMHG | OXYGEN SATURATION: 99 % | WEIGHT: 141.31 LBS

## 2023-04-19 VITALS — DIASTOLIC BLOOD PRESSURE: 66 MMHG | SYSTOLIC BLOOD PRESSURE: 110 MMHG | HEART RATE: 50 BPM

## 2023-04-19 DIAGNOSIS — C15.5 MALIGNANT NEOPLASM OF LOWER THIRD OF ESOPHAGUS (H): Primary | ICD-10-CM

## 2023-04-19 DIAGNOSIS — K21.9 GASTROESOPHAGEAL REFLUX DISEASE, UNSPECIFIED WHETHER ESOPHAGITIS PRESENT: ICD-10-CM

## 2023-04-19 LAB
ALBUMIN SERPL BCG-MCNC: 3.6 G/DL (ref 3.5–5.2)
ALP SERPL-CCNC: 80 U/L (ref 40–129)
ALT SERPL W P-5'-P-CCNC: 17 U/L (ref 10–50)
ANION GAP SERPL CALCULATED.3IONS-SCNC: 8 MMOL/L (ref 7–15)
AST SERPL W P-5'-P-CCNC: 23 U/L (ref 10–50)
BASOPHILS # BLD AUTO: 0 10E3/UL (ref 0–0.2)
BASOPHILS NFR BLD AUTO: 1 %
BILIRUB SERPL-MCNC: 0.2 MG/DL
BUN SERPL-MCNC: 7.1 MG/DL (ref 8–23)
CALCIUM SERPL-MCNC: 9.2 MG/DL (ref 8.8–10.2)
CHLORIDE SERPL-SCNC: 104 MMOL/L (ref 98–107)
CREAT SERPL-MCNC: 0.51 MG/DL (ref 0.67–1.17)
DEPRECATED HCO3 PLAS-SCNC: 29 MMOL/L (ref 22–29)
EOSINOPHIL # BLD AUTO: 0.1 10E3/UL (ref 0–0.7)
EOSINOPHIL NFR BLD AUTO: 3 %
ERYTHROCYTE [DISTWIDTH] IN BLOOD BY AUTOMATED COUNT: 20.8 % (ref 10–15)
GFR SERPL CREATININE-BSD FRML MDRD: >90 ML/MIN/1.73M2
GLUCOSE SERPL-MCNC: 158 MG/DL (ref 70–99)
HCT VFR BLD AUTO: 37.3 % (ref 40–53)
HGB BLD-MCNC: 12.1 G/DL (ref 13.3–17.7)
IMM GRANULOCYTES # BLD: 0 10E3/UL
IMM GRANULOCYTES NFR BLD: 1 %
LYMPHOCYTES # BLD AUTO: 1.1 10E3/UL (ref 0.8–5.3)
LYMPHOCYTES NFR BLD AUTO: 30 %
MCH RBC QN AUTO: 29.6 PG (ref 26.5–33)
MCHC RBC AUTO-ENTMCNC: 32.4 G/DL (ref 31.5–36.5)
MCV RBC AUTO: 91 FL (ref 78–100)
MONOCYTES # BLD AUTO: 0.5 10E3/UL (ref 0–1.3)
MONOCYTES NFR BLD AUTO: 14 %
NEUTROPHILS # BLD AUTO: 2 10E3/UL (ref 1.6–8.3)
NEUTROPHILS NFR BLD AUTO: 51 %
NRBC # BLD AUTO: 0 10E3/UL
NRBC BLD AUTO-RTO: 0 /100
PLATELET # BLD AUTO: 116 10E3/UL (ref 150–450)
POTASSIUM SERPL-SCNC: 4.2 MMOL/L (ref 3.4–5.3)
PROT SERPL-MCNC: 7 G/DL (ref 6.4–8.3)
RBC # BLD AUTO: 4.09 10E6/UL (ref 4.4–5.9)
SODIUM SERPL-SCNC: 141 MMOL/L (ref 136–145)
TSH SERPL DL<=0.005 MIU/L-ACNC: 0.59 UIU/ML (ref 0.3–4.2)
WBC # BLD AUTO: 3.8 10E3/UL (ref 4–11)

## 2023-04-19 PROCEDURE — 96367 TX/PROPH/DG ADDL SEQ IV INF: CPT | Performed by: INTERNAL MEDICINE

## 2023-04-19 PROCEDURE — 36591 DRAW BLOOD OFF VENOUS DEVICE: CPT | Performed by: NURSE PRACTITIONER

## 2023-04-19 PROCEDURE — 96368 THER/DIAG CONCURRENT INF: CPT | Performed by: INTERNAL MEDICINE

## 2023-04-19 PROCEDURE — 96415 CHEMO IV INFUSION ADDL HR: CPT | Performed by: INTERNAL MEDICINE

## 2023-04-19 PROCEDURE — 99215 OFFICE O/P EST HI 40 MIN: CPT | Performed by: NURSE PRACTITIONER

## 2023-04-19 PROCEDURE — 96521 REFILL/MAINT PORTABLE PUMP: CPT | Mod: 59 | Performed by: INTERNAL MEDICINE

## 2023-04-19 PROCEDURE — 999N000107 HC STATISTIC NUTRITIONAL THERAPY NO CHARGE: Performed by: DIETITIAN, REGISTERED

## 2023-04-19 PROCEDURE — 96413 CHEMO IV INFUSION 1 HR: CPT | Performed by: INTERNAL MEDICINE

## 2023-04-19 PROCEDURE — 96375 TX/PRO/DX INJ NEW DRUG ADDON: CPT | Performed by: INTERNAL MEDICINE

## 2023-04-19 PROCEDURE — 80050 GENERAL HEALTH PANEL: CPT | Performed by: NURSE PRACTITIONER

## 2023-04-19 RX ORDER — MEPERIDINE HYDROCHLORIDE 25 MG/ML
25 INJECTION INTRAMUSCULAR; INTRAVENOUS; SUBCUTANEOUS EVERY 30 MIN PRN
Status: CANCELLED | OUTPATIENT
Start: 2023-04-19

## 2023-04-19 RX ORDER — HEPARIN SODIUM (PORCINE) LOCK FLUSH IV SOLN 100 UNIT/ML 100 UNIT/ML
5 SOLUTION INTRAVENOUS
Status: CANCELLED | OUTPATIENT
Start: 2023-04-21

## 2023-04-19 RX ORDER — HEPARIN SODIUM,PORCINE 10 UNIT/ML
5 VIAL (ML) INTRAVENOUS
Status: CANCELLED | OUTPATIENT
Start: 2023-04-19

## 2023-04-19 RX ORDER — HEPARIN SODIUM,PORCINE 10 UNIT/ML
5 VIAL (ML) INTRAVENOUS
Status: CANCELLED | OUTPATIENT
Start: 2023-04-21

## 2023-04-19 RX ORDER — FLUOROURACIL 50 MG/ML
400 INJECTION, SOLUTION INTRAVENOUS ONCE
Status: CANCELLED | OUTPATIENT
Start: 2023-04-19

## 2023-04-19 RX ORDER — LORAZEPAM 2 MG/ML
0.5 INJECTION INTRAMUSCULAR EVERY 4 HOURS PRN
Status: CANCELLED | OUTPATIENT
Start: 2023-04-19

## 2023-04-19 RX ORDER — ALBUTEROL SULFATE 0.83 MG/ML
2.5 SOLUTION RESPIRATORY (INHALATION)
Status: CANCELLED | OUTPATIENT
Start: 2023-04-19

## 2023-04-19 RX ORDER — HEPARIN SODIUM (PORCINE) LOCK FLUSH IV SOLN 100 UNIT/ML 100 UNIT/ML
5 SOLUTION INTRAVENOUS
Status: CANCELLED | OUTPATIENT
Start: 2023-04-19

## 2023-04-19 RX ORDER — EPINEPHRINE 1 MG/ML
0.3 INJECTION, SOLUTION, CONCENTRATE INTRAVENOUS EVERY 5 MIN PRN
Status: CANCELLED | OUTPATIENT
Start: 2023-04-19

## 2023-04-19 RX ORDER — ONDANSETRON 2 MG/ML
8 INJECTION INTRAMUSCULAR; INTRAVENOUS ONCE
Status: COMPLETED | OUTPATIENT
Start: 2023-04-19 | End: 2023-04-19

## 2023-04-19 RX ORDER — ALBUTEROL SULFATE 90 UG/1
1-2 AEROSOL, METERED RESPIRATORY (INHALATION)
Status: CANCELLED
Start: 2023-04-19

## 2023-04-19 RX ORDER — FLUOROURACIL 50 MG/ML
400 INJECTION, SOLUTION INTRAVENOUS ONCE
Status: COMPLETED | OUTPATIENT
Start: 2023-04-19 | End: 2023-04-19

## 2023-04-19 RX ORDER — DIPHENHYDRAMINE HYDROCHLORIDE 50 MG/ML
50 INJECTION INTRAMUSCULAR; INTRAVENOUS
Status: CANCELLED
Start: 2023-04-19

## 2023-04-19 RX ORDER — METHYLPREDNISOLONE SODIUM SUCCINATE 125 MG/2ML
125 INJECTION, POWDER, LYOPHILIZED, FOR SOLUTION INTRAMUSCULAR; INTRAVENOUS
Status: CANCELLED
Start: 2023-04-19

## 2023-04-19 RX ADMIN — FLUOROURACIL 690 MG: 50 INJECTION, SOLUTION INTRAVENOUS at 15:10

## 2023-04-19 RX ADMIN — Medication 250 ML: at 10:19

## 2023-04-19 RX ADMIN — ONDANSETRON 8 MG: 2 INJECTION INTRAMUSCULAR; INTRAVENOUS at 10:20

## 2023-04-19 ASSESSMENT — PAIN SCALES - GENERAL: PAINLEVEL: NO PAIN (0)

## 2023-04-19 NOTE — PROGRESS NOTES
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  He initiated systemic therapy with nivolumab/FOLFOX 1/11/2023 with potential for palliative radiation therapy.    He presents to the clinic today accompanied by his brother stating he is doing pretty well.  He thinks he might of lost a little bit of weight.  He does continue drinking 4-5 ensures a day along with 2 smoothies and also putting 3 cans of supplement through the G-tube on a daily basis.  He will be undergoing EGD and G-tube change next week.  He is no longer dealing with any discomfort around the G-tube as Dr. Dalton did loosen the bumper last week.    He continues to have difficulty with swallowing any solids and he does not even attempt this now.  He was having difficulty with swallowing any solid foods and having liquid come back up.  He denies any pain when swallowing or pain in his chest.  His main complaint is in regards to pain is his butt; it does get sore when he sits due to weight loss.  He denies any skin changes or open wounds.      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  He has no new numbness and tingling occurring in his feet or his hands at this point.  He has a small lesion to the left second digit for the last month.  He admits to ongoing fatigue.  He is trying to keep active and exercise.    Oncologic History:     Experienced difficulty swallowing and 60 pound weight loss  10/21/2022: CT chest, abdomen and pelvis: Diffuse wall thickening of the distal esophagus with  associated mediastinal adenopathy, and upper abdominal  Conglomerate lymphadenopathy. Constellation of findings are highly concerning for underlying esophageal carcinoma with metastatic disease.   11/1/2022: Colonoscopy with polyp showing tubular adenoma and EGD: A large ulcerating mass with no bleeding and no stigmata of recent bleeding was found in the lower third of the esophagus, 35 to 40 cm from the incisors.  The mass was partially obstructing and partially circumferential involving  two thirds.    **Biopsy showed poorly differentiated favoring adenocarcinoma. Her 2 negative.   11/22/2002 patient met with Dr. Barnhart to discuss systemic therapy pending PET results/complete staging  12/5/2022 patient met with Dr. Spain with radiation oncology with possible plan to initiate radiation therapy pending results of PET scan  12/14/2022: PET scan: Distal esophageal neoplasm with metastatic involvement the  mediastinum, left hilum and retroperitoneal lymph nodes of the upperabdomen. Also likely localized metastatic involvement of thestomach/GE junction. 2 cm intramural distal esophageal/GE junction mass. Circumferential wall thickening with increased FDG uptake in the distal esophagus adjacent portions of the stomach/GE junction may represent localized disease however could also represent localized inflammation. Metastatic involvement to the subcarinal nodes, left infrahilar and  pericaval/paraesophageal lymph nodes of the lower thorax. Metastatic involvement to the retroperitoneal lymph nodes of the upper abdomen. These lymph nodes have increased in size dating back to be  10/21/2022 CT scan consistent with worsening disease. Representative  nodes are described above.  12/20/2022  He was seen by Dr. Barnhart with plan to initiate palliative nivolumab/FOLFOX with potential for palliative Radiation at some point  12/22/2022: Port-A-Cath placement and gastrostomy tube insertion by Dr. Dalton  1/11/2023 initiated  treatment  3/29/2023  PET scan:    IMPRESSION:   Significant interval improvement.     Marked decrease in size of distal esophageal/GE junction masses with  near complete resolution of enlarged, FDG avid subcarinal,  paraesophageal and retroperitoneal lymph nodes.      Normal-sized lymph nodes throughout the head and neck, chest, abdomen  and pelvis now demonstrate FDG uptake, the majority of which do not  exceed background suggesting diffuse inflammatory response, possibly  related to therapy.       **Dr Barnhart reviewed with patient and recommended an EGD and discussed radiation consult.     RESULT FOR IMMUNOHISTOCHEMICAL VENTANA CLONE  PD-L1 ASSAY  COMBINED POSITIVE SCORE (CPS): <1%     Treatment Goal: Palliative     TX: Nivolumab 240 mg/oxaliplatin 85 mg per metered squared/leucovorin 350 mg per metered squared/fluorouracil 400 mg per metered squared IV bolus/fluorouracil 2400 mg per metered squared via continuous infusion over 46 hours with cycle given every 14 days     Previous treatment:  N/A    Past Medical History:   Diagnosis Date     Cancer (H)      Esophageal cancer (H) 11/01/2022     Esophageal thickening 10/25/2022     Mixed hearing loss, bilateral      Tobacco abuse 1/4/2023     Tobacco use disorder 12/27/2019       Past Surgical History:   Procedure Laterality Date     COLONOSCOPY  11/01/2022    [facility]     INSERT PORT VASCULAR ACCESS N/A 12/22/2022    Procedure: POWER PORT PLACEMENT;  Surgeon: Nba Dalton MD;  Location: [facility]     Upper GI Endoscopy  11/01/2022       Family History   Problem Relation Age of Onset     Breast Cancer Mother      Hyperlipidemia Mother      Diabetes Mother      Coronary Artery Disease Mother      Cerebrovascular Disease Father      Hyperlipidemia Father      Coronary Artery Disease Father      Myocardial Infarction Father      Hyperlipidemia Brother      Hypertension Brother      Diabetes Brother      Asthma Sister      Breast Cancer Sister      Hyperlipidemia  Sister      Diabetes Sister      Hyperlipidemia Sister      Diabetes Sister      Hyperlipidemia Sister      Suicide Sister      Anesthesia Reaction Sister      Breast Cancer Sister      Hyperlipidemia Sister      Diabetes Sister      Breast Cancer Niece      Skin Cancer Niece      Thyroid Disease Niece      Breast Cancer Niece      Leukemia Nephew      Colon Cancer No family hx of      Prostate Cancer No family hx of      Genetic Disorder No family hx of        Social History     Socioeconomic History     Marital status:      Spouse name: Jazzy     Number of children: 2     Years of education: Not on file     Highest education level: Not on file   Occupational History     Not on file   Tobacco Use     Smoking status: Every Day     Packs/day: 0.50     Years: 46.00     Pack years: 23.00     Types: Cigarettes     Start date: 1976     Smokeless tobacco: Never     Tobacco comments:     Trying to quit   Vaping Use     Vaping status: Never Used   Substance and Sexual Activity     Alcohol use: Not Currently     Comment: no alcohol in last 6 months     Drug use: Never     Sexual activity: Not on file   Other Topics Concern     Parent/sibling w/ CABG, MI or angioplasty before 65F 55M? Yes     Comment: father   Social History Narrative     Not on file     Social Determinants of Health     Financial Resource Strain: Not on file   Food Insecurity: Not on file   Transportation Needs: Not on file   Physical Activity: Not on file   Stress: Not on file   Social Connections: Not on file   Intimate Partner Violence: Not on file   Housing Stability: Not on file       Current Outpatient Medications   Medication     omeprazole (PRILOSEC) 20 MG DR capsule     ondansetron (ZOFRAN) 8 MG tablet     prochlorperazine (COMPAZINE) 10 MG tablet     HYDROcodone-Acetaminophen 5-217 MG/10ML SOLN     hydrocortisone (CORTAID) 1 % external cream     ibuprofen (ADVIL/MOTRIN) 200 MG tablet     nicotine (NICODERM CQ) 14 MG/24HR 24 hr patch      "nicotine (NICODERM CQ) 7 MG/24HR 24 hr patch     nicotine (NICORETTE) 2 MG gum     polyethylene glycol (MIRALAX) 17 GM/Dose powder     No current facility-administered medications for this visit.        No Known Allergies    Review Of Systems:  Constitutional:    denies fever, chills, and night sweats.  Eyes:    denies blurred or double vision  Ears/Nose/Throat:   denies ear pain, nose problems  Respiratory:   denies shortness of breath, cough   Skin:   denies rash, lesions  Cardiovascular:   denies chest pain, palpitations, edema  Gastrointestinal:   denies abdominal pain, bloating, nausea, early satiety; no change in bowel habits or blood in stool  Genitourinary:   denies difficulty with urination, blood in urine  Musculoskeletal:    denies new muscle pain, bone pain  Neurologic:   denies lightheadedness, headaches, see HPI  Psychiatric:   denies anxiety, depression  Hematologic/Lymphatic/Immunologic:   denies easy bruising, easy bleeding, lumps or bumps noted  Endocrine:   Denies increased thirst; has dry mouth      ECOG Performance Status: 1-2    Physical Exam:  BP 98/58   Pulse 73   Temp (!) 96.3  F (35.7  C) (Tympanic)   Resp 20   Ht 1.715 m (5' 7.5\")   Wt 64.1 kg (141 lb 5 oz)   SpO2 99%   BMI 21.81 kg/m      GENERAL APPEARANCE: Healthy, alert and in no acute distress.  HEENT: Normocephalic, Sclerae anicteric. No obvious oral lesions or thrush  NECK:   No asymmetry or masses, no thyromegaly.  LYMPHATICS: No palpable cervical, supraclavicular, axillary, or inguinal nodes   RESP: Lungs clear to auscultation bilaterally, dim to lower lobes bilat, respirations regular and easy  CARDIOVASCULAR: Regular rate and rhythm. Normal S1, S2  ABDOMEN: Soft, nontender. Bowel sounds auscultated all 4 quadrants. No palpable organomegaly or masses.  MUSCULOSKELETAL: Extremities without gross deformities noted. No edema of bilateral lower extremities.  SKIN: No suspicious lesions or rashes to exposed skin  NEURO: Alert " and oriented x 3.  Gait steady.  PSYCHIATRIC: Mentation and affect appear normal.  Mood appropriate.    Laboratory:  Results for orders placed or performed in visit on 04/19/23   Comprehensive metabolic panel     Status: Abnormal   Result Value Ref Range    Sodium 141 136 - 145 mmol/L    Potassium 4.2 3.4 - 5.3 mmol/L    Chloride 104 98 - 107 mmol/L    Carbon Dioxide (CO2) 29 22 - 29 mmol/L    Anion Gap 8 7 - 15 mmol/L    Urea Nitrogen 7.1 (L) 8.0 - 23.0 mg/dL    Creatinine 0.51 (L) 0.67 - 1.17 mg/dL    Calcium 9.2 8.8 - 10.2 mg/dL    Glucose 158 (H) 70 - 99 mg/dL    Alkaline Phosphatase 80 40 - 129 U/L    AST 23 10 - 50 U/L    ALT 17 10 - 50 U/L    Protein Total 7.0 6.4 - 8.3 g/dL    Albumin 3.6 3.5 - 5.2 g/dL    Bilirubin Total 0.2 <=1.2 mg/dL    GFR Estimate >90 >60 mL/min/1.73m2   TSH with free T4 reflex     Status: Normal   Result Value Ref Range    TSH 0.59 0.30 - 4.20 uIU/mL   CBC with platelets and differential     Status: Abnormal   Result Value Ref Range    WBC Count 3.8 (L) 4.0 - 11.0 10e3/uL    RBC Count 4.09 (L) 4.40 - 5.90 10e6/uL    Hemoglobin 12.1 (L) 13.3 - 17.7 g/dL    Hematocrit 37.3 (L) 40.0 - 53.0 %    MCV 91 78 - 100 fL    MCH 29.6 26.5 - 33.0 pg    MCHC 32.4 31.5 - 36.5 g/dL    RDW 20.8 (H) 10.0 - 15.0 %    Platelet Count 116 (L) 150 - 450 10e3/uL    % Neutrophils 51 %    % Lymphocytes 30 %    % Monocytes 14 %    % Eosinophils 3 %    % Basophils 1 %    % Immature Granulocytes 1 %    NRBCs per 100 WBC 0 <1 /100    Absolute Neutrophils 2.0 1.6 - 8.3 10e3/uL    Absolute Lymphocytes 1.1 0.8 - 5.3 10e3/uL    Absolute Monocytes 0.5 0.0 - 1.3 10e3/uL    Absolute Eosinophils 0.1 0.0 - 0.7 10e3/uL    Absolute Basophils 0.0 0.0 - 0.2 10e3/uL    Absolute Immature Granulocytes 0.0 <=0.4 10e3/uL    Absolute NRBCs 0.0 10e3/uL   CBC with platelets differential     Status: Abnormal    Narrative    The following orders were created for panel order CBC with platelets differential.  Procedure                                Abnormality         Status                     ---------                               -----------         ------                     CBC with platelets and d...[[id number]]  Abnormal            Final result                 Please view results for these tests on the individual orders.       Imaging Studies: None completed for today's visit      ASSESSMENT/PLAN:    #1 Esophageal cancer: Stage IV adenocarcinoma of the esophagus diagnosed November 2022.  He initiated systemic therapy with nivolumab/FOLFOX 1/11/2023 with potential for palliative radiation therapy at some point.  Has tolerated treatment fairly well and performance status has improved. We will go forth with cycle 8 therapy today.  We will see him prior to cycle 9 with labs per treatment plan.  RT consult to be arranged after EGD.  We will hold chemotherapy if he does plan to go forth with RT.    #2  Reflux:  continuing to get the episodes of reflux of fluids after attempting to eat solids.  He's had good response on PET but continued tumor.  EGD planned and may tell us more.  Will hold off on RT consult until after EGD.    I encouraged patient to call with any questions or concerns.    40 minutes spent in the patient's encounter today with time spent in review of the chart along with in chart preparation.  Time was spent in review of his treatment plan. Time was also spent obtaining a review of systems and performing a physical exam along with review of his labwork.  Time was spent in discussing and planning followup and in chart documentation.     Delmy Valenzuela, NP  APRN, FNP-BC, AOCNP

## 2023-04-19 NOTE — PATIENT INSTRUCTIONS
We would like to see you back per your schedule.      When you are in need of a refill please call your pharmacy and they will send us a request.     If you have any questions please call [phone]    Other instructions:  none

## 2023-04-19 NOTE — PROGRESS NOTES
"[facility]  Medical Nutrition Therapy    Visit Type: Reassessment     Kenneth BELL Vipul referred by Dr. Nika Barnhart  for MNT related to Malignant neoplasm of lower third of esophagus      Patient accompanied by his brother.    Nutrition Assessment:  Anthropometrics  Height: 5' 7.5\"    BMI:  21.81      Weight: 141lb 5oz    BSA: 67.3kg        Wt Readings from Last 10 Encounters:   04/19/23 64.1 kg (141 lb 5 oz)   04/12/23 64.9 kg (143 lb)   04/05/23 65.2 kg (143 lb 11.8 oz)   03/24/23 64.5 kg (142 lb 3.2 oz)   03/24/23 64.5 kg (142 lb 3.2 oz)   03/22/23 62.8 kg (138 lb 7.2 oz)   03/10/23 63.5 kg (139 lb 15.9 oz)   03/08/23 61.9 kg (136 lb 7.4 oz)   02/24/23 61.9 kg (136 lb 7.4 oz)   02/22/23 62 kg (136 lb 11 oz)       Nutrition History  [facility]. Plans to supplement the TwoCal along with his usual Isosource 1.5 formula. Tolerating TFs well- takes nausea medication- compazine or zofran. Pt is drinking 4-5 ensure clear daily, V8 juice, 2 smoothies with spinach in it. Tolerates eating the reeses still. Difficulty with many solid/soft foods. BM everyday- not loose or hard. Hasn't had to take a stool softener. Weight stable. Weight goal is around 170-180lb.    Has an upcoming appointment to replace feeding tube.    Radiation therapy cancelled. Sticking with chemo.     Physical Activity  Getting out and exercising. Exercising- walking a little. Tried shoveling- pulled a muscle.       Nutrition Prescription- weight used to estimate needs 63.5kg  Energy:   1900-2225kcal (30-35g/kg)    Protein:   75-95g (1.2-1.5g/kg)    Fluid:   1900-2225ml (1ml/kcal)         Nutrition Diagnosis:  Malnutrition related to swallowing difficulty/reduced intake as evidenced by weight loss of 32% weight loss in 1 year.       Nutrition Intervention:  Continue with 4.5 cartons of Isosource 1.5 + TwoCal and oral intake as tolerated- shakes, smoothies, etc.      Nutrition Goals:   Adequate intake oral/TF " to meet nutrition needs to maintain weight/promote healthy weight gain     Nutrition Follow Up / Monitoring:   TF placement, intake, weight, labs.     Nutrition Recommendations:  TF Recs  4.5 cartons of Isosource 1.5 daily- bolus feeds  1688 kcal, 76.5 g protein, 859.5 ml free water  Flush tube with 30-60ml water before and after formula bolus.  Plan to continue to follow pt and adjust as oral intake changes.       Time spent with pt - 15 min     Patient to follow-up with RD in 2-4 weeks.   Patient has RD contact information to call/email if needed.

## 2023-04-19 NOTE — PROGRESS NOTES
Patients power port accessed using non-coring, 19 gauge, 3/4 inch needle.    Mask donned by caregiver: yes Site prepped with CHG: yes Labs drawn: yes Dressing applied using aseptic technique: yes.     Port accessed per facility protocol. Port flushed easily, blood return noted.  No signs and symptoms of infection or infiltration.  Port flushed with 10mL normal saline, blood return noted, 10 mLs blood discarded. Blood taken for ordered labs, port flushed with 20mL normal saline.  Port left accessed as patient has appointment with Martha Valenzuela NP, and is then due for chemo if parameters are met.  Patient discharged with no complaints.

## 2023-04-19 NOTE — NURSING NOTE
"Oncology Rooming Note    April 19, 2023 8:25 AM   Kenneth Matthew is a 58 year old male who presents for:    Chief Complaint   Patient presents with     Oncology Clinic Visit     Follow up Malignant neoplasm of lower third of esophagus        Initial Vitals: BP 98/58   Pulse 73   Temp (!) 96.3  F (35.7  C) (Tympanic)   Resp 20   Ht 1.715 m (5' 7.5\")   Wt 64.1 kg (141 lb 5 oz)   SpO2 99%   BMI 21.81 kg/m   Estimated body mass index is 21.81 kg/m  as calculated from the following:    Height as of this encounter: 1.715 m (5' 7.5\").    Weight as of this encounter: 64.1 kg (141 lb 5 oz). Body surface area is 1.75 meters squared.  No Pain (0) Comment: Data Unavailable   No LMP for male patient.  Allergies reviewed: Yes  Medications reviewed: Yes    Medications: Medication refills not needed today.  Pharmacy name entered into [facility]:    [facility] - [place] - [id number]  [facility] [place] - [id number]            "

## 2023-04-19 NOTE — PROGRESS NOTES
Infusion Nursing Note:  Kenneth Matthew presents today for FOLFOX / Nivolumab.    Patient seen by provider today: Yes: see provider notes/assessment for more info. Home medications, allergies, vitals, pain, fall risk and [facility] screen [facility]. All reviewed by this nurse prior to treating.    present during visit today: Not Applicable.      Treatment Conditions:  Lab Results   Component Value Date    HGB 12.1 (L) 04/19/2023    WBC 3.8 (L) 04/19/2023    ANEUTAUTO 2.0 04/19/2023     (L) 04/19/2023      Lab Results   Component Value Date     04/19/2023    POTASSIUM 4.2 04/19/2023    MAG 2.2 02/08/2023    CR 0.51 (L) 04/19/2023    NICHOLAS 9.2 04/19/2023    BILITOTAL 0.2 04/19/2023    ALBUMIN 3.6 04/19/2023    ALT 17 04/19/2023    AST 23 04/19/2023     Results reviewed, labs MET treatment parameters, ok to proceed with treatment.      Post Infusion Assessment:  Patient tolerated infusion without incident.  Blood return noted pre and post infusion.  Site patent and intact, free from redness, edema or discomfort.  No evidence of extravasations.  Home pump attached per MAR. Clamps taped open, pump secured to patient. Ticket signed by patient and sent to [facility] via secure email.    There were significant delays with chemo (Oxaliplatin) arriving to unit today. Kept patient and brother updated on status, kept in contact with pharmacy. Apologized for delays and kept patient/brother comfortable.     Discharge Plan:   Patient discharged in stable condition accompanied by: brother.  Departure Mode: Ambulatory.      Therese Bergman RN  
No

## 2023-04-21 ENCOUNTER — INFUSION THERAPY VISIT (OUTPATIENT)
Dept: INFUSION THERAPY | Facility: OTHER | Age: 58
End: 2023-04-21
Payer: COMMERCIAL

## 2023-04-21 VITALS
BODY MASS INDEX: 22.69 KG/M2 | OXYGEN SATURATION: 97 % | DIASTOLIC BLOOD PRESSURE: 54 MMHG | WEIGHT: 147.05 LBS | HEART RATE: 67 BPM | SYSTOLIC BLOOD PRESSURE: 97 MMHG

## 2023-04-21 DIAGNOSIS — C15.5 MALIGNANT NEOPLASM OF LOWER THIRD OF ESOPHAGUS (H): Primary | ICD-10-CM

## 2023-04-21 PROCEDURE — 96360 HYDRATION IV INFUSION INIT: CPT | Performed by: INTERNAL MEDICINE

## 2023-04-21 RX ORDER — HEPARIN SODIUM (PORCINE) LOCK FLUSH IV SOLN 100 UNIT/ML 100 UNIT/ML
5 SOLUTION INTRAVENOUS
Status: DISCONTINUED | OUTPATIENT
Start: 2023-04-21 | End: 2023-04-21 | Stop reason: HOSPADM

## 2023-04-21 RX ORDER — HEPARIN SODIUM (PORCINE) LOCK FLUSH IV SOLN 100 UNIT/ML 100 UNIT/ML
5 SOLUTION INTRAVENOUS
Status: CANCELLED | OUTPATIENT
Start: 2023-04-21

## 2023-04-21 RX ADMIN — Medication 1000 ML: at 11:05

## 2023-04-21 RX ADMIN — HEPARIN SODIUM (PORCINE) LOCK FLUSH IV SOLN 100 UNIT/ML 5 ML: 100 SOLUTION at 12:14

## 2023-04-21 NOTE — PROGRESS NOTES
Infusion Nursing Note:  Kenneth Matthew presents today for home infuser pump off and IVF.    Patient seen by provider today: No   present during visit today: Not Applicable.      Post Infusion Assessment:  Patient tolerated infusion without incident.  Blood return noted pre and post infusion.  Site patent and intact, free from redness, edema or discomfort.  No evidence of extravasations.  Access discontinued per protocol.       Discharge Plan:   Copy of AVS reviewed with patient and/or family. Patient discharged in stable condition accompanied by: brother.  Departure Mode: Ambulatory.      Therese Bergman RN

## 2023-04-24 ENCOUNTER — OFFICE VISIT (OUTPATIENT)
Dept: FAMILY MEDICINE | Facility: OTHER | Age: 58
End: 2023-04-24
Attending: STUDENT IN AN ORGANIZED HEALTH CARE EDUCATION/TRAINING PROGRAM
Payer: COMMERCIAL

## 2023-04-24 VITALS
OXYGEN SATURATION: 96 % | BODY MASS INDEX: 23.07 KG/M2 | SYSTOLIC BLOOD PRESSURE: 100 MMHG | HEIGHT: 67 IN | HEART RATE: 65 BPM | DIASTOLIC BLOOD PRESSURE: 53 MMHG | TEMPERATURE: 97.4 F | WEIGHT: 147 LBS

## 2023-04-24 DIAGNOSIS — G89.3 CANCER RELATED PAIN: ICD-10-CM

## 2023-04-24 DIAGNOSIS — C15.5 MALIGNANT NEOPLASM OF LOWER THIRD OF ESOPHAGUS (H): Primary | ICD-10-CM

## 2023-04-24 DIAGNOSIS — R13.10 DYSPHAGIA, UNSPECIFIED TYPE: ICD-10-CM

## 2023-04-24 DIAGNOSIS — F17.210 CIGARETTE NICOTINE DEPENDENCE WITHOUT COMPLICATION: ICD-10-CM

## 2023-04-24 PROCEDURE — 99213 OFFICE O/P EST LOW 20 MIN: CPT | Performed by: STUDENT IN AN ORGANIZED HEALTH CARE EDUCATION/TRAINING PROGRAM

## 2023-04-24 ASSESSMENT — PAIN SCALES - GENERAL: PAINLEVEL: SEVERE PAIN (6)

## 2023-04-24 NOTE — PROGRESS NOTES
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  Follows with oncology team here, Martha and Dr. Barnhart.  He is very happy with the kindness of his oncology team and with the services he has received thus far.    Will be undergoing cycle 8 therapy for his cancer soon.  Will continue with systemic infusion therapy with potential for palliative radiation therapy  Recent repeat PET scan on 3/29 shows significant interval improvement.  There is marked decrease in the size of the distal esophageal/GE junction mass with near complete resolution of lymph nodes.  Patient is scheduled to undergo EGD with Dr. Dalton tomorrow, 4/25.  He is hoping he will be able to get his G-tube out and eat everything by mouth.  He is on pureed oral foods.  He is not able to tolerate any solids by mouth at this time.    He tells me he is tolerating chemotherapy well with no reported side effects.  His nausea is well controlled.  His weight is stable.  In fact, he has gained some weight in the last few weeks which is great news    Cancer related pain  Well controlled.  Patient denies any significant pain currently    Cigarette nicotine dependence without complication  Patient states that he has resumed smoking.  I emphasized that he needs to stop immediately.  Especially if he wants to give himself the best chance with the chemotherapy.  He is agreeable.  We discussed different options and he would like to try the nicotine gum.      Dysphagia  EGD scheduled for 4/25 with Dr. Dalton to address this.  Patient hopeful he will get G-tube removed and that he will be able to eat exclusively by mouth.         No follow-ups on file.    Chata Smith MD  [facility]   Kenneht is a 58 year old, presenting for the following health issues:  Recheck Medication         View : No data to display.              HPI     Pureed  "foods by mouth.  Drinks smoothies every day.  Also has alimentation through G-tube.  Not choking or gagging on the food.    Compazine and Zofran are helping.  Still taking omeprazole.    Will be undergoing Nivolumab/Folfox Cycle 8 therapy.    Lives with wife.    Has been gaining some weight.  Appetitie is increased.  [facility] robby-2 and ensure.   Takes 2-3 ensures and Robby 2 he takes 1.5 twice daily with everything else.    Patient states that he is trying to get 2,500 calories in a day.    EGD and G-tube replaced tomorrow.    Continues to struggle with taking any solids by mouth.  States that he has difficulty with swallowing solid foods.    Had some leakage of gastric contents from the G-tube recently which had irritated the skin.  He has been applying ointment to the area.    Goal is to get tube out     Medication Followup of COMPAZINE     Taking Medication as prescribed: yes    Side Effects:  None    Medication Helping Symptoms:  yes    Medication Followup of ZOFRAN     Taking Medication as prescribed: yes    Side Effects:  None    Medication Helping Symptoms:  yes    Medication Followup of OMEPRAZOLE     Taking Medication as prescribed: yes    Side Effects:  None    Medication Helping Symptoms:  yes       Review of Systems   Constitutional, HEENT, cardiovascular, pulmonary, gi and gu systems are negative, except as otherwise noted.      Objective    /53 (BP Location: Left arm, Patient Position: Sitting, Cuff Size: Adult Regular)   Pulse 65   Temp 97.4  F (36.3  C) (Tympanic)   Ht 1.702 m (5' 7\")   Wt 66.7 kg (147 lb)   SpO2 96%   BMI 23.02 kg/m    Body mass index is 23.02 kg/m .  Physical Exam   GENERAL: healthy, alert and no distress  EYES: Eyes grossly normal to inspection, PERRL and conjunctivae and sclerae normal  HENT: ear canals and TM's normal, nose and mouth without ulcers or lesions  NECK: no adenopathy, no asymmetry, masses, or scars and thyroid normal to " palpation  RESP: lungs clear to auscultation - no rales, rhonchi or wheezes  CV: regular rate and rhythm, normal S1 S2, no S3 or S4, no murmur, click or rub, no peripheral edema and peripheral pulses strong  ABDOMEN: soft, nontender, no hepatosplenomegaly, no masses and bowel sounds normal.  G-tube in place.  Normal surrounding skin, no saturation of bandages.    MS: no gross musculoskeletal defects noted, no edema  SKIN: no suspicious lesions or rashes  NEURO: Normal strength and tone, mentation intact and speech normal  PSYCH: mentation appears normal, affect normal/bright

## 2023-04-24 NOTE — H&P (VIEW-ONLY)
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  Follows with oncology team here, Martha and Dr. Barnhart.  He is very happy with the kindness of his oncology team and with the services he has received thus far.    Will be undergoing cycle 8 therapy for his cancer soon.  Will continue with systemic infusion therapy with potential for palliative radiation therapy  Recent repeat PET scan on 3/29 shows significant interval improvement.  There is marked decrease in the size of the distal esophageal/GE junction mass with near complete resolution of lymph nodes.  Patient is scheduled to undergo EGD with Dr. Dalton tomorrow, 4/25.  He is hoping he will be able to get his G-tube out and eat everything by mouth.  He is on pureed oral foods.  He is not able to tolerate any solids by mouth at this time.    He tells me he is tolerating chemotherapy well with no reported side effects.  His nausea is well controlled.  His weight is stable.  In fact, he has gained some weight in the last few weeks which is great news    Cancer related pain  Well controlled.  Patient denies any significant pain currently    Cigarette nicotine dependence without complication  Patient states that he has resumed smoking.  I emphasized that he needs to stop immediately.  Especially if he wants to give himself the best chance with the chemotherapy.  He is agreeable.  We discussed different options and he would like to try the nicotine gum.      Dysphagia  EGD scheduled for 4/25 with Dr. Dalton to address this.  Patient hopeful he will get G-tube removed and that he will be able to eat exclusively by mouth.         No follow-ups on file.    Chata Smith MD  [facility]   Kenneth is a 58 year old, presenting for the following health issues:  Recheck Medication         View : No data to display.              HPI     Pureed  "foods by mouth.  Drinks smoothies every day.  Also has alimentation through G-tube.  Not choking or gagging on the food.    Compazine and Zofran are helping.  Still taking omeprazole.    Will be undergoing Nivolumab/Folfox Cycle 8 therapy.    Lives with wife.    Has been gaining some weight.  Appetitie is increased.  [facility] robby-[facility] and ensure.   Takes 2-3 ensures and Robby 2 he takes 1.5 twice daily with everything else.    Patient states that he is trying to get 2,500 calories in a day.    EGD and G-tube replaced tomorrow.    Continues to struggle with taking any solids by mouth.  States that he has difficulty with swallowing solid foods.    Had some leakage of gastric contents from the G-tube recently which had irritated the skin.  He has been applying ointment to the area.    Goal is to get tube out     Medication Followup of COMPAZINE     Taking Medication as prescribed: yes    Side Effects:  None    Medication Helping Symptoms:  yes    Medication Followup of ZOFRAN     Taking Medication as prescribed: yes    Side Effects:  None    Medication Helping Symptoms:  yes    Medication Followup of OMEPRAZOLE     Taking Medication as prescribed: yes    Side Effects:  None    Medication Helping Symptoms:  yes       Review of Systems   Constitutional, HEENT, cardiovascular, pulmonary, gi and gu systems are negative, except as otherwise noted.      Objective    /53 (BP Location: Left arm, Patient Position: Sitting, Cuff Size: Adult Regular)   Pulse 65   Temp 97.4  F (36.3  C) (Tympanic)   Ht 1.702 m (5' 7\")   Wt 66.7 kg (147 lb)   SpO2 96%   BMI 23.02 kg/m    Body mass index is 23.02 kg/m .  Physical Exam   GENERAL: healthy, alert and no distress  EYES: Eyes grossly normal to inspection, PERRL and conjunctivae and sclerae normal  HENT: ear canals and TM's normal, nose and mouth without ulcers or lesions  NECK: no adenopathy, no asymmetry, masses, or scars and thyroid normal to " palpation  RESP: lungs clear to auscultation - no rales, rhonchi or wheezes  CV: regular rate and rhythm, normal S1 S2, no S3 or S4, no murmur, click or rub, no peripheral edema and peripheral pulses strong  ABDOMEN: soft, nontender, no hepatosplenomegaly, no masses and bowel sounds normal.  G-tube in place.  Normal surrounding skin, no saturation of bandages.    MS: no gross musculoskeletal defects noted, no edema  SKIN: no suspicious lesions or rashes  NEURO: Normal strength and tone, mentation intact and speech normal  PSYCH: mentation appears normal, affect normal/bright

## 2023-04-25 ENCOUNTER — HOSPITAL ENCOUNTER (OUTPATIENT)
Facility: HOSPITAL | Age: 58
Discharge: HOME | End: 2023-04-25
Attending: SURGERY | Admitting: SURGERY
Payer: COMMERCIAL

## 2023-04-25 ENCOUNTER — ANESTHESIA (OUTPATIENT)
Dept: SURGERY | Facility: HOSPITAL | Age: 58
End: 2023-04-25
Payer: COMMERCIAL

## 2023-04-25 VITALS
TEMPERATURE: 97 F | DIASTOLIC BLOOD PRESSURE: 79 MMHG | WEIGHT: 147 LBS | RESPIRATION RATE: 16 BRPM | HEART RATE: 55 BPM | BODY MASS INDEX: 23.07 KG/M2 | OXYGEN SATURATION: 100 % | SYSTOLIC BLOOD PRESSURE: 135 MMHG | HEIGHT: 67 IN

## 2023-04-25 PROCEDURE — 43246 EGD PLACE GASTROSTOMY TUBE: CPT | Performed by: SURGERY

## 2023-04-25 PROCEDURE — 258N000003 HC RX IP 258 OP 636: Performed by: NURSE ANESTHETIST, CERTIFIED REGISTERED

## 2023-04-25 PROCEDURE — 710N000012 HC RECOVERY PHASE 2, PER MINUTE: Performed by: SURGERY

## 2023-04-25 PROCEDURE — 250N000011 HC RX IP 250 OP 636

## 2023-04-25 PROCEDURE — 250N000009 HC RX 250

## 2023-04-25 PROCEDURE — 360N000075 HC SURGERY LEVEL 2, PER MIN: Performed by: SURGERY

## 2023-04-25 PROCEDURE — 88305 TISSUE EXAM BY PATHOLOGIST: CPT | Mod: 26 | Performed by: PATHOLOGY

## 2023-04-25 PROCEDURE — 43239 EGD BIOPSY SINGLE/MULTIPLE: CPT | Mod: 52 | Performed by: SURGERY

## 2023-04-25 PROCEDURE — 272N000001 HC OR GENERAL SUPPLY STERILE: Performed by: SURGERY

## 2023-04-25 PROCEDURE — 88305 TISSUE EXAM BY PATHOLOGIST: CPT | Mod: TC | Performed by: SURGERY

## 2023-04-25 PROCEDURE — 999N000141 HC STATISTIC PRE-PROCEDURE NURSING ASSESSMENT: Performed by: SURGERY

## 2023-04-25 PROCEDURE — 370N000017 HC ANESTHESIA TECHNICAL FEE, PER MIN: Performed by: SURGERY

## 2023-04-25 PROCEDURE — 43246 EGD PLACE GASTROSTOMY TUBE: CPT | Performed by: NURSE ANESTHETIST, CERTIFIED REGISTERED

## 2023-04-25 RX ORDER — SODIUM CHLORIDE, SODIUM LACTATE, POTASSIUM CHLORIDE, CALCIUM CHLORIDE 600; 310; 30; 20 MG/100ML; MG/100ML; MG/100ML; MG/100ML
INJECTION, SOLUTION INTRAVENOUS CONTINUOUS
Status: DISCONTINUED | OUTPATIENT
Start: 2023-04-25 | End: 2023-04-25 | Stop reason: HOSPADM

## 2023-04-25 RX ORDER — LIDOCAINE HYDROCHLORIDE 20 MG/ML
INJECTION, SOLUTION INFILTRATION; PERINEURAL PRN
Status: DISCONTINUED | OUTPATIENT
Start: 2023-04-25 | End: 2023-04-25

## 2023-04-25 RX ORDER — PROPOFOL 10 MG/ML
INJECTION, EMULSION INTRAVENOUS PRN
Status: DISCONTINUED | OUTPATIENT
Start: 2023-04-25 | End: 2023-04-25

## 2023-04-25 RX ORDER — LIDOCAINE 40 MG/G
CREAM TOPICAL
Status: DISCONTINUED | OUTPATIENT
Start: 2023-04-25 | End: 2023-04-25 | Stop reason: HOSPADM

## 2023-04-25 RX ADMIN — PROPOFOL 20 MG: 10 INJECTION, EMULSION INTRAVENOUS at 15:04

## 2023-04-25 RX ADMIN — SODIUM CHLORIDE, POTASSIUM CHLORIDE, SODIUM LACTATE AND CALCIUM CHLORIDE: 600; 310; 30; 20 INJECTION, SOLUTION INTRAVENOUS at 13:36

## 2023-04-25 RX ADMIN — LIDOCAINE HYDROCHLORIDE 100 MG: 20 INJECTION, SOLUTION INFILTRATION; PERINEURAL at 15:00

## 2023-04-25 RX ADMIN — PROPOFOL 60 MG: 10 INJECTION, EMULSION INTRAVENOUS at 15:00

## 2023-04-25 RX ADMIN — PROPOFOL 20 MG: 10 INJECTION, EMULSION INTRAVENOUS at 15:02

## 2023-04-25 ASSESSMENT — ACTIVITIES OF DAILY LIVING (ADL)
ADLS_ACUITY_SCORE: 33
ADLS_ACUITY_SCORE: 35

## 2023-04-25 NOTE — LETTER
May 3, 2023      Kenneth Matthew  [address]  [place] [address]        Dear ,    We are writing to inform you of your test results.    Your test results fall within the expected range(s) or remain unchanged from previous results.  Please continue with current treatment plan.    Resulted Orders   Surgical Pathology Exam   Result Value Ref Range    Case Report       Surgical Pathology Report                         Case: [id number]                                  Authorizing Provider:  Nba Dalton MD        Collected:           04/25/2023 03:05 PM          Ordering Location:     [facility] Main Operating Room     Received:            04/26/2023 01:05 PM          Pathologist:           Anjel Winchester DO                                                         Specimen:    Esophagus, esophageal biopsy                                                               Final Diagnosis       A.  Esophagus, biopsy:  -Squamous mucosa and subepithelial stromal tissues with mild chronic inflammation and edema (see comment).  -Negative for malignancy.        Comment       The patient's history of a previously diagnosed poorly-differentiated esophageal adenocarcinoma is noted from the EMR.  The current biopsy findings appear most consistent with a reactive process.  The changes may be therapy related.  A component of reflux may also be present as a few eosinophils are noted.  Please correlate with endoscopic findings.      Clinical Information       Procedure:  Upper Endoscopy with biopsy- with Gastrostomy Tube Exchange  Pre-op Diagnosis: Esophageal cancer (H) [C15.9]  Post-op Diagnosis: C15.9 - Esophageal cancer (H) [ICD-10-CM]        Gross Description       A(1). Esophagus, esophageal biopsy:  The specimen is received in a container labeled with the patient's name, medical record number and esophageal biopsy in formalin is a piece of tan tissue measuring 3 x 3 x 2 mm and a second 1 mm bit of tissue that may not survive  processing.  All in 1 cassette.      Microscopic Description       A microscopic examination was performed.      Case Images         If you have any questions or concerns, please call the clinic at the number listed above.       Sincerely,      Nba Dalton MD

## 2023-04-25 NOTE — ANESTHESIA POSTPROCEDURE EVALUATION
Patient: Kenneth Matthew    Procedure: Procedure(s):  Upper Endoscopy with biopsy- with Gastrostomy Tube Exchange       Anesthesia Type:  MAC    Note:  Disposition: Outpatient   Postop Pain Control: Uneventful            Sign Out: Well controlled pain   PONV: No   Neuro/Psych: Uneventful            Sign Out: Acceptable/Baseline neuro status   Airway/Respiratory: Uneventful            Sign Out: Acceptable/Baseline resp. status   CV/Hemodynamics: Uneventful            Sign Out: Acceptable CV status; No obvious hypovolemia; No obvious fluid overload   Other NRE: NONE   DID A NON-ROUTINE EVENT OCCUR? No           Last vitals:  Vitals Value Taken Time   /79 04/25/23 1545   Temp 97  F (36.1  C) 04/25/23 1520   Pulse 55 04/25/23 1545   Resp 16 04/25/23 1545   SpO2 100 % 04/25/23 1545   Vitals shown include unvalidated device data.    Electronically Signed By: NATALEE Arroyo CRNA  April 25, 2023  3:55 PM

## 2023-04-25 NOTE — ANESTHESIA CARE TRANSFER NOTE
Patient: Kenneth Matthew    Procedure: Procedure(s):  Upper Endoscopy with biopsy- with Gastrostomy Tube Exchange       Diagnosis: Esophageal cancer (H) [C15.9]  Diagnosis Additional Information: No value filed.    Anesthesia Type:   MAC     Note:    Oropharynx: oropharynx clear of all foreign objects and spontaneously breathing  Level of Consciousness: awake  Oxygen Supplementation: room air    Independent Airway: airway patency satisfactory and stable  Dentition: dentition unchanged  Vital Signs Stable: post-procedure vital signs reviewed and stable  Report to RN Given: handoff report given  Patient transferred to: Phase II    Handoff Report: Identifed the Patient, Identified the Reponsible Provider, Reviewed the pertinent medical history, Discussed the surgical course, Reviewed Intra-OP anesthesia mangement and issues during anesthesia, Set expectations for post-procedure period and Allowed opportunity for questions and acknowledgement of understanding      Vitals:  Vitals Value Taken Time   BP     Temp     Pulse     Resp     SpO2         Electronically Signed By: Gem Yadav  April 25, 2023  3:13 PM

## 2023-04-25 NOTE — INTERVAL H&P NOTE
I have reviewed the surgical (or preoperative) H&P that is linked to this encounter, and examined the patient. There are no significant changes    Clinical Conditions Present on Arrival:  Clinically Significant Risk Factors Present on Admission                 # Thrombocytopenia: Lowest platelets = 89 in last 30 days, will monitor for bleeding

## 2023-04-25 NOTE — OP NOTE
Kenneth Matthew MRN# [id number]   YOB: 1965 Age: 58 year old      Date of Admission:  4/25/2023    Primary care provider: Chata Smith    PREOPERATIVE DIAGNOSIS:  Esophageal cancer       POSTOPERATIVE DIAGNOSES:    Esophageal stricture       PROCEDURE:  Esophagoscopy with cold forceps biopsies. G-tube exchange.       HISTORY OF PRESENT ILLNESS:  See clinic notes.      OPERATIVE FINDINGS:  Narrowing of distal esophagus, no mucosal lesion noted. Biopsy taken, unable to pass scope through to stomach, Though small lumen noted. Removed 20 [place] PEG and placed 20 [place]er feeding tube,     Specimen(s) submitted to pathology:  Esophageal    PROCEDURE:  With the patient in the supine position on the transport cart, IV sedation was administered by the nurse anesthetist.  The patient's correct identity and planned procedure were confirmed during a requisite timeout pause.  A bite block was placed and the fiberoptic endoscope was introduced and negotiated through the cricopharyngeus without difficulty.  The length of the esophagus was examined and there was noted to be a tapering to the point was unable further, this was at aprox 39 cm. A mucosal biopsy was taken. The remainder of the scope was aborted. The PEG tube was then removed with silcone disc intact and replaced with a 20 [place] Islander feeding tube, 7 ml saline placed in balloon.    The procedure was satisfactorially tolerated; there was no appreciable blood loss and the patient was returned to Day Surgery in good condition.      Nba Dalton MD  4/25/2023  3:44 PM

## 2023-04-25 NOTE — DISCHARGE INSTRUCTIONS
UPPER ENDOSCOPY    AFTER THE PROCEDURE  You will return to Same Day Surgery to rest for about an hour before you go home.  The doctor will talk with you and your family.  A family member/friend may visit with you.  You may burp up any air remaining in your stomach.  You may feel dizzy or light-headed from the medicine.  Your nurse will go over the discharge instructions with you and your caregiver and answer any of your questions.    You will be contacted the next day to check on how you are doing.  If biopsies were taken, you will be contacted with the results usually within 1 week.  BACK AT HOME  Rest for an hour or two after you get home.  When your throat is no longer numb and you have a gag reflex, take a few sips of cool water.  If you can swallow comfortably, you may start eating again.  You may have a mild sore throat for the rest of the day.  You will be contacted with results by the surgeons office within a week. If not contacted in one week, call the surgeons office.  WHAT TO WATCH FOR:  Problems rarely occur after the exam, but it is important to be aware of the early signs of a complication.  Call your doctor immediately if you have:  Difficulty swallowing or breathing  Unusual pain in your stomach or chest  Vomiting blood or dark material that looks like coffee grounds  Black or tarry stools  Temperature over 101.5 degrees    MORE QUESTIONS?  Please ask your doctor or nurse before the exam begins  or call your doctor at the clinic.    IF YOU MUST CANCEL YOUR PROCEDURE THE EVENING/NIGHT BEFORE, PLEASE CALL HOSPITAL ADMITTING -[phone] OR TOLL FREE [phone], EXT. [phone].    Phone Numbers:  [facility] - [phone] [phone]  [facility] - [phone]  Surgery Patient Education - [phone] or toll free [phone]            Post-Anesthesia Patient Instructions    IMMEDIATELY FOLLOWING SURGERY:  Do not drive or operate machinery for the first twenty four hours after surgery.  Do  not make any important decisions for twenty four hours after surgery or while taking narcotic pain medications or sedatives.  If you develop intractable nausea and vomiting or a severe headache please notify your doctor immediately.    FOLLOW-UP:  Please make an appointment with your surgeon as instructed. You do not need to follow up with anesthesia unless specifically instructed to do so.    WOUND CARE INSTRUCTIONS (if applicable):  Keep a dry clean dressing on the anesthesia/puncture wound site if there is drainage.  Once the wound has quit draining you may leave it open to air.  Generally you should leave the bandage intact for twenty four hours unless there is drainage.  If the epidural site drains for more than 36-48 hours please call the anesthesia department.    QUESTIONS?:  Please feel free to call your physician or the hospital  if you have any questions, and they will be happy to assist you.

## 2023-04-25 NOTE — OR NURSING
Patient and responsible adult given discharge instructions with no questions regarding instructions. Layton score 20/20. Pain level 0/10.  Discharged from unit via ambulatory. Patient discharged to home with sister in law.

## 2023-04-26 ENCOUNTER — OFFICE VISIT (OUTPATIENT)
Dept: SURGERY | Facility: OTHER | Age: 58
End: 2023-04-26
Attending: SURGERY
Payer: COMMERCIAL

## 2023-04-26 VITALS
WEIGHT: 146 LBS | BODY MASS INDEX: 22.91 KG/M2 | TEMPERATURE: 98 F | HEART RATE: 90 BPM | OXYGEN SATURATION: 98 % | HEIGHT: 67 IN

## 2023-04-26 DIAGNOSIS — T85.598D FEEDING TUBE DYSFUNCTION, SUBSEQUENT ENCOUNTER: Primary | ICD-10-CM

## 2023-04-26 PROCEDURE — 43762 RPLC GTUBE NO REVJ TRC: CPT | Performed by: SURGERY

## 2023-04-26 ASSESSMENT — PAIN SCALES - GENERAL: PAINLEVEL: NO PAIN (0)

## 2023-04-26 NOTE — PATIENT INSTRUCTIONS
Thank you for allowing Dr. Dalton and our surgical team to participate in your care. Please call our health unit coordinator at [phone] with scheduling questions or the nurse at [phone] with any other questions or concerns.

## 2023-04-29 LAB
PATH REPORT.COMMENTS IMP SPEC: NORMAL
PATH REPORT.COMMENTS IMP SPEC: NORMAL
PATH REPORT.FINAL DX SPEC: NORMAL
PATH REPORT.GROSS SPEC: NORMAL
PATH REPORT.MICROSCOPIC SPEC OTHER STN: NORMAL
PATH REPORT.RELEVANT HX SPEC: NORMAL
PHOTO IMAGE: NORMAL

## 2023-05-03 ENCOUNTER — HOSPITAL ENCOUNTER (OUTPATIENT)
Dept: EDUCATION SERVICES | Facility: HOSPITAL | Age: 58
Discharge: HOME | End: 2023-05-03
Attending: NURSE PRACTITIONER | Admitting: INTERNAL MEDICINE
Payer: COMMERCIAL

## 2023-05-03 ENCOUNTER — INFUSION THERAPY VISIT (OUTPATIENT)
Dept: INFUSION THERAPY | Facility: OTHER | Age: 58
End: 2023-05-03
Payer: COMMERCIAL

## 2023-05-03 ENCOUNTER — LAB (OUTPATIENT)
Dept: ONCOLOGY | Facility: OTHER | Age: 58
End: 2023-05-03
Payer: COMMERCIAL

## 2023-05-03 ENCOUNTER — ONCOLOGY VISIT (OUTPATIENT)
Dept: ONCOLOGY | Facility: OTHER | Age: 58
End: 2023-05-03
Attending: NURSE PRACTITIONER
Payer: COMMERCIAL

## 2023-05-03 VITALS
SYSTOLIC BLOOD PRESSURE: 104 MMHG | RESPIRATION RATE: 20 BRPM | BODY MASS INDEX: 21.35 KG/M2 | TEMPERATURE: 97 F | OXYGEN SATURATION: 98 % | DIASTOLIC BLOOD PRESSURE: 60 MMHG | WEIGHT: 140.87 LBS | HEIGHT: 68 IN | HEART RATE: 69 BPM

## 2023-05-03 VITALS
BODY MASS INDEX: 21.87 KG/M2 | SYSTOLIC BLOOD PRESSURE: 100 MMHG | DIASTOLIC BLOOD PRESSURE: 62 MMHG | HEART RATE: 58 BPM | WEIGHT: 141.7 LBS

## 2023-05-03 DIAGNOSIS — C15.5 MALIGNANT NEOPLASM OF LOWER THIRD OF ESOPHAGUS (H): Primary | ICD-10-CM

## 2023-05-03 DIAGNOSIS — K21.9 GASTROESOPHAGEAL REFLUX DISEASE, UNSPECIFIED WHETHER ESOPHAGITIS PRESENT: ICD-10-CM

## 2023-05-03 DIAGNOSIS — R63.4 WEIGHT LOSS: ICD-10-CM

## 2023-05-03 DIAGNOSIS — R73.9 ELEVATED BLOOD SUGAR: ICD-10-CM

## 2023-05-03 DIAGNOSIS — R79.89 ELEVATED LFTS: ICD-10-CM

## 2023-05-03 LAB
ALBUMIN SERPL BCG-MCNC: 3.5 G/DL (ref 3.5–5.2)
ALP SERPL-CCNC: 102 U/L (ref 40–129)
ALT SERPL W P-5'-P-CCNC: 87 U/L (ref 10–50)
ANION GAP SERPL CALCULATED.3IONS-SCNC: 8 MMOL/L (ref 7–15)
AST SERPL W P-5'-P-CCNC: 65 U/L (ref 10–50)
BASOPHILS # BLD AUTO: 0.1 10E3/UL (ref 0–0.2)
BASOPHILS NFR BLD AUTO: 1 %
BILIRUB SERPL-MCNC: 0.3 MG/DL
BUN SERPL-MCNC: 8.8 MG/DL (ref 8–23)
CALCIUM SERPL-MCNC: 9.1 MG/DL (ref 8.8–10.2)
CHLORIDE SERPL-SCNC: 101 MMOL/L (ref 98–107)
CREAT SERPL-MCNC: 0.52 MG/DL (ref 0.67–1.17)
DEPRECATED HCO3 PLAS-SCNC: 29 MMOL/L (ref 22–29)
EOSINOPHIL # BLD AUTO: 0.2 10E3/UL (ref 0–0.7)
EOSINOPHIL NFR BLD AUTO: 3 %
ERYTHROCYTE [DISTWIDTH] IN BLOOD BY AUTOMATED COUNT: 20.5 % (ref 10–15)
GFR SERPL CREATININE-BSD FRML MDRD: >90 ML/MIN/1.73M2
GLUCOSE SERPL-MCNC: 152 MG/DL (ref 70–99)
HCT VFR BLD AUTO: 37.8 % (ref 40–53)
HGB BLD-MCNC: 12.4 G/DL (ref 13.3–17.7)
IMM GRANULOCYTES # BLD: 0 10E3/UL
IMM GRANULOCYTES NFR BLD: 0 %
LYMPHOCYTES # BLD AUTO: 1.5 10E3/UL (ref 0.8–5.3)
LYMPHOCYTES NFR BLD AUTO: 30 %
MCH RBC QN AUTO: 30 PG (ref 26.5–33)
MCHC RBC AUTO-ENTMCNC: 32.8 G/DL (ref 31.5–36.5)
MCV RBC AUTO: 92 FL (ref 78–100)
MONOCYTES # BLD AUTO: 0.7 10E3/UL (ref 0–1.3)
MONOCYTES NFR BLD AUTO: 14 %
NEUTROPHILS # BLD AUTO: 2.6 10E3/UL (ref 1.6–8.3)
NEUTROPHILS NFR BLD AUTO: 52 %
NRBC # BLD AUTO: 0 10E3/UL
NRBC BLD AUTO-RTO: 0 /100
PLATELET # BLD AUTO: 102 10E3/UL (ref 150–450)
POTASSIUM SERPL-SCNC: 4.3 MMOL/L (ref 3.4–5.3)
PROT SERPL-MCNC: 6.8 G/DL (ref 6.4–8.3)
RBC # BLD AUTO: 4.13 10E6/UL (ref 4.4–5.9)
SODIUM SERPL-SCNC: 138 MMOL/L (ref 136–145)
TSH SERPL DL<=0.005 MIU/L-ACNC: 0.57 UIU/ML (ref 0.3–4.2)
WBC # BLD AUTO: 5.1 10E3/UL (ref 4–11)

## 2023-05-03 PROCEDURE — 96413 CHEMO IV INFUSION 1 HR: CPT | Performed by: INTERNAL MEDICINE

## 2023-05-03 PROCEDURE — 96417 CHEMO IV INFUS EACH ADDL SEQ: CPT | Performed by: INTERNAL MEDICINE

## 2023-05-03 PROCEDURE — 96375 TX/PRO/DX INJ NEW DRUG ADDON: CPT | Performed by: INTERNAL MEDICINE

## 2023-05-03 PROCEDURE — 96367 TX/PROPH/DG ADDL SEQ IV INF: CPT | Performed by: INTERNAL MEDICINE

## 2023-05-03 PROCEDURE — 96415 CHEMO IV INFUSION ADDL HR: CPT | Performed by: INTERNAL MEDICINE

## 2023-05-03 PROCEDURE — 999N000107 HC STATISTIC NUTRITIONAL THERAPY NO CHARGE: Performed by: DIETITIAN, REGISTERED

## 2023-05-03 PROCEDURE — 96368 THER/DIAG CONCURRENT INF: CPT | Performed by: INTERNAL MEDICINE

## 2023-05-03 PROCEDURE — 99215 OFFICE O/P EST HI 40 MIN: CPT | Performed by: NURSE PRACTITIONER

## 2023-05-03 PROCEDURE — 80050 GENERAL HEALTH PANEL: CPT | Performed by: NURSE PRACTITIONER

## 2023-05-03 PROCEDURE — 96521 REFILL/MAINT PORTABLE PUMP: CPT | Mod: 59 | Performed by: INTERNAL MEDICINE

## 2023-05-03 PROCEDURE — 36591 DRAW BLOOD OFF VENOUS DEVICE: CPT | Performed by: NURSE PRACTITIONER

## 2023-05-03 RX ORDER — ONDANSETRON 2 MG/ML
8 INJECTION INTRAMUSCULAR; INTRAVENOUS ONCE
Status: COMPLETED | OUTPATIENT
Start: 2023-05-03 | End: 2023-05-03

## 2023-05-03 RX ORDER — ONDANSETRON 8 MG/1
8 TABLET, FILM COATED ORAL EVERY 8 HOURS PRN
Qty: 30 TABLET | Refills: 2 | Status: SHIPPED | OUTPATIENT
Start: 2023-05-03 | End: 2023-06-14

## 2023-05-03 RX ORDER — ALBUTEROL SULFATE 90 UG/1
1-2 AEROSOL, METERED RESPIRATORY (INHALATION)
Status: CANCELLED
Start: 2023-05-03

## 2023-05-03 RX ORDER — FLUOROURACIL 50 MG/ML
400 INJECTION, SOLUTION INTRAVENOUS ONCE
Status: CANCELLED | OUTPATIENT
Start: 2023-05-03

## 2023-05-03 RX ORDER — MEPERIDINE HYDROCHLORIDE 25 MG/ML
25 INJECTION INTRAMUSCULAR; INTRAVENOUS; SUBCUTANEOUS EVERY 30 MIN PRN
Status: CANCELLED | OUTPATIENT
Start: 2023-05-03

## 2023-05-03 RX ORDER — METHYLPREDNISOLONE SODIUM SUCCINATE 125 MG/2ML
125 INJECTION, POWDER, LYOPHILIZED, FOR SOLUTION INTRAMUSCULAR; INTRAVENOUS
Status: CANCELLED
Start: 2023-05-03

## 2023-05-03 RX ORDER — HEPARIN SODIUM,PORCINE 10 UNIT/ML
5 VIAL (ML) INTRAVENOUS
Status: CANCELLED | OUTPATIENT
Start: 2023-05-05

## 2023-05-03 RX ORDER — HEPARIN SODIUM (PORCINE) LOCK FLUSH IV SOLN 100 UNIT/ML 100 UNIT/ML
5 SOLUTION INTRAVENOUS
Status: CANCELLED | OUTPATIENT
Start: 2023-05-05

## 2023-05-03 RX ORDER — DIPHENHYDRAMINE HYDROCHLORIDE 50 MG/ML
50 INJECTION INTRAMUSCULAR; INTRAVENOUS
Status: CANCELLED
Start: 2023-05-03

## 2023-05-03 RX ORDER — HEPARIN SODIUM (PORCINE) LOCK FLUSH IV SOLN 100 UNIT/ML 100 UNIT/ML
5 SOLUTION INTRAVENOUS
Status: CANCELLED | OUTPATIENT
Start: 2023-05-03

## 2023-05-03 RX ORDER — ALBUTEROL SULFATE 0.83 MG/ML
2.5 SOLUTION RESPIRATORY (INHALATION)
Status: CANCELLED | OUTPATIENT
Start: 2023-05-03

## 2023-05-03 RX ORDER — FLUOROURACIL 50 MG/ML
400 INJECTION, SOLUTION INTRAVENOUS ONCE
Status: COMPLETED | OUTPATIENT
Start: 2023-05-03 | End: 2023-05-03

## 2023-05-03 RX ORDER — PROCHLORPERAZINE MALEATE 10 MG
10 TABLET ORAL EVERY 6 HOURS PRN
Qty: 30 TABLET | Refills: 2 | Status: SHIPPED | OUTPATIENT
Start: 2023-05-03 | End: 2023-06-14

## 2023-05-03 RX ORDER — EPINEPHRINE 1 MG/ML
0.3 INJECTION, SOLUTION, CONCENTRATE INTRAVENOUS EVERY 5 MIN PRN
Status: CANCELLED | OUTPATIENT
Start: 2023-05-03

## 2023-05-03 RX ORDER — HEPARIN SODIUM,PORCINE 10 UNIT/ML
5 VIAL (ML) INTRAVENOUS
Status: CANCELLED | OUTPATIENT
Start: 2023-05-03

## 2023-05-03 RX ORDER — LORAZEPAM 2 MG/ML
0.5 INJECTION INTRAMUSCULAR EVERY 4 HOURS PRN
Status: CANCELLED | OUTPATIENT
Start: 2023-05-03

## 2023-05-03 RX ADMIN — FLUOROURACIL 690 MG: 50 INJECTION, SOLUTION INTRAVENOUS at 13:07

## 2023-05-03 RX ADMIN — ONDANSETRON 8 MG: 2 INJECTION INTRAMUSCULAR; INTRAVENOUS at 09:45

## 2023-05-03 RX ADMIN — Medication 250 ML: at 09:35

## 2023-05-03 ASSESSMENT — PAIN SCALES - GENERAL: PAINLEVEL: NO PAIN (0)

## 2023-05-03 NOTE — PROGRESS NOTES
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  He initiated systemic therapy with nivolumab/FOLFOX 1/11/2023 with potential for palliative radiation therapy.     He underwent EGD on 4/25/2023 due to ongoing difficulty swallowing solids and regurgitation.  This showed a narrowing of the distal esophagus and scope was unable to be passed through to the stomach with noted small lumen.  There was no mucosal lesion and biopsies showed squamous mucosa and subepithelial stromal tissues with mild chronic inflammation and edema and this was negative for malignancy.  G-tube was also exchanged during this procedure.    He presents to the clinic today accompanied by his brother stating he was doing okay until he had the EGD done.  He was told he would need the G tube for life due to the narrowing of his esophagus.  He does deny actual depression or anxiety but admits to being down at times.  He has lost more weight over the past couple weeks.  He states he is currently doing 2 cans of tube feeding through the tube and drinking 2 ensures every day along with 1 very large smoothie and 2 V8 juices.  He supposed to visit with nutrition today.  Continues with some cold neuropathy to his hands and feet about a week after chemotherapy.  He is smoking about 3 cigarettes/day but does have the patch on and is well aware he should not be smoking with the patch on.  Uses Compazine and Zofran for nausea alternating between each 1 on the daily basis basically.    Oncologic History:     Experienced difficulty swallowing and 60 pound weight loss  10/21/2022: CT chest,  abdomen and pelvis: Diffuse wall thickening of the distal esophagus with associated mediastinal adenopathy, and upper abdominal  Conglomerate lymphadenopathy. Constellation of findings are highly concerning for underlying esophageal carcinoma with metastatic disease.   11/1/2022: Colonoscopy with polyp showing tubular adenoma and EGD: A large ulcerating mass with no bleeding and no stigmata of recent bleeding was found in the lower third of the esophagus, 35 to 40 cm from the incisors.  The mass was partially obstructing and partially circumferential involving  two thirds.    **Biopsy showed poorly differentiated favoring adenocarcinoma. Her 2 negative.   11/22/2002 patient met with Dr. Barnhart to discuss systemic therapy pending PET results/complete staging  12/5/2022 patient met with Dr. Spain with radiation oncology with possible plan to initiate radiation therapy pending results of PET scan  12/14/2022: PET scan: Distal esophageal neoplasm with metastatic involvement the  mediastinum, left hilum and retroperitoneal lymph nodes of the upperabdomen. Also likely localized metastatic involvement of thestomach/GE junction. 2 cm intramural distal esophageal/GE junction mass. Circumferential wall thickening with increased FDG uptake in the distal esophagus adjacent portions of the stomach/GE junction may represent localized disease however could also represent localized inflammation. Metastatic involvement to the subcarinal nodes, left infrahilar and  pericaval/paraesophageal lymph nodes of the lower thorax. Metastatic involvement to the retroperitoneal lymph nodes of the upper abdomen. These lymph nodes have increased in size dating back to be  10/21/2022 CT scan consistent with worsening disease. Representative  nodes are described above.  12/20/2022  He was seen by Dr. Barnhart with plan to initiate palliative nivolumab/FOLFOX with potential for palliative Radiation at some point  12/22/2022: Port-A-Cath placement and  gastrostomy tube insertion by Dr. Dalton  1/11/2023 initiated treatment  3/29/2023  PET scan:    IMPRESSION:   Significant interval improvement.     Marked decrease in size of distal esophageal/GE junction masses with  near complete resolution of enlarged, FDG avid subcarinal,  paraesophageal and retroperitoneal lymph nodes.      Normal-sized lymph nodes throughout the head and neck, chest, abdomen  and pelvis now demonstrate FDG uptake, the majority of which do not  exceed background suggesting diffuse inflammatory response, possibly  related to therapy.       **Dr Barnhart reviewed with patient and recommended an EGD and discussed radiation consult.       RESULT FOR IMMUNOHISTOCHEMICAL VENTANA CLONE  PD-L1 ASSAY  COMBINED POSITIVE SCORE (CPS): <1%     Treatment Goal: Palliative     TX: Nivolumab 240 mg/oxaliplatin 85 mg per metered squared/leucovorin 350 mg per metered squared/fluorouracil 400 mg per metered squared IV bolus/fluorouracil 2400 mg per metered squared via continuous infusion over 46 hours with cycle given every 14 days     Previous treatment:  N/A     Past Medical History:   Diagnosis Date     Cancer (H)      Esophageal cancer (H) 11/01/2022     Esophageal thickening 10/25/2022     Mixed hearing loss, bilateral      Tobacco abuse 1/4/2023     Tobacco use disorder 12/27/2019       Past Surgical History:   Procedure Laterality Date     COLONOSCOPY  11/01/2022    [facility]     ESOPHAGOSCOPY N/A 4/25/2023    Procedure: Upper Endoscopy with biopsy- with Gastrostomy Tube Exchange;  Surgeon: Nba Dalton MD;  Location: [facility] OR     INSERT PORT VASCULAR ACCESS N/A 12/22/2022    Procedure: POWER PORT PLACEMENT;  Surgeon: Nba Dalton MD;  Location: [facility] OR     Upper GI Endoscopy  11/01/2022       Family History   Problem Relation Age of Onset     Breast Cancer Mother      Hyperlipidemia Mother      Diabetes Mother      Coronary Artery Disease Mother      Cerebrovascular Disease Father      Hyperlipidemia  Father      Coronary Artery Disease Father      Myocardial Infarction Father      Hyperlipidemia Brother      Hypertension Brother      Diabetes Brother      Asthma Sister      Breast Cancer Sister      Hyperlipidemia Sister      Diabetes Sister      Hyperlipidemia Sister      Diabetes Sister      Hyperlipidemia Sister      Suicide Sister      Anesthesia Reaction Sister      Breast Cancer Sister      Hyperlipidemia Sister      Diabetes Sister      Breast Cancer Niece      Skin Cancer Niece      Thyroid Disease Niece      Breast Cancer Niece      Leukemia Nephew      Colon Cancer No family hx of      Prostate Cancer No family hx of      Genetic Disorder No family hx of        Social History     Socioeconomic History     Marital status:      Spouse name: Jazzy     Number of children: 2     Years of education: Not on file     Highest education level: Not on file   Occupational History     Not on file   Tobacco Use     Smoking status: Every Day     Packs/day: 0.50     Years: 46.00     Pack years: 23.00     Types: Cigarettes     Start date: 1976     Smokeless tobacco: Never     Tobacco comments:     Trying to quit   Vaping Use     Vaping status: Never Used   Substance and Sexual Activity     Alcohol use: Not Currently     Comment: none since July of 2022     Drug use: Never     Sexual activity: Not on file   Other Topics Concern     Parent/sibling w/ CABG, MI or angioplasty before 65F 55M? Yes     Comment: father   Social History Narrative     Not on file     Social Determinants of Health     Financial Resource Strain: Not on file   Food Insecurity: Not on file   Transportation Needs: Not on file   Physical Activity: Not on file   Stress: Not on file   Social Connections: Not on file   Intimate Partner Violence: Not on file   Housing Stability: Not on file       Current Outpatient Medications   Medication     HYDROcodone-Acetaminophen 5-217 MG/10ML SOLN     hydrocortisone (CORTAID) 1 % external cream     nicotine  "(NICODERM CQ) 14 MG/24HR 24 hr patch     ondansetron (ZOFRAN) 8 MG tablet     prochlorperazine (COMPAZINE) 10 MG tablet     ibuprofen (ADVIL/MOTRIN) 200 MG tablet     nicotine (NICODERM CQ) 7 MG/24HR 24 hr patch     nicotine (NICORETTE) 2 MG gum     omeprazole (PRILOSEC) 20 MG DR capsule     polyethylene glycol (MIRALAX) 17 GM/Dose powder     No current facility-administered medications for this visit.        No Known Allergies    Review Of Systems:  Constitutional:    denies fever, chills, and night sweats.  Eyes:    denies double vision; has some blurred vision  Ears/Nose/Throat:   denies ear pain, nose problems, difficulty swallowing  Respiratory:   denies shortness of breath, frequent cough   Skin:   Denies new rash, lesions  Cardiovascular:   denies chest pain, palpitations, edema  Gastrointestinal:   denies abdominal pain, bloating; no change in bowel habits or blood in stool  Genitourinary:   denies difficulty with urination, blood in urine  Musculoskeletal:    denies new muscle pain, bone pain; has some discomfort in his butt  Neurologic:   denies lightheadedness, headaches; see hPI  Psychiatric:   denies anxiety, depression  Hematologic/Lymphatic/Immunologic:   denies easy bruising, easy bleeding, lumps or bumps noted  Endocrine:   Denies increased thirst      ECOG Performance Status: 2    Physical Exam:  /60   Pulse 69   Temp 97  F (36.1  C) (Tympanic)   Resp 20   Ht 1.715 m (5' 7.5\")   Wt 63.9 kg (140 lb 14 oz)   SpO2 98%   BMI 21.74 kg/m      GENERAL APPEARANCE: Healthy, alert and in no acute distress.  HEENT: Normocephalic, Sclerae anicteric.  No obvious oral lesions or thrush  NECK:   No asymmetry or masses, no thyromegaly.  LYMPHATICS: No palpable cervical, supraclavicular, axillary, or inguinal nodes   RESP: Lungs clear to auscultation bilaterally but dim throughout, respirations regular and easy  CARDIOVASCULAR: Regular rate and rhythm. Normal S1, S2; no murmur, gallop, or rub.  NEURO: " Alert and oriented x 3.  Gait steady.  PSYCHIATRIC: Mentation and affect appear normal.  Mood appropriate.    Laboratory:  Results for orders placed or performed in visit on 05/03/23   Comprehensive metabolic panel     Status: Abnormal   Result Value Ref Range    Sodium 138 136 - 145 mmol/L    Potassium 4.3 3.4 - 5.3 mmol/L    Chloride 101 98 - 107 mmol/L    Carbon Dioxide (CO2) 29 22 - 29 mmol/L    Anion Gap 8 7 - 15 mmol/L    Urea Nitrogen 8.8 8.0 - 23.0 mg/dL    Creatinine 0.52 (L) 0.67 - 1.17 mg/dL    Calcium 9.1 8.8 - 10.2 mg/dL    Glucose 152 (H) 70 - 99 mg/dL    Alkaline Phosphatase 102 40 - 129 U/L    AST 65 (H) 10 - 50 U/L    ALT 87 (H) 10 - 50 U/L    Protein Total 6.8 6.4 - 8.3 g/dL    Albumin 3.5 3.5 - 5.2 g/dL    Bilirubin Total 0.3 <=1.2 mg/dL    GFR Estimate >90 >60 mL/min/1.73m2   TSH with free T4 reflex     Status: Normal   Result Value Ref Range    TSH 0.57 0.30 - 4.20 uIU/mL   CBC with platelets and differential     Status: Abnormal   Result Value Ref Range    WBC Count 5.1 4.0 - 11.0 10e3/uL    RBC Count 4.13 (L) 4.40 - 5.90 10e6/uL    Hemoglobin 12.4 (L) 13.3 - 17.7 g/dL    Hematocrit 37.8 (L) 40.0 - 53.0 %    MCV 92 78 - 100 fL    MCH 30.0 26.5 - 33.0 pg    MCHC 32.8 31.5 - 36.5 g/dL    RDW 20.5 (H) 10.0 - 15.0 %    Platelet Count 102 (L) 150 - 450 10e3/uL    % Neutrophils 52 %    % Lymphocytes 30 %    % Monocytes 14 %    % Eosinophils 3 %    % Basophils 1 %    % Immature Granulocytes 0 %    NRBCs per 100 WBC 0 <1 /100    Absolute Neutrophils 2.6 1.6 - 8.3 10e3/uL    Absolute Lymphocytes 1.5 0.8 - 5.3 10e3/uL    Absolute Monocytes 0.7 0.0 - 1.3 10e3/uL    Absolute Eosinophils 0.2 0.0 - 0.7 10e3/uL    Absolute Basophils 0.1 0.0 - 0.2 10e3/uL    Absolute Immature Granulocytes 0.0 <=0.4 10e3/uL    Absolute NRBCs 0.0 10e3/uL   CBC with platelets differential     Status: Abnormal    Narrative    The following orders were created for panel order CBC with platelets differential.  Procedure                                Abnormality         Status                     ---------                               -----------         ------                     CBC with platelets and d...[[id number]]  Abnormal            Final result                 Please view results for these tests on the individual orders.       Imaging Studies:  None completed for today's visit      ASSESSMENT/PLAN:    #1 Esophageal cancer: Stage IV adenocarcinoma of the esophagus diagnosed November 2022.  He initiated systemic therapy with nivolumab/FOLFOX 1/11/2023 with potential for palliative radiation therapy at some point.  Has tolerated treatment fairly well and performance status has improved. We will go forth with cycle 9 therapy today.  We will see him prior to cycle 10 with labs per treatment plan.  Dr. Barnhart reviewed EGD report and radiation consult to be arranged.    #2  Reflux: He will continue with the omeprazole 20 mg daily.    #3 elevated LFTs: Still within parameters for treatment.  Discussed possible etiologies.  We will need to monitor.    #4 elevated blood sugar: We will obtain a hemoglobin A1c.    #5 weight loss: 6 pound weight loss over the past 2 weeks.  I recommended increasing his Ensure by 2 cans a day.  He will be visiting with nutrition services today.    I encouraged patient to call with any questions or concerns.    43 minutes spent in the patient's encounter today with time spent in review of the chart along with in chart preparation.  Time was also spent in review of his treatment plan.  Time was also spent obtaining a review of systems and performing a physical exam along with review of his lab work.  Time was also spent in discussion of recommendation for radiation consult.  Time was also spent in discussion of results of his EGD and active listening provided.  Time spent in placing orders and in chart documentation.    Delmy Valenzuela, NP  APRN, FNP-BC, AOCNP

## 2023-05-03 NOTE — PROGRESS NOTES
"[facility]  Medical Nutrition Therapy    Visit Type: Reassessment     Kenneth BELL Vipul referred by Dr. Nika Barnhart  for MNT related to Malignant neoplasm of lower third of esophagus      Patient accompanied by his brother.    Nutrition Assessment:  Anthropometrics  Height: 5' 7.5\"    BMI:   21.87      Weight: 141lb 11.2 oz    BSA: 1.75        Wt Readings from Last 10 Encounters:   05/03/23 64.3 kg (141 lb 11.2 oz)   05/03/23 63.9 kg (140 lb 14 oz)   04/26/23 66.2 kg (146 lb)   04/25/23 66.7 kg (147 lb)   04/24/23 66.7 kg (147 lb)   04/21/23 66.7 kg (147 lb 0.8 oz)   04/19/23 64.1 kg (141 lb 5 oz)   04/12/23 64.9 kg (143 lb)   04/05/23 65.2 kg (143 lb 11.8 oz)   03/24/23 64.5 kg (142 lb 3.2 oz)       Nutrition History  Pt's intake via feeding tube/orally has been stable. Drinking Ensure clear 4-5, V8 juice (1-2 daily) chicken and beef bone broth, 1 giant smoothie with spinach. Zofran and compazine taken daily to prevent nausea. Experiencing some gas with the TwoCal. Feeding tube replaced by surgery. Having regular BMs - a couple times a day, not loose. Pt's goal is to have the feeding tube out to go back to work, not allowed to return to work with his feeding tube.  No speech therapy. Issue with oral intake is clear liquid (saliva?) coming back up after eating most foods, never food.     Physical Activity  Not addressed today    Nutrition Prescription- weight used to estimate needs 63.5kg  Energy:   1900-2225kcal (30-35g/kg)    Protein:   75-95g (1.2-1.5g/kg)    Fluid:   1900-2225ml (1ml/kcal)         Nutrition Diagnosis:  Malnutrition related to swallowing difficulty/reduced intake as evidenced by weight loss of 32% weight loss in 1 year.  - weight stable around 140lb     Nutrition Intervention:  Continue with 4.5 cartons of Isosource 1.5 + TwoCal and oral intake as tolerated- shakes, smoothies, etc.      Nutrition Goals:   Adequate intake oral/TF to meet nutrition needs to maintain weight/promote " healthy weight gain     Nutrition Follow Up / Monitoring:   TF placement, intake, weight, labs.     Nutrition Recommendations:  TF Recs  4.5 cartons of Isosource 1.5 daily- bolus feeds  1688 kcal, 76.5 g protein, 859.5 ml free water  Flush tube with 30-60ml water before and after formula bolus.  Plan to continue to follow pt and adjust as oral intake changes.        Time spent with pt - 20 min     Patient to follow-up with RD in 2-4 weeks.   Patient has RD contact information to call/email if needed.

## 2023-05-03 NOTE — PATIENT INSTRUCTIONS
We will get you set up with Radiation Therapy for a consult.    We would like to see you back in 2 weeks. Please come 30 minutes prior for lab work and you will have treatment after.     Your prescription for Compazine and Zofran has been sent to: [facility].      When you are in need of a refill, please call your pharmacy and they will send us a request.     If you have any questions please call [phone]    Other instructions:  none

## 2023-05-03 NOTE — PROGRESS NOTES
Infusion Nursing Note:  Kenneth Matthew presents today for Nivo/folfox.    Patient seen by provider today: Yes: Martha Valenzuela NP   present during visit today: Not Applicable.    Note: N/A.    Intravenous Access:Implanted Port.    Treatment Conditions:  Lab Results   Component Value Date    HGB 12.4 (L) 05/03/2023    WBC 5.1 05/03/2023    ANEUTAUTO 2.6 05/03/2023     (L) 05/03/2023      Lab Results   Component Value Date     05/03/2023    POTASSIUM 4.3 05/03/2023    MAG 2.2 02/08/2023    CR 0.52 (L) 05/03/2023    NICHOLAS 9.1 05/03/2023    BILITOTAL 0.3 05/03/2023    ALBUMIN 3.5 05/03/2023    ALT 87 (H) 05/03/2023    AST 65 (H) 05/03/2023     Results reviewed, labs MET treatment parameters, ok to proceed with treatment.  Confirmed patient received the Fact Sheet for Patients, Parents and Caregivers prior to receiving medication.     Post Infusion Assessment:  Patient tolerated infusion without incident.  Blood return noted pre and post infusion.  Blood return noted during administration every 1-3 mls with administration of aduricil push.  Site patent and intact, free from redness, edema or discomfort.  No evidence of extravasations.  Access discontinued per protocol.   *If a hypersensitivity, medication error, or an adverse event occurred, pharmacy was notified in addition to the provider for FDA Reporting.        Discharge Plan:   Discharge instructions reviewed with: Patient.  Patient and/or family verbalized understanding of discharge instructions and all questions answered.  Copy of AVS reviewed with patient and/or family.  Patient will return Friday for next cycle.  Patient discharged in stable condition accompanied by: brother.  Departure Mode: Ambulatory.    Matilde Matthews RN

## 2023-05-03 NOTE — LETTER
May 3, 2023      Kenneth Matthew  [address] [place] [id number]        To Whom It May Concern,     Patient is under my care for Stage 4 esophageal cancer and will be continuing ongoing treatment and is considered permanently disabled and unable to do any job duties in any capacity and will not be able to work due to his medical condition.          Sincerely,        Delmy Valenzuela NP

## 2023-05-03 NOTE — NURSING NOTE
"Oncology Rooming Note    May 3, 2023 8:07 AM   Kenneth Matthew is a 58 year old male who presents for:    Chief Complaint   Patient presents with     Oncology Clinic Visit     Follow up Malignant neoplasm of lower third of esophagus      Initial Vitals: /60   Pulse 69   Temp 97  F (36.1  C) (Tympanic)   Resp 20   Ht 1.715 m (5' 7.5\")   Wt 63.9 kg (140 lb 14 oz)   SpO2 98%   BMI 21.74 kg/m   Estimated body mass index is 21.74 kg/m  as calculated from the following:    Height as of this encounter: 1.715 m (5' 7.5\").    Weight as of this encounter: 63.9 kg (140 lb 14 oz). Body surface area is 1.74 meters squared.  No Pain (0) Comment: Data Unavailable   No LMP for male patient.  Allergies reviewed: Yes  Medications reviewed: Yes    Medications: Medication refills not needed today.  Pharmacy name entered into [facility]:    [id number] - [place], [place] - [address]  [facility] - [place] - [id number]            "

## 2023-05-05 ENCOUNTER — INFUSION THERAPY VISIT (OUTPATIENT)
Dept: INFUSION THERAPY | Facility: OTHER | Age: 58
End: 2023-05-05
Payer: COMMERCIAL

## 2023-05-05 VITALS
HEART RATE: 62 BPM | BODY MASS INDEX: 22.25 KG/M2 | OXYGEN SATURATION: 95 % | TEMPERATURE: 98.2 F | WEIGHT: 144.2 LBS | RESPIRATION RATE: 20 BRPM

## 2023-05-05 DIAGNOSIS — C15.5 MALIGNANT NEOPLASM OF LOWER THIRD OF ESOPHAGUS (H): Primary | ICD-10-CM

## 2023-05-05 LAB
EST. AVERAGE GLUCOSE BLD GHB EST-MCNC: 120 MG/DL
HBA1C MFR BLD: 5.8 %

## 2023-05-05 PROCEDURE — 83036 HEMOGLOBIN GLYCOSYLATED A1C: CPT | Performed by: INTERNAL MEDICINE

## 2023-05-05 PROCEDURE — 96360 HYDRATION IV INFUSION INIT: CPT | Performed by: INTERNAL MEDICINE

## 2023-05-05 PROCEDURE — 36591 DRAW BLOOD OFF VENOUS DEVICE: CPT | Performed by: INTERNAL MEDICINE

## 2023-05-05 RX ORDER — HEPARIN SODIUM (PORCINE) LOCK FLUSH IV SOLN 100 UNIT/ML 100 UNIT/ML
5 SOLUTION INTRAVENOUS
Status: DISCONTINUED | OUTPATIENT
Start: 2023-05-05 | End: 2023-05-05 | Stop reason: HOSPADM

## 2023-05-05 RX ORDER — HEPARIN SODIUM (PORCINE) LOCK FLUSH IV SOLN 100 UNIT/ML 100 UNIT/ML
5 SOLUTION INTRAVENOUS
Status: CANCELLED | OUTPATIENT
Start: 2023-05-05

## 2023-05-05 RX ADMIN — Medication 1000 ML: at 10:56

## 2023-05-05 RX ADMIN — HEPARIN SODIUM (PORCINE) LOCK FLUSH IV SOLN 100 UNIT/ML 5 ML: 100 SOLUTION at 12:08

## 2023-05-05 NOTE — PROGRESS NOTES
Patient tolerated infusion well, offers no complaints or questions.  Discharged per facility protocol.

## 2023-05-05 NOTE — PATIENT INSTRUCTIONS

## 2023-05-05 NOTE — PROGRESS NOTES
Infusion Nursing Note:  Miguel Abinh RAY Matthew presents today for pump off/IVF.    Patient seen by provider today: No   present during visit today: Not Applicable.    Intravenous Access:  Infuser pump completed, 100 % infused.  Patient denies adverse effects from home infusion, nor mechanical issues.  Pump is intact.  Infuser disconnected from port.      Port flushed per MAR.  Immediate blood return noted.  Flushes easily, without resistance.        Treatment Conditions:  Not Applicable.  Confirmed patient received the Fact Sheet for Patients, Parents and Caregivers prior to receiving medication.

## 2023-05-11 NOTE — PROGRESS NOTES
[facility]    RADIATION ONCOLOGY CONSULTATION      Kenneth Matthew  is referred by Dr.Aparna Barnhart for a Radiation Oncology consultation.    PRIMARY PHYSICIAN: Rohan Garland        Metastatic esophageal cancer cT2/3 N1 M1 Stage IV    IMPRESSION: 57-year-old male with known history of advanced stage IV esophageal cancer with upper abdominal adenopathy.  He has been treated since diagnosis with nivolumab, oxaliplatin, leucovorin, and 5-FU from January 11, 2023 with next administration planned May 17, 2023.  At this time he has discomfort and narrowing secondary to the tumor mass and takes the majority of his nutrition via the PEG.  He is able to take liquids and supplements by mouth at this time.  He notes no chest pain or back pain or new problems with bleeding in the past few months since initiation of chemotherapy.  Weight has remained stable since diagnosis.    Today I had a long discussion with the patient, his wife, and his brother who accompanies him.  Options at this time are to continue on with chemotherapy using the nivolumab, oxaliplatin, leucovorin and 5-FU or take a break from chemotherapy proceed with palliative radiation to the obstructing area in the lower esophagus and upper stomach.  At this time the patient is asymptomatic without pain or bleeding and the tumor has shrunk.  His main concern is scarring which is causing his inability to eat larger solid foods.  We discussed that at this time the stimulator is not functioning in this department and a new piece of equipment will be purchased hopefully in the near future.  We discussed that he could be referred to [facility] and at this time he is not inclined to proceed as such.  After much discussion, he states he wants to schedule follow-up reevaluation in July anticipating that a new piece of equipment will be present and at that time proceed with palliative radiation.  We then turned toward side effects associated with chest radiation use to  palliate esophageal mass effect.  We discussed the main side effects could include but are not limited to skin irritation, fatigue, hair loss in the treatment area, small risk of spinal cord damage, esophageal irritation and pain when swallowing, fatigue, nausea, persistent disease, and scar tissue which could still cause constriction of the lower esophagus.  Questions were solicited and answered in full.      PLAN:     Continue on with palliative chemotherapy next dose pending May 17, 2023 with nivolumab, oxaliplatin, leucovorin and 5-FU.  Follow-up in this clinic for reevaluation early July anticipating that a new CT simulator will be installed.  Should his symptoms change in his chest I encouraged both he and his wife to contact us so that we can make the appropriate referral to [facility] if desired.  We discussed that if and when he proceeds with palliative radiation, he would hold chemotherapy at that time  Continue with meticulous attention to his weight and nutrition so as to not lose further mass.  The patient's been doing well and is stable at 140 pounds since PEG was placed.     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  History appears that he began to have difficulty swallowing in June 2022, reports a 60 pound weight loss over the last year.  He is a current tobacco user. Drinking 2 protein drinks per day. History as below    He presented with a 60 lb weight loss and reports of difficulty swallowing since 06/2022.     10/18/2022 chest x-ray: mediastinal adenopathy better seen on subsequent CT.  Small hiatal hernia.    10/21/2022 CT chest abdomen pelvis with IV and oral contrast: Diffuse esophageal wall thickening in the distal third of the  esophagus with associated mediastinal lymphadenopathy, there is a subcarinal node measuring 30 x 31 mm.  No axillary or supraclavicular lymphadenopathy.  There is conglomerate lymphadenopathy in the upper abdomen involving the periaortic, portocaval, and lesser sac lymph nodes, one in which that measures 29 x 28 mm.  There is mild circumferential wall thickening of the urinary bladder.  No suspicious lytic or blastic lesions.    10/24/2022 abdomen pelvis radiograph 3 views: Unremarkable appearance of the visualized abdomen    11/01/2022 upper GI endoscopy: Stomach atrium biopsy, Stomach fundus biopsy negative.  Esophagus biopsy: Poorly differentiated non-small cell carcinoma, favor adenocarcinoma, HER2 negative by FISH.  Findings of a large ulcerating mass with no bleeding and no stigmata of recent bleeding was found in the lower third of the esophagus, 35 to 40 cm from the incisors.  Mass was partially obstructing and partially circumferential involving two thirds of the lumen circumference.  The mass just extends slightly below the GE junction but appears to arise from the esophagus    11/01/2022 Colonoscopy: Transverse colon, and sigmoid colon biopsies: Tubular adenoma.    11/22/2022 Medical oncology visit: Systemic therapy pending PET results/ complete staging, nutrition referral placed, PEG tube referral placed.     12/14/2022 PET scan scheduled, pending results. Patient offered and declined a sooner date.     3/29/2023 PET oncology eyes to thighs: There is marked decrease in size of distal esophageal/GE junction mass with near complete resolution of enlarged FDG avid subcarinal, paraesophageal and retroperitoneal lymph nodes.  Normal-sized lymph nodes throughout the head and neck, chest, abdomen, and pelvis now demonstrate FDG uptake, these do not exceed background which may indicate inflammatory/related to therapy response.    Today, he reports feeling well overall. He is still smoking about half a pack per  "day. He declines assistance with smoking cessation today, favoring he will \"try to quit on his own\" first.     Scheduling Conflicts: chemotherapy  Systemic Therapy: pending  Port: pending  Pacemaker: no  Hx of autoimmune disorders: no  Previous Radiation Therapy: no    Past Medical History:   Diagnosis Date     Cancer (H)      Esophageal cancer (H) 11/01/2022     Esophageal thickening 10/25/2022     Mixed hearing loss, bilateral      Tobacco abuse 1/4/2023     Tobacco use disorder 12/27/2019       Past Surgical History:   Procedure Laterality Date     COLONOSCOPY  11/01/2022    [facility]     ESOPHAGOSCOPY N/A 4/25/2023    Procedure: Upper Endoscopy with biopsy- with Gastrostomy Tube Exchange;  Surgeon: Nba Dalton MD;  Location: [facility]     INSERT PORT VASCULAR ACCESS N/A 12/22/2022    Procedure: POWER PORT PLACEMENT;  Surgeon: Nba Dalton MD;  Location: [facility]     Upper GI Endoscopy  11/01/2022       Family History   Problem Relation Age of Onset     Breast Cancer Mother      Hyperlipidemia Mother      Diabetes Mother      Coronary Artery Disease Mother      Cerebrovascular Disease Father      Hyperlipidemia Father      Coronary Artery Disease Father      Myocardial Infarction Father      Hyperlipidemia Brother      Hypertension Brother      Diabetes Brother      Asthma Sister      Breast Cancer Sister      Hyperlipidemia Sister      Diabetes Sister      Hyperlipidemia Sister      Diabetes Sister      Hyperlipidemia Sister      Suicide Sister      Anesthesia Reaction Sister      Breast Cancer Sister      Hyperlipidemia Sister      Diabetes Sister      Breast Cancer Niece      Skin Cancer Niece      Thyroid Disease Niece      Breast Cancer Niece      Leukemia Nephew      Colon Cancer No family hx of      Prostate Cancer No family hx of      Genetic Disorder No family hx of        Social History     Tobacco Use     Smoking status: Every Day     Packs/day: 0.50     Years: 46.00     Pack years: 23.00     Types: " Cigarettes     Start date: 1976     Smokeless tobacco: Never     Tobacco comments:     Trying to quit   Vaping Use     Vaping status: Never Used   Substance Use Topics     Alcohol use: Not Currently     Comment: none since July of 2022         CURRENT MEDICATIONS:   Current Outpatient Medications   Medication     HYDROcodone-Acetaminophen 5-217 MG/10ML SOLN     hydrocortisone (CORTAID) 1 % external cream     ibuprofen (ADVIL/MOTRIN) 200 MG tablet     nicotine (NICODERM CQ) 14 MG/24HR 24 hr patch     nicotine (NICODERM CQ) 7 MG/24HR 24 hr patch     nicotine (NICORETTE) 2 MG gum     omeprazole (PRILOSEC) 20 MG DR capsule     ondansetron (ZOFRAN) 8 MG tablet     polyethylene glycol (MIRALAX) 17 GM/Dose powder     prochlorperazine (COMPAZINE) 10 MG tablet     No current facility-administered medications for this visit.         ALLERGIES:  No Known Allergies  VITAL SIGNS:  There were no vitals taken for this visit.  Wt Readings from Last 4 Encounters:   05/05/23 65.4 kg (144 lb 3.2 oz)   05/03/23 64.3 kg (141 lb 11.2 oz)   05/03/23 63.9 kg (140 lb 14 oz)   04/26/23 66.2 kg (146 lb)       Review of Systems  Full review of systems is performed today.  The patient denies any problems with his vision.  He denies any problems with his speech.  He is right-hand dominant.  He denies any back pain.  He does note solid food dysphagia and has to modify the quality of the food that he takes.  He denies any bleeding.  He still able to take his pills by mouth and also take liquid such as nutritional supplements and V8 juice.  He denies any diarrhea.  He denies any problems urinating.  Weight has been stable at 140 pounds since PEG tube was placed.    PHYSICAL EXAM:   Constitutional: awake, alert, cooperative, no apparent distress, and appears stated age  Eyes: Lids and lashes normal  ENT: Normocephalic, without obvious abnormality, atraumatic  Hematologic / Lymphatic: no cervical lymphadenopathy and no supraclavicular  lymphadenopathy  Respiratory: lungs diminished bilaterally, slight rhonchi present   Cardiovascular: Normal apical impulse, regular rate and rhythm, normal S1 and S2, no S3 or S4, and no murmur noted  GI: Normal bowel sounds, soft, non-distended, non-tender, PEG in placed.   Skin: no redness, warmth, or swelling  Musculoskeletal: Thin. Tone is normal.  Neurologic: Awake, alert, oriented to name, place and time. Gait is normal.  Neuropsychiatric: General: normal, calm and normal eye contact        NATALEE Mao CNP

## 2023-05-12 NOTE — PROGRESS NOTES
Name: Kenneth Matthew MRN: [id number]   : 1965 (58 year old)  Date of Service: 2023   Referring: Dr. Barnhart       Diagnosis: stage IV esophageal cancer    Prior radiation therapy: None    Prior chemotherapy/immunotherapy:   Protocol: nivolumab/FOLFOX; C10 D1 planned for 23  Facility: [facility]  Dates: 23-present    23 1030:  T/c to obtain health history prior to radiation therapy consult. Spoke with wife, Kenneth is currently outside and will back when he comes inside.    1100:  Patient called back.  He has no financial or transportation concerns.  Nutrition screening not completed as he is already seeing the dietician.  He currently can only drink liquids (Ensure Clear, V8 juice, and broth) and cannot eat any solid food.  He is also using his PEG tube.  His goal is to have it removed so he can go back to work.  His weight has been staying in the 140s for the past month. He currently is receiving chemotherapy every 2 weeks.  His swallowing has improved some.  He has no questions or concerns.  Appointment confirmed for 5/15/23.     Natasha Cunningham RN

## 2023-05-15 ENCOUNTER — ONCOLOGY VISIT (OUTPATIENT)
Dept: RADIATION ONCOLOGY | Facility: HOSPITAL | Age: 58
End: 2023-05-15
Attending: RADIOLOGY
Payer: COMMERCIAL

## 2023-05-15 ENCOUNTER — DOCUMENTATION ONLY (OUTPATIENT)
Dept: RADIATION ONCOLOGY | Facility: HOSPITAL | Age: 58
End: 2023-05-15

## 2023-05-15 VITALS
DIASTOLIC BLOOD PRESSURE: 58 MMHG | SYSTOLIC BLOOD PRESSURE: 92 MMHG | OXYGEN SATURATION: 97 % | WEIGHT: 141.3 LBS | TEMPERATURE: 97.6 F | HEART RATE: 68 BPM | HEIGHT: 68 IN | BODY MASS INDEX: 21.41 KG/M2 | RESPIRATION RATE: 19 BRPM

## 2023-05-15 DIAGNOSIS — C15.5 MALIGNANT NEOPLASM OF LOWER THIRD OF ESOPHAGUS (H): Primary | ICD-10-CM

## 2023-05-15 PROCEDURE — G0463 HOSPITAL OUTPT CLINIC VISIT: HCPCS

## 2023-05-15 PROCEDURE — 99205 OFFICE O/P NEW HI 60 MIN: CPT | Performed by: RADIOLOGY

## 2023-05-15 ASSESSMENT — PAIN SCALES - GENERAL: PAINLEVEL: NO PAIN (0)

## 2023-05-15 NOTE — PROGRESS NOTES
"Radiation Oncology Rooming Note    May 15, 2023 9:44 AM     Kenneth Matthew is a 58 year old male who presents for:     No chief complaint on file.      Initial Vitals: There were no vitals taken for this visit.   Estimated body mass index is 21.8 kg/m  as calculated from the following:    Height as of an earlier encounter on 5/15/23: 1.715 m (5' 7.5\").    Weight as of an earlier encounter on 5/15/23: 64.1 kg (141 lb 4.8 oz).       Patient completed the following questionnaires: NCCN Distress Thermometer.       Patient was assessed using the NCCN psychosocial distress thermometer. Patient rated the score as a one. Patient rated current stressors as treatment decisions, eating, skin dry/congested, sleep, and tingling in hands/feet. Stressors brought to the attention of Aleshia Dillard CNP for a score of 6 or greater or per nurses discretion.       Patient received folder containing the following:  [employer] Radiation Therapy and You booklet  radiation planning process packet  radiation therapy timeline  financial letter  vaccines during cancer treatment hand out  mental health services and providers packet  cancer resource list  family support group handouts   business card  radiation therapy business card    Patient has advance directives on file    Financial assistance resources given to patient:  Patient already applied to care [facility] and adolfo dennis Mcgill LPN              "

## 2023-05-17 ENCOUNTER — ONCOLOGY VISIT (OUTPATIENT)
Dept: ONCOLOGY | Facility: OTHER | Age: 58
End: 2023-05-17
Attending: NURSE PRACTITIONER
Payer: COMMERCIAL

## 2023-05-17 ENCOUNTER — INFUSION THERAPY VISIT (OUTPATIENT)
Dept: INFUSION THERAPY | Facility: OTHER | Age: 58
End: 2023-05-17
Attending: INTERNAL MEDICINE
Payer: COMMERCIAL

## 2023-05-17 ENCOUNTER — LAB (OUTPATIENT)
Dept: ONCOLOGY | Facility: OTHER | Age: 58
End: 2023-05-17
Attending: INTERNAL MEDICINE
Payer: COMMERCIAL

## 2023-05-17 VITALS
BODY MASS INDEX: 21.76 KG/M2 | SYSTOLIC BLOOD PRESSURE: 111 MMHG | WEIGHT: 141 LBS | DIASTOLIC BLOOD PRESSURE: 50 MMHG | HEART RATE: 58 BPM

## 2023-05-17 VITALS
HEIGHT: 68 IN | HEART RATE: 64 BPM | WEIGHT: 140.65 LBS | TEMPERATURE: 96.3 F | OXYGEN SATURATION: 97 % | RESPIRATION RATE: 20 BRPM | BODY MASS INDEX: 21.32 KG/M2 | DIASTOLIC BLOOD PRESSURE: 60 MMHG | SYSTOLIC BLOOD PRESSURE: 110 MMHG

## 2023-05-17 DIAGNOSIS — C15.5 MALIGNANT NEOPLASM OF LOWER THIRD OF ESOPHAGUS (H): Primary | ICD-10-CM

## 2023-05-17 LAB
ALBUMIN SERPL BCG-MCNC: 3.7 G/DL (ref 3.5–5.2)
ALP SERPL-CCNC: 106 U/L (ref 40–129)
ALT SERPL W P-5'-P-CCNC: 85 U/L (ref 10–50)
ANION GAP SERPL CALCULATED.3IONS-SCNC: 7 MMOL/L (ref 7–15)
AST SERPL W P-5'-P-CCNC: 46 U/L (ref 10–50)
BASOPHILS # BLD AUTO: 0.1 10E3/UL (ref 0–0.2)
BASOPHILS NFR BLD AUTO: 1 %
BILIRUB SERPL-MCNC: 0.3 MG/DL
BUN SERPL-MCNC: 8.1 MG/DL (ref 8–23)
CALCIUM SERPL-MCNC: 8.6 MG/DL (ref 8.8–10.2)
CHLORIDE SERPL-SCNC: 105 MMOL/L (ref 98–107)
CREAT SERPL-MCNC: 0.52 MG/DL (ref 0.67–1.17)
DEPRECATED HCO3 PLAS-SCNC: 27 MMOL/L (ref 22–29)
EOSINOPHIL # BLD AUTO: 0.2 10E3/UL (ref 0–0.7)
EOSINOPHIL NFR BLD AUTO: 4 %
ERYTHROCYTE [DISTWIDTH] IN BLOOD BY AUTOMATED COUNT: 19.8 % (ref 10–15)
GFR SERPL CREATININE-BSD FRML MDRD: >90 ML/MIN/1.73M2
GLUCOSE SERPL-MCNC: 118 MG/DL (ref 70–99)
HCT VFR BLD AUTO: 36.8 % (ref 40–53)
HGB BLD-MCNC: 12.2 G/DL (ref 13.3–17.7)
IMM GRANULOCYTES # BLD: 0 10E3/UL
IMM GRANULOCYTES NFR BLD: 0 %
LYMPHOCYTES # BLD AUTO: 1.9 10E3/UL (ref 0.8–5.3)
LYMPHOCYTES NFR BLD AUTO: 44 %
MCH RBC QN AUTO: 30.7 PG (ref 26.5–33)
MCHC RBC AUTO-ENTMCNC: 33.2 G/DL (ref 31.5–36.5)
MCV RBC AUTO: 93 FL (ref 78–100)
MONOCYTES # BLD AUTO: 0.7 10E3/UL (ref 0–1.3)
MONOCYTES NFR BLD AUTO: 16 %
NEUTROPHILS # BLD AUTO: 1.5 10E3/UL (ref 1.6–8.3)
NEUTROPHILS NFR BLD AUTO: 35 %
NRBC # BLD AUTO: 0 10E3/UL
NRBC BLD AUTO-RTO: 0 /100
PLATELET # BLD AUTO: 100 10E3/UL (ref 150–450)
POTASSIUM SERPL-SCNC: 4.5 MMOL/L (ref 3.4–5.3)
PROT SERPL-MCNC: 6.7 G/DL (ref 6.4–8.3)
RBC # BLD AUTO: 3.98 10E6/UL (ref 4.4–5.9)
SODIUM SERPL-SCNC: 139 MMOL/L (ref 136–145)
TSH SERPL DL<=0.005 MIU/L-ACNC: 0.68 UIU/ML (ref 0.3–4.2)
WBC # BLD AUTO: 4.2 10E3/UL (ref 4–11)

## 2023-05-17 PROCEDURE — 96411 CHEMO IV PUSH ADDL DRUG: CPT | Performed by: INTERNAL MEDICINE

## 2023-05-17 PROCEDURE — 96521 REFILL/MAINT PORTABLE PUMP: CPT | Mod: 59 | Performed by: INTERNAL MEDICINE

## 2023-05-17 PROCEDURE — 96375 TX/PRO/DX INJ NEW DRUG ADDON: CPT | Performed by: INTERNAL MEDICINE

## 2023-05-17 PROCEDURE — 36591 DRAW BLOOD OFF VENOUS DEVICE: CPT | Performed by: INTERNAL MEDICINE

## 2023-05-17 PROCEDURE — 96413 CHEMO IV INFUSION 1 HR: CPT | Performed by: INTERNAL MEDICINE

## 2023-05-17 PROCEDURE — 96367 TX/PROPH/DG ADDL SEQ IV INF: CPT | Performed by: INTERNAL MEDICINE

## 2023-05-17 PROCEDURE — 80050 GENERAL HEALTH PANEL: CPT | Performed by: INTERNAL MEDICINE

## 2023-05-17 PROCEDURE — 96415 CHEMO IV INFUSION ADDL HR: CPT | Performed by: INTERNAL MEDICINE

## 2023-05-17 PROCEDURE — 99215 OFFICE O/P EST HI 40 MIN: CPT | Mod: 25 | Performed by: INTERNAL MEDICINE

## 2023-05-17 PROCEDURE — 96417 CHEMO IV INFUS EACH ADDL SEQ: CPT | Performed by: INTERNAL MEDICINE

## 2023-05-17 RX ORDER — HEPARIN SODIUM,PORCINE 10 UNIT/ML
5 VIAL (ML) INTRAVENOUS
Status: CANCELLED | OUTPATIENT
Start: 2023-05-19

## 2023-05-17 RX ORDER — DIPHENHYDRAMINE HYDROCHLORIDE 50 MG/ML
50 INJECTION INTRAMUSCULAR; INTRAVENOUS
Status: CANCELLED
Start: 2023-05-17

## 2023-05-17 RX ORDER — HEPARIN SODIUM,PORCINE 10 UNIT/ML
5 VIAL (ML) INTRAVENOUS
Status: CANCELLED | OUTPATIENT
Start: 2023-05-17

## 2023-05-17 RX ORDER — MEPERIDINE HYDROCHLORIDE 25 MG/ML
25 INJECTION INTRAMUSCULAR; INTRAVENOUS; SUBCUTANEOUS EVERY 30 MIN PRN
Status: CANCELLED | OUTPATIENT
Start: 2023-05-17

## 2023-05-17 RX ORDER — EPINEPHRINE 1 MG/ML
0.3 INJECTION, SOLUTION, CONCENTRATE INTRAVENOUS EVERY 5 MIN PRN
Status: CANCELLED | OUTPATIENT
Start: 2023-05-17

## 2023-05-17 RX ORDER — HEPARIN SODIUM (PORCINE) LOCK FLUSH IV SOLN 100 UNIT/ML 100 UNIT/ML
5 SOLUTION INTRAVENOUS
Status: CANCELLED | OUTPATIENT
Start: 2023-05-19

## 2023-05-17 RX ORDER — HEPARIN SODIUM (PORCINE) LOCK FLUSH IV SOLN 100 UNIT/ML 100 UNIT/ML
5 SOLUTION INTRAVENOUS
Status: CANCELLED | OUTPATIENT
Start: 2023-05-17

## 2023-05-17 RX ORDER — MIRTAZAPINE 7.5 MG/1
7.5 TABLET, FILM COATED ORAL AT BEDTIME
Qty: 30 TABLET | Refills: 0 | Status: SHIPPED | OUTPATIENT
Start: 2023-05-17 | End: 2023-06-28 | Stop reason: ALTCHOICE

## 2023-05-17 RX ORDER — ALBUTEROL SULFATE 90 UG/1
1-2 AEROSOL, METERED RESPIRATORY (INHALATION)
Status: CANCELLED
Start: 2023-05-17

## 2023-05-17 RX ORDER — FLUOROURACIL 50 MG/ML
400 INJECTION, SOLUTION INTRAVENOUS ONCE
Status: CANCELLED | OUTPATIENT
Start: 2023-05-17

## 2023-05-17 RX ORDER — METHYLPREDNISOLONE SODIUM SUCCINATE 125 MG/2ML
125 INJECTION, POWDER, LYOPHILIZED, FOR SOLUTION INTRAMUSCULAR; INTRAVENOUS
Status: CANCELLED
Start: 2023-05-17

## 2023-05-17 RX ORDER — ALBUTEROL SULFATE 0.83 MG/ML
2.5 SOLUTION RESPIRATORY (INHALATION)
Status: CANCELLED | OUTPATIENT
Start: 2023-05-17

## 2023-05-17 RX ORDER — ONDANSETRON 2 MG/ML
8 INJECTION INTRAMUSCULAR; INTRAVENOUS ONCE
Status: COMPLETED | OUTPATIENT
Start: 2023-05-17 | End: 2023-05-17

## 2023-05-17 RX ORDER — LORAZEPAM 2 MG/ML
0.5 INJECTION INTRAMUSCULAR EVERY 4 HOURS PRN
Status: CANCELLED | OUTPATIENT
Start: 2023-05-17

## 2023-05-17 RX ORDER — FLUOROURACIL 50 MG/ML
400 INJECTION, SOLUTION INTRAVENOUS ONCE
Status: COMPLETED | OUTPATIENT
Start: 2023-05-17 | End: 2023-05-17

## 2023-05-17 RX ADMIN — Medication 250 ML: at 10:29

## 2023-05-17 RX ADMIN — FLUOROURACIL 690 MG: 50 INJECTION, SOLUTION INTRAVENOUS at 13:50

## 2023-05-17 RX ADMIN — ONDANSETRON 8 MG: 2 INJECTION INTRAMUSCULAR; INTRAVENOUS at 10:31

## 2023-05-17 ASSESSMENT — PAIN SCALES - GENERAL: PAINLEVEL: MILD PAIN (3)

## 2023-05-17 NOTE — PROGRESS NOTES
Infusion Nursing Note:  Kenneth Matthew presents today for Folfox/Nivolumab.    Patient seen by provider today: Yes: see provider assessment on this date for details.   present during visit today: Not Applicable.    Note: patient denies questions or concerns regarding today's Folfox/Nivolumab infusion.    Intravenous Access:Implanted Port. Patient port accessed prior to appt with Dr Barnhart in medical oncology.    Treatment Conditions:  Results reviewed, labs MET treatment parameters, ok to proceed with treatment.  Confirmed patient received the Fact Sheet for Patients, Parents and Caregivers prior to receiving medication.     Post Infusion Assessment:  Patient tolerated infusion without incident.  Blood return noted pre and post infusion.    Prior to discharge: Port is secured in place with tegaderm and flushed with 10cc NS with positive blood return noted. Continuous home infusion C-series connected.    All connectors secured in place and clamps taped open.   Patient instructed to call our clinic or [facility] with any questions or concerns at home.  Patient verbalized understanding.    Patient set up for pump disconnect at our clinic on 5/19/23.      [facility] delivery ticket secure emailed to [facility] and [facility].    [facility] delivery ticket sent to scanning.

## 2023-05-17 NOTE — NURSING NOTE
"Oncology Rooming Note    May 17, 2023 9:29 AM   Kenneth Matthew is a 58 year old male who presents for:    Chief Complaint   Patient presents with     Oncology Clinic Visit     Follow up Malignant neoplasm of lower third of esophagus      Initial Vitals: /60   Pulse 64   Temp (!) 96.3  F (35.7  C) (Tympanic)   Resp 20   Ht 1.715 m (5' 7.5\")   Wt 63.8 kg (140 lb 10.5 oz)   SpO2 97%   BMI 21.70 kg/m   Estimated body mass index is 21.7 kg/m  as calculated from the following:    Height as of this encounter: 1.715 m (5' 7.5\").    Weight as of this encounter: 63.8 kg (140 lb 10.5 oz). Body surface area is 1.74 meters squared.  Mild Pain (3) Comment: Data Unavailable   No LMP for male patient.  Allergies reviewed: Yes  Medications reviewed: Yes    Medications: Medication refills not needed today.  Pharmacy name entered into [facility]:    [id number] - [place], [place] - [id number] [address]  [facility] - [place] - [id number]            "

## 2023-05-17 NOTE — PATIENT INSTRUCTIONS

## 2023-05-17 NOTE — PROGRESS NOTES
Patients power port accessed using non-coring, 19 gauge, 3/4 inch needle.    Mask donned by caregiver: yes Site prepped with CHG: yes Labs drawn: yes Dressing applied using aseptic technique: yes.     Port accessed per facility protocol. Port flushed easily, blood return noted.  No signs and symptoms of infection or infiltration.  Port flushed with 10mL normal saline, blood return noted, 10 mLs blood discarded. Blood taken for ordered labs, port flushed with 20mL normal saline.  Port left accessed as patient has appointment with Dr. Barnhart, and is then due for chemo if parameters are met.  Patient discharged with no complaints.

## 2023-05-17 NOTE — PROGRESS NOTES
MEDICAL ONCOLOGY FOLLOW UP NOTE  May 17, 2023    Reason for Follow up: esophageal cancer    HISTORY OF PRESENT ILLNESS  Kenneth Matthew is a 58 year old male with PMH as stated below who is seen in the oncology clinic for consultation of esophageal cancer    His history in short is as follows    Mr. Matthew states he noticed difficulty swallowing initially starting June 2022. Over the last 1 year he has unintentionally lost 60 pounds.     10/21/2022: CT chest, abdomen and pelvis: Diffuse wall thickening of the distal esophagus with associated mediastinal adenopathy, and upper abdominal  Conglomerate lymphadenopathy. Constellation of findings are highly concerning for underlying esophageal carcinoma with metastatic disease.     11/1/2022: EGD: A large ulcerating mass with no bleeding and no stigmata of recent bleeding was found   Lower third from the incisors. The mass was partially obstructing and partially circumferential involving  two thirds.     Biopsy showed poorly differentiated favoring adenocarcinoma. Her 2 negative.     12/14/2022: PET scan: Distal esophageal neoplasm with metastatic involvement the  mediastinum, left hilum and retroperitoneal lymph nodes of the upperabdomen. Also likely localized metastatic involvement of thestomach/GE junction. 2 cm intramural distal esophageal/GE junction mass. Circumferential wall thickening with increased FDG uptake in the distal esophagus adjacent portions of the stomach/GE junction may represent localized disease however could also represent localized inflammation. Metastatic involvement to the subcarinal nodes, left infrahilar and  pericaval/paraesophageal lymph nodes of the lower thorax. Metastatic involvement to the retroperitoneal lymph nodes of the upper abdomen. These lymph nodes have increased in size dating back to be10/21/2022 CT scan consistent with worsening disease. Representative  nodes are described above.    3/29/2023: PET scan: Significant interval  improvement. Marked decrease in size of distal esophageal/GE junction masses with near complete resolution of enlarged, FDG avid subcarinal, paraesophageal and retroperitoneal lymph nodes.    Normal-sized lymph nodes throughout the head and neck, chest, abdomen and pelvis now demonstrate FDG uptake, the majority of which do not exceed background suggesting diffuse inflammatory response, possibly  related to therapy.    Interim history    Doing well. Overall he is doing well. He states back pain has improved. Denies any mid abdominal pain. Denies any fever or chills, denies any rash, denies any worsening fatigue. He is currently using PEG tube for nutrition mostly and is able to keep down liquids through his mouth. He is not able to eat solids. Denies any fever or chills. Denies any cough. He is     REVIEW OF SYSTEMS  A 12-point ROS negative except as in HPI      Current Outpatient Medications   Medication Sig Dispense Refill     HYDROcodone-Acetaminophen 5-217 MG/10ML SOLN Take 10 mLs by mouth every 8 hours 473 mL 0     omeprazole (PRILOSEC) 20 MG DR capsule Take 1 capsule (20 mg) by mouth daily 90 capsule 0     ondansetron (ZOFRAN) 8 MG tablet Take 1 tablet (8 mg) by mouth every 8 hours as needed for nausea (vomiting) 30 tablet 2     prochlorperazine (COMPAZINE) 10 MG tablet Take 1 tablet (10 mg) by mouth every 6 hours as needed (Nausea/Vomiting) 30 tablet 2     hydrocortisone (CORTAID) 1 % external cream Apply topically 2 times daily (Patient not taking: Reported on 5/17/2023) 30 g 1     ibuprofen (ADVIL/MOTRIN) 200 MG tablet Take 200 mg by mouth every 4 hours as needed for pain (Patient not taking: Reported on 5/3/2023)       nicotine (NICODERM CQ) 14 MG/24HR 24 hr patch  (Patient not taking: Reported on 5/17/2023)       nicotine (NICODERM CQ) 7 MG/24HR 24 hr patch Place 1 patch onto the skin every 24 hours (Patient not taking: Reported on 5/17/2023) 14 patch 1     nicotine (NICORETTE) 2 MG gum  (Patient not  taking: Reported on 4/5/2023)       polyethylene glycol (MIRALAX) 17 GM/Dose powder Take 9-17 g by mouth daily (Patient not taking: Reported on 5/3/2023) 510 g 1       No Known Allergies  Immunization History   Administered Date(s) Administered     COVID-19 MONOVALENT 12+ (Pfizer) 08/28/2021, 09/18/2021     TDAP (Adacel,Boostrix) 12/10/2021       Past Medical History:   Diagnosis Date     Cancer (H)      Esophageal cancer (H) 11/01/2022     Esophageal thickening 10/25/2022     Mixed hearing loss, bilateral      Tobacco abuse 1/4/2023     Tobacco use disorder 12/27/2019       Past Surgical History:   Procedure Laterality Date     COLONOSCOPY  11/01/2022    [facility]     ESOPHAGOSCOPY N/A 4/25/2023    Procedure: Upper Endoscopy with biopsy- with Gastrostomy Tube Exchange;  Surgeon: Nba Dalton MD;  Location: [facility]     INSERT PORT VASCULAR ACCESS N/A 12/22/2022    Procedure: POWER PORT PLACEMENT;  Surgeon: Nba Dalton MD;  Location: [facility]     Upper GI Endoscopy  11/01/2022       SOCIAL HISTORY  History   Smoking Status     Every Day     Packs/day: 0.50     Years: 46.00     Types: Cigarettes     Start date: 1976   Smokeless Tobacco     Never    Social History    Substance and Sexual Activity      Alcohol use: Not Currently        Comment: none since July of 2022     History   Drug Use Unknown       FAMILY HISTORY  Family History   Problem Relation Age of Onset     Breast Cancer Mother      Hyperlipidemia Mother      Diabetes Mother      Coronary Artery Disease Mother      Cerebrovascular Disease Father      Hyperlipidemia Father      Coronary Artery Disease Father      Myocardial Infarction Father      Hyperlipidemia Brother      Hypertension Brother      Diabetes Brother      Asthma Sister      Breast Cancer Sister      Hyperlipidemia Sister      Diabetes Sister      Hyperlipidemia Sister      Diabetes Sister      Hyperlipidemia Sister      Suicide Sister      Anesthesia Reaction Sister      Breast Cancer  "Sister      Hyperlipidemia Sister      Diabetes Sister      Breast Cancer Niece      Skin Cancer Niece      Thyroid Disease Niece      Breast Cancer Niece      Leukemia Nephew      Colon Cancer No family hx of      Prostate Cancer No family hx of      Genetic Disorder No family hx of        PHYSICAL EXAMINATION  /60   Pulse 64   Temp (!) 96.3  F (35.7  C) (Tympanic)   Resp 20   Ht 1.715 m (5' 7.5\")   Wt 63.8 kg (140 lb 10.5 oz)   SpO2 97%   BMI 21.70 kg/m    Wt Readings from Last 2 Encounters:   05/17/23 63.8 kg (140 lb 10.5 oz)   05/15/23 64.1 kg (141 lb 4.8 oz)     Physical Exam  Constitutional:       Appearance: Normal appearance.   Eyes:      Conjunctiva/sclera: Conjunctivae normal.   Pulmonary:      Effort: Pulmonary effort is normal.   Abdominal:      General: Abdomen is flat.      Palpations: Abdomen is soft.   Neurological:      General: No focal deficit present.      Mental Status: He is alert and oriented to person, place, and time. Mental status is at baseline.     Laboratory and Imaging:     Latest Reference Range & Units 03/22/23 08:53   WBC 4.0 - 11.0 10e3/uL 5.3   Hemoglobin 13.3 - 17.7 g/dL 11.7 (L)   Hematocrit 40.0 - 53.0 % 36.8 (L)   Platelet Count 150 - 450 10e3/uL 93 (L)   (L): Data is abnormally low    RESULT FOR IMMUNOHISTOCHEMICAL VENTANA CLONE  PD-L1 ASSAY  COMBINED POSITIVE SCORE (CPS): <1%    ASSESSMENT AND PLAN    1. Adenocarcinoma of the esophagus. Her 2 negative.     Discussed imgaging and pathology. Given the findings of mediastinal adenopathy and upper abdominal adenopathy with primary in the lower third of the esophagus unlikely to be surgical.     PET scan did show metastatic disease to non regional adenopathy therefore stage IV disease. PET scan was discussed with radiation oncology and not felt to be a candidate for concurrent chemoRT but palliative RT.     He was then started on palliative FOLFOX with Nivolumab with interim PET scan showing good response. He " will proceed to cycle 10 today. Will plan for another PET scan after cycle 12.     He is currently getting his nutrition through his PEG. He does want to try to eat through his mouth more and get his PEG out. He is also unable to go back to work with the PEG so that is also his motivation to get the PEG out. He underwent EGD and scope could not be passed beyond 39 cm. He met with Dr. Roe and as the radiation oncology simulator was not working a reevaluation was recommended a reevaluation  in 2 months. A referral was offered to [facility] but Mr. Matthew declined it.    I also discussed an option of referral to interventional GI to see if there are any other options to help improve his swallowing and get his PEG tube taken out.     Follow up in 2 weeks prior to next cycle.     Total time spent on the patient on day of encounter was 40 minutes doing chart review, review of test results, interpretation of results, patient visit and documentation.      Nika Barnhart MD

## 2023-05-19 ENCOUNTER — INFUSION THERAPY VISIT (OUTPATIENT)
Dept: INFUSION THERAPY | Facility: OTHER | Age: 58
End: 2023-05-19
Attending: NURSE PRACTITIONER
Payer: COMMERCIAL

## 2023-05-19 VITALS
BODY MASS INDEX: 22.25 KG/M2 | HEART RATE: 60 BPM | DIASTOLIC BLOOD PRESSURE: 57 MMHG | SYSTOLIC BLOOD PRESSURE: 115 MMHG | WEIGHT: 144.18 LBS

## 2023-05-19 DIAGNOSIS — C15.5 MALIGNANT NEOPLASM OF LOWER THIRD OF ESOPHAGUS (H): Primary | ICD-10-CM

## 2023-05-19 PROCEDURE — 96360 HYDRATION IV INFUSION INIT: CPT | Performed by: INTERNAL MEDICINE

## 2023-05-19 RX ORDER — HEPARIN SODIUM (PORCINE) LOCK FLUSH IV SOLN 100 UNIT/ML 100 UNIT/ML
5 SOLUTION INTRAVENOUS
Status: DISCONTINUED | OUTPATIENT
Start: 2023-05-19 | End: 2023-05-19 | Stop reason: HOSPADM

## 2023-05-19 RX ORDER — HEPARIN SODIUM (PORCINE) LOCK FLUSH IV SOLN 100 UNIT/ML 100 UNIT/ML
5 SOLUTION INTRAVENOUS
Status: CANCELLED | OUTPATIENT
Start: 2023-05-19

## 2023-05-19 RX ADMIN — Medication 1000 ML: at 10:32

## 2023-05-19 RX ADMIN — HEPARIN SODIUM (PORCINE) LOCK FLUSH IV SOLN 100 UNIT/ML 5 ML: 100 SOLUTION at 11:32

## 2023-05-19 NOTE — PROGRESS NOTES
Patient is 58 years old, here today for home infuser pump off.    Infuser pump completed, 100 % infused.  Patient denies adverse effects from home infusion, nor mechanical issues.  Pump is intact.  Infuser disconnected from port.      Port flushed per MAR.  Immediate blood return noted.  Flushes easily, without resistance.      NS fluid bolus administered per MAR.    Port needle removed, needle intact.  Surrounding skin without redness, swelling, nor discoloration.  Skin warm and dry to touch.      Patient tolerated well.  Site covered with sterile bandage and slight pressure applied for 30 seconds. Instructed patient to leave bandage on for a minimum of one hour and to call with questions or concerns.    Education provided for signs and symptoms of infection and when to call the doctor.  Patient states understanding and is in agreement with this plan. Patient discharged.

## 2023-05-30 ENCOUNTER — OFFICE VISIT (OUTPATIENT)
Dept: FAMILY MEDICINE | Facility: OTHER | Age: 58
End: 2023-05-30
Attending: STUDENT IN AN ORGANIZED HEALTH CARE EDUCATION/TRAINING PROGRAM
Payer: COMMERCIAL

## 2023-05-30 VITALS
HEIGHT: 67 IN | RESPIRATION RATE: 18 BRPM | OXYGEN SATURATION: 95 % | DIASTOLIC BLOOD PRESSURE: 80 MMHG | HEART RATE: 66 BPM | SYSTOLIC BLOOD PRESSURE: 115 MMHG | BODY MASS INDEX: 21.69 KG/M2 | TEMPERATURE: 97.2 F | WEIGHT: 138.2 LBS

## 2023-05-30 DIAGNOSIS — G89.3 CHRONIC PAIN DUE TO NEOPLASM: ICD-10-CM

## 2023-05-30 DIAGNOSIS — C15.5 MALIGNANT NEOPLASM OF LOWER THIRD OF ESOPHAGUS (H): Primary | ICD-10-CM

## 2023-05-30 PROCEDURE — 99213 OFFICE O/P EST LOW 20 MIN: CPT | Performed by: STUDENT IN AN ORGANIZED HEALTH CARE EDUCATION/TRAINING PROGRAM

## 2023-05-30 ASSESSMENT — PAIN SCALES - GENERAL: PAINLEVEL: NO PAIN (0)

## 2023-05-30 NOTE — PROGRESS NOTES
"  Assessment & Plan     Malignant neoplasm of lower third of esophagus (H)  Follows with Dr. Barnhart and her oncology team.  Currently undergoing cycle 10 of chemotherapy.  Recent scoping done on 4/25/2023 with inability to pass scope through to stomach (but small lumen noted).    He is able to tolerate fluids by mouth only.  Food via G-tube.  He is a candidate for palliative radiation therapy    Chronic pain due to neoplasm  Pain is actually very well controlled.  Patient tells me that he has no pain currently and has only needed to use Norco once in the last month.  He currently does not need any refills for his pain medication.      No follow-ups on file.    Chata Smith MD  [facility]\""FRANCESCA Cooper is a 58 year old, presenting for the following health issues:  FU esophogeal cancer    HPI     Cancer related    Pain has improved. Denies current pain. Only needing pain medication once a month. Taking fluids orally, tolerating water orally now. Taking solids through PEG tube. Low appetite, down 2 lbs since last visit 5/17. Low mood. Sleeping ok, up a lot during the night.   Follows with nutritionist  Had scoping, could not get scope down past 39 cm      Review of Systems   Constitutional, HEENT, cardiovascular, pulmonary, gi and gu systems are negative, except as otherwise noted.      Objective    /80 (BP Location: Left arm, Patient Position: Sitting, Cuff Size: Adult Regular)   Pulse 66   Temp 97.2  F (36.2  C) (Tympanic)   Resp 18   Ht 1.702 m (5' 7\")   Wt 62.7 kg (138 lb 3.2 oz)   SpO2 95%   BMI 21.65 kg/m    Body mass index is 21.65 kg/m .  Physical Exam   GENERAL: healthy, alert and no distress  EYES: Eyes grossly normal to inspection, PERRL and conjunctivae and sclerae normal  HENT: ear canals and TM's normal, nose and mouth without ulcers or lesions  NECK: no adenopathy, no asymmetry, masses, or scars and thyroid normal to palpation  RESP: lungs clear to " auscultation - no rales, rhonchi or wheezes  CV: regular rate and rhythm, normal S1 S2, no S3 or S4, no murmur, click or rub, no peripheral edema and peripheral pulses strong  ABDOMEN: soft, nontender, no hepatosplenomegaly, no masses and bowel sounds normal  MS: no gross musculoskeletal defects noted, no edema  SKIN: no suspicious lesions or rashes  NEURO: Normal strength and tone, mentation intact and speech normal  PSYCH: mentation appears normal, affect normal/bright

## 2023-05-31 ENCOUNTER — INFUSION THERAPY VISIT (OUTPATIENT)
Dept: INFUSION THERAPY | Facility: OTHER | Age: 58
End: 2023-05-31
Attending: NURSE PRACTITIONER
Payer: COMMERCIAL

## 2023-05-31 ENCOUNTER — ONCOLOGY VISIT (OUTPATIENT)
Dept: ONCOLOGY | Facility: OTHER | Age: 58
End: 2023-05-31
Attending: NURSE PRACTITIONER
Payer: COMMERCIAL

## 2023-05-31 ENCOUNTER — HOSPITAL ENCOUNTER (OUTPATIENT)
Dept: EDUCATION SERVICES | Facility: HOSPITAL | Age: 58
Discharge: HOME | End: 2023-05-31
Attending: NURSE PRACTITIONER
Payer: COMMERCIAL

## 2023-05-31 VITALS
BODY MASS INDEX: 20.95 KG/M2 | SYSTOLIC BLOOD PRESSURE: 118 MMHG | TEMPERATURE: 97.8 F | OXYGEN SATURATION: 95 % | RESPIRATION RATE: 19 BRPM | HEART RATE: 61 BPM | HEIGHT: 68 IN | WEIGHT: 138.23 LBS | DIASTOLIC BLOOD PRESSURE: 60 MMHG

## 2023-05-31 VITALS — BODY MASS INDEX: 21.5 KG/M2 | DIASTOLIC BLOOD PRESSURE: 57 MMHG | SYSTOLIC BLOOD PRESSURE: 102 MMHG | WEIGHT: 139.33 LBS

## 2023-05-31 DIAGNOSIS — R79.89 ELEVATED LFTS: ICD-10-CM

## 2023-05-31 DIAGNOSIS — C15.5 MALIGNANT NEOPLASM OF LOWER THIRD OF ESOPHAGUS (H): Primary | ICD-10-CM

## 2023-05-31 DIAGNOSIS — R63.4 WEIGHT LOSS: ICD-10-CM

## 2023-05-31 DIAGNOSIS — G47.00 INSOMNIA, UNSPECIFIED TYPE: ICD-10-CM

## 2023-05-31 LAB
ALBUMIN SERPL BCG-MCNC: 3.5 G/DL (ref 3.5–5.2)
ALP SERPL-CCNC: 101 U/L (ref 40–129)
ALT SERPL W P-5'-P-CCNC: 73 U/L (ref 10–50)
ANION GAP SERPL CALCULATED.3IONS-SCNC: 9 MMOL/L (ref 7–15)
AST SERPL W P-5'-P-CCNC: 52 U/L (ref 10–50)
BASOPHILS # BLD AUTO: 0.1 10E3/UL (ref 0–0.2)
BASOPHILS NFR BLD AUTO: 1 %
BILIRUB SERPL-MCNC: 0.4 MG/DL
BUN SERPL-MCNC: 8.4 MG/DL (ref 8–23)
CALCIUM SERPL-MCNC: 8.9 MG/DL (ref 8.8–10.2)
CHLORIDE SERPL-SCNC: 104 MMOL/L (ref 98–107)
CREAT SERPL-MCNC: 0.54 MG/DL (ref 0.67–1.17)
DEPRECATED HCO3 PLAS-SCNC: 26 MMOL/L (ref 22–29)
EOSINOPHIL # BLD AUTO: 0.1 10E3/UL (ref 0–0.7)
EOSINOPHIL NFR BLD AUTO: 3 %
ERYTHROCYTE [DISTWIDTH] IN BLOOD BY AUTOMATED COUNT: 18.3 % (ref 10–15)
GFR SERPL CREATININE-BSD FRML MDRD: >90 ML/MIN/1.73M2
GLUCOSE SERPL-MCNC: 124 MG/DL (ref 70–99)
HCT VFR BLD AUTO: 37.3 % (ref 40–53)
HGB BLD-MCNC: 12.3 G/DL (ref 13.3–17.7)
IMM GRANULOCYTES # BLD: 0 10E3/UL
IMM GRANULOCYTES NFR BLD: 0 %
LYMPHOCYTES # BLD AUTO: 1.5 10E3/UL (ref 0.8–5.3)
LYMPHOCYTES NFR BLD AUTO: 34 %
MCH RBC QN AUTO: 30.9 PG (ref 26.5–33)
MCHC RBC AUTO-ENTMCNC: 33 G/DL (ref 31.5–36.5)
MCV RBC AUTO: 94 FL (ref 78–100)
MONOCYTES # BLD AUTO: 0.8 10E3/UL (ref 0–1.3)
MONOCYTES NFR BLD AUTO: 19 %
NEUTROPHILS # BLD AUTO: 1.9 10E3/UL (ref 1.6–8.3)
NEUTROPHILS NFR BLD AUTO: 43 %
NRBC # BLD AUTO: 0 10E3/UL
NRBC BLD AUTO-RTO: 0 /100
PLATELET # BLD AUTO: 102 10E3/UL (ref 150–450)
POTASSIUM SERPL-SCNC: 4.3 MMOL/L (ref 3.4–5.3)
PROT SERPL-MCNC: 6.6 G/DL (ref 6.4–8.3)
RBC # BLD AUTO: 3.98 10E6/UL (ref 4.4–5.9)
SODIUM SERPL-SCNC: 139 MMOL/L (ref 136–145)
TSH SERPL DL<=0.005 MIU/L-ACNC: 0.58 UIU/ML (ref 0.3–4.2)
WBC # BLD AUTO: 4.5 10E3/UL (ref 4–11)

## 2023-05-31 PROCEDURE — 99215 OFFICE O/P EST HI 40 MIN: CPT | Performed by: NURSE PRACTITIONER

## 2023-05-31 PROCEDURE — 96417 CHEMO IV INFUS EACH ADDL SEQ: CPT | Performed by: INTERNAL MEDICINE

## 2023-05-31 PROCEDURE — 96367 TX/PROPH/DG ADDL SEQ IV INF: CPT | Performed by: INTERNAL MEDICINE

## 2023-05-31 PROCEDURE — 80050 GENERAL HEALTH PANEL: CPT | Performed by: NURSE PRACTITIONER

## 2023-05-31 PROCEDURE — 999N000107 HC STATISTIC NUTRITIONAL THERAPY NO CHARGE: Performed by: DIETITIAN, REGISTERED

## 2023-05-31 PROCEDURE — 96411 CHEMO IV PUSH ADDL DRUG: CPT | Performed by: INTERNAL MEDICINE

## 2023-05-31 PROCEDURE — 96413 CHEMO IV INFUSION 1 HR: CPT | Performed by: INTERNAL MEDICINE

## 2023-05-31 PROCEDURE — 96415 CHEMO IV INFUSION ADDL HR: CPT | Performed by: INTERNAL MEDICINE

## 2023-05-31 RX ORDER — EPINEPHRINE 1 MG/ML
0.3 INJECTION, SOLUTION, CONCENTRATE INTRAVENOUS EVERY 5 MIN PRN
Status: CANCELLED | OUTPATIENT
Start: 2023-05-31

## 2023-05-31 RX ORDER — HEPARIN SODIUM,PORCINE 10 UNIT/ML
5 VIAL (ML) INTRAVENOUS
Status: CANCELLED | OUTPATIENT
Start: 2023-06-16

## 2023-05-31 RX ORDER — DIPHENHYDRAMINE HYDROCHLORIDE 50 MG/ML
50 INJECTION INTRAMUSCULAR; INTRAVENOUS
Status: CANCELLED
Start: 2023-06-14

## 2023-05-31 RX ORDER — HEPARIN SODIUM (PORCINE) LOCK FLUSH IV SOLN 100 UNIT/ML 100 UNIT/ML
5 SOLUTION INTRAVENOUS
Status: CANCELLED | OUTPATIENT
Start: 2023-06-16

## 2023-05-31 RX ORDER — DIPHENHYDRAMINE HYDROCHLORIDE 50 MG/ML
50 INJECTION INTRAMUSCULAR; INTRAVENOUS
Status: CANCELLED
Start: 2023-05-31

## 2023-05-31 RX ORDER — ALBUTEROL SULFATE 0.83 MG/ML
2.5 SOLUTION RESPIRATORY (INHALATION)
Status: CANCELLED | OUTPATIENT
Start: 2023-05-31

## 2023-05-31 RX ORDER — LORAZEPAM 2 MG/ML
0.5 INJECTION INTRAMUSCULAR EVERY 4 HOURS PRN
Status: CANCELLED | OUTPATIENT
Start: 2023-05-31

## 2023-05-31 RX ORDER — HEPARIN SODIUM (PORCINE) LOCK FLUSH IV SOLN 100 UNIT/ML 100 UNIT/ML
5 SOLUTION INTRAVENOUS
Status: CANCELLED | OUTPATIENT
Start: 2023-06-02

## 2023-05-31 RX ORDER — HEPARIN SODIUM (PORCINE) LOCK FLUSH IV SOLN 100 UNIT/ML 100 UNIT/ML
5 SOLUTION INTRAVENOUS
Status: CANCELLED | OUTPATIENT
Start: 2023-05-31

## 2023-05-31 RX ORDER — HEPARIN SODIUM,PORCINE 10 UNIT/ML
5 VIAL (ML) INTRAVENOUS
Status: CANCELLED | OUTPATIENT
Start: 2023-05-31

## 2023-05-31 RX ORDER — ESZOPICLONE 1 MG/1
1 TABLET, FILM COATED ORAL AT BEDTIME
Qty: 30 TABLET | Refills: 0 | Status: SHIPPED | OUTPATIENT
Start: 2023-05-31 | End: 2023-06-21

## 2023-05-31 RX ORDER — MEPERIDINE HYDROCHLORIDE 25 MG/ML
25 INJECTION INTRAMUSCULAR; INTRAVENOUS; SUBCUTANEOUS EVERY 30 MIN PRN
Status: CANCELLED | OUTPATIENT
Start: 2023-06-14

## 2023-05-31 RX ORDER — ONDANSETRON 2 MG/ML
8 INJECTION INTRAMUSCULAR; INTRAVENOUS ONCE
Status: COMPLETED | OUTPATIENT
Start: 2023-05-31 | End: 2023-05-31

## 2023-05-31 RX ORDER — HEPARIN SODIUM,PORCINE 10 UNIT/ML
5 VIAL (ML) INTRAVENOUS
Status: CANCELLED | OUTPATIENT
Start: 2023-06-02

## 2023-05-31 RX ORDER — ALBUTEROL SULFATE 90 UG/1
1-2 AEROSOL, METERED RESPIRATORY (INHALATION)
Status: CANCELLED
Start: 2023-05-31

## 2023-05-31 RX ORDER — FLUOROURACIL 50 MG/ML
400 INJECTION, SOLUTION INTRAVENOUS ONCE
Status: CANCELLED | OUTPATIENT
Start: 2023-05-31

## 2023-05-31 RX ORDER — ONDANSETRON 2 MG/ML
8 INJECTION INTRAMUSCULAR; INTRAVENOUS ONCE
Status: CANCELLED | OUTPATIENT
Start: 2023-06-14

## 2023-05-31 RX ORDER — FLUOROURACIL 50 MG/ML
400 INJECTION, SOLUTION INTRAVENOUS ONCE
Status: CANCELLED | OUTPATIENT
Start: 2023-06-14

## 2023-05-31 RX ORDER — FLUOROURACIL 50 MG/ML
400 INJECTION, SOLUTION INTRAVENOUS ONCE
Status: COMPLETED | OUTPATIENT
Start: 2023-05-31 | End: 2023-05-31

## 2023-05-31 RX ORDER — EPINEPHRINE 1 MG/ML
0.3 INJECTION, SOLUTION, CONCENTRATE INTRAVENOUS EVERY 5 MIN PRN
Status: CANCELLED | OUTPATIENT
Start: 2023-06-14

## 2023-05-31 RX ORDER — METHYLPREDNISOLONE SODIUM SUCCINATE 125 MG/2ML
125 INJECTION, POWDER, LYOPHILIZED, FOR SOLUTION INTRAMUSCULAR; INTRAVENOUS
Status: CANCELLED
Start: 2023-06-14

## 2023-05-31 RX ORDER — METHYLPREDNISOLONE SODIUM SUCCINATE 125 MG/2ML
125 INJECTION, POWDER, LYOPHILIZED, FOR SOLUTION INTRAMUSCULAR; INTRAVENOUS
Status: CANCELLED
Start: 2023-05-31

## 2023-05-31 RX ORDER — HEPARIN SODIUM (PORCINE) LOCK FLUSH IV SOLN 100 UNIT/ML 100 UNIT/ML
5 SOLUTION INTRAVENOUS
Status: CANCELLED | OUTPATIENT
Start: 2023-06-14

## 2023-05-31 RX ORDER — HEPARIN SODIUM,PORCINE 10 UNIT/ML
5 VIAL (ML) INTRAVENOUS
Status: CANCELLED | OUTPATIENT
Start: 2023-06-14

## 2023-05-31 RX ORDER — ALBUTEROL SULFATE 90 UG/1
1-2 AEROSOL, METERED RESPIRATORY (INHALATION)
Status: CANCELLED
Start: 2023-06-14

## 2023-05-31 RX ORDER — LORAZEPAM 2 MG/ML
0.5 INJECTION INTRAMUSCULAR EVERY 4 HOURS PRN
Status: CANCELLED | OUTPATIENT
Start: 2023-06-14

## 2023-05-31 RX ORDER — MEPERIDINE HYDROCHLORIDE 25 MG/ML
25 INJECTION INTRAMUSCULAR; INTRAVENOUS; SUBCUTANEOUS EVERY 30 MIN PRN
Status: CANCELLED | OUTPATIENT
Start: 2023-05-31

## 2023-05-31 RX ORDER — ALBUTEROL SULFATE 0.83 MG/ML
2.5 SOLUTION RESPIRATORY (INHALATION)
Status: CANCELLED | OUTPATIENT
Start: 2023-06-14

## 2023-05-31 RX ADMIN — FLUOROURACIL 690 MG: 50 INJECTION, SOLUTION INTRAVENOUS at 13:17

## 2023-05-31 RX ADMIN — Medication 250 ML: at 09:23

## 2023-05-31 RX ADMIN — ONDANSETRON 8 MG: 2 INJECTION INTRAMUSCULAR; INTRAVENOUS at 09:29

## 2023-05-31 ASSESSMENT — PAIN SCALES - GENERAL: PAINLEVEL: NO PAIN (0)

## 2023-05-31 NOTE — PROGRESS NOTES
"[facility]  Medical Nutrition Therapy    Visit Type: Reassessment    Kenneth BELL Vipul referred by Dr. Nika Barnhart  for MNT related to Malignant neoplasm of lower third of esophagus      Patient accompanied by his brother    Nutrition Assessment:  Anthropometrics  Height: 5' 7.5\"    BMI:  21.5      Weight: 139 lb 5.3 oz    BSA: 1.74        Wt Readings from Last 10 Encounters:   05/31/23 63.2 kg (139 lb 5.3 oz)   05/31/23 62.7 kg (138 lb 3.7 oz)   05/30/23 62.7 kg (138 lb 3.2 oz)   05/19/23 65.4 kg (144 lb 2.9 oz)   05/17/23 64 kg (141 lb)   05/17/23 63.8 kg (140 lb 10.5 oz)   05/15/23 64.1 kg (141 lb 4.8 oz)   05/05/23 65.4 kg (144 lb 3.2 oz)   05/03/23 64.3 kg (141 lb 11.2 oz)   05/03/23 63.9 kg (140 lb 14 oz)       Nutrition History  No nausea/vomiting, abdominal pain or fullness. BMs everyday. Loose stools. Weight stable around 140lb. Would like to gain weight to be around 160-180lb.    Food Recall  3.5 cartons per day with additional water- Isosource 1.5. Out of the [facility] from [facility]. 16 oz bottle of water. Some gatorade. V8, Apple Ensure (2), Apple juice.      Nutrition Prescription- weight used to estimate needs 63.5kg  Energy:   1900-2225kcal (30-35g/kg)    Protein:   75-95g (1.2-1.5g/kg)    Fluid:   1900-2225ml (1ml/kcal)         Nutrition Diagnosis:  Malnutrition related to swallowing difficulty/reduced intake as evidenced by weight loss of 32% weight loss in 1 year.  - weight stable around 140lb     Nutrition Intervention:  Discussed higher calorie formula to help with weight gain.     Nutrition Goals:   Adequate intake oral/TF to meet nutrition needs to maintain weight/promote healthy weight gain     Nutrition Follow Up / Monitoring:   TF placement, intake, weight, labs.     Nutrition Recommendations:  TF Recs  4 cartons of TwoCal HN daily (948ml)  1900kcal, 80g protein, 166ml water   Adds 60-120ml water to formula. 50-60 water flush before and after     Time spent with pt - 15 " min     Patient to follow-up with RD in 2-4 weeks.   Patient has RD contact information to call/email if needed.

## 2023-05-31 NOTE — PROGRESS NOTES
Oncology Follow-up Visit:  May 31, 2023    Reason for Visit:  Patient presents with:  Oncology Clinic Visit: Follow up. 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  He initiated systemic therapy with nivolumab/FOLFOX 1/11/2023 with potential for palliative radiation therapy.     He underwent EGD on 4/25/2023 due to ongoing difficulty swallowing solids and regurgitation.  This showed a narrowing of the distal esophagus and scope was unable to be passed through to the stomach with noted small lumen.  There was no mucosal lesion and biopsies showed squamous mucosa and subepithelial stromal tissues with mild chronic inflammation and edema and this was negative for malignancy.  G-tube was also exchanged during this procedure.    He presents to the clinic today again accompanied by his brother.  States he is doing okay.  He essentially has had no changes in his status and continues to tolerate therapy fairly well.  Ongoing main concern is in regards to issues with swallowing so he no longer is trying to swallow solid foods but he is able to get water down and other fluids.  He is unsure if he is really getting enough fluid in a 24-hour period.  He does continue to lose weight.  He will be visiting with nutrition services later today.      He continues to complain of poor sleep stating he was placed on Remeron about 2 weeks ago and questions if the dose can be increased.  He does feel it may help but he is really wanting something that can help him get back to sleep if he wakes up.  He is used to waking up in the middle of the night getting up and having a cup of coffee and chocolate.   has been doing this since he was in the .  He admits that he has  up-and-down days but denies anxiety or depression.    He denies routine alcohol use but states he did have some alcohol on Saturday.  He states he has not had alcohol for 11 months.    Oncologic History:     Experienced difficulty swallowing and 60 pound weight loss  10/21/2022: CT chest, abdomen and pelvis: Diffuse wall thickening of the distal esophagus with associated mediastinal adenopathy, and upper abdominal  Conglomerate lymphadenopathy. Constellation of findings are highly concerning for underlying esophageal carcinoma with metastatic disease.   11/1/2022: Colonoscopy with polyp showing tubular adenoma and EGD: A large ulcerating mass with no bleeding and no stigmata of recent bleeding was found in the lower third of the esophagus, 35 to 40 cm from the incisors.  The mass was partially obstructing and partially circumferential involving  two thirds.    **Biopsy showed poorly differentiated favoring adenocarcinoma. Her 2 negative.   11/22/2002 patient met with Dr. Barnhart to discuss systemic therapy pending PET results/complete staging  12/5/2022 patient met with Dr. Spain with radiation oncology with possible plan to initiate radiation therapy pending results of PET scan  12/14/2022: PET scan: Distal esophageal neoplasm with metastatic involvement the  mediastinum, left hilum and retroperitoneal lymph nodes of the upperabdomen. Also likely localized metastatic involvement of thestomach/GE junction. 2 cm intramural distal esophageal/GE junction mass. Circumferential wall thickening with increased FDG uptake in the distal esophagus adjacent portions of the stomach/GE junction may represent localized disease however could also represent localized inflammation. Metastatic involvement to the subcarinal nodes, left infrahilar and  pericaval/paraesophageal lymph nodes of the lower thorax. Metastatic involvement to the retroperitoneal lymph nodes of the upper abdomen. These lymph nodes have increased in size dating  back to be  10/21/2022 CT scan consistent with worsening disease. Representative  nodes are described above.  12/20/2022  He was seen by Dr. Barnhart with plan to initiate palliative nivolumab/FOLFOX with potential for palliative Radiation at some point  12/22/2022: Port-A-Cath placement and gastrostomy tube insertion by Dr. Dalton  1/11/2023 initiated treatment  3/29/2023  PET scan:    IMPRESSION:   Significant interval improvement.  Marked decrease in size of distal esophageal/GE junction masses with  near complete resolution of enlarged, FDG avid subcarinal,  paraesophageal and retroperitoneal lymph nodes.    Normal-sized lymph nodes throughout the head and neck, chest, abdomen  and pelvis now demonstrate FDG uptake, the majority of which do not  exceed background suggesting diffuse inflammatory response, possibly  related to therapy.   **Dr Barnhart reviewed with patient and recommended an EGD and discussed radiation consult.  5/15/2023 patient consult with radiation oncology, Dr. Roe, they discussed radiation therapy but due to lack of availability to simulate, plan was to follow-up in 2 months to discuss again.  A referral to [facility] radiation therapy was offered and patient declined.     RESULT FOR IMMUNOHISTOCHEMICAL VENTANA CLONE  PD-L1 ASSAY  COMBINED POSITIVE SCORE (CPS): <1%     Treatment Goal: Palliative     TX: Nivolumab 240 mg/oxaliplatin 85 mg per metered squared/leucovorin 350 mg per metered squared/fluorouracil 400 mg per metered squared IV bolus/fluorouracil 2400 mg per metered squared via continuous infusion over 46 hours with cycle given every 14 days     Previous treatment:  N/A     Past Medical History:   Diagnosis Date     Cancer (H)      Esophageal cancer (H) 11/01/2022     Esophageal thickening 10/25/2022     Mixed hearing loss, bilateral      Tobacco abuse 1/4/2023     Tobacco use disorder 12/27/2019       Past Surgical History:   Procedure Laterality Date     COLONOSCOPY  11/01/2022    [facility]     ESOPHAGOSCOPY N/A 4/25/2023    Procedure: Upper Endoscopy with biopsy- with Gastrostomy Tube Exchange;  Surgeon: Nba Dalton MD;  Location: [facility]     INSERT PORT VASCULAR ACCESS N/A 12/22/2022    Procedure: POWER PORT PLACEMENT;  Surgeon: Nba Dalton MD;  Location: [facility]     Upper GI Endoscopy  11/01/2022       Family History   Problem Relation Age of Onset     Breast Cancer Mother      Hyperlipidemia Mother      Diabetes Mother      Coronary Artery Disease Mother      Cerebrovascular Disease Father      Hyperlipidemia Father      Coronary Artery Disease Father      Myocardial Infarction Father      Hyperlipidemia Brother      Hypertension Brother      Diabetes Brother      Asthma Sister      Breast Cancer Sister      Hyperlipidemia Sister      Diabetes Sister      Hyperlipidemia Sister      Diabetes Sister      Hyperlipidemia Sister      Suicide Sister      Anesthesia Reaction Sister      Breast Cancer Sister      Hyperlipidemia Sister      Diabetes Sister      Breast Cancer Niece      Skin Cancer Niece      Thyroid Disease Niece      Breast Cancer Niece      Leukemia Nephew      Colon Cancer No family hx of      Prostate Cancer No family hx of      Genetic Disorder No family hx of        Social History     Socioeconomic History     Marital status:      Spouse name: Jazzy     Number of children: 2     Years of education: Not on file     Highest education level: Not on file   Occupational History     Not on file   Tobacco Use     Smoking status: Every Day     Packs/day: 0.50     Years: 46.00     Pack years: 23.00     Types: Cigarettes     Start date: 1976     Smokeless tobacco: Never     Tobacco comments:     Trying to quit   Vaping Use     Vaping status: Never Used   Substance and Sexual Activity     Alcohol use: Not Currently     Comment: none since July of 2022     Drug use: Never     Sexual activity: Not on file   Other Topics Concern     Parent/sibling w/ CABG, MI or angioplasty  before 65F 55M? Yes     Comment: father   Social History Narrative     Not on file     Social Determinants of Health     Financial Resource Strain: Not on file   Food Insecurity: Not on file   Transportation Needs: Not on file   Physical Activity: Not on file   Stress: Not on file   Social Connections: Not on file   Intimate Partner Violence: Not on file   Housing Stability: Not on file       Current Outpatient Medications   Medication     HYDROcodone-Acetaminophen 5-217 MG/10ML SOLN     mirtazapine (REMERON) 7.5 MG tablet     nicotine (NICODERM CQ) 14 MG/24HR 24 hr patch     omeprazole (PRILOSEC) 20 MG DR capsule     ondansetron (ZOFRAN) 8 MG tablet     prochlorperazine (COMPAZINE) 10 MG tablet     hydrocortisone (CORTAID) 1 % external cream     ibuprofen (ADVIL/MOTRIN) 200 MG tablet     nicotine (NICODERM CQ) 7 MG/24HR 24 hr patch     nicotine (NICORETTE) 2 MG gum     polyethylene glycol (MIRALAX) 17 GM/Dose powder     No current facility-administered medications for this visit.        No Known Allergies    Review Of Systems:  Constitutional:    denies fever, chills, and night sweats.  Eyes:    Denies double vision; has some fuzzy vision  Ears/Nose/Throat:   denies ear pain, some slight nasal congestion  Respiratory:   denies shortness of breath, cough   Skin:   denies rash, lesions  Cardiovascular:   denies chest pain, palpitations, edema  Gastrointestinal:   denies abdominal pain, bloating, nausea; no change in bowel habits or blood in stool  Genitourinary:   denies difficulty with urination, blood in urine  Musculoskeletal:    denies new muscle pain, bone pain  Neurologic:   denies lightheadedness, headaches; ongoing numbness and tingling in his fingertips that is not continuous  Psychiatric:   denies anxiety, depression; see HPI  Hematologic/Lymphatic/Immunologic:   denies easy bleeding, lumps or bumps noted; has easy bruising  Endocrine:   Feels thirsty with dry mouth      ECOG Performance Status:  "1-2    Physical Exam:  /60   Pulse 61   Temp 97.8  F (36.6  C) (Tympanic)   Resp 19   Ht 1.715 m (5' 7.5\")   Wt 62.7 kg (138 lb 3.7 oz)   SpO2 95%   BMI 21.33 kg/m      GENERAL APPEARANCE:  thin, alert and in no acute distress.  HEENT: Normocephalic, Sclerae anicteric.  No obvious oral lesions or thrush  NECK:   No asymmetry or masses, no thyromegaly.  LYMPHATICS: No palpable cervical, supraclavicular, axillary, or inguinal nodes   RESP: Lungs clear to auscultation bilaterally, respirations regular and easy  CARDIOVASCULAR: Regular rate and rhythm. Normal S1, S2  ABDOMEN: Soft, nontender. Bowel sounds auscultated all 4 quadrants. No palpable organomegaly or masses.  MUSCULOSKELETAL: Extremities without gross deformities noted. No edema of bilateral lower extremities.  NEURO: Alert and oriented x 3.  Gait steady.  PSYCHIATRIC: Mentation and affect appear normal.  Mood appropriate.    Laboratory:  Results for orders placed or performed in visit on 05/31/23   Comprehensive metabolic panel     Status: Abnormal   Result Value Ref Range    Sodium 139 136 - 145 mmol/L    Potassium 4.3 3.4 - 5.3 mmol/L    Chloride 104 98 - 107 mmol/L    Carbon Dioxide (CO2) 26 22 - 29 mmol/L    Anion Gap 9 7 - 15 mmol/L    Urea Nitrogen 8.4 8.0 - 23.0 mg/dL    Creatinine 0.54 (L) 0.67 - 1.17 mg/dL    Calcium 8.9 8.8 - 10.2 mg/dL    Glucose 124 (H) 70 - 99 mg/dL    Alkaline Phosphatase 101 40 - 129 U/L    AST 52 (H) 10 - 50 U/L    ALT 73 (H) 10 - 50 U/L    Protein Total 6.6 6.4 - 8.3 g/dL    Albumin 3.5 3.5 - 5.2 g/dL    Bilirubin Total 0.4 <=1.2 mg/dL    GFR Estimate >90 >60 mL/min/1.73m2   TSH with free T4 reflex     Status: Normal   Result Value Ref Range    TSH 0.58 0.30 - 4.20 uIU/mL   CBC with platelets and differential     Status: Abnormal   Result Value Ref Range    WBC Count 4.5 4.0 - 11.0 10e3/uL    RBC Count 3.98 (L) 4.40 - 5.90 10e6/uL    Hemoglobin 12.3 (L) 13.3 - 17.7 g/dL    Hematocrit 37.3 (L) 40.0 - 53.0 %    " MCV 94 78 - 100 fL    MCH 30.9 26.5 - 33.0 pg    MCHC 33.0 31.5 - 36.5 g/dL    RDW 18.3 (H) 10.0 - 15.0 %    Platelet Count 102 (L) 150 - 450 10e3/uL    % Neutrophils 43 %    % Lymphocytes 34 %    % Monocytes 19 %    % Eosinophils 3 %    % Basophils 1 %    % Immature Granulocytes 0 %    NRBCs per 100 WBC 0 <1 /100    Absolute Neutrophils 1.9 1.6 - 8.3 10e3/uL    Absolute Lymphocytes 1.5 0.8 - 5.3 10e3/uL    Absolute Monocytes 0.8 0.0 - 1.3 10e3/uL    Absolute Eosinophils 0.1 0.0 - 0.7 10e3/uL    Absolute Basophils 0.1 0.0 - 0.2 10e3/uL    Absolute Immature Granulocytes 0.0 <=0.4 10e3/uL    Absolute NRBCs 0.0 10e3/uL   CBC with platelets differential     Status: Abnormal    Narrative    The following orders were created for panel order CBC with platelets differential.  Procedure                               Abnormality         Status                     ---------                               -----------         ------                     CBC with platelets and d...[[id number]]  Abnormal            Final result                 Please view results for these tests on the individual orders.       Imaging Studies:  None completed for today's visit      ASSESSMENT/PLAN:    #1 Esophageal cancer: Stage IV adenocarcinoma of the esophagus diagnosed November 2022.  He initiated systemic therapy with nivolumab/FOLFOX 1/11/2023 with potential for palliative radiation therapy at some point.  Has tolerated treatment fairly well and performance status has improved. We will go forth with cycle 11 therapy today.  We will see him prior to cycle 13 with labs per treatment plan. Plan for radiation therapy on hold at this point.  He will see them again in July.  We will likely plan a PET scan after cycle 13.    #2 weight loss: Recommended increasing calories by mouth and G-tube.  He will be seeing the nutritionist today.    #3  Insomnia: Discontinue the Remeron and Lunesta 1 mg prior to bed was ordered.  We discussed this medication.   May need to increase.    #4 elevated liver function tests: Multiple possible etiologies will need to monitor.    I encouraged patient to call with any questions or concerns.    56 minutes spent in the patient's encounter today with time spent in review of the chart along with in chart preparation.  Time was also spent in review of his treatment plan.  Time was also spent obtaining a review of systems and performing a physical exam along with review of his lab work in detail with him.  Time was also spent in discussion of side effects and recommendation for management.  Time was spent in planning his next follow-up, placing orders and in chart documentation.    Delmy Valenzuela NP  APRN, FNP-BC, AOCNP

## 2023-05-31 NOTE — PATIENT INSTRUCTIONS
Return in 2 weeks for treatment.    We would like to see you back for follow up with me in 4 weeks. Please come 30 minutes prior for lab work. Treatment will be planned that day.    Discontinue the Remeron.    Your prescription for Lunesta for sleep has been sent to: [facility].  This can be increased if needed so please let me now how this works for you..      When you are in need of a refill, please call your pharmacy and they will send us a request.     If you have any questions please call [phone]    Other instructions:  none

## 2023-05-31 NOTE — PROGRESS NOTES
Patients power port accessed using non-coring, 19 gauge, 3/4 inch needle.    Mask donned by caregiver: yes Site prepped with CHG: yes Labs drawn: yes Dressing applied using aseptic technique: yes.     Port accessed per facility protocol. Port flushed easily, blood return noted.  No signs and symptoms of infection or infiltration.  Port flushed with 10mL normal saline, blood return noted, 10 mLs blood discarded. Blood taken for ordered labs, port flushed with 20mL normal saline.  Port left accessed as patient has appointment with GARY Valenzuela, and is then due for chemo if parameters are met.  Patient discharged with no complaints.

## 2023-05-31 NOTE — NURSING NOTE
"Oncology Rooming Note    May 31, 2023 8:14 AM   Kenneth Matthew is a 58 year old male who presents for:    Chief Complaint   Patient presents with     Oncology Clinic Visit     Follow up. Malignant neoplasm of lower third of esophagus      Initial Vitals: /60   Pulse 61   Temp 97.8  F (36.6  C) (Tympanic)   Resp 19   Ht 1.715 m (5' 7.5\")   Wt 62.7 kg (138 lb 3.7 oz)   SpO2 95%   BMI 21.33 kg/m   Estimated body mass index is 21.33 kg/m  as calculated from the following:    Height as of this encounter: 1.715 m (5' 7.5\").    Weight as of this encounter: 62.7 kg (138 lb 3.7 oz). Body surface area is 1.73 meters squared.  No Pain (0) Comment: Data Unavailable   No LMP for male patient.  Allergies reviewed: Yes  Medications reviewed: Yes    Medications: Medication refills not needed today.  Pharmacy name entered into [facility]:    [id number] - [place], [place] - [address] [address] [facility] [address]  [facility] - [place] - [address]    Clinical concerns: none       Syeda Muir LPN              "

## 2023-05-31 NOTE — PROGRESS NOTES
Infusion Nursing Note:  Kenneth Matthew presents today for Folfox.    Patient seen by provider today: Yes: Martha Valenzuela.   present during visit today: Not Applicable.    Note:   Component      Latest Ref Rng 5/31/2023  7:48 AM   WBC      4.0 - 11.0 10e3/uL 4.5    RBC Count      4.40 - 5.90 10e6/uL 3.98 (L)    Hemoglobin      13.3 - 17.7 g/dL 12.3 (L)    Hematocrit      40.0 - 53.0 % 37.3 (L)    MCV      78 - 100 fL 94    MCH      26.5 - 33.0 pg 30.9    MCHC      31.5 - 36.5 g/dL 33.0    RDW      10.0 - 15.0 % 18.3 (H)    Platelet Count      150 - 450 10e3/uL 102 (L)    % Neutrophils      % 43    % Lymphocytes      % 34    % Monocytes      % 19    % Eosinophils      % 3    % Basophils      % 1    % Immature Granulocytes      % 0    NRBCs per 100 WBC      <1 /100 0    Absolute Neutrophils      1.6 - 8.3 10e3/uL 1.9    Absolute Lymphocytes      0.8 - 5.3 10e3/uL 1.5    Absolute Monocytes      0.0 - 1.3 10e3/uL 0.8    Absolute Eosinophils      0.0 - 0.7 10e3/uL 0.1    Absolute Basophils      0.0 - 0.2 10e3/uL 0.1    Absolute Immature Granulocytes      <=0.4 10e3/uL 0.0    Absolute NRBCs      10e3/uL 0.0    Sodium      136 - 145 mmol/L 139    Potassium      3.4 - 5.3 mmol/L 4.3    Chloride      98 - 107 mmol/L 104    Carbon Dioxide (CO2)      22 - 29 mmol/L 26    Anion Gap      7 - 15 mmol/L 9    Urea Nitrogen      8.0 - 23.0 mg/dL 8.4    Creatinine      0.67 - 1.17 mg/dL 0.54 (L)    Calcium      8.8 - 10.2 mg/dL 8.9    Glucose      70 - 99 mg/dL 124 (H)    Alkaline Phosphatase      40 - 129 U/L 101    AST      10 - 50 U/L 52 (H)    ALT      10 - 50 U/L 73 (H)    Protein Total      6.4 - 8.3 g/dL 6.6    Albumin      3.5 - 5.2 g/dL 3.5    Bilirubin Total      <=1.2 mg/dL 0.4    GFR Estimate      >60 mL/min/1.73m2 >90    TSH      0.30 - 4.20 uIU/mL 0.58       Legend:  (L) Low  (H) High        Intravenous Access:  Implanted Port.    Treatment Conditions:  Results reviewed, labs MET treatment parameters, ok to  proceed with treatment.      Post Infusion Assessment:  Patient tolerated infusion without incident.  Blood return noted pre and post infusion, and every 2 mls during Flourouracil push. Pt home infuser attached to patient with clamps taped open.       Discharge Plan:   Discharge instructions reviewed with: Patient.  Copy of AVS reviewed with patient and/or family.  Patient will return Friday for next appointment.      Radha Fernandez RN

## 2023-06-01 ENCOUNTER — DOCUMENTATION ONLY (OUTPATIENT)
Dept: RADIATION ONCOLOGY | Facility: HOSPITAL | Age: 58
End: 2023-06-01
Payer: COMMERCIAL

## 2023-06-01 ENCOUNTER — TELEPHONE (OUTPATIENT)
Dept: ONCOLOGY | Facility: OTHER | Age: 58
End: 2023-06-01

## 2023-06-01 NOTE — TELEPHONE ENCOUNTER
Please call patient and let him know that I did talk with the nurse practitioner from radiation therapy.  I do not have him scheduled to see me on 6/14 prior to therapy but I will come up and see him while he is in infusion.  Please place him on my schedule for the 820 spot.  They will not need to wait to do his infusion before he sees me so please let infusion know that I will be seeing him while he is getting his infusion.

## 2023-06-01 NOTE — PROGRESS NOTES
Patient was consulted by our services on 5/15/2023.  At that time our CT simulation scanner was having technical issues, patient was offered to be referred to [facility] however declined.  Patient was scheduled to return on 7/10/2023.  I did reach out to patient as we do have the ability to continue with the simulation process and complete the CT simulation at our current location, patient states he currently would like to wait he would like to have his follow-up with medical oncology on 6/14/2023 prior to making any further decisions.  He did agree to have a follow-up appointment with us on 6/19/2023 to discuss further what he would like to complete for his plan of care

## 2023-06-02 ENCOUNTER — INFUSION THERAPY VISIT (OUTPATIENT)
Dept: INFUSION THERAPY | Facility: OTHER | Age: 58
End: 2023-06-02
Attending: NURSE PRACTITIONER
Payer: COMMERCIAL

## 2023-06-02 VITALS
BODY MASS INDEX: 21.91 KG/M2 | DIASTOLIC BLOOD PRESSURE: 63 MMHG | WEIGHT: 141.98 LBS | RESPIRATION RATE: 18 BRPM | SYSTOLIC BLOOD PRESSURE: 99 MMHG | HEART RATE: 65 BPM | TEMPERATURE: 98.7 F | OXYGEN SATURATION: 97 %

## 2023-06-02 DIAGNOSIS — C15.5 MALIGNANT NEOPLASM OF LOWER THIRD OF ESOPHAGUS (H): Primary | ICD-10-CM

## 2023-06-02 PROCEDURE — 96360 HYDRATION IV INFUSION INIT: CPT | Performed by: INTERNAL MEDICINE

## 2023-06-02 RX ORDER — HEPARIN SODIUM (PORCINE) LOCK FLUSH IV SOLN 100 UNIT/ML 100 UNIT/ML
5 SOLUTION INTRAVENOUS
Status: CANCELLED | OUTPATIENT
Start: 2023-06-02

## 2023-06-02 RX ORDER — HEPARIN SODIUM (PORCINE) LOCK FLUSH IV SOLN 100 UNIT/ML 100 UNIT/ML
5 SOLUTION INTRAVENOUS
Status: DISCONTINUED | OUTPATIENT
Start: 2023-06-02 | End: 2023-06-02 | Stop reason: HOSPADM

## 2023-06-02 RX ADMIN — HEPARIN SODIUM (PORCINE) LOCK FLUSH IV SOLN 100 UNIT/ML 5 ML: 100 SOLUTION at 12:03

## 2023-06-02 RX ADMIN — Medication 1000 ML: at 11:00

## 2023-06-02 NOTE — PROGRESS NOTES
Infusion Nursing Note:  Kenneth Matthew presents today for pump off and IVF.    Patient seen by provider today: No   present during visit today: Not Applicable.    Note: patient denies changes to health, home medications, or allergies since previous infusion appt.    Intravenous Access:Implanted Port.    Treatment Conditions:  Not Applicable.  Confirmed patient received the Fact Sheet for Patients, Parents and Caregivers prior to receiving medication.     Post Infusion Assessment:  Patient tolerated infusion without incident.  Blood return noted pre and post infusion.  Site patent and intact, free from redness, edema or discomfort.  No evidence of extravasations.  Access discontinued per protocol.     Discharge Plan:   Discharge instructions reviewed with: Patient.  Patient and/or family verbalized understanding of discharge instructions and all questions answered.  Copy of AVS reviewed with patient and/or family.  Patient will return 6/14/23 for next appointment.  Patient discharged in stable condition accompanied by: self and wife.  Departure Mode: Ambulatory.

## 2023-06-02 NOTE — PATIENT INSTRUCTIONS

## 2023-06-14 ENCOUNTER — HOSPITAL ENCOUNTER (OUTPATIENT)
Dept: EDUCATION SERVICES | Facility: HOSPITAL | Age: 58
Discharge: HOME | End: 2023-06-14
Attending: NURSE PRACTITIONER
Payer: COMMERCIAL

## 2023-06-14 ENCOUNTER — ONCOLOGY VISIT (OUTPATIENT)
Dept: ONCOLOGY | Facility: OTHER | Age: 58
End: 2023-06-14
Attending: NURSE PRACTITIONER
Payer: COMMERCIAL

## 2023-06-14 ENCOUNTER — INFUSION THERAPY VISIT (OUTPATIENT)
Dept: INFUSION THERAPY | Facility: OTHER | Age: 58
End: 2023-06-14
Attending: INTERNAL MEDICINE
Payer: COMMERCIAL

## 2023-06-14 VITALS
HEART RATE: 52 BPM | TEMPERATURE: 97.9 F | RESPIRATION RATE: 18 BRPM | OXYGEN SATURATION: 99 % | SYSTOLIC BLOOD PRESSURE: 100 MMHG | WEIGHT: 137.57 LBS | DIASTOLIC BLOOD PRESSURE: 59 MMHG | BODY MASS INDEX: 21.23 KG/M2

## 2023-06-14 DIAGNOSIS — C15.5 MALIGNANT NEOPLASM OF LOWER THIRD OF ESOPHAGUS (H): Primary | ICD-10-CM

## 2023-06-14 DIAGNOSIS — K21.9 GASTROESOPHAGEAL REFLUX DISEASE, UNSPECIFIED WHETHER ESOPHAGITIS PRESENT: Primary | ICD-10-CM

## 2023-06-14 DIAGNOSIS — C15.5 MALIGNANT NEOPLASM OF LOWER THIRD OF ESOPHAGUS (H): ICD-10-CM

## 2023-06-14 LAB
ALBUMIN SERPL BCG-MCNC: 3.4 G/DL (ref 3.5–5.2)
ALP SERPL-CCNC: 91 U/L (ref 40–129)
ALT SERPL W P-5'-P-CCNC: 50 U/L (ref 0–70)
ANION GAP SERPL CALCULATED.3IONS-SCNC: 6 MMOL/L (ref 7–15)
AST SERPL W P-5'-P-CCNC: 38 U/L (ref 0–45)
BASOPHILS # BLD AUTO: 0 10E3/UL (ref 0–0.2)
BASOPHILS NFR BLD AUTO: 1 %
BILIRUB SERPL-MCNC: 0.5 MG/DL
BUN SERPL-MCNC: 7.8 MG/DL (ref 8–23)
CALCIUM SERPL-MCNC: 8.6 MG/DL (ref 8.8–10.2)
CHLORIDE SERPL-SCNC: 101 MMOL/L (ref 98–107)
CREAT SERPL-MCNC: 0.57 MG/DL (ref 0.67–1.17)
DEPRECATED HCO3 PLAS-SCNC: 29 MMOL/L (ref 22–29)
EOSINOPHIL # BLD AUTO: 0.1 10E3/UL (ref 0–0.7)
EOSINOPHIL NFR BLD AUTO: 3 %
ERYTHROCYTE [DISTWIDTH] IN BLOOD BY AUTOMATED COUNT: 17.3 % (ref 10–15)
GFR SERPL CREATININE-BSD FRML MDRD: >90 ML/MIN/1.73M2
GLUCOSE SERPL-MCNC: 208 MG/DL (ref 70–99)
HCT VFR BLD AUTO: 36.9 % (ref 40–53)
HGB BLD-MCNC: 12.3 G/DL (ref 13.3–17.7)
IMM GRANULOCYTES # BLD: 0 10E3/UL
IMM GRANULOCYTES NFR BLD: 0 %
LYMPHOCYTES # BLD AUTO: 1.1 10E3/UL (ref 0.8–5.3)
LYMPHOCYTES NFR BLD AUTO: 32 %
MCH RBC QN AUTO: 31.5 PG (ref 26.5–33)
MCHC RBC AUTO-ENTMCNC: 33.3 G/DL (ref 31.5–36.5)
MCV RBC AUTO: 95 FL (ref 78–100)
MONOCYTES # BLD AUTO: 0.5 10E3/UL (ref 0–1.3)
MONOCYTES NFR BLD AUTO: 15 %
NEUTROPHILS # BLD AUTO: 1.7 10E3/UL (ref 1.6–8.3)
NEUTROPHILS NFR BLD AUTO: 49 %
NRBC # BLD AUTO: 0 10E3/UL
NRBC BLD AUTO-RTO: 0 /100
PLATELET # BLD AUTO: 93 10E3/UL (ref 150–450)
POTASSIUM SERPL-SCNC: 4.1 MMOL/L (ref 3.4–5.3)
PROT SERPL-MCNC: 6.5 G/DL (ref 6.4–8.3)
RBC # BLD AUTO: 3.9 10E6/UL (ref 4.4–5.9)
SODIUM SERPL-SCNC: 136 MMOL/L (ref 136–145)
TSH SERPL DL<=0.005 MIU/L-ACNC: 0.66 UIU/ML (ref 0.3–4.2)
WBC # BLD AUTO: 3.5 10E3/UL (ref 4–11)

## 2023-06-14 PROCEDURE — 96521 REFILL/MAINT PORTABLE PUMP: CPT | Mod: 59 | Performed by: INTERNAL MEDICINE

## 2023-06-14 PROCEDURE — 96415 CHEMO IV INFUSION ADDL HR: CPT | Performed by: INTERNAL MEDICINE

## 2023-06-14 PROCEDURE — 96375 TX/PRO/DX INJ NEW DRUG ADDON: CPT | Performed by: INTERNAL MEDICINE

## 2023-06-14 PROCEDURE — 96413 CHEMO IV INFUSION 1 HR: CPT | Performed by: INTERNAL MEDICINE

## 2023-06-14 PROCEDURE — 80050 GENERAL HEALTH PANEL: CPT | Performed by: INTERNAL MEDICINE

## 2023-06-14 PROCEDURE — 96367 TX/PROPH/DG ADDL SEQ IV INF: CPT | Performed by: INTERNAL MEDICINE

## 2023-06-14 PROCEDURE — 999N000107 HC STATISTIC NUTRITIONAL THERAPY NO CHARGE: Performed by: DIETITIAN, REGISTERED

## 2023-06-14 RX ORDER — FLUOROURACIL 50 MG/ML
400 INJECTION, SOLUTION INTRAVENOUS ONCE
Status: COMPLETED | OUTPATIENT
Start: 2023-06-14 | End: 2023-06-14

## 2023-06-14 RX ORDER — ONDANSETRON 2 MG/ML
8 INJECTION INTRAMUSCULAR; INTRAVENOUS ONCE
Status: COMPLETED | OUTPATIENT
Start: 2023-06-14 | End: 2023-06-14

## 2023-06-14 RX ORDER — PROCHLORPERAZINE MALEATE 10 MG
10 TABLET ORAL EVERY 6 HOURS PRN
Qty: 30 TABLET | Refills: 2 | Status: SHIPPED | OUTPATIENT
Start: 2023-06-14 | End: 2023-07-12

## 2023-06-14 RX ORDER — ONDANSETRON 8 MG/1
8 TABLET, FILM COATED ORAL EVERY 8 HOURS PRN
Qty: 30 TABLET | Refills: 2 | Status: SHIPPED | OUTPATIENT
Start: 2023-06-14 | End: 2023-07-12

## 2023-06-14 RX ADMIN — ONDANSETRON 8 MG: 2 INJECTION INTRAMUSCULAR; INTRAVENOUS at 08:55

## 2023-06-14 RX ADMIN — Medication 250 ML: at 08:55

## 2023-06-14 RX ADMIN — FLUOROURACIL 690 MG: 50 INJECTION, SOLUTION INTRAVENOUS at 12:47

## 2023-06-14 NOTE — PROGRESS NOTES
Patient in infusion today receiving cycle 12.  Discussed upcoming appointment with radiation therapy on Monday.  Patient would like to keep schedule as is and cancel that appointment with radiation therapy for Monday.  He will plan to come on 6/28 for cycle 13 with a PET scan planned for 7/5 and see radiation on 7/10.  Patient states he is feeling good.  Has lost a little bit more weight and is on a different type of supplement now.  Requesting refills on Compazine, Zofran and Prilosec.  I asked patient to call with any questions or concerns.

## 2023-06-14 NOTE — PROGRESS NOTES
Infusion Nursing Note:  Kenneth Matthew presents today for folfox.    Patient seen by provider today: Yes: Delmy Valenzuela.   present during visit today: Not Applicable.    Note:   Component      Latest Ref Rng 6/14/2023  8:15 AM   WBC      4.0 - 11.0 10e3/uL 3.5 (L)    RBC Count      4.40 - 5.90 10e6/uL 3.90 (L)    Hemoglobin      13.3 - 17.7 g/dL 12.3 (L)    Hematocrit      40.0 - 53.0 % 36.9 (L)    MCV      78 - 100 fL 95    MCH      26.5 - 33.0 pg 31.5    MCHC      31.5 - 36.5 g/dL 33.3    RDW      10.0 - 15.0 % 17.3 (H)    Platelet Count      150 - 450 10e3/uL 93 (L)    % Neutrophils      % 49    % Lymphocytes      % 32    % Monocytes      % 15    % Eosinophils      % 3    % Basophils      % 1    % Immature Granulocytes      % 0    NRBCs per 100 WBC      <1 /100 0    Absolute Neutrophils      1.6 - 8.3 10e3/uL 1.7    Absolute Lymphocytes      0.8 - 5.3 10e3/uL 1.1    Absolute Monocytes      0.0 - 1.3 10e3/uL 0.5    Absolute Eosinophils      0.0 - 0.7 10e3/uL 0.1    Absolute Basophils      0.0 - 0.2 10e3/uL 0.0    Absolute Immature Granulocytes      <=0.4 10e3/uL 0.0    Absolute NRBCs      10e3/uL 0.0    Sodium      136 - 145 mmol/L 136    Potassium      3.4 - 5.3 mmol/L 4.1    Chloride      98 - 107 mmol/L 101    Carbon Dioxide (CO2)      22 - 29 mmol/L 29    Anion Gap      7 - 15 mmol/L 6 (L)    Urea Nitrogen      8.0 - 23.0 mg/dL 7.8 (L)    Creatinine      0.67 - 1.17 mg/dL 0.57 (L)    Calcium      8.8 - 10.2 mg/dL 8.6 (L)    Glucose      70 - 99 mg/dL 208 (H)    Alkaline Phosphatase      40 - 129 U/L 91    AST      0 - 45 U/L 38    ALT      0 - 70 U/L 50    Protein Total      6.4 - 8.3 g/dL 6.5    Albumin      3.5 - 5.2 g/dL 3.4 (L)    Bilirubin Total      <=1.2 mg/dL 0.5    GFR Estimate      >60 mL/min/1.73m2 >90       Legend:  (L) Low  (H) High        Intravenous Access:  Implanted Port.    Treatment Conditions:  Results reviewed, labs MET treatment parameters, ok to proceed with  treatment.      Post Infusion Assessment:  Patient tolerated infusion without incident.   Home pump on.      Discharge Plan:   Copy of AVS reviewed with patient and/or family.  Patient will return Friday for next appointment.      Radha Fernandez RN

## 2023-06-14 NOTE — PROGRESS NOTES
"[facility]  Medical Nutrition Therapy    Visit Type: Reassessment     Kenneth Matthew referred by Dr. Nika Barnhart  for MNT related to Malignant neoplasm of lower third of esophagus      Patient accompanied by his brother  Nutrition Assessment:  Anthropometrics  Height: 5' 7.5\"    BMI:  21.23      Weight: 137 lb 9.1 oz    BSA: 1.72        Wt Readings from Last 10 Encounters:   06/14/23 62.4 kg (137 lb 9.1 oz)   06/02/23 64.4 kg (141 lb 15.6 oz)   05/31/23 63.2 kg (139 lb 5.3 oz)   05/31/23 62.7 kg (138 lb 3.7 oz)   05/30/23 62.7 kg (138 lb 3.2 oz)   05/19/23 65.4 kg (144 lb 2.9 oz)   05/17/23 64 kg (141 lb)   05/17/23 63.8 kg (140 lb 10.5 oz)   05/15/23 64.1 kg (141 lb 4.8 oz)   05/05/23 65.4 kg (144 lb 3.2 oz)         Nutrition History  Weight is down a couple lbs. Drinking root beer floats now. Started drinking some beer. May start radiation soon.      Food Record  TWOCal HN- averaging 3 per day (1425 kcal, 60g protein). Takes 5 minutes to feed. Tolerating fairly well. Lots of gas, no bloating. Regular BMs.     Oral intake  V8, Ensure apple (2-3 per day), apple juice, rootbeer float. Smoothie 4 days a week with protein powder.       Nutrition Prescription- weight used to estimate needs 63.5kg  Energy:   1900-2225kcal (30-35g/kg)    Protein:   75-95g (1.2-1.5g/kg)    Fluid:   1900-2225ml (1ml/kcal)         Nutrition Diagnosis:  Malnutrition related to swallowing difficulty/reduced intake as evidenced by weight loss of 32% weight loss in 1 year.  - weight stable around 140lb     Nutrition Intervention:  Work up to 4 TwoCal. Milkshake recipes, foods to try again to see if he can swallow.     Nutrition Goals:   Adequate intake oral/TF to meet nutrition needs to maintain weight/promote healthy weight gain     Nutrition Follow Up / Monitoring:   TF placement, intake, weight, labs.     Nutrition Recommendations:  TF Recs  4 cartons of TwoCal HN daily (948ml)  1900kcal, 80g protein, 166ml water   Adds " 60-120ml water to formula. 50-60 water flush before and after     Time spent with pt - 15 min     Patient to follow-up with RD in 2-4 weeks.   Patient has RD contact information to call/email if needed.

## 2023-06-16 ENCOUNTER — INFUSION THERAPY VISIT (OUTPATIENT)
Dept: INFUSION THERAPY | Facility: OTHER | Age: 58
End: 2023-06-16
Attending: INTERNAL MEDICINE
Payer: COMMERCIAL

## 2023-06-16 VITALS
SYSTOLIC BLOOD PRESSURE: 100 MMHG | HEART RATE: 62 BPM | OXYGEN SATURATION: 99 % | BODY MASS INDEX: 21.7 KG/M2 | WEIGHT: 140.65 LBS | DIASTOLIC BLOOD PRESSURE: 52 MMHG

## 2023-06-16 DIAGNOSIS — C15.5 MALIGNANT NEOPLASM OF LOWER THIRD OF ESOPHAGUS (H): Primary | ICD-10-CM

## 2023-06-16 PROCEDURE — 96523 IRRIG DRUG DELIVERY DEVICE: CPT | Performed by: INTERNAL MEDICINE

## 2023-06-16 RX ORDER — HEPARIN SODIUM (PORCINE) LOCK FLUSH IV SOLN 100 UNIT/ML 100 UNIT/ML
5 SOLUTION INTRAVENOUS
Status: DISCONTINUED | OUTPATIENT
Start: 2023-06-16 | End: 2023-06-16 | Stop reason: HOSPADM

## 2023-06-16 RX ORDER — HEPARIN SODIUM (PORCINE) LOCK FLUSH IV SOLN 100 UNIT/ML 100 UNIT/ML
5 SOLUTION INTRAVENOUS
Status: CANCELLED | OUTPATIENT
Start: 2023-06-16

## 2023-06-16 RX ADMIN — HEPARIN SODIUM (PORCINE) LOCK FLUSH IV SOLN 100 UNIT/ML 5 ML: 100 SOLUTION at 11:50

## 2023-06-16 RX ADMIN — Medication 1000 ML: at 10:48

## 2023-06-16 NOTE — PROGRESS NOTES
Patient is 58 years old, here today for home infuser pump off.    Infuser pump completed, 100 % infused.  Patient denies adverse effects from home infusion, nor mechanical issues.  Pump is intact.  Infuser disconnected from port.      Port flushed per MAR.  Immediate blood return noted.  Flushes easily, without resistance.      Port needle removed, needle intact.  Surrounding skin without redness, swelling, nor discoloration.  Skin warm and dry to touch.      Patient tolerated welll    Infusion Nursing Note:  Kenneth Matthew presents today for normal saline.    Patient seen by provider today: No   present during visit today: Not Applicable.    Note: N/A.      Intravenous Access:  Implanted Port.    Treatment Conditions:  Results reviewed,  ok to proceed with treatment.      Post Infusion Assessment:  Patient tolerated infusion without incident.       Discharge Plan:   Patient and/or family verbalized understanding of discharge instructions and all questions answered.      BENNETT WORRELL RN

## 2023-06-21 ENCOUNTER — TELEPHONE (OUTPATIENT)
Dept: ONCOLOGY | Facility: OTHER | Age: 58
End: 2023-06-21

## 2023-06-21 DIAGNOSIS — G47.00 INSOMNIA, UNSPECIFIED TYPE: Primary | ICD-10-CM

## 2023-06-21 DIAGNOSIS — C15.5 MALIGNANT NEOPLASM OF LOWER THIRD OF ESOPHAGUS (H): ICD-10-CM

## 2023-06-21 RX ORDER — ESZOPICLONE 1 MG/1
2 TABLET, FILM COATED ORAL AT BEDTIME
Qty: 30 TABLET | Refills: 0 | COMMUNITY
Start: 2023-06-21 | End: 2023-06-28

## 2023-06-21 NOTE — TELEPHONE ENCOUNTER
Telephone call to patient as he had wanted to talk last week and his infusion appointment I was unable to reach him.  He questions whether or not my opinion would be to be placed on Social Security disability.  We did talk extensively in regards to this and going back to work and risks versus benefit.  He also states the sleeping pill is not working.  He questioned whether or not CBD Gummies would be okay for him to take.  I explained I could not tell him that they are 100% safe.  I recommended he increase  the Lunesta to 2 tablets 1 hour prior to bed.  He also states he has neuropathy in his hands, feet and also his lips that is fairly continuous since getting treated last week.  I asked that he monitor the symptoms and we will discuss further at his upcoming appointment.

## 2023-06-28 ENCOUNTER — ONCOLOGY VISIT (OUTPATIENT)
Dept: ONCOLOGY | Facility: OTHER | Age: 58
End: 2023-06-28
Attending: NURSE PRACTITIONER
Payer: COMMERCIAL

## 2023-06-28 ENCOUNTER — HOSPITAL ENCOUNTER (OUTPATIENT)
Dept: EDUCATION SERVICES | Facility: HOSPITAL | Age: 58
Discharge: HOME | End: 2023-06-28
Attending: NURSE PRACTITIONER
Payer: COMMERCIAL

## 2023-06-28 ENCOUNTER — TELEPHONE (OUTPATIENT)
Dept: ONCOLOGY | Facility: OTHER | Age: 58
End: 2023-06-28

## 2023-06-28 ENCOUNTER — LAB (OUTPATIENT)
Dept: ONCOLOGY | Facility: OTHER | Age: 58
End: 2023-06-28
Attending: STUDENT IN AN ORGANIZED HEALTH CARE EDUCATION/TRAINING PROGRAM
Payer: COMMERCIAL

## 2023-06-28 ENCOUNTER — INFUSION THERAPY VISIT (OUTPATIENT)
Dept: INFUSION THERAPY | Facility: OTHER | Age: 58
End: 2023-06-28
Attending: INTERNAL MEDICINE
Payer: COMMERCIAL

## 2023-06-28 VITALS
BODY MASS INDEX: 21.06 KG/M2 | SYSTOLIC BLOOD PRESSURE: 105 MMHG | DIASTOLIC BLOOD PRESSURE: 58 MMHG | HEART RATE: 59 BPM | WEIGHT: 136.5 LBS

## 2023-06-28 VITALS
DIASTOLIC BLOOD PRESSURE: 62 MMHG | HEART RATE: 63 BPM | TEMPERATURE: 97.5 F | SYSTOLIC BLOOD PRESSURE: 95 MMHG | OXYGEN SATURATION: 95 % | RESPIRATION RATE: 17 BRPM

## 2023-06-28 DIAGNOSIS — G89.3 CANCER RELATED PAIN: ICD-10-CM

## 2023-06-28 DIAGNOSIS — T45.1X5A ANTINEOPLASTIC CHEMOTHERAPY INDUCED PANCYTOPENIA (H): ICD-10-CM

## 2023-06-28 DIAGNOSIS — C15.5 MALIGNANT NEOPLASM OF LOWER THIRD OF ESOPHAGUS (H): Primary | ICD-10-CM

## 2023-06-28 DIAGNOSIS — D61.810 ANTINEOPLASTIC CHEMOTHERAPY INDUCED PANCYTOPENIA (H): ICD-10-CM

## 2023-06-28 DIAGNOSIS — G47.00 INSOMNIA, UNSPECIFIED TYPE: ICD-10-CM

## 2023-06-28 LAB
ALBUMIN SERPL BCG-MCNC: 3.6 G/DL (ref 3.5–5.2)
ALP SERPL-CCNC: 87 U/L (ref 40–129)
ALT SERPL W P-5'-P-CCNC: 16 U/L (ref 0–70)
ANION GAP SERPL CALCULATED.3IONS-SCNC: 8 MMOL/L (ref 7–15)
AST SERPL W P-5'-P-CCNC: 21 U/L (ref 0–45)
BASOPHILS # BLD AUTO: 0.1 10E3/UL (ref 0–0.2)
BASOPHILS NFR BLD AUTO: 2 %
BILIRUB SERPL-MCNC: 0.5 MG/DL
BUN SERPL-MCNC: 7.2 MG/DL (ref 8–23)
CALCIUM SERPL-MCNC: 9.1 MG/DL (ref 8.8–10.2)
CHLORIDE SERPL-SCNC: 103 MMOL/L (ref 98–107)
CREAT SERPL-MCNC: 0.57 MG/DL (ref 0.67–1.17)
DEPRECATED HCO3 PLAS-SCNC: 27 MMOL/L (ref 22–29)
EOSINOPHIL # BLD AUTO: 0.1 10E3/UL (ref 0–0.7)
EOSINOPHIL NFR BLD AUTO: 4 %
ERYTHROCYTE [DISTWIDTH] IN BLOOD BY AUTOMATED COUNT: 16.8 % (ref 10–15)
GFR SERPL CREATININE-BSD FRML MDRD: >90 ML/MIN/1.73M2
GLUCOSE SERPL-MCNC: 168 MG/DL (ref 70–99)
HCT VFR BLD AUTO: 38.4 % (ref 40–53)
HGB BLD-MCNC: 12.8 G/DL (ref 13.3–17.7)
IMM GRANULOCYTES # BLD: 0 10E3/UL
IMM GRANULOCYTES NFR BLD: 0 %
LYMPHOCYTES # BLD AUTO: 1.2 10E3/UL (ref 0.8–5.3)
LYMPHOCYTES NFR BLD AUTO: 40 %
MCH RBC QN AUTO: 31.8 PG (ref 26.5–33)
MCHC RBC AUTO-ENTMCNC: 33.3 G/DL (ref 31.5–36.5)
MCV RBC AUTO: 95 FL (ref 78–100)
MONOCYTES # BLD AUTO: 0.5 10E3/UL (ref 0–1.3)
MONOCYTES NFR BLD AUTO: 16 %
NEUTROPHILS # BLD AUTO: 1.2 10E3/UL (ref 1.6–8.3)
NEUTROPHILS NFR BLD AUTO: 38 %
NRBC # BLD AUTO: 0 10E3/UL
NRBC BLD AUTO-RTO: 0 /100
PLATELET # BLD AUTO: 81 10E3/UL (ref 150–450)
POTASSIUM SERPL-SCNC: 4 MMOL/L (ref 3.4–5.3)
PROT SERPL-MCNC: 7 G/DL (ref 6.4–8.3)
RBC # BLD AUTO: 4.03 10E6/UL (ref 4.4–5.9)
SODIUM SERPL-SCNC: 138 MMOL/L (ref 136–145)
TSH SERPL DL<=0.005 MIU/L-ACNC: 0.84 UIU/ML (ref 0.3–4.2)
WBC # BLD AUTO: 3.1 10E3/UL (ref 4–11)

## 2023-06-28 PROCEDURE — 80050 GENERAL HEALTH PANEL: CPT | Performed by: NURSE PRACTITIONER

## 2023-06-28 PROCEDURE — 99215 OFFICE O/P EST HI 40 MIN: CPT | Performed by: NURSE PRACTITIONER

## 2023-06-28 PROCEDURE — 999N000107 HC STATISTIC NUTRITIONAL THERAPY NO CHARGE: Performed by: DIETITIAN, REGISTERED

## 2023-06-28 RX ORDER — HEPARIN SODIUM (PORCINE) LOCK FLUSH IV SOLN 100 UNIT/ML 100 UNIT/ML
5 SOLUTION INTRAVENOUS
Status: CANCELLED | OUTPATIENT
Start: 2023-06-30

## 2023-06-28 RX ORDER — EPINEPHRINE 1 MG/ML
0.3 INJECTION, SOLUTION, CONCENTRATE INTRAVENOUS EVERY 5 MIN PRN
Status: CANCELLED | OUTPATIENT
Start: 2023-06-28

## 2023-06-28 RX ORDER — LORAZEPAM 2 MG/ML
0.5 INJECTION INTRAMUSCULAR EVERY 4 HOURS PRN
Status: CANCELLED | OUTPATIENT
Start: 2023-06-28

## 2023-06-28 RX ORDER — HYDROCODONE BITARTRATE AND ACETAMINOPHEN 5; 217 MG/10ML; MG/10ML
10 SOLUTION ORAL EVERY 8 HOURS
Qty: 473 ML | Refills: 0 | Status: SHIPPED | OUTPATIENT
Start: 2023-06-28 | End: 2023-01-01 | Stop reason: DRUGHIGH

## 2023-06-28 RX ORDER — HEPARIN SODIUM,PORCINE 10 UNIT/ML
5 VIAL (ML) INTRAVENOUS
Status: CANCELLED | OUTPATIENT
Start: 2023-06-28

## 2023-06-28 RX ORDER — ALBUTEROL SULFATE 0.83 MG/ML
2.5 SOLUTION RESPIRATORY (INHALATION)
Status: CANCELLED | OUTPATIENT
Start: 2023-06-28

## 2023-06-28 RX ORDER — METHYLPREDNISOLONE SODIUM SUCCINATE 125 MG/2ML
125 INJECTION, POWDER, LYOPHILIZED, FOR SOLUTION INTRAMUSCULAR; INTRAVENOUS
Status: CANCELLED
Start: 2023-06-28

## 2023-06-28 RX ORDER — FLUOROURACIL 50 MG/ML
400 INJECTION, SOLUTION INTRAVENOUS ONCE
Status: COMPLETED | OUTPATIENT
Start: 2023-06-28 | End: 2023-06-28

## 2023-06-28 RX ORDER — ESZOPICLONE 1 MG/1
2 TABLET, FILM COATED ORAL AT BEDTIME
Qty: 30 TABLET | Refills: 0 | Status: SHIPPED | OUTPATIENT
Start: 2023-06-28 | End: 2023-07-21

## 2023-06-28 RX ORDER — FLUOROURACIL 50 MG/ML
400 INJECTION, SOLUTION INTRAVENOUS ONCE
Status: CANCELLED | OUTPATIENT
Start: 2023-06-28

## 2023-06-28 RX ORDER — DIPHENHYDRAMINE HYDROCHLORIDE 50 MG/ML
50 INJECTION INTRAMUSCULAR; INTRAVENOUS
Status: CANCELLED
Start: 2023-06-28

## 2023-06-28 RX ORDER — ONDANSETRON 2 MG/ML
8 INJECTION INTRAMUSCULAR; INTRAVENOUS ONCE
Status: COMPLETED | OUTPATIENT
Start: 2023-06-28 | End: 2023-06-28

## 2023-06-28 RX ORDER — HEPARIN SODIUM,PORCINE 10 UNIT/ML
5 VIAL (ML) INTRAVENOUS
Status: CANCELLED | OUTPATIENT
Start: 2023-06-30

## 2023-06-28 RX ORDER — MEPERIDINE HYDROCHLORIDE 25 MG/ML
25 INJECTION INTRAMUSCULAR; INTRAVENOUS; SUBCUTANEOUS EVERY 30 MIN PRN
Status: CANCELLED | OUTPATIENT
Start: 2023-06-28

## 2023-06-28 RX ORDER — ALBUTEROL SULFATE 90 UG/1
1-2 AEROSOL, METERED RESPIRATORY (INHALATION)
Status: CANCELLED
Start: 2023-06-28

## 2023-06-28 RX ORDER — HEPARIN SODIUM (PORCINE) LOCK FLUSH IV SOLN 100 UNIT/ML 100 UNIT/ML
5 SOLUTION INTRAVENOUS
Status: CANCELLED | OUTPATIENT
Start: 2023-06-28

## 2023-06-28 RX ADMIN — ONDANSETRON 8 MG: 2 INJECTION INTRAMUSCULAR; INTRAVENOUS at 10:28

## 2023-06-28 RX ADMIN — FLUOROURACIL 690 MG: 50 INJECTION, SOLUTION INTRAVENOUS at 14:27

## 2023-06-28 RX ADMIN — Medication 250 ML: at 10:28

## 2023-06-28 ASSESSMENT — PATIENT HEALTH QUESTIONNAIRE - PHQ9: SUM OF ALL RESPONSES TO PHQ QUESTIONS 1-9: 2

## 2023-06-28 NOTE — PROGRESS NOTES
"Oncology Rooming Note    June 28, 2023 9:04 AM   Kenneth Matthew is a 58 year old male who presents for:    Chief Complaint   Patient presents with     Oncology Clinic Visit     Esophageal cancer     Initial Vitals: BP 95/62 (BP Location: Left arm, Patient Position: Sitting)   Pulse 63   Temp 97.5  F (36.4  C) (Tympanic)   Resp 17   SpO2 95%  Estimated body mass index is 21.7 kg/m  as calculated from the following:    Height as of 5/31/23: 1.715 m (5' 7.5\").    Weight as of 6/16/23: 63.8 kg (140 lb 10.5 oz). There is no height or weight on file to calculate BSA.  No Pain (0) Comment: Data Unavailable   No LMP for male patient.  Allergies reviewed: Yes  Medications reviewed: Yes    Medications: Medication refills not needed today.  Pharmacy name entered into IDx:    [id number] - [place], [place] - [address]   [facility] - [place], [place] - [address]    Clinical concerns: has brown spots on forearms       BENNETT WORRELL RN            "

## 2023-06-28 NOTE — TELEPHONE ENCOUNTER
Received a PA request from [facility] for eszopiclone (LUNESTA) 1 MG tablet. Submitted on CMM. Waiting for a response.

## 2023-06-28 NOTE — TELEPHONE ENCOUNTER
Received a PA request from [facility] for HYDROcodone-Acetaminophen 5-217 MG/10ML SOLN. Submitted on CMM. Waiting for a response.

## 2023-06-28 NOTE — PROGRESS NOTES
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  He initiated systemic therapy with nivolumab/FOLFOX 1/11/2023 with potential for palliative radiation therapy.     He underwent EGD on 4/25/2023 due to ongoing difficulty swallowing solids and regurgitation.  This showed a narrowing of the distal esophagus and scope was unable to be passed through to the stomach with noted small lumen.  There was no mucosal lesion and biopsies showed squamous mucosa and subepithelial stromal tissues with mild chronic inflammation and edema and this was negative for malignancy.  G-tube was also exchanged during this procedure.     He presents to the clinic today stating he is doing okay.  He does have increased numbness to his hands and feet and also his lips stating that this is basically continuous since his last treatment.  He initiated the Lunesta 1 mg at night after our last visit and it did not help sleep.  He increased this last week to 2mg stating it did help him sleep a couple nights 7-1/2 hours.  Continues to alternate the Compazine and Zofran on a daily basis though he is not experiencing any nausea at this time.  He states he takes it more to prevent nausea.  He has used the hydrocodone a couple of times recently for low back pain.    He continues to not eat any type of solid foods.  He admits he has not been taking in as much tube feeding as he should and he continues to lose a little weight.  He doesn't feel like eating and feels full easily when its hot.    He will be undergoing a PET scan next week and seeing radiation therapy on 7/10/2023 followed by a visit with Dr. Barnhart on 7/12/2023  to discuss ongoing plan.    Oncologic History:     Experienced difficulty swallowing and 60 pound weight loss  10/21/2022: CT chest, abdomen and pelvis: Diffuse wall thickening of the distal esophagus with associated mediastinal adenopathy, and upper abdominal  Conglomerate lymphadenopathy. Constellation of findings are highly concerning for underlying esophageal carcinoma with metastatic disease.   11/1/2022: Colonoscopy with polyp showing tubular adenoma and EGD: A large ulcerating mass with no bleeding and no stigmata of recent bleeding was found in the lower third of the esophagus, 35 to 40 cm from the incisors.  The mass was partially obstructing and partially circumferential involving  two thirds.    **Biopsy showed poorly differentiated favoring adenocarcinoma. Her 2 negative.   11/22/2002 patient met with Dr. Barnhart to discuss systemic therapy pending PET results/complete staging  12/5/2022 patient met with Dr. Spain with radiation oncology with possible plan to initiate radiation therapy pending results of PET scan  12/14/2022: PET scan: Distal esophageal neoplasm with metastatic involvement the  mediastinum, left hilum and retroperitoneal lymph nodes of the upperabdomen. Also likely localized metastatic involvement of thestomach/GE junction. 2 cm intramural distal esophageal/GE junction mass. Circumferential wall thickening with increased FDG uptake in the distal esophagus adjacent portions of the stomach/GE junction may represent localized disease however could also represent localized inflammation. Metastatic involvement to the subcarinal nodes, left infrahilar and  pericaval/paraesophageal lymph nodes of the lower thorax. Metastatic involvement to the retroperitoneal lymph nodes of the upper abdomen. These lymph nodes have increased in size dating back to be  10/21/2022 CT scan consistent with worsening disease. Representative  nodes are described above.  12/20/2022  He was seen by Dr. Barnhart with plan  to initiate palliative nivolumab/FOLFOX with potential for palliative Radiation at some point  12/22/2022: Port-A-Cath placement and gastrostomy tube insertion by Dr. Dalton  1/11/2023 initiated treatment  3/29/2023  PET scan:    IMPRESSION:   Significant interval improvement.  Marked decrease in size of distal esophageal/GE junction masses with  near complete resolution of enlarged, FDG avid subcarinal,  paraesophageal and retroperitoneal lymph nodes.    Normal-sized lymph nodes throughout the head and neck, chest, abdomen  and pelvis now demonstrate FDG uptake, the majority of which do not  exceed background suggesting diffuse inflammatory response, possibly  related to therapy.   **Dr Barnhart reviewed with patient and recommended an EGD and discussed radiation consult.  5/15/2023 patient consult with radiation oncology, Dr. Roe, they discussed radiation therapy but due to lack of availability to simulate, plan was to follow-up in 2 months to discuss again.  A referral to [place] radiation therapy was offered and patient declined.     RESULT FOR IMMUNOHISTOCHEMICAL VENTANA CLONE  PD-L1 ASSAY  COMBINED POSITIVE SCORE (CPS): <1%     Treatment Goal: Palliative     TX: Nivolumab 240 mg/oxaliplatin 85 mg per metered squared/leucovorin 350 mg per metered squared/fluorouracil 400 mg per metered squared IV bolus/fluorouracil 2400 mg per metered squared via continuous infusion over 46 hours with cycle given every 14 days     Previous treatment:  N/A    Past Medical History:   Diagnosis Date     Cancer (H)      Esophageal cancer (H) 11/01/2022     Esophageal thickening 10/25/2022     Mixed hearing loss, bilateral      Tobacco abuse 1/4/2023     Tobacco use disorder 12/27/2019       Past Surgical History:   Procedure Laterality Date     COLONOSCOPY  11/01/2022    [facility]     ESOPHAGOSCOPY N/A 4/25/2023    Procedure: Upper Endoscopy with biopsy- with Gastrostomy Tube Exchange;  Surgeon: Nba Dalton MD;  Location: [facility]  OR     INSERT PORT VASCULAR ACCESS N/A 2022    Procedure: POWER PORT PLACEMENT;  Surgeon: Nba Dalton MD;  Location: [facility] OR     Upper GI Endoscopy  2022       Family History   Problem Relation Age of Onset     Breast Cancer Mother      Hyperlipidemia Mother      Diabetes Mother      Coronary Artery Disease Mother      Cerebrovascular Disease Father      Hyperlipidemia Father      Coronary Artery Disease Father      Myocardial Infarction Father      Hyperlipidemia Brother      Hypertension Brother      Diabetes Brother      Asthma Sister      Breast Cancer Sister      Hyperlipidemia Sister      Diabetes Sister      Hyperlipidemia Sister      Diabetes Sister      Hyperlipidemia Sister      Suicide Sister      Anesthesia Reaction Sister      Breast Cancer Sister      Hyperlipidemia Sister      Diabetes Sister      Breast Cancer Niece      Skin Cancer Niece      Thyroid Disease Niece      Breast Cancer Niece      Leukemia Nephew      Colon Cancer No family hx of      Prostate Cancer No family hx of      Genetic Disorder No family hx of        Social History     Socioeconomic History     Marital status:      Spouse name: Jazzy     Number of children: 2     Years of education: Not on file     Highest education level: Not on file   Occupational History     Not on file   Tobacco Use     Smoking status: Former     Packs/day: 0.50     Years: 46.00     Pack years: 23.00     Types: Cigarettes     Start date:      Quit date: 2023     Years since quittin.0     Smokeless tobacco: Never     Tobacco comments:     Trying to quit   Vaping Use     Vaping Use: Never used   Substance and Sexual Activity     Alcohol use: Not Currently     Comment: none since 2022     Drug use: Never     Sexual activity: Not on file   Other Topics Concern     Parent/sibling w/ CABG, MI or angioplasty before 65F 55M? Yes     Comment: father   Social History Narrative     Not on file     Social Determinants of  Health     Financial Resource Strain: Not on file   Food Insecurity: Not on file   Transportation Needs: Not on file   Physical Activity: Not on file   Stress: Not on file   Social Connections: Not on file   Intimate Partner Violence: Not on file   Housing Stability: Not on file       Current Outpatient Medications   Medication     eszopiclone (LUNESTA) 1 MG tablet     HYDROcodone-Acetaminophen 5-217 MG/10ML SOLN     hydrocortisone (CORTAID) 1 % external cream     ibuprofen (ADVIL/MOTRIN) 200 MG tablet     mirtazapine (REMERON) 7.5 MG tablet     nicotine (NICODERM CQ) 14 MG/24HR 24 hr patch     nicotine (NICODERM CQ) 7 MG/24HR 24 hr patch     nicotine (NICORETTE) 2 MG gum     omeprazole (PRILOSEC) 20 MG DR capsule     ondansetron (ZOFRAN) 8 MG tablet     polyethylene glycol (MIRALAX) 17 GM/Dose powder     prochlorperazine (COMPAZINE) 10 MG tablet     No current facility-administered medications for this visit.        No Known Allergies    Review Of Systems:  Constitutional:    denies fever, chills, and night sweats.  Eyes:    Denies double vision; has some blurred vision at times  Ears/Nose/Throat:   denies ear pain, has some nasal congestion, no pain with swallowing  Respiratory:   denies shortness of breath, cough   Skin:  Few new bruised areas to forearms  Cardiovascular:   denies chest pain, palpitations, edema  Gastrointestinal:   denies abdominal pain, bloating, nausea; no change in bowel habits or blood in stool  Genitourinary:   denies difficulty with urination, blood in urine  Musculoskeletal:    denies new muscle pain, bone pain  Neurologic:   denies headaches, see hPI  Psychiatric:   denies anxiety, depression  Hematologic/Lymphatic/Immunologic:   denies easy bleeding, lumps or bumps noted; has easy bruising  Endocrine:   Denies increased thirst    ECOG Performance Status: 1    Physical Exam:  BP 95/62 (BP Location: Left arm, Patient Position: Sitting)   Pulse 63   Temp 97.5  F (36.4  C) (Tympanic)    Resp 17   SpO2 95%     GENERAL APPEARANCE: Thin, alert and in no acute distress.  HEENT: Normocephalic, Sclerae anicteric.   NECK:   No asymmetry or masses, no thyromegaly.  LYMPHATICS: No palpable cervical, supraclavicular, axillary, or inguinal nodes   RESP: Lungs clear to auscultation bilaterally, respirations regular and easy  CARDIOVASCULAR: Regular rate and rhythm. Normal S1, S2  ABDOMEN: Soft, nontender. Bowel sounds auscultated all 4 quadrants. No palpable organomegaly or masses.  MUSCULOSKELETAL: Extremities without gross deformities noted. No edema of bilateral lower extremities.  SKIN: No suspicious lesions or rashes to exposed skin; few small bruised areas  NEURO: Alert and oriented x 3.  Gait steady.  PSYCHIATRIC: Mentation and affect appear normal.  Mood appropriate.    Laboratory:  Results for orders placed or performed in visit on 06/28/23   Comprehensive metabolic panel     Status: Abnormal   Result Value Ref Range    Sodium 138 136 - 145 mmol/L    Potassium 4.0 3.4 - 5.3 mmol/L    Chloride 103 98 - 107 mmol/L    Carbon Dioxide (CO2) 27 22 - 29 mmol/L    Anion Gap 8 7 - 15 mmol/L    Urea Nitrogen 7.2 (L) 8.0 - 23.0 mg/dL    Creatinine 0.57 (L) 0.67 - 1.17 mg/dL    Calcium 9.1 8.8 - 10.2 mg/dL    Glucose 168 (H) 70 - 99 mg/dL    Alkaline Phosphatase 87 40 - 129 U/L    AST 21 0 - 45 U/L    ALT 16 0 - 70 U/L    Protein Total 7.0 6.4 - 8.3 g/dL    Albumin 3.6 3.5 - 5.2 g/dL    Bilirubin Total 0.5 <=1.2 mg/dL    GFR Estimate >90 >60 mL/min/1.73m2   TSH with free T4 reflex     Status: Normal   Result Value Ref Range    TSH 0.84 0.30 - 4.20 uIU/mL   CBC with platelets and differential     Status: Abnormal   Result Value Ref Range    WBC Count 3.1 (L) 4.0 - 11.0 10e3/uL    RBC Count 4.03 (L) 4.40 - 5.90 10e6/uL    Hemoglobin 12.8 (L) 13.3 - 17.7 g/dL    Hematocrit 38.4 (L) 40.0 - 53.0 %    MCV 95 78 - 100 fL    MCH 31.8 26.5 - 33.0 pg    MCHC 33.3 31.5 - 36.5 g/dL    RDW 16.8 (H) 10.0 - 15.0 %    Platelet  Count 81 (L) 150 - 450 10e3/uL    % Neutrophils 38 %    % Lymphocytes 40 %    % Monocytes 16 %    % Eosinophils 4 %    % Basophils 2 %    % Immature Granulocytes 0 %    NRBCs per 100 WBC 0 <1 /100    Absolute Neutrophils 1.2 (L) 1.6 - 8.3 10e3/uL    Absolute Lymphocytes 1.2 0.8 - 5.3 10e3/uL    Absolute Monocytes 0.5 0.0 - 1.3 10e3/uL    Absolute Eosinophils 0.1 0.0 - 0.7 10e3/uL    Absolute Basophils 0.1 0.0 - 0.2 10e3/uL    Absolute Immature Granulocytes 0.0 <=0.4 10e3/uL    Absolute NRBCs 0.0 10e3/uL   CBC with platelets differential     Status: Abnormal    Narrative    The following orders were created for panel order CBC with platelets differential.  Procedure                               Abnormality         Status                     ---------                               -----------         ------                     CBC with platelets and d...[[id number]]  Abnormal            Final result                 Please view results for these tests on the individual orders.       Imaging Studies: None completed for today's visit      ASSESSMENT/PLAN:    #1 Esophageal cancer: Stage IV adenocarcinoma of the esophagus diagnosed November 2022.  He initiated systemic therapy with nivolumab/FOLFOX 1/11/2023 with potential for palliative radiation therapy at some point.  Has tolerated treatment fairly well. We will go forth with cycle 13 therapy today.  He will have his PET next week, see Radiation Oncology on 7/10 and see Dr. Barnhart 7/12 to discuss ongoing therapy.    #2  Insomnia:  Will continue with 2mg at night.  2 weeks supply sent to his pharmacy.  He will be seen in 2 weeks for reassessment.  May need to increase to 3mg at HS.     #3  Pancytopenia:  Meets parameters for treatment today.  Will monitor.    #4  Cancer related pain:  Hydrocodone use is minimal-refilled.    I encouraged patient to call with any questions or concerns.    40 minutes spent in the patient's encounter today with time spent in review of the  chart along with in chart preparation.  Time was also spent in review of his treatment plan.  Time was also spent obtaining a review of systems and performing a physical exam along with review of his lab work with him in detail.  Time was also spent in discussion of his upcoming schedule including his visit with radiation therapy and Dr. Barnhart.  Time was also spent in placing orders and in chart documentation.    Delmy Valenzuela NP  APRN, FNP-BC, AOCNP

## 2023-06-28 NOTE — PATIENT INSTRUCTIONS
We would like to see you back per your calender.     Your prescriptions have been sent to: [facility].      When you are in need of a refill, please call your pharmacy and they will send us a request.     If you have any questions please call [phone]    Other instructions:  none

## 2023-06-28 NOTE — PROGRESS NOTES
Infusion Nursing Note:  Kenneth Matthew presents today for Folfox/Vivo.    Patient seen by provider today: Yes: Martha Valenzuela.   present during visit today: Not Applicable.    Note:   Component      Latest Ref Rng 6/28/2023  8:16 AM   WBC      4.0 - 11.0 10e3/uL 3.1 (L)    RBC Count      4.40 - 5.90 10e6/uL 4.03 (L)    Hemoglobin      13.3 - 17.7 g/dL 12.8 (L)    Hematocrit      40.0 - 53.0 % 38.4 (L)    MCV      78 - 100 fL 95    MCH      26.5 - 33.0 pg 31.8    MCHC      31.5 - 36.5 g/dL 33.3    RDW      10.0 - 15.0 % 16.8 (H)    Platelet Count      150 - 450 10e3/uL 81 (L)    % Neutrophils      % 38    % Lymphocytes      % 40    % Monocytes      % 16    % Eosinophils      % 4    % Basophils      % 2    % Immature Granulocytes      % 0    NRBCs per 100 WBC      <1 /100 0    Absolute Neutrophils      1.6 - 8.3 10e3/uL 1.2 (L)    Absolute Lymphocytes      0.8 - 5.3 10e3/uL 1.2    Absolute Monocytes      0.0 - 1.3 10e3/uL 0.5    Absolute Eosinophils      0.0 - 0.7 10e3/uL 0.1    Absolute Basophils      0.0 - 0.2 10e3/uL 0.1    Absolute Immature Granulocytes      <=0.4 10e3/uL 0.0    Absolute NRBCs      10e3/uL 0.0    Sodium      136 - 145 mmol/L 138    Potassium      3.4 - 5.3 mmol/L 4.0    Chloride      98 - 107 mmol/L 103    Carbon Dioxide (CO2)      22 - 29 mmol/L 27    Anion Gap      7 - 15 mmol/L 8    Urea Nitrogen      8.0 - 23.0 mg/dL 7.2 (L)    Creatinine      0.67 - 1.17 mg/dL 0.57 (L)    Calcium      8.8 - 10.2 mg/dL 9.1    Glucose      70 - 99 mg/dL 168 (H)    Alkaline Phosphatase      40 - 129 U/L 87    AST      0 - 45 U/L 21    ALT      0 - 70 U/L 16    Protein Total      6.4 - 8.3 g/dL 7.0    Albumin      3.5 - 5.2 g/dL 3.6    Bilirubin Total      <=1.2 mg/dL 0.5    GFR Estimate      >60 mL/min/1.73m2 >90    TSH      0.30 - 4.20 uIU/mL 0.84       Legend:  (L) Low  (H) High        Intravenous Access:  Implanted Port.    Treatment Conditions:  Results reviewed, labs MET treatment parameters, ok  to proceed with treatment.      Post Infusion Assessment:  Patient tolerated infusion without incident.  Blood return noted pre and post infusion.     Pt connected to home infuser fluorouracil, clamps taped open, sensor and tubing secured to skin with tape.     Discharge Plan:   Copy of AVS reviewed with patient and/or family.  Patient will return Friday for next appointment.      Radha Fernandez RN

## 2023-06-28 NOTE — TELEPHONE ENCOUNTER
Received a fax from [facility] that HYDROcodone-Acetaminophen 5-217 MG/10ML SOLN does not required prior authorization at this time and may be covered at the pharmacy in accordance with the benefit plan. Scanned in Epic.

## 2023-06-28 NOTE — PROGRESS NOTES
"[facility]  Medical Nutrition Therapy    Visit Type: Reassessment     Kenneth Matthew referred by Dr. Nika Barnhart  for MNT related to Malignant neoplasm of lower third of esophagus      Patient accompanied by his brother    Nutrition Assessment:  Anthropometrics  Height: 5' 7.5\"    BMI:  21.06      Weight: 136 lb 8 oz    BSA: 1.72        Wt Readings from Last 10 Encounters:   06/28/23 61.9 kg (136 lb 8 oz)   06/16/23 63.8 kg (140 lb 10.5 oz)   06/14/23 62.4 kg (137 lb 9.1 oz)   06/02/23 64.4 kg (141 lb 15.6 oz)   05/31/23 63.2 kg (139 lb 5.3 oz)   05/31/23 62.7 kg (138 lb 3.7 oz)   05/30/23 62.7 kg (138 lb 3.2 oz)   05/19/23 65.4 kg (144 lb 2.9 oz)   05/17/23 64 kg (141 lb)   05/17/23 63.8 kg (140 lb 10.5 oz)       Nutrition History  Weight is down a pound since last seen 2 weeks ago. Reduced oral intake and intake of tube feeding formula. Gas after formula bolus. Regular BMs    Food Record  Was only using 1-1.5 cartons a day when is was hot out. Some water added to formula to thin out- maybe 8oz. 50ml flush before and after. Still drinking smoothies with protein powder, ensure apple juice (2 a day), v8 (1-2 12oz cans), regular apple juice, Gatorade, Root beer floats- (1-3 a day), 1 water bottle- 16 oz a day.  Drinking some beer.      Nutrition Prescription- weight used to estimate needs 63.5kg  Energy:   1900-2225kcal (30-35g/kg)    Protein:   75-95g (1.2-1.5g/kg)    Fluid:   1900-2225ml (1ml/kcal)         Nutrition Diagnosis:  Malnutrition related to swallowing difficulty/reduced intake as evidenced by weight loss of 32% weight loss in 1 year.  weight trending down a little     Nutrition Intervention:  Work up to 3-4  TwoCal. Trial different soft foods. Milkshakes/malts     Nutrition Goals:   Adequate intake oral/TF to meet nutrition needs to maintain weight/promote healthy weight gain     Nutrition Follow Up / Monitoring:   TF placement, intake, weight, labs.     Nutrition Recommendations:  TF " Recs  4 cartons of TwoCal HN daily (948ml)  1900kcal, 80g protein, 166ml water   Adds 60-120ml water to formula. 50-60 water flush before and after     Time spent with pt - 15 min     Patient to follow-up with RD in 2-4 weeks.   Patient has RD contact information to call/email if needed.

## 2023-06-28 NOTE — PROGRESS NOTES
Patients power port accessed using non-coring, 19 gauge, 3/4 inch needle.    Mask donned by caregiver: yes Site prepped with CHG: yes Labs drawn: yes Dressing applied using aseptic technique: yes.     Port accessed per facility protocol. Port flushed easily, blood return noted.  No signs and symptoms of infection or infiltration.  Port flushed with 10mL normal saline, blood return noted, 10 mLs blood discarded. Blood taken for ordered labs, port flushed with 20mL normal saline.  Port left accessed as patient has appointment with Martha Chavarria, and is then due for chemo if parameters are met.  Patient discharged with no complaints.

## 2023-06-30 ENCOUNTER — INFUSION THERAPY VISIT (OUTPATIENT)
Dept: INFUSION THERAPY | Facility: OTHER | Age: 58
End: 2023-06-30
Attending: INTERNAL MEDICINE
Payer: COMMERCIAL

## 2023-06-30 ENCOUNTER — TELEPHONE (OUTPATIENT)
Dept: ONCOLOGY | Facility: OTHER | Age: 58
End: 2023-06-30

## 2023-06-30 VITALS — HEART RATE: 59 BPM | SYSTOLIC BLOOD PRESSURE: 103 MMHG | DIASTOLIC BLOOD PRESSURE: 55 MMHG

## 2023-06-30 DIAGNOSIS — C15.5 MALIGNANT NEOPLASM OF LOWER THIRD OF ESOPHAGUS (H): Primary | ICD-10-CM

## 2023-06-30 PROCEDURE — 96521 REFILL/MAINT PORTABLE PUMP: CPT | Mod: 59 | Performed by: INTERNAL MEDICINE

## 2023-06-30 PROCEDURE — 96415 CHEMO IV INFUSION ADDL HR: CPT | Performed by: INTERNAL MEDICINE

## 2023-06-30 PROCEDURE — 96413 CHEMO IV INFUSION 1 HR: CPT | Performed by: INTERNAL MEDICINE

## 2023-06-30 PROCEDURE — 96367 TX/PROPH/DG ADDL SEQ IV INF: CPT | Performed by: INTERNAL MEDICINE

## 2023-06-30 PROCEDURE — 96417 CHEMO IV INFUS EACH ADDL SEQ: CPT | Performed by: INTERNAL MEDICINE

## 2023-06-30 PROCEDURE — 96375 TX/PRO/DX INJ NEW DRUG ADDON: CPT | Performed by: INTERNAL MEDICINE

## 2023-06-30 RX ORDER — HEPARIN SODIUM (PORCINE) LOCK FLUSH IV SOLN 100 UNIT/ML 100 UNIT/ML
5 SOLUTION INTRAVENOUS
Status: CANCELLED | OUTPATIENT
Start: 2023-06-30

## 2023-06-30 RX ORDER — HEPARIN SODIUM (PORCINE) LOCK FLUSH IV SOLN 100 UNIT/ML 100 UNIT/ML
5 SOLUTION INTRAVENOUS
Status: DISCONTINUED | OUTPATIENT
Start: 2023-06-30 | End: 2023-06-30 | Stop reason: HOSPADM

## 2023-06-30 RX ADMIN — Medication 1000 ML: at 10:44

## 2023-06-30 RX ADMIN — HEPARIN SODIUM (PORCINE) LOCK FLUSH IV SOLN 100 UNIT/ML 5 ML: 100 SOLUTION at 11:51

## 2023-06-30 NOTE — PROGRESS NOTES
Infusion Nursing Note:  Kenneth Matthew presents today for IVF.    Patient seen by provider today: No   present during visit today: Not Applicable.    Note: Patient offers no concerns.    Intravenous Access:Implanted Port.    Treatment Conditions:  Lab Results   Component Value Date    HGB 12.8 (L) 06/28/2023    WBC 3.1 (L) 06/28/2023    ANEUTAUTO 1.2 (L) 06/28/2023    PLT 81 (L) 06/28/2023      Lab Results   Component Value Date     06/28/2023    POTASSIUM 4.0 06/28/2023    MAG 2.2 02/08/2023    CR 0.57 (L) 06/28/2023    NICHOLAS 9.1 06/28/2023    BILITOTAL 0.5 06/28/2023    ALBUMIN 3.6 06/28/2023    ALT 16 06/28/2023    AST 21 06/28/2023     Results reviewed, labs MET treatment parameters, ok to proceed with treatment.  Confirmed patient received the Fact Sheet for Patients, Parents and Caregivers prior to receiving medication.     Post Infusion Assessment:  Patient tolerated infusion without incident.   *If a hypersensitivity, medication error, or an adverse event occurred, pharmacy was notified in addition to the provider for FDA Reporting.      Patient is 58  years old, here today for home infuser pump off.    Infuser pump completed, 100 % infused.  Patient denies adverse effects from home infusion, nor mechanical issues.  Pump is intact.  Infuser disconnected from port.      Port flushed per MAR.  Immediate blood return noted.  Flushes easily, without resistance.      Port needle removed, needle intact.  Surrounding skin without redness, swelling, nor discoloration.  Skin warm and dry to touch.      Patient tolerated well  Matilde Matthews RN

## 2023-06-30 NOTE — TELEPHONE ENCOUNTER
[employer] will only cover a 7 day supply of HYDROcodone-Acetaminophen 5-217 MG/10ML SOLN prior to covering the full amount. The pharmacy will fill the 7 day supply but a new script needs to be sent for when that is up. Thank you!

## 2023-06-30 NOTE — PROGRESS NOTES
Post Infusion Assessment:  Patient tolerated infusion without incident.  Blood return noted pre and post infusion.  Site patent and intact, free from redness, edema or discomfort.  No evidence of extravasations.  Access discontinued per protocol.     Discharge Plan:   Patient declined prescription refills.  Discharge instructions reviewed with: Patient.  Copy of AVS reviewed with patient and/or family.  Patient will return 8/5/23 for next appointment.  Patient discharged in stable condition accompanied by: self.  Departure Mode: Ambulatory.

## 2023-07-03 ENCOUNTER — TELEPHONE (OUTPATIENT)
Dept: PET IMAGING | Facility: HOSPITAL | Age: 58
End: 2023-07-03

## 2023-07-03 NOTE — TELEPHONE ENCOUNTER
Reminder call regarding 7/5 730 pet scan appointment. Patient will stop tube feedings 6 hours prior to appointment.  Patient understands that he can have plain water and should try to be well hydrated.

## 2023-07-05 ENCOUNTER — HOSPITAL ENCOUNTER (OUTPATIENT)
Dept: PET IMAGING | Facility: HOSPITAL | Age: 58
Discharge: HOME | End: 2023-07-05
Attending: NURSE PRACTITIONER | Admitting: NURSE PRACTITIONER
Payer: COMMERCIAL

## 2023-07-05 DIAGNOSIS — C15.5 MALIGNANT NEOPLASM OF LOWER THIRD OF ESOPHAGUS (H): ICD-10-CM

## 2023-07-05 PROCEDURE — A9552 F18 FDG: HCPCS | Performed by: NURSE PRACTITIONER

## 2023-07-05 PROCEDURE — 343N000001 HC RX 343: Performed by: NURSE PRACTITIONER

## 2023-07-05 PROCEDURE — 78815 PET IMAGE W/CT SKULL-THIGH: CPT | Mod: PS

## 2023-07-05 RX ADMIN — FLUDEOXYGLUCOSE F-18 12.14 MILLICURIE: 500 INJECTION, SOLUTION INTRAVENOUS at 07:38

## 2023-07-06 ENCOUNTER — TELEPHONE (OUTPATIENT)
Dept: ONCOLOGY | Facility: OTHER | Age: 58
End: 2023-07-06

## 2023-07-06 NOTE — TELEPHONE ENCOUNTER
Telephone call to patient to discuss results of his PET scan as he is scheduled to see radiation therapy on 7/10/2023.  We discussed that there appears to be some worsening of the disease in some areas and that I would like him to see Dr. Barnhart prior to seeing radiation therapy.  We will cancel the appointment with radiation therapy for 7/10 and he will see Dr. Barnhart on 7/12/2023 to review the PET scan in detail and make a plan going forward.  I did offer an appointment to see him in clinic tomorrow to review the PET scan and he declined.  Patient is in agreement with the plan to cancel radiation therapy appointment at this point and see Dr. Barnhart.

## 2023-07-11 RX ORDER — ONDANSETRON 2 MG/ML
8 INJECTION INTRAMUSCULAR; INTRAVENOUS ONCE
Status: CANCELLED | OUTPATIENT
Start: 2023-07-12

## 2023-07-12 ENCOUNTER — ONCOLOGY VISIT (OUTPATIENT)
Dept: ONCOLOGY | Facility: OTHER | Age: 58
End: 2023-07-12
Attending: INTERNAL MEDICINE
Payer: COMMERCIAL

## 2023-07-12 ENCOUNTER — INFUSION THERAPY VISIT (OUTPATIENT)
Dept: INFUSION THERAPY | Facility: OTHER | Age: 58
End: 2023-07-12
Attending: INTERNAL MEDICINE
Payer: COMMERCIAL

## 2023-07-12 VITALS
OXYGEN SATURATION: 98 % | HEIGHT: 68 IN | WEIGHT: 134.04 LBS | DIASTOLIC BLOOD PRESSURE: 68 MMHG | BODY MASS INDEX: 20.31 KG/M2 | HEART RATE: 66 BPM | SYSTOLIC BLOOD PRESSURE: 128 MMHG | TEMPERATURE: 96.8 F

## 2023-07-12 VITALS
SYSTOLIC BLOOD PRESSURE: 99 MMHG | WEIGHT: 135.36 LBS | DIASTOLIC BLOOD PRESSURE: 69 MMHG | BODY MASS INDEX: 20.89 KG/M2 | HEART RATE: 57 BPM

## 2023-07-12 DIAGNOSIS — C15.5 MALIGNANT NEOPLASM OF LOWER THIRD OF ESOPHAGUS (H): Primary | ICD-10-CM

## 2023-07-12 LAB
ALBUMIN SERPL BCG-MCNC: 3.5 G/DL (ref 3.5–5.2)
ALP SERPL-CCNC: 82 U/L (ref 40–129)
ALT SERPL W P-5'-P-CCNC: 10 U/L (ref 0–70)
ANION GAP SERPL CALCULATED.3IONS-SCNC: 8 MMOL/L (ref 7–15)
AST SERPL W P-5'-P-CCNC: 19 U/L (ref 0–45)
BASOPHILS # BLD AUTO: 0.1 10E3/UL (ref 0–0.2)
BASOPHILS NFR BLD AUTO: 2 %
BILIRUB SERPL-MCNC: 0.4 MG/DL
BUN SERPL-MCNC: 8.6 MG/DL (ref 8–23)
CALCIUM SERPL-MCNC: 9.1 MG/DL (ref 8.8–10.2)
CHLORIDE SERPL-SCNC: 105 MMOL/L (ref 98–107)
CREAT SERPL-MCNC: 0.54 MG/DL (ref 0.67–1.17)
DEPRECATED HCO3 PLAS-SCNC: 27 MMOL/L (ref 22–29)
EOSINOPHIL # BLD AUTO: 0.2 10E3/UL (ref 0–0.7)
EOSINOPHIL NFR BLD AUTO: 6 %
ERYTHROCYTE [DISTWIDTH] IN BLOOD BY AUTOMATED COUNT: 16.5 % (ref 10–15)
GFR SERPL CREATININE-BSD FRML MDRD: >90 ML/MIN/1.73M2
GLUCOSE SERPL-MCNC: 175 MG/DL (ref 70–99)
HCT VFR BLD AUTO: 39.1 % (ref 40–53)
HGB BLD-MCNC: 13.2 G/DL (ref 13.3–17.7)
IMM GRANULOCYTES # BLD: 0 10E3/UL
IMM GRANULOCYTES NFR BLD: 0 %
LYMPHOCYTES # BLD AUTO: 0.9 10E3/UL (ref 0.8–5.3)
LYMPHOCYTES NFR BLD AUTO: 30 %
MCH RBC QN AUTO: 32.7 PG (ref 26.5–33)
MCHC RBC AUTO-ENTMCNC: 33.8 G/DL (ref 31.5–36.5)
MCV RBC AUTO: 97 FL (ref 78–100)
MONOCYTES # BLD AUTO: 0.4 10E3/UL (ref 0–1.3)
MONOCYTES NFR BLD AUTO: 13 %
NEUTROPHILS # BLD AUTO: 1.6 10E3/UL (ref 1.6–8.3)
NEUTROPHILS NFR BLD AUTO: 49 %
NRBC # BLD AUTO: 0 10E3/UL
NRBC BLD AUTO-RTO: 0 /100
PLATELET # BLD AUTO: 86 10E3/UL (ref 150–450)
POTASSIUM SERPL-SCNC: 4 MMOL/L (ref 3.4–5.3)
PROT SERPL-MCNC: 6.7 G/DL (ref 6.4–8.3)
RBC # BLD AUTO: 4.04 10E6/UL (ref 4.4–5.9)
SODIUM SERPL-SCNC: 140 MMOL/L (ref 136–145)
TSH SERPL DL<=0.005 MIU/L-ACNC: 0.74 UIU/ML (ref 0.3–4.2)
WBC # BLD AUTO: 3.2 10E3/UL (ref 4–11)

## 2023-07-12 PROCEDURE — 80050 GENERAL HEALTH PANEL: CPT | Performed by: INTERNAL MEDICINE

## 2023-07-12 PROCEDURE — 99215 OFFICE O/P EST HI 40 MIN: CPT | Performed by: INTERNAL MEDICINE

## 2023-07-12 PROCEDURE — 96523 IRRIG DRUG DELIVERY DEVICE: CPT | Performed by: INTERNAL MEDICINE

## 2023-07-12 RX ORDER — EPINEPHRINE 1 MG/ML
0.3 INJECTION, SOLUTION, CONCENTRATE INTRAVENOUS EVERY 5 MIN PRN
Status: CANCELLED | OUTPATIENT
Start: 2023-07-12

## 2023-07-12 RX ORDER — HEPARIN SODIUM (PORCINE) LOCK FLUSH IV SOLN 100 UNIT/ML 100 UNIT/ML
5 SOLUTION INTRAVENOUS
Status: CANCELLED | OUTPATIENT
Start: 2023-07-20

## 2023-07-12 RX ORDER — HEPARIN SODIUM (PORCINE) LOCK FLUSH IV SOLN 100 UNIT/ML 100 UNIT/ML
5 SOLUTION INTRAVENOUS
Status: CANCELLED | OUTPATIENT
Start: 2023-07-12

## 2023-07-12 RX ORDER — ALBUTEROL SULFATE 0.83 MG/ML
2.5 SOLUTION RESPIRATORY (INHALATION)
Status: CANCELLED | OUTPATIENT
Start: 2023-07-12

## 2023-07-12 RX ORDER — ALBUTEROL SULFATE 0.83 MG/ML
2.5 SOLUTION RESPIRATORY (INHALATION)
Status: CANCELLED | OUTPATIENT
Start: 2023-08-02

## 2023-07-12 RX ORDER — DIPHENHYDRAMINE HYDROCHLORIDE 50 MG/ML
50 INJECTION INTRAMUSCULAR; INTRAVENOUS
Status: CANCELLED
Start: 2023-07-20

## 2023-07-12 RX ORDER — METHYLPREDNISOLONE SODIUM SUCCINATE 125 MG/2ML
125 INJECTION, POWDER, LYOPHILIZED, FOR SOLUTION INTRAMUSCULAR; INTRAVENOUS
Status: CANCELLED
Start: 2023-07-20

## 2023-07-12 RX ORDER — LORAZEPAM 2 MG/ML
0.5 INJECTION INTRAMUSCULAR EVERY 4 HOURS PRN
Status: CANCELLED | OUTPATIENT
Start: 2023-07-20

## 2023-07-12 RX ORDER — ALBUTEROL SULFATE 90 UG/1
1-2 AEROSOL, METERED RESPIRATORY (INHALATION)
Status: CANCELLED
Start: 2023-08-02

## 2023-07-12 RX ORDER — EPINEPHRINE 1 MG/ML
0.3 INJECTION, SOLUTION, CONCENTRATE INTRAVENOUS EVERY 5 MIN PRN
Status: CANCELLED | OUTPATIENT
Start: 2023-07-20

## 2023-07-12 RX ORDER — MEPERIDINE HYDROCHLORIDE 25 MG/ML
25 INJECTION INTRAMUSCULAR; INTRAVENOUS; SUBCUTANEOUS EVERY 30 MIN PRN
Status: CANCELLED | OUTPATIENT
Start: 2023-07-20

## 2023-07-12 RX ORDER — MEPERIDINE HYDROCHLORIDE 25 MG/ML
25 INJECTION INTRAMUSCULAR; INTRAVENOUS; SUBCUTANEOUS EVERY 30 MIN PRN
Status: CANCELLED | OUTPATIENT
Start: 2023-07-12

## 2023-07-12 RX ORDER — DIPHENHYDRAMINE HCL 50 MG
50 CAPSULE ORAL ONCE
Status: CANCELLED
Start: 2023-07-12

## 2023-07-12 RX ORDER — LORAZEPAM 2 MG/ML
0.5 INJECTION INTRAMUSCULAR EVERY 4 HOURS PRN
Status: CANCELLED | OUTPATIENT
Start: 2023-08-02

## 2023-07-12 RX ORDER — DIPHENHYDRAMINE HYDROCHLORIDE 50 MG/ML
50 INJECTION INTRAMUSCULAR; INTRAVENOUS
Status: CANCELLED
Start: 2023-08-02

## 2023-07-12 RX ORDER — HEPARIN SODIUM (PORCINE) LOCK FLUSH IV SOLN 100 UNIT/ML 100 UNIT/ML
5 SOLUTION INTRAVENOUS
Status: DISCONTINUED | OUTPATIENT
Start: 2023-07-12 | End: 2023-07-12 | Stop reason: HOSPADM

## 2023-07-12 RX ORDER — LORAZEPAM 2 MG/ML
0.5 INJECTION INTRAMUSCULAR EVERY 4 HOURS PRN
Status: CANCELLED | OUTPATIENT
Start: 2023-07-12

## 2023-07-12 RX ORDER — HEPARIN SODIUM,PORCINE 10 UNIT/ML
5-20 VIAL (ML) INTRAVENOUS DAILY PRN
Status: CANCELLED | OUTPATIENT
Start: 2023-07-12

## 2023-07-12 RX ORDER — ALBUTEROL SULFATE 0.83 MG/ML
2.5 SOLUTION RESPIRATORY (INHALATION)
Status: CANCELLED | OUTPATIENT
Start: 2023-07-20

## 2023-07-12 RX ORDER — ALBUTEROL SULFATE 90 UG/1
1-2 AEROSOL, METERED RESPIRATORY (INHALATION)
Status: CANCELLED
Start: 2023-07-12

## 2023-07-12 RX ORDER — DIPHENHYDRAMINE HYDROCHLORIDE 50 MG/ML
50 INJECTION INTRAMUSCULAR; INTRAVENOUS
Status: CANCELLED
Start: 2023-07-12

## 2023-07-12 RX ORDER — HEPARIN SODIUM,PORCINE 10 UNIT/ML
5-20 VIAL (ML) INTRAVENOUS DAILY PRN
Status: CANCELLED | OUTPATIENT
Start: 2023-07-20

## 2023-07-12 RX ORDER — MEPERIDINE HYDROCHLORIDE 25 MG/ML
25 INJECTION INTRAMUSCULAR; INTRAVENOUS; SUBCUTANEOUS EVERY 30 MIN PRN
Status: CANCELLED | OUTPATIENT
Start: 2023-08-02

## 2023-07-12 RX ORDER — METHYLPREDNISOLONE SODIUM SUCCINATE 125 MG/2ML
125 INJECTION, POWDER, LYOPHILIZED, FOR SOLUTION INTRAMUSCULAR; INTRAVENOUS
Status: CANCELLED
Start: 2023-07-12

## 2023-07-12 RX ORDER — HEPARIN SODIUM (PORCINE) LOCK FLUSH IV SOLN 100 UNIT/ML 100 UNIT/ML
5 SOLUTION INTRAVENOUS
Status: CANCELLED | OUTPATIENT
Start: 2023-08-02

## 2023-07-12 RX ORDER — HEPARIN SODIUM,PORCINE 10 UNIT/ML
5-20 VIAL (ML) INTRAVENOUS DAILY PRN
Status: CANCELLED | OUTPATIENT
Start: 2023-08-02

## 2023-07-12 RX ORDER — METHYLPREDNISOLONE SODIUM SUCCINATE 125 MG/2ML
125 INJECTION, POWDER, LYOPHILIZED, FOR SOLUTION INTRAMUSCULAR; INTRAVENOUS
Status: CANCELLED
Start: 2023-08-02

## 2023-07-12 RX ORDER — EPINEPHRINE 1 MG/ML
0.3 INJECTION, SOLUTION, CONCENTRATE INTRAVENOUS EVERY 5 MIN PRN
Status: CANCELLED | OUTPATIENT
Start: 2023-08-02

## 2023-07-12 RX ORDER — ALBUTEROL SULFATE 90 UG/1
1-2 AEROSOL, METERED RESPIRATORY (INHALATION)
Status: CANCELLED
Start: 2023-07-20

## 2023-07-12 RX ORDER — DIPHENHYDRAMINE HCL 50 MG
50 CAPSULE ORAL ONCE
Status: CANCELLED
Start: 2023-07-20

## 2023-07-12 RX ORDER — DIPHENHYDRAMINE HCL 50 MG
50 CAPSULE ORAL ONCE
Status: CANCELLED
Start: 2023-08-02

## 2023-07-12 RX ADMIN — Medication 1000 ML: at 09:37

## 2023-07-12 RX ADMIN — HEPARIN SODIUM (PORCINE) LOCK FLUSH IV SOLN 100 UNIT/ML 5 ML: 100 SOLUTION at 10:49

## 2023-07-12 ASSESSMENT — PAIN SCALES - GENERAL: PAINLEVEL: MILD PAIN (3)

## 2023-07-12 NOTE — NURSING NOTE
"Oncology Rooming Note    July 12, 2023 8:23 AM   Kenneth Matthew is a 58 year old male who presents for:    Chief Complaint   Patient presents with     Oncology Clinic Visit     Follow up. Malignant neoplasm of lower third of esophagus      Initial Vitals: /68   Pulse 66   Temp 96.8  F (36  C) (Tympanic)   Ht 1.715 m (5' 7.5\")   Wt 60.8 kg (134 lb 0.6 oz)   SpO2 98%   BMI 20.68 kg/m   Estimated body mass index is 20.68 kg/m  as calculated from the following:    Height as of this encounter: 1.715 m (5' 7.5\").    Weight as of this encounter: 60.8 kg (134 lb 0.6 oz). Body surface area is 1.7 meters squared.  Mild Pain (3) Comment: Data Unavailable   No LMP for male patient.  Allergies reviewed: Yes  Medications reviewed: Yes    Medications: Medication refills not needed today.  Pharmacy name entered into EPIC: [facility] - [place], [place] - [address]    Clinical concerns: none       Syeda Muir LPN              "

## 2023-07-12 NOTE — PROGRESS NOTES
Clinic Care Coordination Contact  Community Health Worker Initial Outreach    CHW Initial Information Gathering:  Referral Source: PCP  Preferred Hospital: [facility]  [phone]  Preferred Urgent Care: [facility], [phone]  Current living arrangement:: I live in a private home with spouse  No PCP office visit in Past Year: No     The patient stated that he did not want to be on the phone long due to talking hurting his throat. The CHW started the conversation with his wife and she stated that the patient needed assistance at this time. The CHW was not able to ask many questions due to the patient answering with short answers at this time. He stated that he did not need help and that he was okay with talking to the SW. The family stated that he does need help with coordinating his care and making sure he is getting the best health care that he deserves.     Patient accepts CC: Yes. Patient scheduled for assessment with SW on 7/30/20 at 10:00 am. Patient noted desire to discuss coordination of care.     Next Outreach: 8/26/20   Infusion Nursing Note:  Miguel Abinh RAY Matthew presents today for IVF.    Patient seen by provider today: Yes: Dr. Barnhart.   present during visit today: Not Applicable.    Intravenous Access:  Implanted Port.    Treatment Conditions:  Not Applicable. Pt not being treated with chemo today, present for IVF only.       Post Infusion Assessment:  Patient tolerated infusion without incident.       Discharge Plan:   Copy of AVS reviewed with patient and/or family.        Radha Fernandez RN

## 2023-07-12 NOTE — PROGRESS NOTES
MEDICAL ONCOLOGY FOLLOW UP NOTE  Jul 12, 2023    Reason for Follow up: esophageal cancer    HISTORY OF PRESENT ILLNESS  Kenneth Matthew is a 58 year old male with PMH as stated below who is seen in the oncology clinic for follow up of esophageal cancer    His history in short is as follows    Mr. Matthew states he noticed difficulty swallowing initially starting June 2022. Over the last 1 year he has unintentionally lost 60 pounds.     10/21/2022: CT chest, abdomen and pelvis: Diffuse wall thickening of the distal esophagus with associated mediastinal adenopathy, and upper abdominal  Conglomerate lymphadenopathy. Constellation of findings are highly concerning for underlying esophageal carcinoma with metastatic disease.     11/1/2022: EGD: A large ulcerating mass with no bleeding and no stigmata of recent bleeding was found Lower third from the incisors. The mass was partially obstructing and partially circumferential involving  two thirds.     Biopsy showed poorly differentiated favoring adenocarcinoma. Her 2 negative.     12/14/2022: PET scan: Distal esophageal neoplasm with metastatic involvement the  mediastinum, left hilum and retroperitoneal lymph nodes of the upperabdomen. Also likely localized metastatic involvement of thestomach/GE junction. 2 cm intramural distal esophageal/GE junction mass. Circumferential wall thickening with increased FDG uptake in the distal esophagus adjacent portions of the stomach/GE junction may represent localized disease however could also represent localized inflammation. Metastatic involvement to the subcarinal nodes, left infrahilar and  pericaval/paraesophageal lymph nodes of the lower thorax. Metastatic involvement to the retroperitoneal lymph nodes of the upper abdomen. These lymph nodes have increased in size dating back to be  10/21/2022 CT scan consistent with worsening disease. Representative  nodes are described above.    1/11/2023 to 6/28/2023: 13 cycles of FOLFOX with  Moisés    7/5/2023: PET scan: Interval worsening of disease.   Increased size and FDG uptake suggests malignant involvement involving of a right paraesophageal lymph node in the upper thorax, diffuse involvement of the mediastinal and hilar nodes as well as worsening involvement of lymph nodes along the lesser curvature the stomach and in the retroperitoneum. Focal area of uptake in the right inferolateral aspect of the prostate gland may be inflammatory in nature.   Stable areas of uptake seen within the distal portions of the  Esophagus. An area of increased uptake seen previously within the gastric mucosa appears smaller.    Interim history    Doing well. Overall he is doing well. He states back pain has improved though he continues to have intermittent pain.  Denies any bleeding or blood in stools.  He has lost some weight and he states it is because he sometimes forgets to get his feeding done on time when he is busy with other projects.  He is mostly using his PEG tube though he does sometimes eat orally and it is normally drinks like Ensure.      REVIEW OF SYSTEMS  A 12-point ROS negative except as in HPI      Current Outpatient Medications   Medication Sig Dispense Refill     eszopiclone (LUNESTA) 1 MG tablet Take 2 tablets (2 mg) by mouth At Bedtime 30 tablet 0     HYDROcodone-Acetaminophen 5-217 MG/10ML SOLN Take 10 mLs by mouth every 8 hours 473 mL 0     hydrocortisone (CORTAID) 1 % external cream Apply topically 2 times daily (Patient not taking: Reported on 5/30/2023) 30 g 1     nicotine (NICODERM CQ) 14 MG/24HR 24 hr patch        nicotine (NICODERM CQ) 7 MG/24HR 24 hr patch Place 1 patch onto the skin every 24 hours (Patient not taking: Reported on 5/30/2023) 14 patch 1     nicotine (NICORETTE) 2 MG gum  (Patient not taking: Reported on 4/5/2023)       omeprazole (PRILOSEC) 20 MG DR capsule Take 1 capsule (20 mg) by mouth daily 90 capsule 0     ondansetron (ZOFRAN) 8 MG tablet Take 1 tablet (8 mg) by  mouth every 8 hours as needed for nausea (vomiting) 30 tablet 2     polyethylene glycol (MIRALAX) 17 GM/Dose powder Take 9-17 g by mouth daily (Patient not taking: Reported on 5/30/2023) 510 g 1     prochlorperazine (COMPAZINE) 10 MG tablet Take 1 tablet (10 mg) by mouth every 6 hours as needed (Nausea/Vomiting) 30 tablet 2       No Known Allergies  Immunization History   Administered Date(s) Administered     COVID-19 MONOVALENT 12+ (Pfizer) 08/28/2021, 09/18/2021     TDAP (Adacel,Boostrix) 12/10/2021       Past Medical History:   Diagnosis Date     Cancer (H)      Esophageal cancer (H) 11/01/2022     Esophageal thickening 10/25/2022     Mixed hearing loss, bilateral      Tobacco abuse 1/4/2023     Tobacco use disorder 12/27/2019       Past Surgical History:   Procedure Laterality Date     COLONOSCOPY  11/01/2022    [facility]     ESOPHAGOSCOPY N/A 4/25/2023    Procedure: Upper Endoscopy with biopsy- with Gastrostomy Tube Exchange;  Surgeon: Nba Dalton MD;  Location: [facility] OR     INSERT PORT VASCULAR ACCESS N/A 12/22/2022    Procedure: POWER PORT PLACEMENT;  Surgeon: Nba Dalton MD;  Location: [facility] OR     Upper GI Endoscopy  11/01/2022       SOCIAL HISTORY  History   Smoking Status     Former     Packs/day: 0.50     Years: 46.00     Types: Cigarettes     Start date: 1976     Quit date: 6/12/2023   Smokeless Tobacco     Never    Social History    Substance and Sexual Activity      Alcohol use: Not Currently        Comment: none since July of 2022     History   Drug Use Unknown       FAMILY HISTORY  Family History   Problem Relation Age of Onset     Breast Cancer Mother      Hyperlipidemia Mother      Diabetes Mother      Coronary Artery Disease Mother      Cerebrovascular Disease Father      Hyperlipidemia Father      Coronary Artery Disease Father      Myocardial Infarction Father      Hyperlipidemia Brother      Hypertension Brother      Diabetes Brother      Asthma Sister      Breast Cancer Sister       Hyperlipidemia Sister      Diabetes Sister      Hyperlipidemia Sister      Diabetes Sister      Hyperlipidemia Sister      Suicide Sister      Anesthesia Reaction Sister      Breast Cancer Sister      Hyperlipidemia Sister      Diabetes Sister      Breast Cancer Niece      Skin Cancer Niece      Thyroid Disease Niece      Breast Cancer Niece      Leukemia Nephew      Colon Cancer No family hx of      Prostate Cancer No family hx of      Genetic Disorder No family hx of        PHYSICAL EXAMINATION  There were no vitals taken for this visit.  Wt Readings from Last 2 Encounters:   06/28/23 61.9 kg (136 lb 8 oz)   06/16/23 63.8 kg (140 lb 10.5 oz)     Physical Exam  Constitutional:       Appearance: Normal appearance.   Eyes:      Conjunctiva/sclera: Conjunctivae normal.   Pulmonary:      Effort: Pulmonary effort is normal.   Abdominal:      General: Abdomen is flat.      Palpations: Abdomen is soft.   Neurological:      General: No focal deficit present.      Mental Status: He is alert and oriented to person, place, and time. Mental status is at baseline.     Laboratory and Imaging:     Latest Reference Range & Units 07/12/23 08:16   WBC 4.0 - 11.0 10e3/uL 3.2 (L)   Hemoglobin 13.3 - 17.7 g/dL 13.2 (L)   Hematocrit 40.0 - 53.0 % 39.1 (L)   Platelet Count 150 - 450 10e3/uL 86 (L)   (L): Data is abnormally low    RESULT FOR IMMUNOHISTOCHEMICAL VENTANA CLONE  PD-L1 ASSAY  COMBINED POSITIVE SCORE (CPS): <1%    ASSESSMENT AND PLAN    1. 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     Currently on first line treatment with FOLFOX with Nivo.  Has received 13 cycles till date.  However staging scan on 7/5/2023 does show progressive disease.    Discussed moving on to second line treatment with Taxol and ramucirumab.    Expected side effects which include but not limited to nasuea, vomiting, diarrhea, low blood counts, infection, kidney or liver toxicity, allergic reactions, neuropathy.  He does have some  neuropathy from his oxaliplatin treatment.  We will start with full dose of Taxol at 80 mg per metered squared now and consider decreasing doses when his neuropathy gets worse.  Expected side effects of ramucirumab discussed which include but are not limited to bleeding, high blood pressure, proteinuria, MI or stroke, perforation.  He is agreeable to proceed.  Orders placed    He is currently getting his nutrition through his PEG.  We had previously discussed palliative radiation as he wanted to get his PEG tube out however in light of progressive disease I think at this point we should continue with systemic therapy as he will have to come off systemic therapy when he is getting radiation.    We will also send testing for foundation one on tissue to see if he is a candidate for any targeted therapy.    PET scan also showed a focal area of uptake in the right inferolateral aspect of the prostate gland.  No urinary symptoms currently.  At this point given that his life limiting diagnosis is stage IV esophageal cancer I do not think a work-up for prostate cancer is warranted.  No concern for infection currently    Follow up in 2 weeks prior to next cycle.     Total time spent on the patient on day of encounter was 40 minutes doing chart review, review of test results, interpretation of results, patient visit and documentation.      Nika Barnhart MD

## 2023-07-12 NOTE — Clinical Note
Hi I put in new orders for Taxol plus Chevy for Mr. Matthew.  I also wanted to order a HER2 IHC.  This is just to see if what his IHC expression is to see if he is a candidate for Enhertu in the future.  I did see the HER2 FISH but I did not see the IHC.  Also I do not think we have sent to [facility] on tissue on him and if so can we please send it. 2 vessel cord/Other

## 2023-07-19 ENCOUNTER — INFUSION THERAPY VISIT (OUTPATIENT)
Dept: INFUSION THERAPY | Facility: OTHER | Age: 58
End: 2023-07-19
Attending: STUDENT IN AN ORGANIZED HEALTH CARE EDUCATION/TRAINING PROGRAM
Payer: COMMERCIAL

## 2023-07-19 VITALS
TEMPERATURE: 97.2 F | HEIGHT: 67 IN | RESPIRATION RATE: 17 BRPM | HEART RATE: 56 BPM | SYSTOLIC BLOOD PRESSURE: 113 MMHG | WEIGHT: 138.1 LBS | DIASTOLIC BLOOD PRESSURE: 68 MMHG | OXYGEN SATURATION: 95 % | BODY MASS INDEX: 21.67 KG/M2

## 2023-07-19 DIAGNOSIS — C15.5 MALIGNANT NEOPLASM OF LOWER THIRD OF ESOPHAGUS (H): Primary | ICD-10-CM

## 2023-07-19 LAB
ALBUMIN SERPL BCG-MCNC: 3.6 G/DL (ref 3.5–5.2)
ALBUMIN UR-MCNC: 10 MG/DL
ALP SERPL-CCNC: 76 U/L (ref 40–129)
ALT SERPL W P-5'-P-CCNC: 12 U/L (ref 0–70)
AST SERPL W P-5'-P-CCNC: 19 U/L (ref 0–45)
BASOPHILS # BLD AUTO: 0.1 10E3/UL (ref 0–0.2)
BASOPHILS NFR BLD AUTO: 2 %
BILIRUB DIRECT SERPL-MCNC: <0.2 MG/DL (ref 0–0.3)
BILIRUB SERPL-MCNC: 0.4 MG/DL
EOSINOPHIL # BLD AUTO: 0.3 10E3/UL (ref 0–0.7)
EOSINOPHIL NFR BLD AUTO: 5 %
ERYTHROCYTE [DISTWIDTH] IN BLOOD BY AUTOMATED COUNT: 16.6 % (ref 10–15)
HCT VFR BLD AUTO: 40.4 % (ref 40–53)
HGB BLD-MCNC: 13.4 G/DL (ref 13.3–17.7)
IMM GRANULOCYTES # BLD: 0 10E3/UL
IMM GRANULOCYTES NFR BLD: 1 %
LYMPHOCYTES # BLD AUTO: 1.7 10E3/UL (ref 0.8–5.3)
LYMPHOCYTES NFR BLD AUTO: 30 %
MCH RBC QN AUTO: 32.4 PG (ref 26.5–33)
MCHC RBC AUTO-ENTMCNC: 33.2 G/DL (ref 31.5–36.5)
MCV RBC AUTO: 98 FL (ref 78–100)
MONOCYTES # BLD AUTO: 0.9 10E3/UL (ref 0–1.3)
MONOCYTES NFR BLD AUTO: 17 %
NEUTROPHILS # BLD AUTO: 2.6 10E3/UL (ref 1.6–8.3)
NEUTROPHILS NFR BLD AUTO: 45 %
NRBC # BLD AUTO: 0 10E3/UL
NRBC BLD AUTO-RTO: 0 /100
PLATELET # BLD AUTO: 150 10E3/UL (ref 150–450)
PROT SERPL-MCNC: 7 G/DL (ref 6.4–8.3)
RBC # BLD AUTO: 4.13 10E6/UL (ref 4.4–5.9)
WBC # BLD AUTO: 5.6 10E3/UL (ref 4–11)

## 2023-07-19 PROCEDURE — 85025 COMPLETE CBC W/AUTO DIFF WBC: CPT | Performed by: INTERNAL MEDICINE

## 2023-07-19 PROCEDURE — 80076 HEPATIC FUNCTION PANEL: CPT | Performed by: INTERNAL MEDICINE

## 2023-07-19 PROCEDURE — 36591 DRAW BLOOD OFF VENOUS DEVICE: CPT | Performed by: INTERNAL MEDICINE

## 2023-07-19 PROCEDURE — 96413 CHEMO IV INFUSION 1 HR: CPT | Performed by: INTERNAL MEDICINE

## 2023-07-19 PROCEDURE — 81003 URINALYSIS AUTO W/O SCOPE: CPT | Performed by: INTERNAL MEDICINE

## 2023-07-19 PROCEDURE — 96375 TX/PRO/DX INJ NEW DRUG ADDON: CPT | Performed by: INTERNAL MEDICINE

## 2023-07-19 PROCEDURE — 96417 CHEMO IV INFUS EACH ADDL SEQ: CPT | Performed by: INTERNAL MEDICINE

## 2023-07-19 PROCEDURE — 96367 TX/PROPH/DG ADDL SEQ IV INF: CPT | Performed by: INTERNAL MEDICINE

## 2023-07-19 PROCEDURE — S0028 INJECTION, FAMOTIDINE, 20 MG: HCPCS | Mod: JZ | Performed by: INTERNAL MEDICINE

## 2023-07-19 RX ORDER — HEPARIN SODIUM (PORCINE) LOCK FLUSH IV SOLN 100 UNIT/ML 100 UNIT/ML
5 SOLUTION INTRAVENOUS
Status: DISCONTINUED | OUTPATIENT
Start: 2023-07-19 | End: 2023-07-19 | Stop reason: HOSPADM

## 2023-07-19 RX ORDER — PROCHLORPERAZINE MALEATE 10 MG
10 TABLET ORAL EVERY 6 HOURS PRN
Qty: 30 TABLET | Refills: 2 | Status: SHIPPED | OUTPATIENT
Start: 2023-07-19 | End: 2023-01-01

## 2023-07-19 RX ADMIN — HEPARIN SODIUM (PORCINE) LOCK FLUSH IV SOLN 100 UNIT/ML 5 ML: 100 SOLUTION at 16:40

## 2023-07-19 RX ADMIN — Medication 250 ML: at 13:36

## 2023-07-19 NOTE — PROGRESS NOTES
Infusion Nursing Note:  Kenneth BELL Vipul presents today for Cyramza and Taxel.    Patient seen by provider today: No   present during visit today: Not Applicable.    Note: N/A.    Intravenous Access:Implanted Port.    Treatment Conditions:  Lab Results   Component Value Date    HGB 13.4 07/19/2023    WBC 5.6 07/19/2023    ANEUTAUTO 2.6 07/19/2023     07/19/2023      Lab Results   Component Value Date     07/12/2023    POTASSIUM 4.0 07/12/2023    MAG 2.2 02/08/2023    CR 0.54 (L) 07/12/2023    NICHOLAS 9.1 07/12/2023    BILITOTAL 0.4 07/19/2023    ALBUMIN 3.6 07/19/2023    ALT 12 07/19/2023    AST 19 07/19/2023     Results reviewed, labs MET treatment parameters, ok to proceed with treatment.  Confirmed patient received the Fact Sheet for Patients, Parents and Caregivers prior to receiving medication.     Post Infusion Assessment:  Patient tolerated infusion without incident.   *If a hypersensitivity, medication error, or an adverse event occurred, pharmacy was notified in addition to the provider for FDA Reporting.        Discharge Plan:   Discharge instructions reviewed with: Patient.  Patient and/or family verbalized understanding of discharge instructions and all questions answered.  Copy of AVS reviewed with patient and/or family.  Patient will return 3 weeks for next appointment.  Patient discharged in stable condition accompanied by: self and brother.  Departure Mode: Ambulatory.    Matilde Matthews RN

## 2023-07-21 DIAGNOSIS — G47.00 INSOMNIA, UNSPECIFIED TYPE: ICD-10-CM

## 2023-07-21 RX ORDER — ESZOPICLONE 1 MG/1
2 TABLET, FILM COATED ORAL AT BEDTIME
Qty: 30 TABLET | Refills: 0 | Status: SHIPPED | OUTPATIENT
Start: 2023-07-21 | End: 2023-01-01

## 2023-07-26 ENCOUNTER — INFUSION THERAPY VISIT (OUTPATIENT)
Dept: INFUSION THERAPY | Facility: OTHER | Age: 58
End: 2023-07-26
Attending: NURSE PRACTITIONER
Payer: COMMERCIAL

## 2023-07-26 VITALS
HEART RATE: 57 BPM | OXYGEN SATURATION: 97 % | WEIGHT: 139.1 LBS | BODY MASS INDEX: 21.58 KG/M2 | DIASTOLIC BLOOD PRESSURE: 63 MMHG | TEMPERATURE: 97 F | RESPIRATION RATE: 17 BRPM | SYSTOLIC BLOOD PRESSURE: 99 MMHG

## 2023-07-26 DIAGNOSIS — C15.5 MALIGNANT NEOPLASM OF LOWER THIRD OF ESOPHAGUS (H): Primary | ICD-10-CM

## 2023-07-26 LAB
BASOPHILS # BLD AUTO: 0.1 10E3/UL (ref 0–0.2)
BASOPHILS NFR BLD AUTO: 1 %
EOSINOPHIL # BLD AUTO: 0.2 10E3/UL (ref 0–0.7)
EOSINOPHIL NFR BLD AUTO: 3 %
ERYTHROCYTE [DISTWIDTH] IN BLOOD BY AUTOMATED COUNT: 15.9 % (ref 10–15)
HCT VFR BLD AUTO: 38 % (ref 40–53)
HGB BLD-MCNC: 12.9 G/DL (ref 13.3–17.7)
IMM GRANULOCYTES # BLD: 0 10E3/UL
IMM GRANULOCYTES NFR BLD: 1 %
LYMPHOCYTES # BLD AUTO: 1.8 10E3/UL (ref 0.8–5.3)
LYMPHOCYTES NFR BLD AUTO: 28 %
MCH RBC QN AUTO: 33 PG (ref 26.5–33)
MCHC RBC AUTO-ENTMCNC: 33.9 G/DL (ref 31.5–36.5)
MCV RBC AUTO: 97 FL (ref 78–100)
MONOCYTES # BLD AUTO: 0.5 10E3/UL (ref 0–1.3)
MONOCYTES NFR BLD AUTO: 9 %
NEUTROPHILS # BLD AUTO: 3.7 10E3/UL (ref 1.6–8.3)
NEUTROPHILS NFR BLD AUTO: 58 %
NRBC # BLD AUTO: 0 10E3/UL
NRBC BLD AUTO-RTO: 0 /100
PLATELET # BLD AUTO: 135 10E3/UL (ref 150–450)
RBC # BLD AUTO: 3.91 10E6/UL (ref 4.4–5.9)
WBC # BLD AUTO: 6.3 10E3/UL (ref 4–11)

## 2023-07-26 PROCEDURE — 96367 TX/PROPH/DG ADDL SEQ IV INF: CPT | Performed by: INTERNAL MEDICINE

## 2023-07-26 PROCEDURE — 96413 CHEMO IV INFUSION 1 HR: CPT | Performed by: INTERNAL MEDICINE

## 2023-07-26 PROCEDURE — 96375 TX/PRO/DX INJ NEW DRUG ADDON: CPT | Performed by: INTERNAL MEDICINE

## 2023-07-26 PROCEDURE — S0028 INJECTION, FAMOTIDINE, 20 MG: HCPCS | Mod: JZ | Performed by: INTERNAL MEDICINE

## 2023-07-26 PROCEDURE — 36591 DRAW BLOOD OFF VENOUS DEVICE: CPT | Performed by: INTERNAL MEDICINE

## 2023-07-26 PROCEDURE — 85025 COMPLETE CBC W/AUTO DIFF WBC: CPT | Performed by: INTERNAL MEDICINE

## 2023-07-26 RX ORDER — HEPARIN SODIUM (PORCINE) LOCK FLUSH IV SOLN 100 UNIT/ML 100 UNIT/ML
5 SOLUTION INTRAVENOUS
Status: DISCONTINUED | OUTPATIENT
Start: 2023-07-26 | End: 2023-07-26 | Stop reason: HOSPADM

## 2023-07-26 RX ORDER — DIPHENHYDRAMINE HCL 50 MG
50 CAPSULE ORAL ONCE
Status: COMPLETED | OUTPATIENT
Start: 2023-07-26 | End: 2023-07-26

## 2023-07-26 RX ORDER — HEPARIN SODIUM (PORCINE) LOCK FLUSH IV SOLN 100 UNIT/ML 100 UNIT/ML
5 SOLUTION INTRAVENOUS
Status: CANCELLED | OUTPATIENT
Start: 2023-07-26

## 2023-07-26 RX ADMIN — HEPARIN SODIUM (PORCINE) LOCK FLUSH IV SOLN 100 UNIT/ML 5 ML: 100 SOLUTION at 12:32

## 2023-07-26 RX ADMIN — Medication 250 ML: at 10:46

## 2023-07-26 RX ADMIN — Medication 50 MG: at 10:47

## 2023-07-26 ASSESSMENT — PAIN SCALES - GENERAL: PAINLEVEL: NO PAIN (0)

## 2023-07-26 NOTE — PROGRESS NOTES
Infusion Nursing Note:  Kenneth Matthew presents today for Palitaxol.    Patient seen by provider today: No   present during visit today: Not Applicable.    Note: Patient reports no changes in condition since last weeks infusion.  Has nothing to report as different.      Intravenous Access:Implanted Port.    Treatment Conditions:  Lab Results   Component Value Date    HGB 12.9 (L) 07/26/2023    WBC 6.3 07/26/2023    ANEUTAUTO 3.7 07/26/2023     (L) 07/26/2023        Lab Results   Component Value Date     07/12/2023    POTASSIUM 4.0 07/12/2023    MAG 2.2 02/08/2023    CR 0.54 (L) 07/12/2023    NICHOLAS 9.1 07/12/2023    BILITOTAL 0.4 07/19/2023    ALBUMIN 3.6 07/19/2023    ALT 12 07/19/2023    AST 19 07/19/2023       Results reviewed, labs MET treatment parameters, ok to proceed with treatment.  Confirmed patient received the Fact Sheet for Patients, Parents and Caregivers prior to receiving medication.     Post Infusion Assessment:  Patient tolerated infusion without incident.  Blood return noted pre and post infusion.  Site patent and intact, free from redness, edema or discomfort.  No evidence of extravasations.  Access discontinued per protocol.   *If a hypersensitivity, medication error, or an adverse event occurred, pharmacy was notified in addition to the provider for FDA Reporting.        Discharge Plan:   Discharge instructions reviewed with: Patient and Family.  Patient and/or family verbalized understanding of discharge instructions and all questions answered.  Copy of AVS reviewed with patient and/or family.  Patient will return next weekl for next appointment.  Patient discharged in stable condition accompanied by: brother.  Departure Mode: Ambulatory.    Matilde Matthews RN

## 2023-07-26 NOTE — PROGRESS NOTES
Post Infusion Assessment:  Patient tolerated infusion without incident.  Site patent and intact, free from redness, edema or discomfort.  No evidence of extravasations.  Access discontinued per protocol.       Discharge Plan:   Patient discharged in stable condition accompanied by: brother. Patient asking for names of drugs re family request for further education. Primary RN Matilde had prepared and gave info to patient prior to discharge.   Departure Mode: Ambulatory.

## 2023-07-27 ENCOUNTER — MEDICAL CORRESPONDENCE (OUTPATIENT)
Dept: HEALTH INFORMATION MANAGEMENT | Facility: CLINIC | Age: 58
End: 2023-07-27

## 2023-07-27 ENCOUNTER — DOCUMENTATION ONLY (OUTPATIENT)
Dept: ONCOLOGY | Facility: OTHER | Age: 58
End: 2023-07-27

## 2023-07-27 NOTE — PROGRESS NOTES
[facility] Chemotherapy Consult    The inpatient pharmacist has been consulted to review the patient's chart for  the following: any significant drug interactions between home medications, new take home medications, pre-medications, PRN medications, chemotherapy agents, and chemotherapy regimen.     Current Outpatient Medications   Medication Sig    eszopiclone (LUNESTA) 1 MG tablet Take 2 tablets (2 mg) by mouth At Bedtime    HYDROcodone-Acetaminophen 5-217 MG/10ML SOLN Take 10 mLs by mouth every 8 hours    hydrocortisone (CORTAID) 1 % external cream Apply topically 2 times daily (Patient not taking: Reported on 5/30/2023)    nicotine (NICODERM CQ) 14 MG/24HR 24 hr patch     nicotine (NICODERM CQ) 7 MG/24HR 24 hr patch Place 1 patch onto the skin every 24 hours (Patient not taking: Reported on 5/30/2023)    nicotine (NICORETTE) 2 MG gum  (Patient not taking: Reported on 4/5/2023)    omeprazole (PRILOSEC) 20 MG DR capsule Take 1 capsule (20 mg) by mouth daily    polyethylene glycol (MIRALAX) 17 GM/Dose powder Take 9-17 g by mouth daily (Patient not taking: Reported on 5/30/2023)    prochlorperazine (COMPAZINE) 10 MG tablet Take 1 tablet (10 mg) by mouth every 6 hours as needed for nausea or vomiting     No current facility-administered medications for this visit.     Take home medications:     Pre-Treats:       PRN medications:       Chemotherapy agents:       Cancer Being Treated: Esophageal Cancer    Difference in Regimen Ordered vs. Standard Regimen: None       The following interactions were found and will require patient education:     Drug-Drug interactions:   Concurrent use of acetaminophen-hydrocodone with the following drugs may result in increased risk of respiratory and CNS depression: lorazepam (Ativan),  eszopiclone (Lunesta), prochlorperazine (Compazine), or diphenhydramine (Benadryl).   Concurrent use of acetaminophen-hydrocodone with ondansetron (Zofran) may  result in increased risk of serotonin syndrome.  Concurrent use of ondansetron (Zofran) with famotidine (Pepcid) or prochlorperazine (Compazine) may result in an increased risk of QT interval prolongation. No EKG on file for baseline QTc; consider obtaining    Drug-Food interactions:   Concurrent use of grapefruit juice with hydrocodone-acetaminophen may result in increased exposure of hydrocodone-acetaminophen.  Concurrent use of grapefruit juice with eszopiclone (Lunesta) may result in increased plasma concentrations of eszopiclone (Lunesta).    Drug-Ethanol interactions:   Concurrent use of hydrocodone-acetaminophen and alcohol may result in increased risk of hepatoxicity and increased hydrocodone exposure, increasing risk of fatal overdose.   Concurrent use of alcohol with diphenhydramine (Benadryl), eszopiclone (Lunesta), or lorazepam (Ativan) may result in increased sedation.   Concurrent use of prochlorperazine (Compazine) and alcohol may result in increased CNS depression and increased risk of extrapyramidal reactions.     Drug-Tobacco interactions:   Concurrent use of acetaminophen-hydrocodone and tobacco may result in decreased acetaminophen-hydrocodone exposure.   Concurrent use of ondansetron (Zofran) and tobacco may result in decreased ondansetron (Zofran) exposure.     Other:   Add Narcan to outpatient regimen as patient takes scheduled hydrocodone/acetaminophen.  Patient has PEG tube; if medications administered via PEG, change omeprazole to suspension.    If there are any additional questions or concerns, please contact the inpatient pharmacy ([phone]) for further review.     Nelda Edgar  July 27, 2023

## 2023-08-01 NOTE — CONFIDENTIAL NOTE
Message from pharmacy asking to update date for tomorrows cycle 1 day 15 treatment. On review of plan, it appears patient is due today with no concerns re deferal or cancellation. Date updated.

## 2023-08-02 ENCOUNTER — INFUSION THERAPY VISIT (OUTPATIENT)
Dept: INFUSION THERAPY | Facility: OTHER | Age: 58
End: 2023-08-02
Attending: NURSE PRACTITIONER
Payer: COMMERCIAL

## 2023-08-02 VITALS
RESPIRATION RATE: 16 BRPM | TEMPERATURE: 97.4 F | OXYGEN SATURATION: 96 % | SYSTOLIC BLOOD PRESSURE: 105 MMHG | HEART RATE: 62 BPM | WEIGHT: 139.99 LBS | DIASTOLIC BLOOD PRESSURE: 60 MMHG | BODY MASS INDEX: 21.72 KG/M2

## 2023-08-02 DIAGNOSIS — C15.5 MALIGNANT NEOPLASM OF LOWER THIRD OF ESOPHAGUS (H): Primary | ICD-10-CM

## 2023-08-02 DIAGNOSIS — G89.3 CANCER RELATED PAIN: Primary | ICD-10-CM

## 2023-08-02 LAB
ALBUMIN UR-MCNC: 20 MG/DL
BASOPHILS # BLD AUTO: 0 10E3/UL (ref 0–0.2)
BASOPHILS NFR BLD AUTO: 1 %
EOSINOPHIL # BLD AUTO: 0.1 10E3/UL (ref 0–0.7)
EOSINOPHIL NFR BLD AUTO: 3 %
ERYTHROCYTE [DISTWIDTH] IN BLOOD BY AUTOMATED COUNT: 15.9 % (ref 10–15)
HCT VFR BLD AUTO: 37.7 % (ref 40–53)
HGB BLD-MCNC: 12.9 G/DL (ref 13.3–17.7)
IMM GRANULOCYTES # BLD: 0 10E3/UL
IMM GRANULOCYTES NFR BLD: 0 %
LYMPHOCYTES # BLD AUTO: 1.3 10E3/UL (ref 0.8–5.3)
LYMPHOCYTES NFR BLD AUTO: 34 %
MCH RBC QN AUTO: 33.2 PG (ref 26.5–33)
MCHC RBC AUTO-ENTMCNC: 34.2 G/DL (ref 31.5–36.5)
MCV RBC AUTO: 97 FL (ref 78–100)
MONOCYTES # BLD AUTO: 0.4 10E3/UL (ref 0–1.3)
MONOCYTES NFR BLD AUTO: 9 %
NEUTROPHILS # BLD AUTO: 2.1 10E3/UL (ref 1.6–8.3)
NEUTROPHILS NFR BLD AUTO: 53 %
NRBC # BLD AUTO: 0 10E3/UL
NRBC BLD AUTO-RTO: 0 /100
PLATELET # BLD AUTO: 120 10E3/UL (ref 150–450)
RBC # BLD AUTO: 3.88 10E6/UL (ref 4.4–5.9)
WBC # BLD AUTO: 3.9 10E3/UL (ref 4–11)

## 2023-08-02 PROCEDURE — S0028 INJECTION, FAMOTIDINE, 20 MG: HCPCS | Mod: JZ | Performed by: INTERNAL MEDICINE

## 2023-08-02 PROCEDURE — 81003 URINALYSIS AUTO W/O SCOPE: CPT | Performed by: INTERNAL MEDICINE

## 2023-08-02 PROCEDURE — 96523 IRRIG DRUG DELIVERY DEVICE: CPT | Performed by: INTERNAL MEDICINE

## 2023-08-02 PROCEDURE — 96413 CHEMO IV INFUSION 1 HR: CPT | Performed by: INTERNAL MEDICINE

## 2023-08-02 PROCEDURE — 96417 CHEMO IV INFUS EACH ADDL SEQ: CPT | Performed by: INTERNAL MEDICINE

## 2023-08-02 PROCEDURE — 96375 TX/PRO/DX INJ NEW DRUG ADDON: CPT | Performed by: INTERNAL MEDICINE

## 2023-08-02 PROCEDURE — 96367 TX/PROPH/DG ADDL SEQ IV INF: CPT | Performed by: INTERNAL MEDICINE

## 2023-08-02 PROCEDURE — 85025 COMPLETE CBC W/AUTO DIFF WBC: CPT | Performed by: INTERNAL MEDICINE

## 2023-08-02 PROCEDURE — 36591 DRAW BLOOD OFF VENOUS DEVICE: CPT | Performed by: INTERNAL MEDICINE

## 2023-08-02 RX ORDER — DIPHENHYDRAMINE HCL 50 MG
50 CAPSULE ORAL ONCE
Status: COMPLETED | OUTPATIENT
Start: 2023-08-02 | End: 2023-08-02

## 2023-08-02 RX ORDER — HEPARIN SODIUM (PORCINE) LOCK FLUSH IV SOLN 100 UNIT/ML 100 UNIT/ML
5 SOLUTION INTRAVENOUS
Status: DISCONTINUED | OUTPATIENT
Start: 2023-08-02 | End: 2023-08-02 | Stop reason: HOSPADM

## 2023-08-02 RX ORDER — HYDROCODONE BITARTRATE AND ACETAMINOPHEN 7.5; 325 MG/15ML; MG/15ML
10 SOLUTION ORAL 4 TIMES DAILY PRN
Qty: 473 ML | Refills: 0 | Status: SHIPPED | OUTPATIENT
Start: 2023-08-02 | End: 2023-01-01

## 2023-08-02 RX ADMIN — HEPARIN SODIUM (PORCINE) LOCK FLUSH IV SOLN 100 UNIT/ML 5 ML: 100 SOLUTION at 12:58

## 2023-08-02 RX ADMIN — Medication 250 ML: at 10:21

## 2023-08-02 RX ADMIN — Medication 50 MG: at 10:02

## 2023-08-02 NOTE — PATIENT INSTRUCTIONS

## 2023-08-02 NOTE — PROGRESS NOTES
"Infusion Nursing Note:  Kenneth Matthew presents today for taxol/cyramza.    Patient seen by provider today: No   present during visit today: Not Applicable.    Note: on arrival patient verbalizes increased neuropathy in his bilateral hands up to mid upper forearm, increased neuropathy bilateral feet up to slightly below the knee. Patient states \"took 20 minutes to button shirt today\"  Increase mid back pain (lymph node pain) rates 7/10 today  States unable to sleep for more than 3 hours at a time. Verbalized that if he takes his sleeping pill and pain medication he is unable to sleep.  Above reported to Dr Barnhart.  VORB Dr Barnhart verbalizes that she will be dose reducing taxol and sending in a new script for increased dose of pain med. Instruct patient to call if symptoms persist/increase. Patient verbalizes understanding.  Patient declining pain medication today here in infusion.    Intravenous Access:  Implanted Port.    Treatment Conditions:  Results reviewed, labs MET treatment parameters, ok to proceed with treatment.    Component      Latest Ref Rng 8/2/2023  9:24 AM   WBC      4.0 - 11.0 10e3/uL 3.9 (L)    RBC Count      4.40 - 5.90 10e6/uL 3.88 (L)    Hemoglobin      13.3 - 17.7 g/dL 12.9 (L)    Hematocrit      40.0 - 53.0 % 37.7 (L)    MCV      78 - 100 fL 97    MCH      26.5 - 33.0 pg 33.2 (H)    MCHC      31.5 - 36.5 g/dL 34.2    RDW      10.0 - 15.0 % 15.9 (H)    Platelet Count      150 - 450 10e3/uL 120 (L)    % Neutrophils      % 53    % Lymphocytes      % 34    % Monocytes      % 9    % Eosinophils      % 3    % Basophils      % 1    % Immature Granulocytes      % 0    NRBCs per 100 WBC      <1 /100 0    Absolute Neutrophils      1.6 - 8.3 10e3/uL 2.1    Absolute Lymphocytes      0.8 - 5.3 10e3/uL 1.3    Absolute Monocytes      0.0 - 1.3 10e3/uL 0.4    Absolute Eosinophils      0.0 - 0.7 10e3/uL 0.1    Absolute Basophils      0.0 - 0.2 10e3/uL 0.0    Absolute Immature Granulocytes      <=0.4 " 10e3/uL 0.0    Absolute NRBCs      10e3/uL 0.0    Protein Albumin Urine      Negative mg/dL 20 !       Legend:  (L) Low  (H) High  ! Abnormal    Post Infusion Assessment:  Patient tolerated infusion without incident.  Blood return noted pre and post infusion.  No evidence of extravasations.  Access discontinued per protocol.       Discharge Plan:   Patient discharged in stable condition accompanied by: brother.  Departure Mode: Ambulatory.      DAKOTAH Randle

## 2023-08-16 NOTE — PROGRESS NOTES
"[facility]  Medical Nutrition Therapy    Visit Type: Reassessment    Kenneth BELL Vipul referred by Dr. Nika Barnhart  for MNT related to Malignant neoplasm of lower third of esophagus      Patient accompanied by his brother    Nutrition Assessment:  Anthropometrics  Height: 5' 7.32\"    BMI:  21.7      Weight: 139 lb 8.8 oz    BSA: 1.73        Wt Readings from Last 10 Encounters:   08/16/23 63.3 kg (139 lb 8.8 oz)   08/02/23 63.5 kg (139 lb 15.9 oz)   07/26/23 63.1 kg (139 lb 1.6 oz)   07/19/23 62.6 kg (138 lb 1.6 oz)   07/12/23 61.4 kg (135 lb 5.8 oz)   07/12/23 60.8 kg (134 lb 0.6 oz)   06/28/23 61.9 kg (136 lb 8 oz)   06/16/23 63.8 kg (140 lb 10.5 oz)   06/14/23 62.4 kg (137 lb 9.1 oz)   06/02/23 64.4 kg (141 lb 15.6 oz)     Nutrition History  TwoCal- 1 carton x1, 1.5 x2 daily (4 cartons daily). [facility]. Continues to drink Ensure Clear 2 per day, regular apple juice, gatorade, root beer floats, more water. Less smoothies, burnt out on chocolate flavored foods. Mostly taking liquids by mouth. Did have 16 cheetos one day. Feels like food may still get stuck, would like to try oatmeal again. Noticed he can handle cold foods a bit better- ice cream, iced beverages. Nausea/ vomiting- not bad takes meds daily. Back pain may be hindering appetite some. Regular bowel movements- soft. Weight is trending up- stable at 139lb the past few weeks.    Nutrition Prescription- weight used to estimate needs 63.5kg  Energy:   1900-2225kcal (30-35g/kg)    Protein:   75-95g (1.2-1.5g/kg)    Fluid:   1900-2225ml (1ml/kcal)         Nutrition Diagnosis:  Malnutrition related to swallowing difficulty/reduced intake as evidenced by weight loss of 32% weight loss in 1 year.  weight trending down a little     Nutrition Intervention:   Trial different soft foods. Can increase formula to 4.5 cartons per day to help promote further weight gain.     Nutrition Goals:   Adequate intake oral/TF to meet nutrition " needs to maintain weight/promote healthy weight gain     Nutrition Follow Up / Monitoring:   TF placement, intake, weight, labs.        Time spent with pt - 10 min     Patient to follow-up with RD in 2-4 weeks.   Patient has RD contact information to call/email if needed.

## 2023-08-16 NOTE — NURSING NOTE
"Oncology Rooming Note    August 16, 2023 8:21 AM   Kenneth Matthew is a 58 year old male who presents for:    Chief Complaint   Patient presents with    Oncology Clinic Visit     Follow up Malignant neoplasm of lower third of esophagus      Initial Vitals: BP 98/60   Pulse 69   Temp 97.8  F (36.6  C) (Tympanic)   Wt 63.3 kg (139 lb 8.8 oz)   SpO2 96%   BMI 21.65 kg/m   Estimated body mass index is 21.65 kg/m  as calculated from the following:    Height as of 7/19/23: 1.71 m (5' 7.32\").    Weight as of this encounter: 63.3 kg (139 lb 8.8 oz). Body surface area is 1.73 meters squared.  Moderate Pain (5) Comment: Data Unavailable   No LMP for male patient.  Allergies reviewed: Yes  Medications reviewed: Yes    Medications: MEDICATION REFILLS NEEDED TODAY. Provider was notified.  Pharmacy name entered into EPIC: [facility] - [place], [place] - [address] [facility]    Clinical concerns: none       Alana Gavin LPN             "

## 2023-08-16 NOTE — TELEPHONE ENCOUNTER
Received a PA request from [facility] for ondansetron (ZOFRAN) 8 MG tablet. Submitted on CMM. Waiting for a response.

## 2023-08-16 NOTE — PROGRESS NOTES
Post Infusion Assessment:  Patient tolerated infusion without incident.       Discharge Plan:   Discharge instructions reviewed with: Patient and Family.      DAKOTAH GUAJARDO

## 2023-08-16 NOTE — PROGRESS NOTES
Infusion Nursing Note:  Kenneth Matthew presents today for Cyramza.    Patient seen by provider today: Yes: See ONC provider assessment/visit note.  Home medications, allergies, vitals, fall risk, pain and abuse screen done at [facility] appt earlier this date. All reviewed by this nurse prior to treating. Patient denies questions or concerns not already discussed with provider prior, wishes to proceed with treatment.   present during visit today: Not Applicable.    Note: 0920 patients CBC still in process. Call to hospital lab and inquired. Patient updated on delay, and awaiting results.     Treatment Conditions:  Lab Results   Component Value Date    HGB 13.8 08/16/2023    WBC 5.8 08/16/2023    ANEUTAUTO 3.9 08/16/2023     (L) 08/16/2023        Lab Results   Component Value Date     07/12/2023    POTASSIUM 4.0 07/12/2023    MAG 2.2 02/08/2023    CR 0.54 (L) 07/12/2023    NICHOLAS 9.1 07/12/2023    BILITOTAL 0.4 08/16/2023    ALBUMIN 3.7 08/16/2023    ALT 10 08/16/2023    AST 18 08/16/2023       Results reviewed, labs MET treatment parameters, ok to proceed with treatment.    Dr Barnhart sent TEAMS message at 0936 asking to give patient 1 liter of NS. Does have current standing orders therapy plan for, so did release from there. Patient updated.       Patient discharged by Radha CLAIRE.

## 2023-08-16 NOTE — PROGRESS NOTES
MEDICAL ONCOLOGY FOLLOW UP NOTE  Aug 16, 2023    Reason for Follow up: esophageal cancer    HISTORY OF PRESENT ILLNESS  Kenneth Matthew is a 58 year old male with PMH as stated below who is seen in the oncology clinic for follow up of esophageal cancer    His history in short is as follows    Mr. Matthew states he noticed difficulty swallowing initially starting June 2022. Over the last 1 year he has unintentionally lost 60 pounds.     10/21/2022: CT chest, abdomen and pelvis: Diffuse wall thickening of the distal esophagus with associated mediastinal adenopathy, and upper abdominal  Conglomerate lymphadenopathy. Constellation of findings are highly concerning for underlying esophageal carcinoma with metastatic disease.     11/1/2022: EGD: A large ulcerating mass with no bleeding and no stigmata of recent bleeding was found Lower third from the incisors. The mass was partially obstructing and partially circumferential involving  two thirds.     Biopsy showed poorly differentiated favoring adenocarcinoma. Her 2 negative.     12/14/2022: PET scan: Distal esophageal neoplasm with metastatic involvement the  mediastinum, left hilum and retroperitoneal lymph nodes of the upperabdomen. Also likely localized metastatic involvement of thestomach/GE junction. 2 cm intramural distal esophageal/GE junction mass. Circumferential wall thickening with increased FDG uptake in the distal esophagus adjacent portions of the stomach/GE junction may represent localized disease however could also represent localized inflammation. Metastatic involvement to the subcarinal nodes, left infrahilar and  pericaval/paraesophageal lymph nodes of the lower thorax. Metastatic involvement to the retroperitoneal lymph nodes of the upper abdomen. These lymph nodes have increased in size dating back to be  10/21/2022 CT scan consistent with worsening disease. Representative  nodes are described above.    1/11/2023 to 6/28/2023: 13 cycles of FOLFOX with  Moisés    7/5/2023: PET scan: Interval worsening of disease.   Increased size and FDG uptake suggests malignant involvement involving of a right paraesophageal lymph node in the upper thorax, diffuse involvement of the mediastinal and hilar nodes as well as worsening involvement of lymph nodes along the lesser curvature the stomach and in the retroperitoneum. Focal area of uptake in the right inferolateral aspect of the prostate gland may be inflammatory in nature.   Stable areas of uptake seen within the distal portions of the  Esophagus. An area of increased uptake seen previously within the gastric mucosa appears smaller.    7/9/2023: Given progression seen on PET scan he was started on second line treatment with ramucirumab and Taxol.    Interim history    Vipul is seen today in the clinic with his brother.  He again complains of back pain.  This is pain similar to when he was initially diagnosed.  He is currently taking hydrocodone 7.5 mg with Tylenol which is not controlling his pain.  He is also having quite a lot of neuropathy in his hands and feet.  Especially is hands it is making him difficult to relate his task.  He is dropping things.  He is also states that he is feeling a little more wobbly than usual when standing.  Denies any nausea, vomiting or diarrhea.  Denies any bleeding.      REVIEW OF SYSTEMS  A 12-point ROS negative except as in HPI      Current Outpatient Medications   Medication Sig Dispense Refill    eszopiclone (LUNESTA) 1 MG tablet Take 2 tablets (2 mg) by mouth At Bedtime 30 tablet 0    HYDROcodone-Acetaminophen 5-217 MG/10ML SOLN Take 10 mLs by mouth every 8 hours 473 mL 0    HYDROcodone-acetaminophen 7.5-325 MG/15ML solution Take 10 mLs by mouth 4 times daily as needed for severe pain 473 mL 0    hydrocortisone (CORTAID) 1 % external cream Apply topically 2 times daily 30 g 1    nicotine (NICODERM CQ) 14 MG/24HR 24 hr patch       nicotine (NICODERM CQ) 7 MG/24HR 24 hr patch Place 1  patch onto the skin every 24 hours 14 patch 1    nicotine (NICORETTE) 2 MG gum       omeprazole (PRILOSEC) 20 MG DR capsule Take 1 capsule (20 mg) by mouth daily 90 capsule 0    prochlorperazine (COMPAZINE) 10 MG tablet Take 1 tablet (10 mg) by mouth every 6 hours as needed for nausea or vomiting 30 tablet 2    polyethylene glycol (MIRALAX) 17 GM/Dose powder Take 9-17 g by mouth daily (Patient not taking: Reported on 5/30/2023) 510 g 1       No Known Allergies  Immunization History   Administered Date(s) Administered    COVID-19 MONOVALENT 12+ (Pfizer) 08/28/2021, 09/18/2021    TDAP (Adacel,Boostrix) 12/10/2021       Past Medical History:   Diagnosis Date    Cancer (H)     Esophageal cancer (H) 11/01/2022    Esophageal thickening 10/25/2022    Mixed hearing loss, bilateral     Tobacco abuse 1/4/2023    Tobacco use disorder 12/27/2019       Past Surgical History:   Procedure Laterality Date    [facility]    ESOPHAGOSCOPY N/A 4/25/2023    Procedure: Upper Endoscopy with biopsy- with Gastrostomy Tube Exchange;  Surgeon: Nba Dalton MD;  Location: [facility] OR    INSERT PORT VASCULAR ACCESS N/A 12/22/2022    Procedure: POWER PORT PLACEMENT;  Surgeon: Nba Dalton MD;  Location: [facility] OR    Upper GI Endoscopy  11/01/2022       SOCIAL HISTORY  History   Smoking Status    Former    Packs/day: 0.50    Years: 46.00    Types: Cigarettes    Start date: 1976    Quit date: 6/12/2023   Smokeless Tobacco    Never    Social History    Substance and Sexual Activity      Alcohol use: Not Currently        Comment: none since July of 2022     History   Drug Use Unknown       FAMILY HISTORY  Family History   Problem Relation Age of Onset    Breast Cancer Mother     Hyperlipidemia Mother     Diabetes Mother     Coronary Artery Disease Mother     Cerebrovascular Disease Father     Hyperlipidemia Father     Coronary Artery Disease Father     Myocardial Infarction Father     Hyperlipidemia Brother     Hypertension  Brother     Diabetes Brother     Asthma Sister     Breast Cancer Sister     Hyperlipidemia Sister     Diabetes Sister     Hyperlipidemia Sister     Diabetes Sister     Hyperlipidemia Sister     Suicide Sister     Anesthesia Reaction Sister     Breast Cancer Sister     Hyperlipidemia Sister     Diabetes Sister     Breast Cancer Niece     Skin Cancer Niece     Thyroid Disease Niece     Breast Cancer Niece     Leukemia Nephew     Colon Cancer No family hx of     Prostate Cancer No family hx of     Genetic Disorder No family hx of        PHYSICAL EXAMINATION  BP 98/60   Pulse 69   Temp 97.8  F (36.6  C) (Tympanic)   Wt 63.3 kg (139 lb 8.8 oz)   SpO2 96%   BMI 21.65 kg/m    Wt Readings from Last 2 Encounters:   08/16/23 63.3 kg (139 lb 8.8 oz)   08/02/23 63.5 kg (139 lb 15.9 oz)     Physical Exam  Constitutional:       Appearance: Normal appearance.   Eyes:      Conjunctiva/sclera: Conjunctivae normal.   Pulmonary:      Effort: Pulmonary effort is normal.   Abdominal:      General: Abdomen is flat.      Palpations: Abdomen is soft.   Neurological:      General: No focal deficit present.      Mental Status: He is alert and oriented to person, place, and time. Mental status is at baseline.     Laboratory and Imaging:     Latest Reference Range & Units 08/16/23 08:08   WBC 4.0 - 11.0 10e3/uL 5.8   Hemoglobin 13.3 - 17.7 g/dL 13.8   Hematocrit 40.0 - 53.0 % 41.3   Platelet Count 150 - 450 10e3/uL 121 (L)   (L): Data is abnormally low    RESULT FOR IMMUNOHISTOCHEMICAL VENTANA CLONE  PD-L1 ASSAY  COMBINED POSITIVE SCORE (CPS): <1%    8/7/2023: Foundation one: Microsatellite status - MS-Stable   Tumor Mutational [place] - 5 Muts/ Mb   Genomic Findings   APC [id number]   CTNA1 rearrangement exon 4   [id number]   TP53 P27fs*17 1   Disease relevant genes with no reportable alterations: ERBB2    ASSESSMENT AND PLAN    1. 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     Currently on first line treatment with  FOLFOX with Nivo.  Has received 13 cycles till date.  However staging scan on 7/5/2023 does show progressive disease.    Discussed moving on to second line treatment with Taxol and ramucirumab.    He is currently status post cycle 1.  His Taxol dose was reduced during his previous cycle for progressive neuropathy.  Today he complains of continued neuropathy in his hands and feet to the point that it is affecting his activity of daily living.  Therefore we discussed continuing with the reduced dose of Taxol and eliminate day 8 of Taxol to try to help with the neuropathy.  I will also recommend starting duloxetine 30 mg daily to see if it helps.  He is aware that he can increase the dose to 60 mg if he is able to tolerate it well.    His labs today show a white count of 5.8 with a hemoglobin of 13.8 and a platelet count of 121.  Therefore he will proceed to cycle 2-day 1 today.  We will eliminate day 8 and then he will return for day 15 again.    We will plan to do a repeat PET scan after cycle 3 to see if there is any response.    He continues to use his PEG for nutrition.  He is is drinking Ensure orally.    Foundation 1 is pending     2.  Cancer related pain:    Continues to have significant back pain.  Therefore I will recommend adding a long acting pain medication to try to help control his pain.  We will add oxycodone 10 mg twice daily.  He can continue with hydrocodone for breakthrough pain.    We will see him back in 2 weeks before his day 15 for symptom check    Total time spent on the patient on day of encounter was 60 minutes doing chart review, review of test results, interpretation of results, patient visit and documentation.      Nika Barnhart MD

## 2023-08-16 NOTE — TELEPHONE ENCOUNTER
Received an APPROVAL from [facility] for  ondansetron (ZOFRAN) 8 MG tablet. Effective dates 8/16/23-8/16/24. Scanned in Epic.

## 2023-08-30 NOTE — PROGRESS NOTES
Call from [facility], alerting that patients BG is 53 and asking that we offer a juice or something of that nature, and encourage food as able. Done. Patient accepting of apple juice at this time.

## 2023-08-30 NOTE — PATIENT INSTRUCTIONS

## 2023-08-30 NOTE — PROGRESS NOTES
"[facility]  Medical Nutrition Therapy    Visit Type: Follow-up    Patient Referred by: Nika Barnhart MD    Referred Diagnosis: Malignant neoplasm of lower third of esophagus       Nutrition Assessment  Anthropometrics:  Height: 5' 7.5\"   BMI: 21.5   Weight: 139 lb 8.8 oz   Weight Change: stable     Wt Readings from Last 10 Encounters:   08/30/23 63.3 kg (139 lb 8.8 oz)   08/16/23 63.3 kg (139 lb 8.8 oz)   08/02/23 63.5 kg (139 lb 15.9 oz)   07/26/23 63.1 kg (139 lb 1.6 oz)   07/19/23 62.6 kg (138 lb 1.6 oz)   07/12/23 61.4 kg (135 lb 5.8 oz)   07/12/23 60.8 kg (134 lb 0.6 oz)   06/28/23 61.9 kg (136 lb 8 oz)   06/16/23 63.8 kg (140 lb 10.5 oz)   06/14/23 62.4 kg (137 lb 9.1 oz)      Medical History/Family History:  Tobacco abuse    Nutrition History:  TwoCal- 1 carton x1, 1.5 x2 daily (4 cartons daily). Ensure Clear 2x per day. [facility] Continues with drinking other liquids. Hasn't tried working in more foods. Pt shared that his brother was in a serious accident a couple weeks ago and is now recovering in a rehab facility.    Physical Activity:  Not addressed    Nutrition Education:  Continue with current TF regimen. Try new foods as he feels comfortable    Nutrition Goals:   Adequate intake oral/TF to meet nutrition needs to maintain weight/promote healthy weight gain     Nutrition Follow Up / Monitoring:  TF placement, intake, weight, labs.    Time spent with pt - 8 min    Follow up with RD in 4 weeks.   Patient has RD's contact information to call/email if needed.      Shelbie Pimentel RD      "

## 2023-08-30 NOTE — NURSING NOTE
"Oncology Rooming Note    August 30, 2023 10:10 AM   Kenneth Matthew is a 58 year old male who presents for:    Chief Complaint   Patient presents with    Oncology Clinic Visit     Follow up Malignant neoplasm of lower third of esophagus        Initial Vitals: /60   Pulse 66   Temp 96.8  F (36  C) (Tympanic)   Resp 20   Ht 1.715 m (5' 7.5\")   Wt 63.3 kg (139 lb 8.8 oz)   SpO2 97%   BMI 21.53 kg/m   Estimated body mass index is 21.53 kg/m  as calculated from the following:    Height as of this encounter: 1.715 m (5' 7.5\").    Weight as of this encounter: 63.3 kg (139 lb 8.8 oz). Body surface area is 1.74 meters squared.  Extreme Pain (8) Comment: Data Unavailable   No LMP for male patient.  Allergies reviewed: Yes  Medications reviewed: Yes    Medications: MEDICATION REFILLS NEEDED TODAY. Provider was notified.  Pharmacy name entered into EPIC: [facility] - [place], [place] - [address]          Yahaira Rodriguez LPN             "

## 2023-08-30 NOTE — PATIENT INSTRUCTIONS
We would like to see you back in 2 weeks per your schedule.     Your prescription for Neurontin (gabapentin) has been sent to: [facility].  Start as instructed and titrate up to 3 times a day.      When you are in need of a refill, please call your pharmacy and they will send us a request.     If you have any questions please call [phone]    Other instructions:  none

## 2023-08-30 NOTE — PROGRESS NOTES
Infusion Nursing Note:  Kenneth Matthew presents today for cyramaza/taxol.    Patient seen by provider today: Yes: GARY Valenzuela  See provider dictation for details.   present during visit today: Not Applicable.    Note: GARY Valenzuela TORB to draw CMP. OK to proceed with treatment prior to CMP resulting..    Intravenous Access:  Implanted Port accessed prior to provider appointment in medical oncology.    Treatment Conditions:  Results reviewed, labs MET treatment parameters, ok to proceed with treatment.  Component      Latest Ref Rng 8/30/2023  9:53 AM 8/30/2023  12:09 PM   WBC      4.0 - 11.0 10e3/uL 5.6     RBC Count      4.40 - 5.90 10e6/uL 4.35 (L)     Hemoglobin      13.3 - 17.7 g/dL 14.3     Hematocrit      40.0 - 53.0 % 43.0     MCV      78 - 100 fL 99     MCH      26.5 - 33.0 pg 32.9     MCHC      31.5 - 36.5 g/dL 33.3     RDW      10.0 - 15.0 % 14.1     Platelet Count      150 - 450 10e3/uL 104 (L)     % Neutrophils      % 62     % Lymphocytes      % 25     % Monocytes      % 9     % Eosinophils      % 3     % Basophils      % 1     % Immature Granulocytes      % 0     NRBCs per 100 WBC      <1 /100 0     Absolute Neutrophils      1.6 - 8.3 10e3/uL 3.5     Absolute Lymphocytes      0.8 - 5.3 10e3/uL 1.4     Absolute Monocytes      0.0 - 1.3 10e3/uL 0.5     Absolute Eosinophils      0.0 - 0.7 10e3/uL 0.2     Absolute Basophils      0.0 - 0.2 10e3/uL 0.0     Absolute Immature Granulocytes      <=0.4 10e3/uL 0.0     Absolute NRBCs      10e3/uL 0.0     Sodium      136 - 145 mmol/L  139    Potassium      3.4 - 5.3 mmol/L  4.8    Chloride      98 - 107 mmol/L  104    Carbon Dioxide (CO2)      22 - 29 mmol/L  28    Anion Gap      7 - 15 mmol/L  7    Urea Nitrogen      8.0 - 23.0 mg/dL  13.5    Creatinine      0.67 - 1.17 mg/dL  0.56 (L)    Calcium      8.8 - 10.2 mg/dL  9.0    Glucose      70 - 99 mg/dL  53 (L)    Alkaline Phosphatase      40 - 129 U/L  72    AST      0 - 45 U/L  22    ALT      0 - 70  U/L  10    Protein Total      6.4 - 8.3 g/dL  6.8    Albumin      3.5 - 5.2 g/dL  3.6    Bilirubin Total      <=1.2 mg/dL  0.4    GFR Estimate      >60 mL/min/1.73m2  >90    Protein Albumin Urine      Negative mg/dL Negative        Legend:  (L) Low    Post Infusion Assessment:  Patient tolerated infusion without incident.  Blood return noted pre and post infusion.  No evidence of extravasations.  Access discontinued per protocol.       Discharge Plan:   AVS to patient via MYCHART.  Patient will return as scheduled for next appointment.   Patient discharged in stable condition accompanied by: self.  Departure Mode: Ambulatory.      Ricardo CLAIRE

## 2023-08-30 NOTE — PROGRESS NOTES
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  He initiated systemic therapy with nivolumab/FOLFOX 1/11/2023 with potential for palliative radiation therapy with treatment changed to ramuciramab and paclitaxel due to progression and this was started 7/19/2023.     He presents to the clinic today stating he is doing okay.  He is dealing with some depression and anxiety as his brother was in a recent accident and is in the hospital.  His main complaint today is in regards to ongoing numbness and tingling in his hands and his feet.  He states they are not painful.  He does not feel its gotten worse over the past 2 weeks but it is very debilitating for him.  He has difficulty with all his fine motor skills and also at times will feel as though his balance is off and he does use a walker on occasion.  He tells me he was placed on OxyContin for the neuropathy in his hands and he did not use this.  He states he took the duloxetine for 1 week twice a day and it made no difference so he stopped this.      States he continues to use the liquid Lortab 7.5 mg/325 mg every 8 hours for his back pain and he feels this manages his pain well.  He does continue to have difficulty with sleeping at night where he will sleep for a couple hours and then be up for a few hours and then sleep again for a few hours.  This is basically been a lifelong issue for him.  He had been on Lunesta for short time but states he discontinued this as he felt it did not really help.    He continues to take liquids by mouth and does about 3 to 4 cans via his G-tube of the tube  feeding.  Rates his fatigue at about a 5 on a 0-to-10 scale.    Oncologic History:     Experienced difficulty swallowing and 60 pound weight loss  10/21/2022: CT chest, abdomen and pelvis: Diffuse wall thickening of the distal esophagus with associated mediastinal adenopathy, and upper abdominal  Conglomerate lymphadenopathy. Constellation of findings are highly concerning for underlying esophageal carcinoma with metastatic disease.   11/1/2022: Colonoscopy with polyp showing tubular adenoma and EGD: A large ulcerating mass with no bleeding and no stigmata of recent bleeding was found in the lower third of the esophagus, 35 to 40 cm from the incisors.  The mass was partially obstructing and partially circumferential involving  two thirds.    **Biopsy showed poorly differentiated favoring adenocarcinoma. Her 2 negative.   11/22/2002 patient met with Dr. Barnhart to discuss systemic therapy pending PET results/complete staging  12/5/2022 patient met with Dr. Spain with radiation oncology with possible plan to initiate radiation therapy pending results of PET scan  12/14/2022: PET scan: Distal esophageal neoplasm with metastatic involvement the  mediastinum, left hilum and retroperitoneal lymph nodes of the upperabdomen. Also likely localized metastatic involvement of thestomach/GE junction. 2 cm intramural distal esophageal/GE junction mass. Circumferential wall thickening with increased FDG uptake in the distal esophagus adjacent portions of the stomach/GE junction may represent localized disease however could also represent localized inflammation. Metastatic involvement to the subcarinal nodes, left infrahilar and  pericaval/paraesophageal lymph nodes of the lower thorax. Metastatic involvement to the retroperitoneal lymph nodes of the upper abdomen. These lymph nodes have increased in size dating back to be  10/21/2022 CT scan consistent with worsening disease. Representative  nodes are described above.  12/20/2022   He was seen by Dr. Barnhart with plan to initiate palliative nivolumab/FOLFOX with potential for palliative Radiation at some point  12/22/2022: Port-A-Cath placement and gastrostomy tube insertion by Dr. Dalton  1/11/2023 initiated treatment FOLFOX with Nivo   3/29/2023  PET scan:    IMPRESSION:   Significant interval improvement.  Marked decrease in size of distal esophageal/GE junction masses with  near complete resolution of enlarged, FDG avid subcarinal,  paraesophageal and retroperitoneal lymph nodes.    Normal-sized lymph nodes throughout the head and neck, chest, abdomen  and pelvis now demonstrate FDG uptake, the majority of which do not  exceed background suggesting diffuse inflammatory response, possibly  related to therapy.   **Dr Barnhart reviewed with patient and recommended an EGD and discussed radiation consult.  5/15/2023 patient consult with radiation oncology, Dr. Roe, they discussed radiation therapy but due to lack of availability to simulate, plan was to follow-up in 2 months to discuss again.  A referral to [facility] radiation therapy was offered and patient declined.  7/5/2023: PET scan: Interval worsening of disease.   Increased size and FDG uptake suggests malignant involvement involving of a right paraesophageal lymph node in the upper thorax, diffuse involvement of the mediastinal and hilar nodes as well as worsening involvement of lymph nodes along the lesser curvature the stomach and in the retroperitoneum. Focal area of uptake in the right inferolateral aspect of the prostate gland may be inflammatory in nature.   Stable areas of uptake seen within the distal portions of the  Esophagus. An area of increased uptake seen previously within the gastric mucosa appears smaller.   7/9/2023: Given progression seen on PET scan he was started on second line treatment with ramucirumab and Taxol.        RESULT FOR IMMUNOHISTOCHEMICAL VENTANA CLONE  PD-L1 ASSAY  COMBINED POSITIVE SCORE (CPS): <1%      Treatment Goal: Palliative     Current Chemo Regime/TX:  ramuciramab 8mg/kg and paclitaxel 65mg/m2 (20% dose decrease related to neuropathy)    Previous treatment:  Nivolumab 240 mg/oxaliplatin 85 mg per metered squared/leucovorin 350 mg per metered squared/fluorouracil 400 mg per metered squared IV bolus/fluorouracil 2400 mg per metered squared via continuous infusion over 46 hours with cycle given every 14 days     Past Medical History:   Diagnosis Date    Cancer (H)     Esophageal cancer (H) 11/01/2022    Esophageal thickening 10/25/2022    Mixed hearing loss, bilateral     Tobacco abuse 1/4/2023    Tobacco use disorder 12/27/2019       Past Surgical History:   Procedure Laterality Date    COLONOSCOPY  11/01/2022    [facility]    ESOPHAGOSCOPY N/A 4/25/2023    Procedure: Upper Endoscopy with biopsy- with Gastrostomy Tube Exchange;  Surgeon: Nba Dalton MD;  Location: [facility] OR    INSERT PORT VASCULAR ACCESS N/A 12/22/2022    Procedure: POWER PORT PLACEMENT;  Surgeon: Nba Dalton MD;  Location: [facility] OR    Upper GI Endoscopy  11/01/2022       Family History   Problem Relation Age of Onset    Breast Cancer Mother     Hyperlipidemia Mother     Diabetes Mother     Coronary Artery Disease Mother     Cerebrovascular Disease Father     Hyperlipidemia Father     Coronary Artery Disease Father     Myocardial Infarction Father     Hyperlipidemia Brother     Hypertension Brother     Diabetes Brother     Asthma Sister     Breast Cancer Sister     Hyperlipidemia Sister     Diabetes Sister     Hyperlipidemia Sister     Diabetes Sister     Hyperlipidemia Sister     Suicide Sister     Anesthesia Reaction Sister     Breast Cancer Sister     Hyperlipidemia Sister     Diabetes Sister     Breast Cancer Niece     Skin Cancer Niece     Thyroid Disease Niece     Breast Cancer Niece     Leukemia Nephew     Colon Cancer No family hx of     Prostate Cancer No family hx of     Genetic Disorder No family hx of        Social History      Socioeconomic History    Marital status:      Spouse name: Jazzy    Number of children: 2    Years of education: Not on file    Highest education level: Not on file   Occupational History    Not on file   Tobacco Use    Smoking status: Former     Packs/day: 0.50     Years: 46.00     Pack years: 23.00     Types: Cigarettes     Start date:      Quit date: 2023     Years since quittin.2    Smokeless tobacco: Never    Tobacco comments:     Trying to quit   Vaping Use    Vaping Use: Never used   Substance and Sexual Activity    Alcohol use: Not Currently     Comment: none since 2022    Drug use: Never    Sexual activity: Not on file   Other Topics Concern    Parent/sibling w/ CABG, MI or angioplasty before 65F 55M? Yes     Comment: father   Social History Narrative    Not on file     Social Determinants of Health     Financial Resource Strain: Not on file   Food Insecurity: Not on file   Transportation Needs: Not on file   Physical Activity: Not on file   Stress: Not on file   Social Connections: Not on file   Intimate Partner Violence: Not on file   Housing Stability: Not on file       Current Outpatient Medications   Medication    HYDROcodone-acetaminophen 7.5-325 MG/15ML solution    hydrocortisone (CORTAID) 1 % external cream    nicotine (NICODERM CQ) 7 MG/24HR 24 hr patch    omeprazole (PRILOSEC) 20 MG DR capsule    ondansetron (ZOFRAN) 8 MG tablet    polyethylene glycol (MIRALAX) 17 GM/Dose powder    DULoxetine (CYMBALTA) 30 MG capsule    eszopiclone (LUNESTA) 1 MG tablet    nicotine (NICODERM CQ) 14 MG/24HR 24 hr patch    oxyCODONE (OXYCONTIN) 10 MG 12 hr tablet    prochlorperazine (COMPAZINE) 10 MG tablet     No current facility-administered medications for this visit.        No Known Allergies    Review Of Systems:  Constitutional:    denies fever, weight changes, chills, and night sweats.  Eyes:    denies double vision; has some blurred vision at times  Ears/Nose/Throat:   denies  "ear pain, nose problems, difficulty swallowing; teeth seem to be breaking  Respiratory:   denies shortness of breath, slight cough  Skin:   denies rash, lesions  Cardiovascular:   denies chest pain, palpitations, edema  Gastrointestinal:   denies abdominal pain, bloating, nausea, early satiety; no change in bowel habits or blood in stool  Genitourinary:   denies difficulty with urination, blood in urine  Musculoskeletal:    denies new muscle pain, bone pain  Neurologic:   denies lightheadedness, headaches, see hPI  Psychiatric:   denies anxiety, upset about brother being hoospitalized  Hematologic/Lymphatic/Immunologic:   denies easy bruising, easy bleeding, lumps or bumps noted  Endocrine:   Denies increased thirst    ECOG Performance Status: 1    Physical Exam:  /60   Pulse 66   Temp 96.8  F (36  C) (Tympanic)   Resp 20   Ht 1.715 m (5' 7.5\")   Wt 63.3 kg (139 lb 8.8 oz)   SpO2 97%   BMI 21.53 kg/m      GENERAL APPEARANCE: Thin, alert and in no acute distress.  HEENT: Normocephalic, Sclerae anicteric.  No obvious oral lesions or thrush  NECK:   No asymmetry or masses, no thyromegaly.  LYMPHATICS: No palpable cervical, supraclavicular, axillary, or inguinal nodes   RESP: Lungs clear to auscultation bilaterally, respirations regular and easy  CARDIOVASCULAR: Regular rate and rhythm. Normal S1, S2; no murmur, gallop, or rub.  ABDOMEN: Soft, nontender. Bowel sounds auscultated all 4 quadrants. No palpable organomegaly or masses.  MUSCULOSKELETAL: Extremities without gross deformities noted. No edema of bilateral lower extremities.  SKIN: No suspicious lesions or rashes to exposed skin  NEURO: Alert and oriented x 3.  Gait steady.  PSYCHIATRIC: Mentation and affect appear normal.  Mood appropriate.    Laboratory:  Results for orders placed or performed in visit on 08/30/23   Comprehensive metabolic panel     Status: Abnormal   Result Value Ref Range    Sodium 139 136 - 145 mmol/L    Potassium 4.8 3.4 - 5.3 " mmol/L    Chloride 104 98 - 107 mmol/L    Carbon Dioxide (CO2) 28 22 - 29 mmol/L    Anion Gap 7 7 - 15 mmol/L    Urea Nitrogen 13.5 8.0 - 23.0 mg/dL    Creatinine 0.56 (L) 0.67 - 1.17 mg/dL    Calcium 9.0 8.8 - 10.2 mg/dL    Glucose 53 (L) 70 - 99 mg/dL    Alkaline Phosphatase 72 40 - 129 U/L    AST 22 0 - 45 U/L    ALT 10 0 - 70 U/L    Protein Total 6.8 6.4 - 8.3 g/dL    Albumin 3.6 3.5 - 5.2 g/dL    Bilirubin Total 0.4 <=1.2 mg/dL    GFR Estimate >90 >60 mL/min/1.73m2   Results for orders placed or performed in visit on 08/30/23   Protein qualitative urine     Status: Normal   Result Value Ref Range    Protein Albumin Urine Negative Negative mg/dL   CBC with platelets and differential     Status: Abnormal   Result Value Ref Range    WBC Count 5.6 4.0 - 11.0 10e3/uL    RBC Count 4.35 (L) 4.40 - 5.90 10e6/uL    Hemoglobin 14.3 13.3 - 17.7 g/dL    Hematocrit 43.0 40.0 - 53.0 %    MCV 99 78 - 100 fL    MCH 32.9 26.5 - 33.0 pg    MCHC 33.3 31.5 - 36.5 g/dL    RDW 14.1 10.0 - 15.0 %    Platelet Count 104 (L) 150 - 450 10e3/uL    % Neutrophils 62 %    % Lymphocytes 25 %    % Monocytes 9 %    % Eosinophils 3 %    % Basophils 1 %    % Immature Granulocytes 0 %    NRBCs per 100 WBC 0 <1 /100    Absolute Neutrophils 3.5 1.6 - 8.3 10e3/uL    Absolute Lymphocytes 1.4 0.8 - 5.3 10e3/uL    Absolute Monocytes 0.5 0.0 - 1.3 10e3/uL    Absolute Eosinophils 0.2 0.0 - 0.7 10e3/uL    Absolute Basophils 0.0 0.0 - 0.2 10e3/uL    Absolute Immature Granulocytes 0.0 <=0.4 10e3/uL    Absolute NRBCs 0.0 10e3/uL   CBC with platelets differential     Status: Abnormal    Narrative    The following orders were created for panel order CBC with platelets differential.  Procedure                               Abnormality         Status                     ---------                               -----------         ------                     CBC with platelets and d...[[id number]]  Abnormal            Final result                 Please view results  for these tests on the individual orders.       Imaging Studies:  None completed for today's visit      ASSESSMENT/PLAN:    #1 Esophageal cancer: Stage IV adenocarcinoma of the esophagus diagnosed November 2022.  He initiated systemic therapy with nivolumab/FOLFOX 1/11/2023 with potential for palliative radiation therapy.  He developed progression and treatment was changed to ramuciramab and paclitaxel 7/19/2023.  He will go forth with cycle 2 therapy and will follow up prior to cycle 3 with labs per treatment plan.     #2  Insomnia: Discussed reinitiating the Lunesta with potential to increase to 3 mg at at bedtime.     #3  Cancer related pain: Currently using hydrocodone every 8 hours and feels this is working well.  We did discuss the OxyContin that was prescribed and that this could be used every 12 hours as a long-acting pain medication and he states he does not want to use it at this point as he likes to be in more control of when he takes his pain medicine.  He does not want to drive when he takes any pain meds.    #4 neuropathy: Day 8 treatment was removed from his treatment plan per Dr. Barnhart with cycle 2.  Will initiate Neurontin 300 mg at at bedtime with plan to titrate up to 300 mg 3 times a day.  Instructions were given.    I encouraged patient to call with any questions or concerns.    33 minutes spent in the patient's encounter today with time spent in review of the chart along with in chart preparation.  Time was also spent in review of his treatment plan.  Time was also spent obtaining a review of systems and performing a physical exam along with review of his lab work with him.  Time was also spent in discussion of ongoing side effects and recommendation for management.  Time was also spent in planning his follow-up, placing orders and in chart documentation.    Delmy Valenzuela, NP  APRN, FNP-BC, AOCNP

## 2023-09-06 NOTE — PROGRESS NOTES
Called patient on new medication. Informed him that he will have a 75.00 copay. He is not able to manage this at this time. He would like to try for anand care to assist with copay. Will send to [facility] and check on copay here prior to utilizing anand care.

## 2023-09-08 NOTE — PROGRESS NOTES
[facility]: Cancer Care                                                                                        TC to [facility] to check on copay for the xtampza. It would be 75.00, and it will need to be ordered for Monday. Spoke with NP and she would like to see if patient would be willing to switch to morphine as this may be covered by his insurance and he will be able to have if before the weekend, and she will refill his norco. Patient called and this was discussed with him. He would like to just  the xtampza on Monday and discuss further the morphine at his follow up visit on Wednesday. He will pay the co pay. Will update provider.       Signature:  Nenita Tatum RN

## 2023-09-13 NOTE — PATIENT INSTRUCTIONS
We would like to see you back per your schedule. We will get you scheduled for a PET scan the week prior to your next cycle.    I will send a referral to the palliative care program to have you see Dr. House to help manage symptoms.    Increase your protein shakes by 2 a day if you are able.    Your prescriptions for hydrocodone and Narcan have been sent to: [facility].      [facility]    Increase your gabapentin to 300 mg 3 times a day.    Initiate the long-acting pain medication tonight and you may use the Lortab every 6 hours as needed for breakthrough pain.  Please instruct your wife on use of Narcan as we discussed.    When you are in need of a refill, please call your pharmacy and they will send us a request.     If you have any questions please call [phone]    Other instructions:  none

## 2023-09-13 NOTE — PROGRESS NOTES
Infusion Nursing Note:  Kenneth Matthew presents today for   C.    Patient seen by provider today: Yes: Martha Valenzuela NP   present during visit today: Not Applicable.    Note: Patient has his sister with today.  Offers no concerns.    Intravenous Access:Implanted Port.    Treatment Conditions:  Lab Results   Component Value Date    HGB 14.8 09/13/2023    WBC 5.2 09/13/2023    ANEUTAUTO 3.3 09/13/2023     (L) 09/13/2023        Lab Results   Component Value Date     08/30/2023    POTASSIUM 4.8 08/30/2023    MAG 2.2 02/08/2023    CR 0.56 (L) 08/30/2023    NICHOLAS 9.0 08/30/2023    BILITOTAL 0.5 09/13/2023    ALBUMIN 3.8 09/13/2023    ALT 13 09/13/2023    AST 20 09/13/2023       Results reviewed, labs MET treatment parameters, ok to proceed with treatment.  Confirmed patient received the Fact Sheet for Patients, Parents and Caregivers prior to receiving medication.     Post Infusion Assessment:  Patient tolerated infusion without incident.   *If a hypersensitivity, medication error, or an adverse event occurred, pharmacy was notified in addition to the provider for FDA Reporting.        Discharge Plan:   Patient and/or family verbalized understanding of discharge instructions and all questions answered.  Copy of AVS reviewed with patient and/or family.  Patient will return two weeks for next appointment.  Patient discharged in stable condition accompanied by: self and sister.  Departure Mode: Ambulatory.    Matilde Matthews RN

## 2023-09-13 NOTE — NURSING NOTE
"Oncology Rooming Note    September 13, 2023 8:11 AM   Kenneth Matthew is a 58 year old male who presents for:    Chief Complaint   Patient presents with    Oncology Clinic Visit     Follow up Malignant neoplasm of lower third of esophagus        Initial Vitals: /62   Pulse 73   Temp 97.5  F (36.4  C) (Tympanic)   Resp 20   Ht 1.715 m (5' 7.5\")   Wt 62.5 kg (137 lb 12.6 oz)   SpO2 98%   BMI 21.26 kg/m   Estimated body mass index is 21.26 kg/m  as calculated from the following:    Height as of this encounter: 1.715 m (5' 7.5\").    Weight as of this encounter: 62.5 kg (137 lb 12.6 oz). Body surface area is 1.73 meters squared.  Moderate Pain (5) Comment: Data Unavailable   No LMP for male patient.  Allergies reviewed: Yes  Medications reviewed: Yes    Medications: MEDICATION REFILLS NEEDED TODAY. Provider was notified.  Pharmacy name entered into EPIC: [facility] - [place], [place] - [address]    Clinical concerns: Cost of pain medication and copays      Yahaira Rodriguez LPN             "

## 2023-09-13 NOTE — PROGRESS NOTES
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  He initiated systemic therapy with nivolumab/FOLFOX 1/11/2023 with potential for palliative radiation therapy with treatment changed to ramuciramab and paclitaxel due to progression and this was started 7/19/2023.    He presents to the clinic today accompanied by his sister.  He states he is doing a bit better from an anxiety/depression standpoint as his brother is progressively getting better and he did talk to him yesterday.  He states his sisters will be helping out giving him rides to appointments.    He states the pain in his back is actually better and he is using his Lortab at least once a day to twice a day.  He states his pain is better when he takes the Lortab and then lays down.  Otherwise he does admit to having pain on a continuous basis and increase if he makes too long without any pain medication or ceftaroline.  He has not initiated the long-acting OxyContin yet but he did pick it up from the pharmacy on Friday and states that it cost him $75.  He initiated the gabapentin 300 mg and is taking this in the morning and at night.  He does not feel its improved his neuropathy.  His sleep continues to be somewhat erratic but he did sleep about 7 hours straight last night.  He has lost additional 2 pounds.  States he drinks 2 ensures a day plus V8 juice and apple juice and also does 3 cans of tube feeding through his tube each day.  Does have ongoing issues with fatigue and naps if needed.  He uses 1 Zofran or Compazine on a daily basis.     Oncologic History:     Experienced  difficulty swallowing and 60 pound weight loss  10/21/2022: CT chest, abdomen and pelvis: Diffuse wall thickening of the distal esophagus with associated mediastinal adenopathy, and upper abdominal  Conglomerate lymphadenopathy. Constellation of findings are highly concerning for underlying esophageal carcinoma with metastatic disease.   11/1/2022: Colonoscopy with polyp showing tubular adenoma and EGD: A large ulcerating mass with no bleeding and no stigmata of recent bleeding was found in the lower third of the esophagus, 35 to 40 cm from the incisors.  The mass was partially obstructing and partially circumferential involving  two thirds.    **Biopsy showed poorly differentiated favoring adenocarcinoma. Her 2 negative.   11/22/2002 patient met with Dr. Barnhart to discuss systemic therapy pending PET results/complete staging  12/5/2022 patient met with Dr. Spain with radiation oncology with possible plan to initiate radiation therapy pending results of PET scan  12/14/2022: PET scan: Distal esophageal neoplasm with metastatic involvement the  mediastinum, left hilum and retroperitoneal lymph nodes of the upperabdomen. Also likely localized metastatic involvement of thestomach/GE junction. 2 cm intramural distal esophageal/GE junction mass. Circumferential wall thickening with increased FDG uptake in the distal esophagus adjacent portions of the stomach/GE junction may represent localized disease however could also represent localized inflammation. Metastatic involvement to the subcarinal nodes, left infrahilar and  pericaval/paraesophageal lymph nodes of the lower thorax. Metastatic involvement to the retroperitoneal lymph nodes of the upper abdomen. These lymph nodes have increased in size dating back to be  10/21/2022 CT scan consistent with worsening disease. Representative  nodes are described above.  12/20/2022  He was seen by Dr. Barnhart with plan to initiate palliative nivolumab/FOLFOX with potential for  palliative Radiation at some point  12/22/2022: Port-A-Cath placement and gastrostomy tube insertion by Dr. Dalton  1/11/2023 initiated treatment FOLFOX with Nivo   3/29/2023  PET scan:    IMPRESSION:   Significant interval improvement.  Marked decrease in size of distal esophageal/GE junction masses with  near complete resolution of enlarged, FDG avid subcarinal,  paraesophageal and retroperitoneal lymph nodes.    Normal-sized lymph nodes throughout the head and neck, chest, abdomen  and pelvis now demonstrate FDG uptake, the majority of which do not  exceed background suggesting diffuse inflammatory response, possibly  related to therapy.   **Dr Barnhart reviewed with patient and recommended an EGD and discussed radiation consult.  5/15/2023 patient consult with radiation oncology, Dr. Roe, they discussed radiation therapy but due to lack of availability to simulate, plan was to follow-up in 2 months to discuss again.  A referral to [place] radiation therapy was offered and patient declined.  7/5/2023: PET scan: Interval worsening of disease.   Increased size and FDG uptake suggests malignant involvement involving of a right paraesophageal lymph node in the upper thorax, diffuse involvement of the mediastinal and hilar nodes as well as worsening involvement of lymph nodes along the lesser curvature the stomach and in the retroperitoneum. Focal area of uptake in the right inferolateral aspect of the prostate gland may be inflammatory in nature.   Stable areas of uptake seen within the distal portions of the  Esophagus. An area of increased uptake seen previously within the gastric mucosa appears smaller.   7/9/2023: Given progression seen on PET scan he was started on second line treatment with ramucirumab and Taxol.        RESULT FOR IMMUNOHISTOCHEMICAL VENTANA CLONE  PD-L1 ASSAY  COMBINED POSITIVE SCORE (CPS): <1%     Treatment Goal: Palliative     Current Chemo Regime/TX:  ramuciramab 8mg/kg and paclitaxel  65mg/m2 (20% dose decrease related to neuropathy)     Previous treatment:  Nivolumab 240 mg/oxaliplatin 85 mg per metered squared/leucovorin 350 mg per metered squared/fluorouracil 400 mg per metered squared IV bolus/fluorouracil 2400 mg per metered squared via continuous infusion over 46 hours with cycle given every 14 days    Past Medical History:   Diagnosis Date    Cancer (H)     Esophageal cancer (H) 11/01/2022    Esophageal thickening 10/25/2022    Mixed hearing loss, bilateral     Tobacco abuse 1/4/2023    Tobacco use disorder 12/27/2019       Past Surgical History:   Procedure Laterality Date    COLONOSCOPY  11/01/2022    [facility]    ESOPHAGOSCOPY N/A 4/25/2023    Procedure: Upper Endoscopy with biopsy- with Gastrostomy Tube Exchange;  Surgeon: Nba Dalton MD;  Location: [facility] OR    INSERT PORT VASCULAR ACCESS N/A 12/22/2022    Procedure: POWER PORT PLACEMENT;  Surgeon: Nba Dalton MD;  Location: [facility] OR    Upper GI Endoscopy  11/01/2022       Family History   Problem Relation Age of Onset    Breast Cancer Mother     Hyperlipidemia Mother     Diabetes Mother     Coronary Artery Disease Mother     Cerebrovascular Disease Father     Hyperlipidemia Father     Coronary Artery Disease Father     Myocardial Infarction Father     Hyperlipidemia Brother     Hypertension Brother     Diabetes Brother     Asthma Sister     Breast Cancer Sister     Hyperlipidemia Sister     Diabetes Sister     Hyperlipidemia Sister     Diabetes Sister     Hyperlipidemia Sister     Suicide Sister     Anesthesia Reaction Sister     Breast Cancer Sister     Hyperlipidemia Sister     Diabetes Sister     Breast Cancer Niece     Skin Cancer Niece     Thyroid Disease Niece     Breast Cancer Niece     Leukemia Nephew     Colon Cancer No family hx of     Prostate Cancer No family hx of     Genetic Disorder No family hx of        Social History     Socioeconomic History    Marital status:      Spouse name: Jazzy    Number of children:  2    Years of education: Not on file    Highest education level: Not on file   Occupational History    Not on file   Tobacco Use    Smoking status: Former     Packs/day: 0.50     Years: 46.00     Pack years: 23.00     Types: Cigarettes     Start date:      Quit date: 2023     Years since quittin.2    Smokeless tobacco: Never    Tobacco comments:     Trying to quit   Vaping Use    Vaping Use: Never used   Substance and Sexual Activity    Alcohol use: Not Currently     Comment: none since 2022    Drug use: Never    Sexual activity: Not on file   Other Topics Concern    Parent/sibling w/ CABG, MI or angioplasty before 65F 55M? Yes     Comment: father   Social History Narrative    Not on file     Social Determinants of Health     Financial Resource Strain: Not on file   Food Insecurity: Not on file   Transportation Needs: Not on file   Physical Activity: Not on file   Stress: Not on file   Social Connections: Not on file   Intimate Partner Violence: Not on file   Housing Stability: Not on file       Current Outpatient Medications   Medication    gabapentin (NEURONTIN) 300 MG capsule    HYDROcodone-acetaminophen 7.5-325 MG/15ML solution    hydrocortisone (CORTAID) 1 % external cream    omeprazole (PRILOSEC) 20 MG DR capsule    ondansetron (ZOFRAN) 8 MG tablet    prochlorperazine (COMPAZINE) 10 MG tablet    oxyCODONE (XTAMPZA ER) 13.5 MG 12 hr tablet     No current facility-administered medications for this visit.        No Known Allergies    Review Of Systems:  Constitutional:    denies fever, chills, and night sweats.  Eyes:    denies double vision; has some blurred vision  Ears/Nose/Throat:   denies ear pain, nose problems; no pain with swallowing  Respiratory:   denies shortness of breath, some slight cough  Skin:   denies rash, lesions  Cardiovascular:   denies chest pain, palpitations, edema  Gastrointestinal:   denies abdominal pain, bloating; no change in bowel habits or blood in  "stool  Genitourinary:   denies difficulty with urination, blood in urine  Musculoskeletal:    denies new muscle pain, bone pain-see HPI  Neurologic:   denies lightheadedness, headaches, see hPI  Psychiatric:   denies anxiety, depression  Hematologic/Lymphatic/Immunologic:   denies easy bruising, easy bleeding, lumps or bumps noted  Endocrine:   Denies increased thirst      ECOG Performance Status: 1    Physical Exam:  /62   Pulse 73   Temp 97.5  F (36.4  C) (Tympanic)   Resp 20   Ht 1.715 m (5' 7.5\")   Wt 62.5 kg (137 lb 12.6 oz)   SpO2 98%   BMI 21.26 kg/m      GENERAL APPEARANCE: Thin, alert and in no acute distress.  HEENT: Normocephalic, Sclerae anicteric.  No obvious oral lesions or thrush  NECK:   No asymmetry or masses, no thyromegaly.  LYMPHATICS: No palpable cervical, supraclavicular, axillary, or inguinal nodes   RESP: Lungs clear to auscultation bilaterally, respirations regular and easy  CARDIOVASCULAR: Regular rate and rhythm. Normal S1, S2; no murmur, gallop, or rub.  ABDOMEN: Soft, nontender. Bowel sounds auscultated all 4 quadrants. No palpable organomegaly or masses.  MUSCULOSKELETAL: Extremities without gross deformities noted. No edema of bilateral lower extremities.  SKIN: No suspicious lesions or rashes to exposed skin  NEURO: Alert and oriented x 3.  Gait steady.  PSYCHIATRIC: Mentation and affect appear normal.  Mood appropriate.    Laboratory:  Results for orders placed or performed in visit on 09/13/23   Hepatic panel     Status: Normal   Result Value Ref Range    Protein Total 7.3 6.4 - 8.3 g/dL    Albumin 3.8 3.5 - 5.2 g/dL    Bilirubin Total 0.5 <=1.2 mg/dL    Alkaline Phosphatase 75 40 - 129 U/L    AST 20 0 - 45 U/L    ALT 13 0 - 70 U/L    Bilirubin Direct <0.20 0.00 - 0.30 mg/dL   Protein qualitative urine     Status: Abnormal   Result Value Ref Range    Protein Albumin Urine 10 (A) Negative mg/dL   CBC with platelets and differential     Status: Abnormal   Result Value Ref " Range    WBC Count 5.2 4.0 - 11.0 10e3/uL    RBC Count 4.43 4.40 - 5.90 10e6/uL    Hemoglobin 14.8 13.3 - 17.7 g/dL    Hematocrit 43.8 40.0 - 53.0 %    MCV 99 78 - 100 fL    MCH 33.4 (H) 26.5 - 33.0 pg    MCHC 33.8 31.5 - 36.5 g/dL    RDW 13.6 10.0 - 15.0 %    Platelet Count 100 (L) 150 - 450 10e3/uL    % Neutrophils 64 %    % Lymphocytes 23 %    % Monocytes 9 %    % Eosinophils 3 %    % Basophils 1 %    % Immature Granulocytes 0 %    NRBCs per 100 WBC 0 <1 /100    Absolute Neutrophils 3.3 1.6 - 8.3 10e3/uL    Absolute Lymphocytes 1.2 0.8 - 5.3 10e3/uL    Absolute Monocytes 0.5 0.0 - 1.3 10e3/uL    Absolute Eosinophils 0.2 0.0 - 0.7 10e3/uL    Absolute Basophils 0.1 0.0 - 0.2 10e3/uL    Absolute Immature Granulocytes 0.0 <=0.4 10e3/uL    Absolute NRBCs 0.0 10e3/uL   CBC with platelets differential     Status: Abnormal    Narrative    The following orders were created for panel order CBC with platelets differential.  Procedure                               Abnormality         Status                     ---------                               -----------         ------                     CBC with platelets and d...[[id number]]  Abnormal            Final result                 Please view results for these tests on the individual orders.        Imaging Studies:  None completed for today's visit       ASSESSMENT/PLAN:    #1 Esophageal cancer: Stage IV adenocarcinoma of the esophagus diagnosed November 2022.  He initiated systemic therapy with nivolumab/FOLFOX 1/11/2023 with potential for palliative radiation therapy.  He developed progression and treatment was changed to ramuciramab and paclitaxel 7/19/2023.  He will go forth with cycle 3 therapy and will follow up prior to cycle 4 with labs per treatment plan along with a PET scan.  We will also send a referral to the palliative care program to have a visit with Dr. House to assist with symptom management outpatient.  Patient is in favor of this.     #2  Cancer  related pain: Patient will initiate the long-acting oxycodone and we discussed taking this every 12 hours or just in the morning or just at night depending on his pain status but to be consistent.  We also did discuss that he is able to use the hydrocodone for breakthrough pain during the day and night if needed.  We also discussed Narcan and the use of this.  Prescription was sent for Narcan and hydrocodone refill.  I will see him back in about 2 weeks for status check.     #3 neuropathy: Day 8 treatment was removed from his treatment plan per Dr. Barnhart with cycle 2.  He is using gabapentin 300 mg in the morning and evening and he will not increase this to 3 times daily.  I will check back with him in about 2 weeks.    #4 Weight loss: We did discuss increasing his protein supplements by 2 a day if he is able.    I encouraged patient to call with any questions or concerns.    50 minutes spent in the patient's encounter today with time spent in review of the chart along with in chart preparation.  Time was also spent in review of his treatment plan.  Time was also spent obtaining a review of systems and performing a physical exam along with review of his lab work in detail.  Time was also spent in discussion of side effects and my recommendations for management.  Time was also spent in planning his follow-up, placing orders and in chart documentation.    Delmy Valenzuela, EULA KELLEYN, FNP-BC, AOYASHP

## 2023-09-21 NOTE — TELEPHONE ENCOUNTER
Telephone call to patient to check status.  Wife states patient is lying down.  Will attempt later today.

## 2023-09-22 NOTE — TELEPHONE ENCOUNTER
I called and spoke with patient he states his pain level is 4-5 on the pain scale in his back and tube feeling site. He is currently taking Hydrocodone 7.5/ 325 mg liquid twice a day and also Oxycodone 13.5 mg daily. He is aware he can take the Oxycodone every 12 hours if needed. He also does use his compazine daily.  Yahaira Rodriguez LPN

## 2023-09-22 NOTE — TELEPHONE ENCOUNTER
Please call patient and see how he is doing from a pain status and what he is currently taking for pain meds.

## 2023-09-27 NOTE — PROGRESS NOTES
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  He initiated systemic therapy with nivolumab/FOLFOX 1/11/2023 with potential for palliative radiation therapy with treatment changed to ramuciramab and paclitaxel due to progression and this was started 7/19/2023.     He presents to infusion today stating he is \"here\" when asked him how he is doing.  States he actually gained a pound since our last visit 2 weeks ago.  He continues to try to do 3 cans of tube feeding through his tube per day and tells me he is also having a rootbeer float every day.  He also drinks 2 apple ensures daily.      He states the neuropathy is essentially unchanged but he does not feel its worse.  He continues on the gabapentin 300 mg twice a day and has not increased it to 3 times a day as recommended 2 weeks ago.  He has been using some type of cream for his hands which seems to help but has only been doing that once a day.  He has not tried it on his feet yet.  He states he does not have pain in his hands or his feet and that it is more of a numb and tingling feeling and he does have difficulty with fine motor skills including buttoning his shirt.  He has not been tripping or losing his balance.    In regards to his pain status, he rates his pain at about a 4-5 on a 0-to-10 scale.  He has been taking the long-acting oxycodone every 12 hours and using hydrocodone up to twice throughout the day.  He feels that his back pain is much improved.  He has been getting some discomfort to the low sternal area 2-3 times a day since our last visit.  He continues " on Prilosec once daily.    He continues to sleep very poorly.  He did not reinitiate the Lunesta as suggested at her previous visit.    [facility]    He has an appointment to see Dr. House on 10/18/2023 for palliative care services.    Oncologic History:     Experienced difficulty swallowing and 60 pound weight loss  10/21/2022: CT chest, abdomen and pelvis: Diffuse wall thickening of the distal esophagus with associated mediastinal adenopathy, and upper abdominal  Conglomerate lymphadenopathy. Constellation of findings are highly concerning for underlying esophageal carcinoma with metastatic disease.   11/1/2022: Colonoscopy with polyp showing tubular adenoma and EGD: A large ulcerating mass with no bleeding and no stigmata of recent bleeding was found in the lower third of the esophagus, 35 to 40 cm from the incisors.  The mass was partially obstructing and partially circumferential involving  two thirds.    **Biopsy showed poorly differentiated favoring adenocarcinoma. Her 2 negative.   11/22/2002 patient met with Dr. Barnhart to discuss systemic therapy pending PET results/complete staging  12/5/2022 patient met with Dr. Spain with radiation oncology with possible plan to initiate radiation therapy pending results of PET scan  12/14/2022: PET scan: Distal esophageal neoplasm with metastatic involvement the  mediastinum, left hilum and retroperitoneal lymph nodes of the upperabdomen. Also likely localized metastatic involvement of thestomach/GE junction. 2 cm intramural distal esophageal/GE junction mass. Circumferential wall thickening with increased FDG uptake in the distal esophagus adjacent portions of the stomach/GE junction may represent localized disease however could also represent localized inflammation. Metastatic involvement to the subcarinal nodes, left infrahilar and  pericaval/paraesophageal lymph nodes of the lower thorax. Metastatic involvement to the  retroperitoneal lymph nodes of the upper abdomen. These lymph nodes have increased in size dating back to be  10/21/2022 CT scan consistent with worsening disease. Representative  nodes are described above.  12/20/2022  He was seen by Dr. Barnhrat with plan to initiate palliative nivolumab/FOLFOX with potential for palliative Radiation at some point  12/22/2022: Port-A-Cath placement and gastrostomy tube insertion by Dr. Dalton  1/11/2023 initiated treatment FOLFOX with Nivo   3/29/2023  PET scan:    IMPRESSION:   Significant interval improvement.  Marked decrease in size of distal esophageal/GE junction masses with  near complete resolution of enlarged, FDG avid subcarinal,  paraesophageal and retroperitoneal lymph nodes.    Normal-sized lymph nodes throughout the head and neck, chest, abdomen  and pelvis now demonstrate FDG uptake, the majority of which do not  exceed background suggesting diffuse inflammatory response, possibly  related to therapy.   **Dr Barnhart reviewed with patient and recommended an EGD and discussed radiation consult.  5/15/2023 patient consult with radiation oncology, Dr. Roe, they discussed radiation therapy but due to lack of availability to simulate, plan was to follow-up in 2 months to discuss again.  A referral to [facility] radiation therapy was offered and patient declined.  7/5/2023: PET scan: Interval worsening of disease.   Increased size and FDG uptake suggests malignant involvement involving of a right paraesophageal lymph node in the upper thorax, diffuse involvement of the mediastinal and hilar nodes as well as worsening involvement of lymph nodes along the lesser curvature the stomach and in the retroperitoneum. Focal area of uptake in the right inferolateral aspect of the prostate gland may be inflammatory in nature.   Stable areas of uptake seen within the distal portions of the  Esophagus. An area of increased uptake seen previously within the gastric mucosa appears smaller.    7/9/2023: Given progression seen on PET scan he was started on second line treatment with ramucirumab and Taxol.        RESULT FOR IMMUNOHISTOCHEMICAL VENTANA CLONE  PD-L1 ASSAY  COMBINED POSITIVE SCORE (CPS): <1%     Treatment Goal: Palliative     Current Chemo Regime/TX:  ramuciramab 8mg/kg and paclitaxel 65mg/m2 (20% dose decrease related to neuropathy)     Previous treatment:  Nivolumab 240 mg/oxaliplatin 85 mg per metered squared/leucovorin 350 mg per metered squared/fluorouracil 400 mg per metered squared IV bolus/fluorouracil 2400 mg per metered squared via continuous infusion over 46 hours with cycle given every 14 days    Past Medical History:   Diagnosis Date    Cancer (H)     Esophageal cancer (H) 11/01/2022    Esophageal thickening 10/25/2022    Mixed hearing loss, bilateral     Tobacco abuse 1/4/2023    Tobacco use disorder 12/27/2019       Past Surgical History:   Procedure Laterality Date    COLONOSCOPY  11/01/2022    [facility]    ESOPHAGOSCOPY N/A 4/25/2023    Procedure: Upper Endoscopy with biopsy- with Gastrostomy Tube Exchange;  Surgeon: Nba Dalton MD;  Location: [facility]    INSERT PORT VASCULAR ACCESS N/A 12/22/2022    Procedure: POWER PORT PLACEMENT;  Surgeon: Nba Dalton MD;  Location: [facility]    Upper GI Endoscopy  11/01/2022       Family History   Problem Relation Age of Onset    Breast Cancer Mother     Hyperlipidemia Mother     Diabetes Mother     Coronary Artery Disease Mother     Cerebrovascular Disease Father     Hyperlipidemia Father     Coronary Artery Disease Father     Myocardial Infarction Father     Hyperlipidemia Brother     Hypertension Brother     Diabetes Brother     Asthma Sister     Breast Cancer Sister     Hyperlipidemia Sister     Diabetes Sister     Hyperlipidemia Sister     Diabetes Sister     Hyperlipidemia Sister     Suicide Sister     Anesthesia Reaction Sister     Breast Cancer Sister     Hyperlipidemia Sister     Diabetes Sister     Breast Cancer Niece      Skin Cancer Niece     Thyroid Disease Niece     Breast Cancer Niece     Leukemia Nephew     Colon Cancer No family hx of     Prostate Cancer No family hx of     Genetic Disorder No family hx of        Social History     Socioeconomic History    Marital status:      Spouse name: Jazzy    Number of children: 2    Years of education: Not on file    Highest education level: Not on file   Occupational History    Not on file   Tobacco Use    Smoking status: Former     Packs/day: 0.50     Years: 46.00     Pack years: 23.00     Types: Cigarettes     Start date:      Quit date: 2023     Years since quittin.2    Smokeless tobacco: Never    Tobacco comments:     Trying to quit   Vaping Use    Vaping Use: Never used   Substance and Sexual Activity    Alcohol use: Not Currently     Comment: none since 2022    Drug use: Never    Sexual activity: Not on file   Other Topics Concern    Parent/sibling w/ CABG, MI or angioplasty before 65F 55M? Yes     Comment: father   Social History Narrative    Not on file     Social Determinants of Health     Financial Resource Strain: Not on file   Food Insecurity: Not on file   Transportation Needs: Not on file   Physical Activity: Not on file   Stress: Not on file   Social Connections: Not on file   Interpersonal Safety: Not on file   Housing Stability: Not on file       Current Outpatient Medications   Medication    gabapentin (NEURONTIN) 300 MG capsule    HYDROcodone-acetaminophen 7.5-325 MG/15ML solution    hydrocortisone (CORTAID) 1 % external cream    omeprazole (PRILOSEC) 20 MG DR capsule    ondansetron (ZOFRAN) 8 MG tablet    oxyCODONE (XTAMPZA ER) 13.5 MG 12 hr tablet    naloxone (NARCAN) 4 MG/0.1ML nasal spray    prochlorperazine (COMPAZINE) 10 MG tablet     No current facility-administered medications for this visit.        No Known Allergies    Review Of Systems: See HPI    ECOG Performance Status: 1-2    Physical Exam:  /60   Pulse 63   Temp (!)  "96.2  F (35.7  C) (Tympanic)   Resp 20   Ht 1.715 m (5' 7.5\")   Wt 62.9 kg (138 lb 10.7 oz)   SpO2 96%   BMI 21.40 kg/m      GENERAL APPEARANCE:  alert and in no acute distress.  HEENT: Normocephalic, Sclerae anicteric.   NECK:   No asymmetry or masses  LYMPHATICS: No palpable cervical, supraclavicular inguinal nodes   RESP: Lungs clear to auscultation bilaterally, respirations regular and easy  CARDIOVASCULAR: Regular rate and rhythm. Normal S1, S2  NEURO: Alert and oriented x 3.    PSYCHIATRIC: Mentation and affect appear normal.  Mood appropriate.    Laboratory:  Results for orders placed or performed in visit on 09/27/23   Protein qualitative urine     Status: Abnormal   Result Value Ref Range    Protein Albumin Urine 20 (A) Negative mg/dL   CBC with platelets and differential     Status: Abnormal   Result Value Ref Range    WBC Count 4.7 4.0 - 11.0 10e3/uL    RBC Count 4.19 (L) 4.40 - 5.90 10e6/uL    Hemoglobin 13.8 13.3 - 17.7 g/dL    Hematocrit 41.6 40.0 - 53.0 %    MCV 99 78 - 100 fL    MCH 32.9 26.5 - 33.0 pg    MCHC 33.2 31.5 - 36.5 g/dL    RDW 13.6 10.0 - 15.0 %    Platelet Count 79 (L) 150 - 450 10e3/uL    % Neutrophils 61 %    % Lymphocytes 27 %    % Monocytes 10 %    % Eosinophils 1 %    % Basophils 1 %    % Immature Granulocytes 0 %    NRBCs per 100 WBC 0 <1 /100    Absolute Neutrophils 2.9 1.6 - 8.3 10e3/uL    Absolute Lymphocytes 1.3 0.8 - 5.3 10e3/uL    Absolute Monocytes 0.5 0.0 - 1.3 10e3/uL    Absolute Eosinophils 0.1 0.0 - 0.7 10e3/uL    Absolute Basophils 0.1 0.0 - 0.2 10e3/uL    Absolute Immature Granulocytes 0.0 <=0.4 10e3/uL    Absolute NRBCs 0.0 10e3/uL   CBC with platelets differential     Status: Abnormal    Narrative    The following orders were created for panel order CBC with platelets differential.  Procedure                               Abnormality         Status                     ---------                               -----------         ------                     CBC with " platelets and d...[[id number]]  Abnormal            Final result                 Please view results for these tests on the individual orders.       Imaging Studies: None completed for today's visit      ASSESSMENT/PLAN:    #1 Esophageal cancer: Stage IV adenocarcinoma of the esophagus diagnosed November 2022.  He initiated systemic therapy with nivolumab/FOLFOX 1/11/2023 with potential for palliative radiation therapy.  He developed progression and treatment was changed to ramuciramab and paclitaxel 7/19/2023.  Currently on cycle 3-day 15 therapy.  He will follow up prior to cycle 4 with labs per treatment plan along with a PET scan.       #2  Cancer related pain: Currently on the long-acting oxycodone every 12 hours and using hydrocodone as needed in between for breakthrough pain.  He feels his pain is adequately controlled.  We discussed switching to MS Contin and discontinuing the long-acting oxycodone due to cost and he is in favor of this.  He will continue with the hydrocodone for breakthrough pain.      #3 neuropathy: Day 8 treatment was removed from his treatment plan per Dr. Barnhart with cycle 2.  He is using gabapentin 300 mg in the morning and evening.  Once again recommended he increase this to 3 times daily.      #4 Weight loss:  We again did discussed attempting to increase his protein supplements.     #5  Insomnia: We will try Remeron 7.5 mg at at bedtime.  I discussed that this can help with nausea, weight loss, anxiety and sleep.  Can be increased to 30 mg at at bedtime if needed.  We will send a prescription for 1 month to see how he tolerates.  We also did discuss side effects.    I encouraged patient to call with any questions or concerns.    35 minutes spent in the patient's encounter today with time spent in review of the chart along with in chart preparation.  Time was also spent obtaining a review of systems and performing a physical exam along with discussion of ongoing side effects and  symptoms and my recommendations for management.  Time was also spent in placing orders and in chart documentation.    Delmy Valenzuela NP  APRN, FNP-BC, AOCNP

## 2023-09-27 NOTE — TELEPHONE ENCOUNTER
Anastasiya from [facility] pharmacy in the clinic called and states patients MS Contin 15 mg CR - Take every 12 hours is not covered by insurance. They will do a prior Auth for medication and ask for it to be expedited for pain control. I notified patient and pharmacy will notify Oncology and patient once approved. I did check cost if not covered by insurance for 60 tablets and it would be $41.49. Patient states if not covered he will pay out of pocket.   Yahaira Rodriguez LPN

## 2023-09-27 NOTE — NURSING NOTE
"Oncology Rooming Note    September 27, 2023 9:18 AM   Kenneth Matthew is a 58 year old male who presents for:    Chief Complaint   Patient presents with    Oncology Clinic Visit     Follow up Malignant neoplasm of lower third of esophagus        Initial Vitals: /60   Pulse 63   Temp (!) 96.2  F (35.7  C) (Tympanic)   Resp 20   Ht 1.715 m (5' 7.5\")   Wt 62.9 kg (138 lb 10.7 oz)   SpO2 96%   BMI 21.40 kg/m   Estimated body mass index is 21.4 kg/m  as calculated from the following:    Height as of this encounter: 1.715 m (5' 7.5\").    Weight as of this encounter: 62.9 kg (138 lb 10.7 oz). Body surface area is 1.73 meters squared.  Moderate Pain (5) Comment: Data Unavailable   No LMP for male patient.  Allergies reviewed: Yes  Medications reviewed: Yes    Medications: MEDICATION REFILLS NEEDED TODAY. Provider was notified.  Pharmacy name entered into EPIC: [facility] - [place], [place] - [address]    Clinical concerns: Due to cost patient [facility]      Yahaira Rodriguez LPN             "

## 2023-09-27 NOTE — PATIENT INSTRUCTIONS

## 2023-09-27 NOTE — PATIENT INSTRUCTIONS
We would like to see you back per your schedule.     Stop the long-acting oxycodone.  Start the long-acting morphine.  Continue to use the hydrocodone as needed for breakthrough pain.    Your prescriptions for long-acting morphine for pain and Remeron for sleep have been sent to: [facility].     Your prescriptions for Prilosec for reflux and gabapentin for the neuropathy, [facility]  Please increase your Prilosec to twice daily and your gabapentin to 3 times a day.    When you are in need of a refill of your, please call your pharmacy and they will send us a request.     If you have any questions please call [phone]    Other instructions:  none

## 2023-09-27 NOTE — TELEPHONE ENCOUNTER
Received a PA request from [facility] for morphine (MS CONTIN) 15 MG CR tablet. Submitted on CMM. Waiting for a response.

## 2023-09-27 NOTE — PROGRESS NOTES
Infusion Nursing Note:  Miguel Abinh BELL Vipul presents today for cyramza/taxol.    Patient seen by provider today: Yes: Martha Valenzuela   present during visit today: Not Applicable.    Note: see provider assessment on this date for details.      Intravenous Access:  Implanted Port.    Treatment Conditions:  Results reviewed, labs MET treatment parameters, ok to proceed with treatment.    Component      Latest Ref Rng 9/27/2023  9:24 AM   WBC      4.0 - 11.0 10e3/uL 4.7    RBC Count      4.40 - 5.90 10e6/uL 4.19 (L)    Hemoglobin      13.3 - 17.7 g/dL 13.8    Hematocrit      40.0 - 53.0 % 41.6    MCV      78 - 100 fL 99    MCH      26.5 - 33.0 pg 32.9    MCHC      31.5 - 36.5 g/dL 33.2    RDW      10.0 - 15.0 % 13.6    Platelet Count      150 - 450 10e3/uL 79 (L)    % Neutrophils      % 61    % Lymphocytes      % 27    % Monocytes      % 10    % Eosinophils      % 1    % Basophils      % 1    % Immature Granulocytes      % 0    NRBCs per 100 WBC      <1 /100 0    Absolute Neutrophils      1.6 - 8.3 10e3/uL 2.9    Absolute Lymphocytes      0.8 - 5.3 10e3/uL 1.3    Absolute Monocytes      0.0 - 1.3 10e3/uL 0.5    Absolute Eosinophils      0.0 - 0.7 10e3/uL 0.1    Absolute Basophils      0.0 - 0.2 10e3/uL 0.1    Absolute Immature Granulocytes      <=0.4 10e3/uL 0.0    Absolute NRBCs      10e3/uL 0.0    Protein Albumin Urine      Negative mg/dL 20 !       Legend:  (L) Low  ! Abnormal    Post Infusion Assessment:  Patient tolerated infusion without incident.  No evidence of extravasations.  Access discontinued per protocol.       Discharge Plan:   Copy of AVS reviewed with patient and/or family.  Patient will return 10/4/23 for next appointment.  Patient discharged in stable condition accompanied by: sister.  Departure Mode: Ambulatory.      Ricardo CLAIRE

## 2023-09-28 NOTE — TELEPHONE ENCOUNTER
Received an APPROVAL from [facility] for  morphine (MS CONTIN) 15 MG CR tablet. Effective dates 9/28/23-3/28/24. Scanned in Epic.

## 2023-10-02 NOTE — PROGRESS NOTES
[facility]: Cancer Care                                                                                        Patient called and was questioning if he is taking too many pills at once. He states when he takes the long acting morphine and then lays down he has chest pain. He states that it only happens when he takes the morphine. He stopped taking the morphine and switched back to hydrocodone, he has had no further pain. He states he has enough until he follows up with Dr. Barnhart.      Signature:  Nenita Tatum RN

## 2023-10-03 NOTE — TELEPHONE ENCOUNTER
Made an appointment reminder call for the [facility] scheduled 10/4 at 0945.  Patient has had several of these scans and states that he knows the prep.

## 2023-10-03 NOTE — PROGRESS NOTES
He shouldn't take everything together. If he is not having much for pain then we should take away the long acting pain medication, no morphine or the Xtampza that he was on before.  Use the hydrcodone as needed. I am wondering if he started the remeron for sleep.  I could see him in clinic to discuss this week.   Problem: NORMAL   Goal: Experiences normal transition  Description: INTERVENTIONS:  - Assess and monitor vital signs and lab values. - Encourage skin-to-skin with caregiver for thermoregulation  - Assess signs, symptoms and risk factors for hypoglycemia and follow protocol as needed. - Assess signs, symptoms and risk factors for jaundice risk and follow protocol as needed. - Utilize standard precautions and use personal protective equipment as indicated. Wash hands properly before and after each patient care activity.   - Ensure proper skin care and diapering and educate caregiver. - Follow proper infant identification and infant security measures (secure access to the unit, provider ID, visiting policy, [employer] and Kisses system), and educate caregiver. - Ensure proper circumcision care and instruct/demonstrate to caregiver. Outcome: Progressing  Goal: Total weight loss less than 10% of birth weight  Description: INTERVENTIONS:  - Initiate breastfeeding within first hour after birth. - Encourage rooming-in.  - Assess infant feedings. - Monitor intake and output and daily weight.  - Encourage maternal fluid intake for breastfeeding mother.  - Encourage feeding on-demand or as ordered per pediatrician.  - Educate caregiver on proper bottle-feeding technique as needed. - Provide information about early infant feeding cues (e.g., rooting, lip smacking, sucking fingers/hand) versus late cue of crying.  - Review techniques for breastfeeding moms for expression (breast pumping) and storage of breast milk.   Outcome: Progressing

## 2023-10-03 NOTE — PROGRESS NOTES
[facility]: Cancer Care                                                                                        Spoke with patients spouse and she was not certain what exactly he is taking all at once, has has stopped the morphine and is only taking the hydrocodone. He would like to make an appointment with [facility] this week to discuss. Patient offered Friday at 9:10. They mentioned that they would like a referral for palliative care. Informed them that this was already placed.       Signature:  Nenita Tatum RN

## 2023-10-06 NOTE — NURSING NOTE
"Oncology Rooming Note    October 6, 2023 9:09 AM   Kenneth Matthew is a 58 year old male who presents for:    Chief Complaint   Patient presents with    Oncology Clinic Visit     Follow up Malignant neoplasm of lower third of esophagus        Initial Vitals: /62   Pulse 80   Temp 99.4  F (37.4  C) (Tympanic)   Resp 20   Ht 1.715 m (5' 7.5\")   Wt 63 kg (138 lb 14.2 oz)   SpO2 97%   BMI 21.43 kg/m   Estimated body mass index is 21.43 kg/m  as calculated from the following:    Height as of this encounter: 1.715 m (5' 7.5\").    Weight as of this encounter: 63 kg (138 lb 14.2 oz). Body surface area is 1.73 meters squared.  Severe Pain (7) Comment: Data Unavailable   No LMP for male patient.  Allergies reviewed: Yes  Medications reviewed: Yes    Medications: Medication refills not needed today.  Pharmacy name entered into EPIC: [facility] - [place], [place] - [address]          Yahaira Rodriguez LPN             "

## 2023-10-06 NOTE — PROGRESS NOTES
"Oncology Follow-up Visit:  October 6, 2023    Reason for Visit:  Patient presents with:  Oncology Clinic Visit: Follow up Malignant neoplasm of lower third of esophagus        Nursing Note and documentation reviewed: yes    HPI:  This is a 58-year-old male patient who presents to the infusion center today in a nonroutine fashion for to discuss pain control and medications.  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  He initiated systemic therapy with nivolumab/FOLFOX 1/11/2023 with potential for palliative radiation therapy with treatment changed to ramuciramab and paclitaxel due to progression and this was started 7/19/2023.      Patient had called on 3/3/2023 with complaints of development of chest pain after taking the morphine and lying down.    He presents to the clinic today accompanied by his wife.  He tells me the pain is mainly in his back.  Patient tells me he took the morphine 1 time and it was in the morning.  He did take it along with his Zofran, gabapentin and Prilosec along with the tube feeding and states he laid down and developed pain in his chest.  As soon as he sat up the pain went away.  He was hesitant to take the morphine again so has been using the hydrocodone up to 3 times a day.  He states it does help bring his pain from a 7 down to a 1 for almost 6 hours.    His sleep continues to be erratic.  He did take the Remeron 7.5 mg and states it did not help him sleep any longer than normal.  He continues to get up in the middle of the night and drink a cup of coffee.  States he did drink a cannabis drink one night that did help him sleep for 5 to 6 hours.  He states it did make him feel somewhat \"loopy\".    He denies any fevers, shortness of breath or cough.  He states around the time of his PET scan he was coughing a little more and did cough up some green sputum.    He has an appointment to see Dr. House on 10/18/2023 for palliative " care services.     He questions the results of his recent PET scan.    Oncologic History:     Experienced difficulty swallowing and 60 pound weight loss  10/21/2022: CT chest, abdomen and pelvis: Diffuse wall thickening of the distal esophagus with associated mediastinal adenopathy, and upper abdominal  Conglomerate lymphadenopathy. Constellation of findings are highly concerning for underlying esophageal carcinoma with metastatic disease.   11/1/2022: Colonoscopy with polyp showing tubular adenoma and EGD: A large ulcerating mass with no bleeding and no stigmata of recent bleeding was found in the lower third of the esophagus, 35 to 40 cm from the incisors.  The mass was partially obstructing and partially circumferential involving  two thirds.    **Biopsy showed poorly differentiated favoring adenocarcinoma. Her 2 negative.   11/22/2002 patient met with Dr. Barnhart to discuss systemic therapy pending PET results/complete staging  12/5/2022 patient met with Dr. Spain with radiation oncology with possible plan to initiate radiation therapy pending results of PET scan  12/14/2022: PET scan: Distal esophageal neoplasm with metastatic involvement the  mediastinum, left hilum and retroperitoneal lymph nodes of the upperabdomen. Also likely localized metastatic involvement of thestomach/GE junction. 2 cm intramural distal esophageal/GE junction mass. Circumferential wall thickening with increased FDG uptake in the distal esophagus adjacent portions of the stomach/GE junction may represent localized disease however could also represent localized inflammation. Metastatic involvement to the subcarinal nodes, left infrahilar and  pericaval/paraesophageal lymph nodes of the lower thorax. Metastatic involvement to the retroperitoneal lymph nodes of the upper abdomen. These lymph nodes have increased in size dating back to be  10/21/2022 CT scan consistent with worsening disease. Representative  nodes are described  above.  12/20/2022  He was seen by Dr. Barnhart with plan to initiate palliative nivolumab/FOLFOX with potential for palliative Radiation at some point  12/22/2022: Port-A-Cath placement and gastrostomy tube insertion by Dr. Dalton  1/11/2023 initiated treatment FOLFOX with Nivo   3/29/2023  PET scan:    IMPRESSION:   Significant interval improvement.  Marked decrease in size of distal esophageal/GE junction masses with  near complete resolution of enlarged, FDG avid subcarinal,  paraesophageal and retroperitoneal lymph nodes.    Normal-sized lymph nodes throughout the head and neck, chest, abdomen  and pelvis now demonstrate FDG uptake, the majority of which do not  exceed background suggesting diffuse inflammatory response, possibly  related to therapy.   **Dr Barnhart reviewed with patient and recommended an EGD and discussed radiation consult.  5/15/2023 patient consult with radiation oncology, Dr. Roe, they discussed radiation therapy but due to lack of availability to simulate, plan was to follow-up in 2 months to discuss again.  A referral to [place] radiation therapy was offered and patient declined.  7/5/2023: PET scan: Interval worsening of disease.   Increased size and FDG uptake suggests malignant involvement involving of a right paraesophageal lymph node in the upper thorax, diffuse involvement of the mediastinal and hilar nodes as well as worsening involvement of lymph nodes along the lesser curvature the stomach and in the retroperitoneum. Focal area of uptake in the right inferolateral aspect of the prostate gland may be inflammatory in nature.   Stable areas of uptake seen within the distal portions of the  Esophagus. An area of increased uptake seen previously within the gastric mucosa appears smaller.   7/9/2023: Given progression seen on PET scan he was started on second line treatment with ramucirumab and Taxol.        RESULT FOR IMMUNOHISTOCHEMICAL VENTANA CLONE  PD-L1 ASSAY  COMBINED POSITIVE  SCORE (CPS): <1%     Treatment Goal: Palliative     Current Chemo Regime/TX:  ramuciramab 8mg/kg and paclitaxel 65mg/m2 (20% dose decrease related to neuropathy)     Previous treatment:  Nivolumab 240 mg/oxaliplatin 85 mg per metered squared/leucovorin 350 mg per metered squared/fluorouracil 400 mg per metered squared IV bolus/fluorouracil 2400 mg per metered squared via continuous infusion over 46 hours with cycle given every 14 days     Past Medical History:   Diagnosis Date    Cancer (H)     Esophageal cancer (H) 11/01/2022    Esophageal thickening 10/25/2022    Mixed hearing loss, bilateral     Tobacco abuse 1/4/2023    Tobacco use disorder 12/27/2019       Past Surgical History:   Procedure Laterality Date    [facility]    ESOPHAGOSCOPY N/A 4/25/2023    Procedure: Upper Endoscopy with biopsy- with Gastrostomy Tube Exchange;  Surgeon: Nba Dalton MD;  Location: [facility] OR    INSERT PORT VASCULAR ACCESS N/A 12/22/2022    Procedure: POWER PORT PLACEMENT;  Surgeon: Nba Dalton MD;  Location: [facility] OR    Upper GI Endoscopy  11/01/2022       Family History   Problem Relation Age of Onset    Breast Cancer Mother     Hyperlipidemia Mother     Diabetes Mother     Coronary Artery Disease Mother     Cerebrovascular Disease Father     Hyperlipidemia Father     Coronary Artery Disease Father     Myocardial Infarction Father     Hyperlipidemia Brother     Hypertension Brother     Diabetes Brother     Asthma Sister     Breast Cancer Sister     Hyperlipidemia Sister     Diabetes Sister     Hyperlipidemia Sister     Diabetes Sister     Hyperlipidemia Sister     Suicide Sister     Anesthesia Reaction Sister     Breast Cancer Sister     Hyperlipidemia Sister     Diabetes Sister     Breast Cancer Niece     Skin Cancer Niece     Thyroid Disease Niece     Breast Cancer Niece     Leukemia Nephew     Colon Cancer No family hx of     Prostate Cancer No family hx of     Genetic Disorder No family hx of   "      Social History     Socioeconomic History    Marital status:      Spouse name: Jazzy    Number of children: 2    Years of education: Not on file    Highest education level: Not on file   Occupational History    Not on file   Tobacco Use    Smoking status: Former     Packs/day: 0.50     Years: 46.00     Pack years: 23.00     Types: Cigarettes     Start date:      Quit date: 2023     Years since quittin.3    Smokeless tobacco: Never    Tobacco comments:     Trying to quit   Vaping Use    Vaping Use: Never used   Substance and Sexual Activity    Alcohol use: Not Currently     Comment: none since 2022    Drug use: Never    Sexual activity: Not on file   Other Topics Concern    Parent/sibling w/ CABG, MI or angioplasty before 65F 55M? Yes     Comment: father   Social History Narrative    Not on file     Social Determinants of Health     Financial Resource Strain: Not on file   Food Insecurity: Not on file   Transportation Needs: Not on file   Physical Activity: Not on file   Stress: Not on file   Social Connections: Not on file   Interpersonal Safety: Not on file   Housing Stability: Not on file       Current Outpatient Medications   Medication    gabapentin (NEURONTIN) 300 MG capsule    HYDROcodone-acetaminophen 7.5-325 MG/15ML solution    hydrocortisone (CORTAID) 1 % external cream    mirtazapine (REMERON) 7.5 MG tablet    omeprazole (PRILOSEC) 20 MG DR capsule    ondansetron (ZOFRAN) 8 MG tablet    prochlorperazine (COMPAZINE) 10 MG tablet    morphine (MS CONTIN) 15 MG CR tablet    naloxone (NARCAN) 4 MG/0.1ML nasal spray     No current facility-administered medications for this visit.        No Known Allergies    Review Of Systems: See HPI      ECOG Performance Status: 2    Physical Exam:  /62   Pulse 80   Temp 99.4  F (37.4  C) (Tympanic)   Resp 20   Ht 1.715 m (5' 7.5\")   Wt 63 kg (138 lb 14.2 oz)   SpO2 97%   BMI 21.43 kg/m      GENERAL APPEARANCE: alert and in no " acute distress.  HEENT: Normocephalic, Sclerae anicteric.   NECK:   No asymmetry   RESP: respirations regular and easy  NEURO: Alert and oriented x 3.  Gait steady.  PSYCHIATRIC: Mentation and affect appear normal.  Mood appropriate.    Laboratory: None completed for today's visit    Imaging Studies:      Results for orders placed or performed during the hospital encounter of 10/04/23   PET Oncology (Eyes to Thighs)    Narrative    Procedure: PET ONCOLOGY (EYES TO THIGHS)    Subtype: Subsequent     Radiopharmaceutical: F-18 FDG     Dose: 12.3 mCi IV    Serum Glucose: 113 mg/dl    History:   response to therapy for stage 4 GE junction carcinoma;  Malignant neoplasm of lower third of esophagus (H)    Comparison: 7/5/2023, 3/29/2023    Technique:  A low dose CT examination was performed for the purpose of  attenuation correction and localization. Attenuation corrected PET  images from the skull base to the mid thigh were acquired  approximately one hour following intravenous administration of the  dose, and displayed in transaxial, sagittal, and coronal projections.    FINDINGS:     NECK:   -No significant abnormal uptake.   -No significant findings on the non-contrast CT localizer images.    CHEST:   -Slight interval increase in size of the enlarged node adjacent to the  right upper esophagus, now measuring 14 x 11 mm, previously 13 x 10.  This demonstrates intensely increased FDG uptake with a maximum SUV of  18.5, not significantly changed.   -There is intensely increased GI uptake within a subcarinal lymph node  measuring 11 mm short axis, maximum SUV of 15.7, previously 8.7.   -There is intensely increased FDG uptake within the distal esophageal  mass, with a maximum of 17.2, not significantly changed. There are  couple of additional mediastinal lymph nodes which demonstrate a  common femoral which are slightly more proximal than on the prior  exam.  -There has been interval collapse of the right middle lobe  with  associated FDG uptake, possibly due to an infectious or inflammatory  process.  -There is a right chest port with catheter terminating in the SVC.    ABDOMEN/PELVIS:   -Interval increase in size of the lymph node along the lesser  curvature of the stomach, now measuring 2.2 x 1.9 cm, previously 1.5 x  1.4 cm. There is intensely increased FDG uptake with a maximum SUV of  19.3.   -There is a new 7 mm short axis preaortic lymph node demonstrating  intense increased FDG uptake with a maximum SUV of 13.8.  -There are several additional gastrohepatic and gastrosplenic lymph  nodes which remain prominent.  -A percutaneous gastrostomy tube is unchanged with uptake along the  course of the catheter, likely due to localized inflammation.    MUSCULOSKELETAL:   -No significant abnormal uptake.   -No significant findings on the non-contrast CT localizer images.      Impression    IMPRESSION:      Interval progression of disease, with increasing size of mediastinal  and upper abdominal lymph nodes. The focal mass in the distal  esophagus has not changed significantly.    There has been interval collapse of the right middle lobe which  demonstrates increased FDG uptake, likely due to an infectious or  inflammatory process, however metastatic disease is also within the  differential..    LIZANDRO BARAHONA MD         SYSTEM ID:  [id number]        ASSESSMENT/PLAN:    #1 Cancer related pain: Discussed in depth timing of his medications.  I recommended  the morphine from his other medications.  We discussed possible etiology being multiple pills at one time and location of disease.  He would like to take the Morphine at noon and again at midnight if he chooses to take at night.  He feels his pain is less at night and takes the hydrocodone and this works well throughout the night.  Discussed consistence with timing of the long acting morphine for the best pain control.  He is aware he can still use the hydrocodone for  breakthrough pain.  We also discussed changing all meds to liquid form and he declines.  We will reassess next week.    #2  Insomnia:  He took the remeron only once.  He will try it again.  We discussed possible increasing.     #3 neuropathy: He will continue with the gabapentin.  I did add vitamin B12, folate and vitamin D with his treatment plan Labs for next week per his request.    #4  Esophageal cancer: Stage IV adenocarcinoma of the esophagus diagnosed November 2022.  He initiated systemic therapy with nivolumab/FOLFOX 1/11/2023 with potential for palliative radiation therapy.  He developed progression and treatment was changed to ramuciramab and paclitaxel 7/19/2023.  He has received 3 cycles to date.  I did review his PET scan with him showing some slight progression.  He is aware that Dr. Barnhart will review with him next week with further plan.    I encouraged patient to call with any questions or concerns.    30 minutes spent in the patient's encounter today with time spent in review of the chart along with in chart preparation.  Time was also spent obtaining a review of systems and performing a physical exam along with discussion of pain status and insomnia and my recommendations for management.  Time was also spent in review of his PET scan.  Time was also spent in discussion of plan to follow-up with Dr. Yobany Norton next week in regards to ongoing treatment.  Time was also spent in placing orders and in chart documentation.    Delmy Valenzuela, EULA KELLEYN, FNP-BC, AOCNP

## 2023-10-11 NOTE — PROGRESS NOTES
ANMOL from Dr. Barnhart Patient is not being treated today, treatment is changing. Will need 1 liter of NS over an hour.

## 2023-10-11 NOTE — PROGRESS NOTES
[facility]: Cancer Care                                                                                          Spoke with patients wife regarding scheduling patient to new treatment. Will start him on Wednesday next week. Will call for plan of care out reach on 10-13-23    Signature:  Nenita Tatum RN

## 2023-10-11 NOTE — PROGRESS NOTES
Infusion Nursing Note:  Kenneth Matthew presents today for IVF.    Patient seen by provider today: Yes: See ONC provider assessment/visit notes this date.  Home medications, allergies, vitals, fall risk, pain and abuse screen done at [facility] ONC appt earlier this date. All reviewed by this nurse prior to treating. Patient denies questions or concerns not already discussed with provider prior, wishes to proceed with treatment.    present during visit today: Not Applicable.    Note: N/A.      Post Infusion Assessment:  Patient tolerated infusion without incident.  Site patent and intact, free from redness, edema or discomfort.  No evidence of extravasations.  Access discontinued per protocol.       Discharge Plan:   Patient discharged in stable condition accompanied by: sister.  Departure Mode: Ambulatory.

## 2023-10-11 NOTE — NURSING NOTE
"Oncology Rooming Note    October 11, 2023 8:56 AM   Kenneth Matthew is a 58 year old male who presents for:    Chief Complaint   Patient presents with    Oncology Clinic Visit     Follow up Malignant neoplasm of lower third of esophagus        Initial Vitals: BP 90/58   Pulse 67   Temp 98  F (36.7  C) (Tympanic)   Resp 20   Ht 1.715 m (5' 7.5\")   Wt 62.8 kg (138 lb 7.2 oz)   SpO2 97%   BMI 21.36 kg/m   Estimated body mass index is 21.36 kg/m  as calculated from the following:    Height as of this encounter: 1.715 m (5' 7.5\").    Weight as of this encounter: 62.8 kg (138 lb 7.2 oz). Body surface area is 1.73 meters squared.  Mild Pain (3) Comment: Data Unavailable   No LMP for male patient.  Allergies reviewed: Yes  Medications reviewed: Yes    Medications: Medication refills not needed today.  Pharmacy name entered into EPIC: [facility] - [place], [place] - [address]          Yahaira Rodriguez LPN             "

## 2023-10-11 NOTE — PROGRESS NOTES
MEDICAL ONCOLOGY FOLLOW UP NOTE  Oct 11, 2023    Reason for Follow up: esophageal cancer    HISTORY OF PRESENT ILLNESS  Kenneth Matthew is a 58 year old male with PMH as stated below who is seen in the oncology clinic for follow up of esophageal cancer    His history in short is as follows    Mr. Matthew states he noticed difficulty swallowing initially starting June 2022. Over the last 1 year he has unintentionally lost 60 pounds.     10/21/2022: CT chest, abdomen and pelvis: Diffuse wall thickening of the distal esophagus with associated mediastinal adenopathy, and upper abdominal  Conglomerate lymphadenopathy. Constellation of findings are highly concerning for underlying esophageal carcinoma with metastatic disease.     11/1/2022: EGD: A large ulcerating mass with no bleeding and no stigmata of recent bleeding was found Lower third from the incisors. The mass was partially obstructing and partially circumferential involving  two thirds.     Biopsy showed poorly differentiated favoring adenocarcinoma. Her 2 negative.     12/14/2022: PET scan: Distal esophageal neoplasm with metastatic involvement the  mediastinum, left hilum and retroperitoneal lymph nodes of the upperabdomen. Also likely localized metastatic involvement of thestomach/GE junction. 2 cm intramural distal esophageal/GE junction mass. Circumferential wall thickening with increased FDG uptake in the distal esophagus adjacent portions of the stomach/GE junction may represent localized disease however could also represent localized inflammation. Metastatic involvement to the subcarinal nodes, left infrahilar and  pericaval/paraesophageal lymph nodes of the lower thorax. Metastatic involvement to the retroperitoneal lymph nodes of the upper abdomen. These lymph nodes have increased in size dating back to be  10/21/2022 CT scan consistent with worsening disease. Representative  nodes are described above.    1/11/2023 to 6/28/2023: 13 cycles of FOLFOX with  Moisés    7/5/2023: PET scan: Interval worsening of disease.   Increased size and FDG uptake suggests malignant involvement involving of a right paraesophageal lymph node in the upper thorax, diffuse involvement of the mediastinal and hilar nodes as well as worsening involvement of lymph nodes along the lesser curvature the stomach and in the retroperitoneum. Focal area of uptake in the right inferolateral aspect of the prostate gland may be inflammatory in nature.   Stable areas of uptake seen within the distal portions of the  Esophagus. An area of increased uptake seen previously within the gastric mucosa appears smaller.    7/9/2023 to 9/13/2023: Given progression seen on PET scan he was started on second line treatment with ramucirumab and Taxol.  Received total of 3 cycles.    10/4/2023: PET scan:Interval progression of disease, with increasing size of mediastinal  and upper abdominal lymph nodes. The focal mass in the distal  esophagus has not changed significantly.There has been interval collapse of the right middle lobe which  demonstrates increased FDG uptake, likely due to an infectious or  inflammatory process, however metastatic disease is also within the  differential..       Interim history    Vipul is seen today in the clinic with his sister.  His pain is controlled today.  He denies any nausea or vomiting.  Denies any diarrhea.  Fatigue is at baseline.  Does require a nap in the middle of the day.  He has noticed that his neuropathy is worse.  He states he is dropping pills when he picks them up with his hands.  He feels that the gabapentin is not really helping.      REVIEW OF SYSTEMS  A 12-point ROS negative except as in HPI      Current Outpatient Medications   Medication Sig Dispense Refill    gabapentin (NEURONTIN) 300 MG capsule Take 1 capsule (300 mg) by mouth 3 times daily Start with 300mg at night for 7 days then increase to AM and PM for 7 days then increase to 3 times a day. (Patient  taking differently: Take 300 mg by mouth 3 times daily Take 1 po three times a day) 270 capsule 1    HYDROcodone-acetaminophen 7.5-325 MG/15ML solution Take 10 mLs by mouth every 6 hours as needed for moderate to severe pain 473 mL 0    hydrocortisone (CORTAID) 1 % external cream Apply topically 2 times daily 30 g 1    mirtazapine (REMERON) 7.5 MG tablet Take 1 tablet (7.5 mg) by mouth At Bedtime 30 tablet 0    morphine (MS CONTIN) 15 MG CR tablet Take 1 tablet (15 mg) by mouth every 12 hours 60 tablet 0    omeprazole (PRILOSEC) 20 MG DR capsule Take 1 capsule (20 mg) by mouth 2 times daily 180 capsule 3    ondansetron (ZOFRAN) 8 MG tablet Take 1 tablet (8 mg) by mouth every 8 hours as needed for nausea 60 tablet 1    prochlorperazine (COMPAZINE) 10 MG tablet Take 1 tablet (10 mg) by mouth every 6 hours as needed for nausea or vomiting 30 tablet 2    naloxone (NARCAN) 4 MG/0.1ML nasal spray Spray 1 spray (4 mg) into one nostril alternating nostrils as needed for opioid reversal every 2-3 minutes until assistance arrives (Patient not taking: Reported on 9/27/2023) 0.2 mL 0       No Known Allergies  Immunization History   Administered Date(s) Administered    COVID-19 MONOVALENT 12+ (Pfizer) 08/28/2021, 09/18/2021    TDAP (Adacel,Boostrix) 12/10/2021       Past Medical History:   Diagnosis Date    Cancer (H)     Esophageal cancer (H) 11/01/2022    Esophageal thickening 10/25/2022    Mixed hearing loss, bilateral     Tobacco abuse 1/4/2023    Tobacco use disorder 12/27/2019       Past Surgical History:   Procedure Laterality Date    COLONOSCOPY  11/01/2022    [facility]    ESOPHAGOSCOPY N/A 4/25/2023    Procedure: Upper Endoscopy with biopsy- with Gastrostomy Tube Exchange;  Surgeon: Nba Dalton MD;  Location: [facility] OR    INSERT PORT VASCULAR ACCESS N/A 12/22/2022    Procedure: POWER PORT PLACEMENT;  Surgeon: Nba Dalton MD;  Location: [facility]    Upper GI Endoscopy  11/01/2022       SOCIAL HISTORY  History   Smoking  "Status    Former    Packs/day: 0.50    Years: 46.00    Types: Cigarettes    Start date: 1976    Quit date: 6/12/2023   Smokeless Tobacco    Never    Social History    Substance and Sexual Activity      Alcohol use: Not Currently        Comment: none since July of 2022     History   Drug Use Unknown       FAMILY HISTORY  Family History   Problem Relation Age of Onset    Breast Cancer Mother     Hyperlipidemia Mother     Diabetes Mother     Coronary Artery Disease Mother     Cerebrovascular Disease Father     Hyperlipidemia Father     Coronary Artery Disease Father     Myocardial Infarction Father     Hyperlipidemia Brother     Hypertension Brother     Diabetes Brother     Asthma Sister     Breast Cancer Sister     Hyperlipidemia Sister     Diabetes Sister     Hyperlipidemia Sister     Diabetes Sister     Hyperlipidemia Sister     Suicide Sister     Anesthesia Reaction Sister     Breast Cancer Sister     Hyperlipidemia Sister     Diabetes Sister     Breast Cancer Niece     Skin Cancer Niece     Thyroid Disease Niece     Breast Cancer Niece     Leukemia Nephew     Colon Cancer No family hx of     Prostate Cancer No family hx of     Genetic Disorder No family hx of        PHYSICAL EXAMINATION  BP 90/58   Pulse 67   Temp 98  F (36.7  C) (Tympanic)   Resp 20   Ht 1.715 m (5' 7.5\")   Wt 62.8 kg (138 lb 7.2 oz)   SpO2 97%   BMI 21.36 kg/m    Wt Readings from Last 2 Encounters:   10/11/23 62.8 kg (138 lb 7.2 oz)   10/11/23 62.8 kg (138 lb 7.2 oz)     Physical Exam  Constitutional:       Appearance: Normal appearance.   Eyes:      Conjunctiva/sclera: Conjunctivae normal.   Pulmonary:      Effort: Pulmonary effort is normal.   Abdominal:      General: Abdomen is flat.      Palpations: Abdomen is soft.   Neurological:      General: No focal deficit present.      Mental Status: He is alert and oriented to person, place, and time. Mental status is at baseline.     Laboratory and Imaging:     Latest Reference Range & Units " 10/11/23 08:11   WBC 4.0 - 11.0 10e3/uL 8.4   Hemoglobin 13.3 - 17.7 g/dL 12.6 (L)   Hematocrit 40.0 - 53.0 % 37.3 (L)   Platelet Count 150 - 450 10e3/uL 157   (L): Data is abnormally low    RESULT FOR IMMUNOHISTOCHEMICAL VENTANA CLONE  PD-L1 ASSAY  COMBINED POSITIVE SCORE (CPS): <1%    8/7/2023: Foundation one: Microsatellite status - MS-Stable   Tumor Mutational [place] - 5 Muts/ Mb   Genomic Findings   APC [id number]*3   CTNA1 rearrangement exon 4   [id number]   TP53 [id number]*17 1   Disease relevant genes with no reportable alterations: ERBB2    ASSESSMENT AND PLAN    1. 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     Currently on first line treatment with FOLFOX with Nivo.  Has received 13 cycles till date.  However staging scan on 7/5/2023 does show progressive disease.    He then received 3 cycles of Taxol with ramucirumab from 7 2023-9 2023.  His PET scan done after 3 cycles does show progressive disease with increasing mediastinal and upper abdominal lymph nodes.  Also a new right middle lobe collapse with increased FDG uptake.  He does state that he had a cold at that time.  He is currently doing well.  He also has been having progressive neuropathy.  Discussed therefore I would recommend changing treatment.  We discussed next options for treatment and 1 option would be FOLFIRI.  Expected side effects which include but not limited to Diarrhea, nausea, low blood counts, infection, kidney or liver toxicity, allergic reactions. Visual changes and hair loss can occur.     He is agreeable to proceed.  Orders placed.  He will start once we receive insurance approval.  We will plan to do staging scans after 6 cycles.    We discussed that his Neuropathy is likely treatment related and now that we are stopping the Taxol hopefully with time his neuropathy will improve.    2.  Cancer related pain:    He is currently on long-acting morphine and short acting hydrocodone and Tylenol liquid.  States pain  currently is well controlled.    He will move onto his first treatment and then we will see him back in clinic with cycle 2.    Total time spent on the patient on day of encounter was 45 minutes doing chart review, review of test results, interpretation of results, patient visit and documentation.      Nika Barnhart MD

## 2023-10-11 NOTE — PROGRESS NOTES
Port accessed, labs obtained from it per orders. Urine sample produced by patient. Samples to lab. Patient left accessed as has upcoming infusion appt if provider deems appropriate. Wheeled patient to ONC unit, wife accompanied.

## 2023-10-12 NOTE — PROGRESS NOTES
[facility] Chemotherapy Consult    The inpatient pharmacist has been consulted to review the patient's chart for  the following: any significant drug interactions between home medications, new take home medications, pre-medications, PRN medications, chemotherapy agents, and chemotherapy regimen.     Current Outpatient Medications   Medication Sig    gabapentin (NEURONTIN) 300 MG capsule Take 1 capsule (300 mg) by mouth 3 times daily Start with 300mg at night for 7 days then increase to AM and PM for 7 days then increase to 3 times a day. (Patient taking differently: Take 300 mg by mouth 3 times daily Take 1 po three times a day)    HYDROcodone-acetaminophen 7.5-325 MG/15ML solution Take 10 mLs by mouth every 6 hours as needed for moderate to severe pain    hydrocortisone (CORTAID) 1 % external cream Apply topically 2 times daily    mirtazapine (REMERON) 7.5 MG tablet Take 1 tablet (7.5 mg) by mouth At Bedtime    morphine (MS CONTIN) 15 MG CR tablet Take 1 tablet (15 mg) by mouth every 12 hours    naloxone (NARCAN) 4 MG/0.1ML nasal spray Spray 1 spray (4 mg) into one nostril alternating nostrils as needed for opioid reversal every 2-3 minutes until assistance arrives (Patient not taking: Reported on 9/27/2023)    omeprazole (PRILOSEC) 20 MG DR capsule Take 1 capsule (20 mg) by mouth 2 times daily    ondansetron (ZOFRAN) 8 MG tablet Take 1 tablet (8 mg) by mouth every 8 hours as needed for nausea     No current facility-administered medications for this visit.       Take home medications: Compazine, Imodium    Pre-Treats: Zofran, Decadron    PRN medications: Ativan, Pepcid, atropine    Chemotherapy agents: irinotecan, leucovorin, fluorouracil    Cancer Being Treated: esophageal    The following interactions were found and will require patient education and/or provider assessment:     Drug-Drug interactions: Concurrent use of GABAPENTIN and CNS DEPRESSANTS such as MORPHINE and/or  HYDROCODONE may result in respiratory depression.    Concurrent use of FLUOROURACIL and LEUCOVORIN may result in increased 5-fluorouracil exposure and an increased risk of fluorouracil toxicity (granulocytopenia, anemia, thrombocytopenia, stomatitis, vomiting).    Drug-Allergy interactions:  None.    Drug-Food interactions: Concurrent use of IRINOTECAN and GRAPEFRUIT JUICE may result in increased irinotecan exposure.    Drug-Ethanol interactions: Concurrent use of HYDROCODONE and/or MORPHINE with ETHANOL may result in increased opioid exposure and risk of fatal overdose.    Drug-Tobacco interactions: Concurrent use of IRINOTECAN and TOBACCO may result in decreased irinotecan concentrations.    If there are any additional questions or concerns, please contact the inpatient pharmacy ([phone]) for further review.     Rohan Gonzalez RPH  October 11, 2023

## 2023-10-13 NOTE — PROGRESS NOTES
[facility]: Cancer Care                                                                                          Patient outreach to discuss upcoming treatment and discuss chemotherapy and Plan of care.  Patient had family over and would like for care coordination to reach out on Monday AM.  Patient out reach date set for next week on Monday.      Signature:  Matilde Matthews RN

## 2023-10-16 NOTE — PROGRESS NOTES
[facility]: Cancer Care                                                                                          Talked with patient today he was not really interested in talking over the phone regarding plan of care.  Patient declined any current questions or concerns regarding the treatment plan.  Patient is familiar with the process as he has had similar treatment therapy in the past.  This writer did discuss the risk of diarrhea associated with the Irinotecan and suggested the patient have imodium picked up from the pharmacy prior to treatment day.  Patient also  would like to have fluids with his pump off each time as he felt that was beneficial.  Will obtain orders for IVF with each pump off.    Patient also expressed concerns with transportation, patient resources are limited and he would like to look at receiving help with transport to and from appointments.  This writer did reach out to , she will meet with patient on Wednesday to go over the [employer] application.  She will also look at taxi vouchers for patient.  This writer did reach out to the infusion center to ask that they print off the drug information from chemo care for patient and provide the bowel protocol at time of infusion on Wednesday.    Signature:  Matilde Matthews RN

## 2023-10-16 NOTE — PROGRESS NOTES
[facility]: Cancer Care Plan of Care Education Note                                    Discussion with Patient:                                                      Patient will be starting Folfiri on Wednesday     Patient will be provided with education his chemo therapy referral, and also social work will see patient during appointment.       Goals          General     Transportation (pt-stated)             Assessment:                                                      Assessment completed with:: Spouse or significant other;Patient    Plan of Care Education   Yearly learning assessment completed?: Yes (see Education tab)  Diagnosis:: Malignant neoplasam of lower third of esophagus  Does patient understand diagnosis?: Yes  Tx plan/regimen:: Folfiri  Does patient understand treatment plan/regimen?: Yes  Vascular access education provided for:: Port  Side effect education:: Diarrhea/Constipation;Fatigue;Nausea/Vomiting  Supportive services education provided for:: Nutrition;Palliative care;Social work  Safety/self care at home reviewed with patient:: Yes  Coping - concerns/fears reviewed with patient:: Yes  Plan of Care:: DOROTHY follow-up appointment;Lab appointment;Treatment schedule;MD follow-up appointment  When to call provider:: Uncontrolled diarrhea/constipation  Reasons for deferring treatment reviewed with patient:: Yes    Evaluation of Learning  Patient Education Provided: Yes  Readiness:: Acceptance  Method:: Literature;Explanation  Response:: Verbalizes understanding    No assessment indicated    Intervention/Education provided during outreach:                                                           Patient to follow up as scheduled at next appt    Signature:  Matilde Matthews RN

## 2023-10-16 NOTE — PROGRESS NOTES
Goal Statement: I will explore and connect with supportive care services as suggested by my care team.   Date Goal set: 10/16/2023  Barriers: transportation  Strengths: support, health awareness, and involvement with Care Team  Date to Achieve By: ongoing  Patient expressed understanding of goal:  Yes   Action steps to achieve this goal: I will meet with  and fill out applications for assistance related to transportation.  Patient will meet with  on Wednesday.

## 2023-10-17 NOTE — PROGRESS NOTES
Palliative Care Clinic Initial Visit      Primary Care Provider:  Chata Smith MD      Reason for Consultation:  Kenneth Matthew, 58 year old, male is seen for initial Palliative Care visit due to esophageal cancer lower third and cancer related pain    The patient is seen in [facility] with family present.    Advanced Directives:  DNR/DNI HCD on file    Information sharing preferences:  Self, family    History of Present Illness:  Mr. Matthew states he noticed difficulty swallowing initially starting June 2022. Over the last 1 year he has unintentionally lost 60 pounds.      10/21/2022: CT chest, abdomen and pelvis: Diffuse wall thickening of the distal esophagus with associated mediastinal adenopathy, and upper abdominal  Conglomerate lymphadenopathy. Constellation of findings are highly concerning for underlying esophageal carcinoma with metastatic disease.      11/1/2022: EGD: A large ulcerating mass with no bleeding and no stigmata of recent bleeding was found Lower third from the incisors. The mass was partially obstructing and partially circumferential involving  two thirds.      Biopsy showed poorly differentiated favoring adenocarcinoma. Her 2 negative.      12/14/2022: PET scan: Distal esophageal neoplasm with metastatic involvement the  mediastinum, left hilum and retroperitoneal lymph nodes of the upperabdomen. Also likely localized metastatic involvement of thestomach/GE junction. 2 cm intramural distal esophageal/GE junction mass. Circumferential wall thickening with increased FDG uptake in the distal esophagus adjacent portions of the stomach/GE junction may represent localized disease however could also represent localized inflammation. Metastatic involvement to the subcarinal nodes, left infrahilar and  pericaval/paraesophageal lymph nodes of the lower thorax. Metastatic involvement to the retroperitoneal lymph nodes of the upper abdomen. These lymph nodes have increased in  size dating back to be  10/21/2022 CT scan consistent with worsening disease. Representative  nodes are described above.     1/11/2023 to 6/28/2023: 13 cycles of FOLFOX with Nivo     7/5/2023: PET scan: Interval worsening of disease.   Increased size and FDG uptake suggests malignant involvement involving of a right paraesophageal lymph node in the upper thorax, diffuse involvement of the mediastinal and hilar nodes as well as worsening involvement of lymph nodes along the lesser curvature the stomach and in the retroperitoneum. Focal area of uptake in the right inferolateral aspect of the prostate gland may be inflammatory in nature.   Stable areas of uptake seen within the distal portions of the  Esophagus. An area of increased uptake seen previously within the gastric mucosa appears smaller.     7/9/2023 to 9/13/2023: Given progression seen on PET scan he was started on second line treatment with ramucirumab and Taxol.  Received total of 3 cycles.     10/4/2023: PET scan:Interval progression of disease, with increasing size of mediastinal  and upper abdominal lymph nodes. The focal mass in the distal  esophagus has not changed significantly.There has been interval collapse of the right middle lobe which  demonstrates increased FDG uptake, likely due to an infectious or  inflammatory process, however metastatic disease is also within the  differential..     He is now referred for Palliative Care consultation    Symptom management challenges:  Pain.  He has had cancer related pain and is controlled on the current regimen.     Ed had previously developed.pruritus.  This has now  improved.  His gabapentin decreased to once daily.    There has been neuropathic pain post chemotherapy    Ed has a feeding tube for nutrition.  He is tolerating liquids orally.    He has been using cannabis 10 mg seltzer for sleep.  Question neuropathy.    Ed continues to receive fluids.    Palliative Care Goals:     Patient understanding  of illness and prognosis:  intact    Medical records are reviewed.      Current Outpatient Medications:     gabapentin (NEURONTIN) 300 MG capsule, Take 1 capsule (300 mg) by mouth 3 times daily Start with 300mg at night for 7 days then increase to AM and PM for 7 days then increase to 3 times a day. (Patient taking differently: Take 300 mg by mouth 3 times daily Take 1 po three times a day), Disp: 270 capsule, Rfl: 1    HYDROcodone-acetaminophen 7.5-325 MG/15ML solution, Take 10 mLs by mouth every 6 hours as needed for moderate to severe pain, Disp: 473 mL, Rfl: 0    hydrocortisone (CORTAID) 1 % external cream, Apply topically 2 times daily, Disp: 30 g, Rfl: 1    mirtazapine (REMERON) 7.5 MG tablet, Take 1 tablet (7.5 mg) by mouth At Bedtime, Disp: 30 tablet, Rfl: 0    morphine (MS CONTIN) 15 MG CR tablet, Take 1 tablet (15 mg) by mouth every 12 hours, Disp: 60 tablet, Rfl: 0    naloxone (NARCAN) 4 MG/0.1ML nasal spray, Spray 1 spray (4 mg) into one nostril alternating nostrils as needed for opioid reversal every 2-3 minutes until assistance arrives (Patient not taking: Reported on 9/27/2023), Disp: 0.2 mL, Rfl: 0    omeprazole (PRILOSEC) 20 MG DR capsule, Take 1 capsule (20 mg) by mouth 2 times daily, Disp: 180 capsule, Rfl: 3    ondansetron (ZOFRAN) 8 MG tablet, Take 1 tablet (8 mg) by mouth every 8 hours as needed for nausea, Disp: 60 tablet, Rfl: 1      No Known Allergies    Past Medical History:  Past Medical History:   Diagnosis Date    Cancer (H)     Esophageal cancer (H) 11/01/2022    Esophageal thickening 10/25/2022    Mixed hearing loss, bilateral     Tobacco abuse 1/4/2023    Tobacco use disorder 12/27/2019         Past Surgical History:   Procedure Laterality Date    COLONOSCOPY  11/01/2022    [facility]    ESOPHAGOSCOPY N/A 4/25/2023    Procedure: Upper Endoscopy with biopsy- with Gastrostomy Tube Exchange;  Surgeon: Nba Dalton MD;  Location: [facility] OR    INSERT PORT VASCULAR ACCESS N/A 12/22/2022     Procedure: POWER PORT PLACEMENT;  Surgeon: Nba Dalton MD;  Location: [facility] OR    Upper GI Endoscopy  11/01/2022       Family History:  Family History   Problem Relation Age of Onset    Breast Cancer Mother     Hyperlipidemia Mother     Diabetes Mother     Coronary Artery Disease Mother     Cerebrovascular Disease Father     Hyperlipidemia Father     Coronary Artery Disease Father     Myocardial Infarction Father     Hyperlipidemia Brother     Hypertension Brother     Diabetes Brother     Asthma Sister     Breast Cancer Sister     Hyperlipidemia Sister     Diabetes Sister     Hyperlipidemia Sister     Diabetes Sister     Hyperlipidemia Sister     Suicide Sister     Anesthesia Reaction Sister     Breast Cancer Sister     Hyperlipidemia Sister     Diabetes Sister     Breast Cancer Niece     Skin Cancer Niece     Thyroid Disease Niece     Breast Cancer Niece     Leukemia Nephew     Colon Cancer No family hx of     Prostate Cancer No family hx of     Genetic Disorder No family hx of        Social History:   reports that he quit smoking about 4 months ago. His smoking use included cigarettes. He started smoking about 47 years ago. He has a 23.00 pack-year smoking history. He has never used smokeless tobacco. He reports that he does not currently use alcohol. He reports that he does not use drugs.      Palliative Review of Systems:   Performance status: ECOG 3   Karnofsky Performance Scale 50    Palliative Performance Status 50      Nutritional status: weight loss    Review of Systems   Constitutional:  Positive for appetite change, fatigue and unexpected weight change.   HENT:   Positive for trouble swallowing. Negative for hearing loss and voice change.    Eyes:  Negative for eye problems.   Respiratory:  Positive for cough and shortness of breath. Negative for chest tightness, hemoptysis and wheezing.    Cardiovascular:  Negative for chest pain, leg swelling and palpitations.   Gastrointestinal:  Positive for  constipation. Negative for abdominal pain, blood in stool, diarrhea, nausea and vomiting.   Genitourinary:  Positive for frequency. Negative for bladder incontinence, difficulty urinating, dysuria, hematuria and nocturia.    Musculoskeletal:  Positive for arthralgias, back pain, gait problem and myalgias. Negative for neck pain.   Skin:  Positive for itching. Negative for rash and wound.   Neurological:  Positive for dizziness, extremity weakness and gait problem. Negative for light-headedness, numbness, seizures and speech difficulty.   Hematological:  Negative for adenopathy.   Psychiatric/Behavioral:  Negative for depression and sleep disturbance. The patient is not nervous/anxious.            Physical Examination:  GENERAL:  Chronically ill appearing  SKIN:  No areas of breakdown.  No rashes.  HEENT:  Oral cavity moist  NECK:  No palpable lymphadenopathy.  No JVD  CV:  Regular rate and rhythm.  No murmur appreciated.  No clicks or rubs.  LUNGS:  Clear to auscultation.  ABDOMEN:  Soft and nontender.  No palpable masses. PEG tube in place  EXTREM:  No edema.  NEURO:  No focal deficits.   PSYCH: Alert and oriented.  Affect bright.     Laboratory/Imaging:  Reviewed      Assessment/Plan:  Malignant neoplasm of lower third of esophagus (H)      Recommendations:  Reviewed role of cannabis in the treatment of symptoms.  Discussed medical cannabis however he wishes to wait as his symptoms are controlled with the current regimen.    Discussed in detail the [facility] Based Palliative Care Maintenance program and services offered.  The clients overall medical care will be managed by his Primary Care Provider with the assistance of Palliative Care Team members Sanju House MD and NATALEE Rodney.  This will include quarterly scheduled provider visits in the office setting with communication to the clients Primary Care Provider.  Other visits will be scheduled should circumstances arise at the request of  his Primary Care Provider. This is reviewed with client and family and are agreeable with this plan of care.      Attestation:  Total time spent on the day of encounter was 90 minutes including review of pertinent clinical information, patient visit, treatment plan, and coordination of care.     Sanju House MD Psychiatric  Hospice and Palliative Care

## 2023-10-18 NOTE — PROGRESS NOTES
Infusion Nursing Note:  Kenneth Matthew presents today for folfiri.    Patient seen by provider today: Yes: Dr. House.   present during visit today: Not Applicable.    Note:   Component      Latest Ref Rng 10/18/2023  9:58 AM   WBC      4.0 - 11.0 10e3/uL 7.2    RBC Count      4.40 - 5.90 10e6/uL 3.90 (L)    Hemoglobin      13.3 - 17.7 g/dL 12.7 (L)    Hematocrit      40.0 - 53.0 % 38.7 (L)    MCV      78 - 100 fL 99    MCH      26.5 - 33.0 pg 32.6    MCHC      31.5 - 36.5 g/dL 32.8    RDW      10.0 - 15.0 % 14.0    Platelet Count      150 - 450 10e3/uL 176    % Neutrophils      % 72    % Lymphocytes      % 20    % Monocytes      % 7    % Eosinophils      % 1    % Basophils      % 0    % Immature Granulocytes      % 0    NRBCs per 100 WBC      <1 /100 0    Absolute Neutrophils      1.6 - 8.3 10e3/uL 5.1    Absolute Lymphocytes      0.8 - 5.3 10e3/uL 1.4    Absolute Monocytes      0.0 - 1.3 10e3/uL 0.5    Absolute Eosinophils      0.0 - 0.7 10e3/uL 0.1    Absolute Basophils      0.0 - 0.2 10e3/uL 0.0    Absolute Immature Granulocytes      <=0.4 10e3/uL 0.0    Absolute NRBCs      10e3/uL 0.0    Bilirubin Total      <=1.2 mg/dL 0.2       Legend:  (L) Low.      Intravenous Access:  Implanted Port.    Treatment Conditions:  Results reviewed, labs MET treatment parameters, ok to proceed with treatment.

## 2023-10-18 NOTE — PROGRESS NOTES
Patients port accessed using non-coring, 19 gauge, 3/4 needle, per facility protocol.     Hand hygiene performed: yes   Mask donned by caregiver: yes  Site prepped with CHG: yes   Labs drawn: yes   Dressing applied using aseptic technique: yes     Port flushed easily, without resistance. Flushed with 10 cc's normal saline.   Immediate blood return noted. 10 cc blood discarded.  Ordered labs obtained and sent to lab.  Port flushed per orders/MAR. Needle removed intact, sterile dressing applied.  Slight pressure applied for 30 seconds.   Patient tolerated port flush well, denies pain nor discomfort at this time. Patient instructed to leave dressing intact for a minimum of one hour, and to call with questions or concerns.  Patient states understanding and is in agreement with this plan. Patient discharged.

## 2023-10-18 NOTE — PROGRESS NOTES
Care Coordination Note:    Writer met briefly with patient and friend to discuss transportation concerns. Patient does not have transportation option through insurance so they were given an application for [employer].  Writer explained the application and confirmed that they can bring it back on Friday for the writer to turn in.  Contact information was given in case there are any other questions or concerns.    ANDREWS Shukla

## 2023-10-18 NOTE — PROGRESS NOTES
Post Infusion Assessment:  Patient tolerated infusion without incident.  Site patent and intact, free from redness, edema or discomfort.  No evidence of extravasations.  Access discontinued per protocol.  Home infuser attached per orders.   Scanned to secure email the home infusion ticket with patient signature. Patient denied any questions or concerns related to home infuser.     Discharge Plan:   Patient discharged in stable condition accompanied by: sister.  Departure Mode: Ambulatory.

## 2023-10-18 NOTE — NURSING NOTE
"Oncology Rooming Note    October 18, 2023 10:39 AM   Kenneth Matthew is a 58 year old male who presents for:    Chief Complaint   Patient presents with    Oncology Clinic Visit     New consult Malignant neoplasm of lower third of esophagus      Initial Vitals: BP 96/52   Pulse 72   Temp 97.6  F (36.4  C) (Tympanic)   SpO2 98%  Estimated body mass index is 22.59 kg/m  as calculated from the following:    Height as of an earlier encounter on 10/18/23: 1.67 m (5' 5.75\").    Weight as of an earlier encounter on 10/18/23: 63 kg (138 lb 14.2 oz). There is no height or weight on file to calculate BSA.  No Pain (0) Comment: Data Unavailable   No LMP for male patient.  Allergies reviewed: Yes  Medications reviewed: Yes    Medications: Medication refills not needed today.  Pharmacy name entered into EPIC:    [facility] - [place], [place] - [id number] [place] [address] [place] [facility] [place] [facility]  [address]    Clinical concerns: none       Alana Reynoso LPN             "

## 2023-10-20 NOTE — PROGRESS NOTES
Infusion Nursing Note:  Kenneth BELL Vipul presents today for pump-off/IVF.    Patient seen by provider today: No   present during visit today: Not Applicable.    Intravenous Access:  Implanted Port.    Treatment Conditions:  Not Applicable.    Post Infusion Assessment:  Infuser pump completed, 100 % infused.  Patient denies adverse effects from home infusion, nor mechanical issues.  Pump is intact.  Infuser disconnected from port.      Patient tolerated infusion without incident.  No evidence of extravasations.  Access discontinued per protocol.       Discharge Plan:   AVS to patient via MYCHART.  Patient will return as scheduled for next appointment.   Patient discharged in stable condition accompanied by: family.  Departure Mode: Ambulatory.      DAKOTAH Silva

## 2023-10-23 NOTE — PROGRESS NOTES
Care Coordination Note:    Writer scanned to email filled-out application for [employer], to [employer].    ANDREWS Shukla

## 2023-11-01 NOTE — PROGRESS NOTES
MEDICAL ONCOLOGY FOLLOW UP NOTE  Nov 1, 2023    Reason for Follow up: esophageal cancer    HISTORY OF PRESENT ILLNESS  Kenneth Matthew is a 58 year old male with PMH as stated below who is seen in the oncology clinic for follow up of esophageal cancer    His history in short is as follows    Mr. Matthew states he noticed difficulty swallowing initially starting June 2022. Over the last 1 year he has unintentionally lost 60 pounds.     10/21/2022: CT chest, abdomen and pelvis: Diffuse wall thickening of the distal esophagus with associated mediastinal adenopathy, and upper abdominal  Conglomerate lymphadenopathy. Constellation of findings are highly concerning for underlying esophageal carcinoma with metastatic disease.     11/1/2022: EGD: A large ulcerating mass with no bleeding and no stigmata of recent bleeding was found Lower third from the incisors. The mass was partially obstructing and partially circumferential involving  two thirds.     Biopsy showed poorly differentiated favoring adenocarcinoma. Her 2 negative.     12/14/2022: PET scan: Distal esophageal neoplasm with metastatic involvement the  mediastinum, left hilum and retroperitoneal lymph nodes of the upperabdomen. Also likely localized metastatic involvement of thestomach/GE junction. 2 cm intramural distal esophageal/GE junction mass. Circumferential wall thickening with increased FDG uptake in the distal esophagus adjacent portions of the stomach/GE junction may represent localized disease however could also represent localized inflammation. Metastatic involvement to the subcarinal nodes, left infrahilar and  pericaval/paraesophageal lymph nodes of the lower thorax. Metastatic involvement to the retroperitoneal lymph nodes of the upper abdomen. These lymph nodes have increased in size dating back to be  10/21/2022 CT scan consistent with worsening disease. Representative  nodes are described above.    1/11/2023 to 6/28/2023: 13 cycles of FOLFOX with  Moisés    7/5/2023: PET scan: Interval worsening of disease.   Increased size and FDG uptake suggests malignant involvement involving of a right paraesophageal lymph node in the upper thorax, diffuse involvement of the mediastinal and hilar nodes as well as worsening involvement of lymph nodes along the lesser curvature the stomach and in the retroperitoneum. Focal area of uptake in the right inferolateral aspect of the prostate gland may be inflammatory in nature.   Stable areas of uptake seen within the distal portions of the  Esophagus. An area of increased uptake seen previously within the gastric mucosa appears smaller.    7/9/2023 to 9/13/2023: Given progression seen on PET scan he was started on second line treatment with ramucirumab and Taxol.  Received total of 3 cycles.    10/4/2023: PET scan:Interval progression of disease, with increasing size of mediastinal  and upper abdominal lymph nodes. The focal mass in the distal  esophagus has not changed significantly.There has been interval collapse of the right middle lobe which  demonstrates increased FDG uptake, likely due to an infectious or  inflammatory process, however metastatic disease is also within the  differential.    He was then switched to FOLFIRI.     10/18/2023: Started treatment with cycle 1 of FOLFIRI    Interim history     Mr. Matthew is seen today in the clinic. His pain is controlled today.  He denies any worsening difficulty swallowing.  He denies any significant nausea or vomiting or diarrhea.  Denies any fever or chills.  Denies any bleeding or blood in his stools.    REVIEW OF SYSTEMS  A 12-point ROS negative except as in HPI      Current Outpatient Medications   Medication Sig Dispense Refill    gabapentin (NEURONTIN) 300 MG capsule Take 1 capsule (300 mg) by mouth 3 times daily Start with 300mg at night for 7 days then increase to AM and PM for 7 days then increase to 3 times a day. (Patient taking differently: Take 300 mg by mouth 3  times daily Take 1 po three times a day) 270 capsule 1    HYDROcodone-acetaminophen 7.5-325 MG/15ML solution Take 10 mLs by mouth every 6 hours as needed for moderate to severe pain 473 mL 0    hydrocortisone (CORTAID) 1 % external cream Apply topically 2 times daily 30 g 1    omeprazole (PRILOSEC) 20 MG DR capsule Take 1 capsule (20 mg) by mouth 2 times daily 180 capsule 3    ondansetron (ZOFRAN) 8 MG tablet Take 1 tablet (8 mg) by mouth every 8 hours as needed for nausea 60 tablet 1    oxyCODONE (XTAMPZA ER) 13.5 MG 12 hr tablet Take 1 tablet (13.5 mg) by mouth every 12 hours 60 tablet 0    prochlorperazine (COMPAZINE) 10 MG tablet Take 1 tablet (10 mg) by mouth every 6 hours as needed for nausea or vomiting 120 tablet 3    mirtazapine (REMERON) 7.5 MG tablet Take 1 tablet (7.5 mg) by mouth At Bedtime 30 tablet 0    naloxone (NARCAN) 4 MG/0.1ML nasal spray Spray 1 spray (4 mg) into one nostril alternating nostrils as needed for opioid reversal every 2-3 minutes until assistance arrives (Patient not taking: Reported on 9/27/2023) 0.2 mL 0       No Known Allergies  Immunization History   Administered Date(s) Administered    COVID-19 MONOVALENT 12+ (Pfizer) 08/28/2021, 09/18/2021    TDAP (Adacel,Boostrix) 12/10/2021       Past Medical History:   Diagnosis Date    Cancer (H)     Esophageal cancer (H) 11/01/2022    Esophageal thickening 10/25/2022    Mixed hearing loss, bilateral     Tobacco abuse 1/4/2023    Tobacco use disorder 12/27/2019       Past Surgical History:   Procedure Laterality Date    [facility]    ESOPHAGOSCOPY N/A 4/25/2023    Procedure: Upper Endoscopy with biopsy- with Gastrostomy Tube Exchange;  Surgeon: Nba Dalton MD;  Location: [facility] OR    INSERT PORT VASCULAR ACCESS N/A 12/22/2022    Procedure: POWER PORT PLACEMENT;  Surgeon: Nba Dalton MD;  Location: [facility]    Upper GI Endoscopy  11/01/2022       SOCIAL HISTORY  History   Smoking Status    Former    Packs/day:  "0.50    Years: 46.00    Types: Cigarettes    Start date: 1976    Quit date: 6/12/2023   Smokeless Tobacco    Never    Social History    Substance and Sexual Activity      Alcohol use: Not Currently        Comment: none since July of 2022     History   Drug Use Unknown       FAMILY HISTORY  Family History   Problem Relation Age of Onset    Breast Cancer Mother     Hyperlipidemia Mother     Diabetes Mother     Coronary Artery Disease Mother     Cerebrovascular Disease Father     Hyperlipidemia Father     Coronary Artery Disease Father     Myocardial Infarction Father     Hyperlipidemia Brother     Hypertension Brother     Diabetes Brother     Asthma Sister     Breast Cancer Sister     Hyperlipidemia Sister     Diabetes Sister     Hyperlipidemia Sister     Diabetes Sister     Hyperlipidemia Sister     Suicide Sister     Anesthesia Reaction Sister     Breast Cancer Sister     Hyperlipidemia Sister     Diabetes Sister     Breast Cancer Niece     Skin Cancer Niece     Thyroid Disease Niece     Breast Cancer Niece     Leukemia Nephew     Colon Cancer No family hx of     Prostate Cancer No family hx of     Genetic Disorder No family hx of        PHYSICAL EXAMINATION  /60   Pulse 63   Temp (!) 96.6  F (35.9  C) (Tympanic)   Resp 20   Ht 1.715 m (5' 7.5\")   Wt 62 kg (136 lb 9.6 oz)   SpO2 94%   BMI 21.08 kg/m    Wt Readings from Last 2 Encounters:   11/01/23 62 kg (136 lb 9.6 oz)   11/01/23 62 kg (136 lb 11 oz)     Physical Exam  Constitutional:       Appearance: Normal appearance.   Eyes:      Conjunctiva/sclera: Conjunctivae normal.   Pulmonary:      Effort: Pulmonary effort is normal.   Abdominal:      General: Abdomen is flat.      Palpations: Abdomen is soft.   Neurological:      General: No focal deficit present.      Mental Status: He is alert and oriented to person, place, and time. Mental status is at baseline.   Laboratory and Imaging:     Latest Reference Range & Units 11/01/23 08:11   WBC 4.0 - 11.0 " 10e3/uL 4.4   Hemoglobin 13.3 - 17.7 g/dL 13.6   Hematocrit 40.0 - 53.0 % 41.3   Platelet Count 150 - 450 10e3/uL 64 (L)   (L): Data is abnormally low  RESULT FOR IMMUNOHISTOCHEMICAL VENTANA CLONE  PD-L1 ASSAY  COMBINED POSITIVE SCORE (CPS): <1%    8/7/2023: Foundation one: Microsatellite status - MS-Stable   Tumor Mutational [place] - 5 Muts/ Mb   Genomic Findings   APC [id number]*3   CTNA1 rearrangement exon 4   [id number]   TP53 P27fs*17 1   Disease relevant genes with no reportable alterations: ERBB2    ASSESSMENT AND PLAN    1. 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     Currently on first line treatment with FOLFOX with Nivo.  Has received 13 cycles till date.  However staging scan on 7/5/2023 does show progressive disease.    He then received 3 cycles of Taxol with ramucirumab from 7 2023-9 2023.  His PET scan done after 3 cycles does show progressive disease with increasing mediastinal and upper abdominal lymph nodes.  We discussed next options for treatment and 1 option would be FOLFIRI.    Following discussion he was started on treatment with FOLFIRI on 10/18/2023.  Plan to do staging scans after 6 cycles.    He is status post cycle 1.  His labs checked today prior to cycle 2 shows platelet count of 64.  Therefore will delay treatment by 1 week.  I will also eliminate bolus 5-FU to see if it helps with the cytopenia.  If persistent then will consider decreasing irinotecan dose.    2.  Cancer related pain:    He is currently on long-acting morphine and short acting hydrocodone and Tylenol liquid. However he would like to switch back to Xtampza 13.5 mg BID as he feels he tolerates it better.    He will be scheduled for cycle 2 in 1 week.  We will see him back in clinic prior to cycle 3.    Total time spent on the patient on day of encounter was 40 minutes doing chart review, review of test results, interpretation of results, patient visit and documentation.      Nika Barnhart MD

## 2023-11-01 NOTE — PROGRESS NOTES
Infusion Nursing Note:  Kenneth Matthew presents today for IVF.    Patient seen by provider today: Yes: See ONC provider assessment this date.  Home medications, allergies, vitals, fall risk, pain and abuse screen done at [facility] ONC appt earlier this date. All reviewed by this nurse prior to treating. Patient denies questions or concerns not already discussed with provider prior, wishes to proceed with treatment.    present during visit today: Not Applicable.    Note: N/A.      Intravenous Access:  Port was left accessed from a prior appt this date.    Treatment Conditions:  Not Applicable.

## 2023-11-01 NOTE — PROGRESS NOTES
Infusion Nursing Note:  Kenneth Matthew presents today for labs from port.      Intravenous Access:  Implanted Port. Labs drawn without difficulty from port.     Patient to medical oncology via w/c for provider appoinment.

## 2023-11-01 NOTE — NURSING NOTE
"Oncology Rooming Note    November 1, 2023 8:48 AM   Kenneth Matthew is a 58 year old male who presents for:    Chief Complaint   Patient presents with    Oncology Clinic Visit     Follow up Malignant neoplasm of lower third of esophagus        Initial Vitals: /60   Pulse 63   Temp (!) 96.6  F (35.9  C) (Tympanic)   Resp 20   Ht 1.715 m (5' 7.5\")   Wt 62 kg (136 lb 9.6 oz)   SpO2 94%   BMI 21.08 kg/m   Estimated body mass index is 21.08 kg/m  as calculated from the following:    Height as of this encounter: 1.715 m (5' 7.5\").    Weight as of this encounter: 62 kg (136 lb 9.6 oz). Body surface area is 1.72 meters squared.  Moderate Pain (5) Comment: Data Unavailable   No LMP for male patient.  Allergies reviewed: Yes  Medications reviewed: Yes    Medications: MEDICATION REFILLS NEEDED TODAY. Provider was notified.  Pharmacy name entered into EPIC:    [facility] - [place], [place] - [id number] [place], [place] - [address] AT [facility] [address]    Patient was assessed using the NCCN psychosocial distress thermometer. Patient rated the score as a 5. Patient rated current stressors as per scanned form. Stressors will be brought to the attention of provider or Oncology RN Care Coordinator for a score of 6 or greater or per nurses discretion.             Clinical concerns:needs compazine and Oxycodone ([place] ER) 13.5 mg refilled at [facility]      Yahaira Rodriguez LPN             "

## 2023-11-01 NOTE — PROGRESS NOTES
Post Infusion Assessment:  Blood return noted pre and post infusion.  No evidence of extravasations.  Access discontinued per protocol.     Discharge Plan:   Patient discharged in stable condition accompanied by: self.  Departure Mode: Ambulatory.    DAKOTAH Silva

## 2023-11-08 NOTE — PROGRESS NOTES
Infusion Nursing Note:  Kenneth Matthew presents today for Folfiri.    Patient seen by provider today: No   present during visit today: Not Applicable.    Note:   Component      Latest Ref Rng 11/8/2023  10:50 AM   WBC      4.0 - 11.0 10e3/uL 3.8 (L)    RBC Count      4.40 - 5.90 10e6/uL 4.10 (L)    Hemoglobin      13.3 - 17.7 g/dL 13.7    Hematocrit      40.0 - 53.0 % 40.9    MCV      78 - 100 fL 100    MCH      26.5 - 33.0 pg 33.4 (H)    MCHC      31.5 - 36.5 g/dL 33.5    RDW      10.0 - 15.0 % 15.6 (H)    Platelet Count      150 - 450 10e3/uL 71 (L)    % Neutrophils      % 56    % Lymphocytes      % 27    % Monocytes      % 13    % Eosinophils      % 3    % Basophils      % 1    % Immature Granulocytes      % 0    NRBCs per 100 WBC      <1 /100 0    Absolute Neutrophils      1.6 - 8.3 10e3/uL 2.1    Absolute Lymphocytes      0.8 - 5.3 10e3/uL 1.0    Absolute Monocytes      0.0 - 1.3 10e3/uL 0.5    Absolute Eosinophils      0.0 - 0.7 10e3/uL 0.1    Absolute Basophils      0.0 - 0.2 10e3/uL 0.0    Absolute Immature Granulocytes      <=0.4 10e3/uL 0.0    Absolute NRBCs      10e3/uL 0.0    Sodium      135 - 145 mmol/L 137    Potassium      3.4 - 5.3 mmol/L 4.4    Carbon Dioxide (CO2)      22 - 29 mmol/L 26    Anion Gap      7 - 15 mmol/L 9    Urea Nitrogen      8.0 - 23.0 mg/dL 8.6    Creatinine      0.67 - 1.17 mg/dL 0.58 (L)    GFR Estimate      >60 mL/min/1.73m2 >90    Calcium      8.8 - 10.2 mg/dL 8.9    Chloride      98 - 107 mmol/L 102    Glucose      70 - 99 mg/dL 150 (H)    Alkaline Phosphatase      40 - 129 U/L 62    AST      0 - 45 U/L 15    ALT      0 - 70 U/L 7    Protein Total      6.4 - 8.3 g/dL 6.7    Albumin      3.5 - 5.2 g/dL 3.7    Bilirubin Total      <=1.2 mg/dL 0.4       Legend:  (L) Low  (H) High.      Intravenous Access:  Implanted Port.    Treatment Conditions:  Results reviewed, labs did not meet treatment parameters, see note about verbal ok to proceed with treatment from   [place].       Post Infusion Assessment:  Patient tolerated infusion without incident.       Discharge Plan:   Copy of AVS reviewed with patient and/or family.  Patient will return Friday for next appointment.      Radha Fernandez RN

## 2023-11-10 NOTE — PROGRESS NOTES
Infusion Nursing Note:    Kenneth Matthew presents here today for home infuser pump off.    Infuser pump completed, 100 % infused.  Patient denies adverse effects from home infusion, nor mechanical issues.  Pump is intact.  Infuser disconnected from port.      Port flushed per MAR.  Immediate blood return noted.  Flushes easily, without resistance.      Port needle removed, needle intact.  Surrounding skin without redness, swelling, nor discoloration.  Skin warm and dry to touch.      Patient tolerated well.  Site covered with sterile bandage and slight pressure applied for 30 seconds. Instructed patient to leave bandage on for a minimum of one hour and to call with questions or concerns.    Education provided for signs and symptoms of infection and when to call the doctor.  Patient states understanding and is in agreement with this plan. Patient discharged.   Patient seen by provider today: No   present during visit today: Not Applicable.    Note: N/A.      Intravenous Access:  Implanted Port.    Treatment Conditions:  Not Applicable.      Post Infusion Assessment:  Patient tolerated infusion without incident.  Blood return noted pre and post infusion.       Discharge Plan:   Discharge instructions reviewed with: Patient.      DAKOTAH GUAJARDO

## 2023-11-15 NOTE — PROGRESS NOTES
Pt seen in infusion.    He is disappointed he lost about 5lbs. Weight was pretty stable for the past couple months. He thinks it may be fluid related, but admits hasn't been feeling as hungry lately. Chronic pain likely also contributing to loss of appetite. He reports daily intake includes 2 cartons of TWOCal HN, 2 bottles of Ensure clear, V8 juice and lots of apple juice. Trying to drink some smoothies with spinach to help his platelet level. The smoothie did not sit well the last time he drank one. Also trying broth from stew and chicken broth. Planning to try some turkey on Thanksgiving, feels he should be able to eat some cranberry sauce and cheesecake with cherry sauce on it. He is thinking about trying some 1% milk. Still does not tolerate eating many foods including thicker liquids.    [facility] Continues to speak with the dietitian there by phone on a regular basis.    Encouraged to increase intake - 0.5 to 1 carton of formula or protein shake via g-tube or snacking.    Wt Readings from Last 10 Encounters:   11/15/23 60.7 kg (133 lb 12.8 oz)   11/08/23 62.8 kg (138 lb 7.2 oz)   11/01/23 62 kg (136 lb 9.6 oz)   11/01/23 62 kg (136 lb 11 oz)   10/20/23 63.5 kg (140 lb)   10/18/23 63 kg (138 lb 14.2 oz)   10/11/23 62.8 kg (138 lb 7.2 oz)   10/11/23 62.8 kg (138 lb 7.2 oz)   10/06/23 63 kg (138 lb 14.2 oz)   09/27/23 62.9 kg (138 lb 10.7 oz)

## 2023-11-15 NOTE — PROGRESS NOTES
Infusion Nursing Note:  Miguel Abinh RAY Matthew presents today for labs and IVF.    Patient seen by provider today: No   present during visit today: Not Applicable.    Note: Dr Barnhart aware of pt lab results from today. NO new orders, stated pt can be discharged      Intravenous Access:  Implanted Port.    Treatment Conditions:  Lab Results   Component Value Date    HGB 8.5 (L) 11/15/2023    WBC 3.0 (L) 11/15/2023    ANEUTAUTO 1.8 11/15/2023    PLT 50 (L) 11/15/2023        Lab Results   Component Value Date     11/08/2023    POTASSIUM 4.4 11/08/2023    MAG 2.2 02/08/2023    CR 0.58 (L) 11/08/2023    NICHOLAS 8.9 11/08/2023    BILITOTAL 0.4 11/08/2023    ALBUMIN 3.7 11/08/2023    ALT 7 11/08/2023    AST 15 11/08/2023       Results reviewed, labs MET treatment parameters, ok to proceed with treatment.      Post Infusion Assessment:  Patient tolerated infusion without incident.       Discharge Plan:   Discharge instructions reviewed with: Patient.      BENNETT CLAIRE

## 2023-11-16 NOTE — TELEPHONE ENCOUNTER
Pending Prescriptions:                       Disp   Refills    oxyCODONE (XTAMPZA ER) 13.5 MG 12 hr tabl*60 tab*0            Sig: Take 1 tablet (13.5 mg) by mouth every 12 hours

## 2023-11-20 NOTE — PROGRESS NOTES
G-tube changed without concerns. He is on his third line chemotherapy and has been told his prognosis is not good at this point. Will re-schedule in 6 months for repeat change. No charge for visit.     Nba Dalton MD

## 2023-11-22 NOTE — NURSING NOTE
"Oncology Rooming Note    November 22, 2023 9:53 AM   Kenneth Matthew is a 58 year old male who presents for:    Chief Complaint   Patient presents with    Oncology Clinic Visit     Follow up Malignant neoplasm of lower third of esophagus        Initial Vitals: BP (!) 86/55   Pulse 66   Temp 97.1  F (36.2  C) (Tympanic)   Resp 20   Ht 1.715 m (5' 7.5\")   Wt 60.1 kg (132 lb 6.4 oz)   SpO2 96%   BMI 20.43 kg/m   Estimated body mass index is 20.43 kg/m  as calculated from the following:    Height as of this encounter: 1.715 m (5' 7.5\").    Weight as of this encounter: 60.1 kg (132 lb 6.4 oz). Body surface area is 1.69 meters squared.  No Pain (0) Comment: Data Unavailable   No LMP for male patient.  Allergies reviewed: Yes  Medications reviewed: Yes    Medications: Medication refills not needed today.  Pharmacy name entered into EPIC:    [facility] - [place], [place] - [id number] - [place], [place] - [address] AT [facility] [address]    Clinical concerns: weight loss states 5 lb since last treatment      Yahaira Rodriguez LPN             "

## 2023-11-22 NOTE — PATIENT INSTRUCTIONS
We would like to see you back in 2 weeks prior to your next treatment. Please come 30 minutes prior for lab work through your port.     Your prescription for hydrocodone has been sent to: [facility].      When you are in need of a refill, please call your pharmacy and they will send us a request.     If you have any questions please call [phone]    Other instructions: Increase gabapentin to 300 mg 3 times a day.

## 2023-11-22 NOTE — PROGRESS NOTES
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  He initiated systemic therapy with nivolumab/FOLFOX 1/11/2023 with potential for palliative radiation therapy with treatment changed to ramuciramab and paclitaxel due to progression.  He had further progression after 3 cycles and is now receiving FOLFIRI.    He presents to the clinic today stating he is doing okay.  Note he has been not using the long-acting oxycodone every 12 hours due to the fact that he does not want to run out.  He did not  his prescription that was called in last week as he was unaware it was there.  He has been taking it in the morning and using hydrocodone at night.  He does need a refill on his hydrocodone.  He will continue to use this for breakthrough pain when he is on the long-acting medication every 12 hours.  He feels his pain is under fairly good control with that pain regime.  Note he is concerned about his pain medication as he will lose his current insurance December 3 and be strictly on the [facility] insurance starting December 4.    He has lost some additional weight and did meet with the nutritionist the other day.  States he is doing 3 cans of tube feeding and continues with 1 smoothies a day along with fluids.  States he is not able to eat any more than this.  He is not taking any solids. Note he essentially is having no issues with diarrhea and states he did have 1 episode but feels it was likely related to the fact that the coffee pot had not been rinsed after being cleaned.  Bowel movements are soft and he is having anywhere from 1-2 a day normally.    He  does have some ongoing fatigue but feels overall he is sleeping better.  He has ongoing issues with neuropathy and is only taking 300 mg of the gabapentin once a day and at times may take it twice a day and has not increased to 3 times a day as recommended.  States he is using a cream that he got off of the Internet and this does seem to help his hands and he has not used it on his feet yet.  The neuropathy goes from the feet to just below the knee on both legs.  He did see Dr. House with palliative care with plan to possibly be certified for medical cannabis.    G-tube was changed 11/16/2023 with plan to change again in 6 months.    Oncologic History:     Experienced difficulty swallowing and 60 pound weight loss  10/21/2022: CT chest, abdomen and pelvis: Diffuse wall thickening of the distal esophagus with associated mediastinal adenopathy, and upper abdominal  Conglomerate lymphadenopathy. Constellation of findings are highly concerning for underlying esophageal carcinoma with metastatic disease.   11/1/2022: Colonoscopy with polyp showing tubular adenoma and EGD: A large ulcerating mass with no bleeding and no stigmata of recent bleeding was found in the lower third of the esophagus, 35 to 40 cm from the incisors.  The mass was partially obstructing and partially circumferential involving  two thirds.    **Biopsy showed poorly differentiated favoring adenocarcinoma. Her 2 negative.   11/22/2002 patient met with Dr. Barnhart to discuss systemic therapy pending PET results/complete staging  12/5/2022 patient met with Dr. Spain with radiation oncology with possible plan to initiate radiation therapy pending results of PET scan  12/14/2022: PET scan: Distal esophageal neoplasm with metastatic involvement the  mediastinum, left hilum and retroperitoneal lymph nodes of the upperabdomen. Also likely localized metastatic involvement of thestomach/GE junction. 2 cm intramural distal esophageal/GE junction mass.  Circumferential wall thickening with increased FDG uptake in the distal esophagus adjacent portions of the stomach/GE junction may represent localized disease however could also represent localized inflammation. Metastatic involvement to the subcarinal nodes, left infrahilar and  pericaval/paraesophageal lymph nodes of the lower thorax. Metastatic involvement to the retroperitoneal lymph nodes of the upper abdomen. These lymph nodes have increased in size dating back to be  10/21/2022 CT scan consistent with worsening disease. Representative  nodes are described above.  12/20/2022  He was seen by Dr. Barnhart with plan to initiate palliative nivolumab/FOLFOX with potential for palliative Radiation at some point  12/22/2022: Port-A-Cath placement and gastrostomy tube insertion by Dr. Dalton  1/11/2023 initiated treatment FOLFOX with Nivo   3/29/2023  PET scan:    IMPRESSION:   Significant interval improvement.  Marked decrease in size of distal esophageal/GE junction masses with  near complete resolution of enlarged, FDG avid subcarinal,  paraesophageal and retroperitoneal lymph nodes.    Normal-sized lymph nodes throughout the head and neck, chest, abdomen  and pelvis now demonstrate FDG uptake, the majority of which do not  exceed background suggesting diffuse inflammatory response, possibly  related to therapy.   **Dr Barnhart reviewed with patient and recommended an EGD and discussed radiation consult.  5/15/2023 patient consult with radiation oncology, Dr. Roe, they discussed radiation therapy but due to lack of availability to simulate, plan was to follow-up in 2 months to discuss again.  A referral to [facility] radiation therapy was offered and patient declined.  7/5/2023: PET scan: Interval worsening of disease.   Increased size and FDG uptake suggests malignant involvement involving of a right paraesophageal lymph node in the upper thorax, diffuse involvement of the mediastinal and hilar nodes as well as worsening  involvement of lymph nodes along the lesser curvature the stomach and in the retroperitoneum. Focal area of uptake in the right inferolateral aspect of the prostate gland may be inflammatory in nature.   Stable areas of uptake seen within the distal portions of the  Esophagus. An area of increased uptake seen previously within the gastric mucosa appears smaller.   7/9/2023: Given progression seen on PET scan he was started on second line treatment with ramucirumab and Taxol.  10/4/2023: PET scan:Interval progression of disease, with increasing size of mediastinal and upper abdominal lymph nodes. The focal mass in the distal esophagus has not changed significantly.There has been interval collapse of the right middle lobe which demonstrates increased FDG uptake, likely due to an infectious or  inflammatory process, however metastatic disease is also within the differential.   10/18/2023: Started treatment with cycle 1 of FOLFIRI        RESULT FOR IMMUNOHISTOCHEMICAL VENTANA CLONE  PD-L1 ASSAY  COMBINED POSITIVE SCORE (CPS): <1%     Treatment Goal: Palliative     Current Chemo Regime/TX:   FOLFIRI    **Bolus eliminated with cycle 2 due to thrombocytopenia  Previous treatment:  Nivolumab 240 mg/oxaliplatin 85 mg per metered squared/leucovorin 350 mg per metered squared/fluorouracil 400 mg per metered squared IV bolus/fluorouracil 2400 mg per metered squared via continuous infusion over 46 hours with cycle given every 14 days; ramuciramab 8mg/kg and paclitaxel 65mg/m2 (20% dose decrease related to neuropathy) x 3 cycles     Past Medical History:   Diagnosis Date    Cancer (H)     Esophageal cancer (H) 11/01/2022    Esophageal thickening 10/25/2022    Mixed hearing loss, bilateral     Tobacco abuse 1/4/2023    Tobacco use disorder 12/27/2019       Past Surgical History:   Procedure Laterality Date    COLONOSCOPY  11/01/2022    [facility]    ESOPHAGOSCOPY N/A 4/25/2023    Procedure: Upper Endoscopy with biopsy- with  Gastrostomy Tube Exchange;  Surgeon: Nba Dalton MD;  Location: [facility] OR    INSERT PORT VASCULAR ACCESS N/A 2022    Procedure: POWER PORT PLACEMENT;  Surgeon: Nba Dalton MD;  Location: [facility] OR    Upper GI Endoscopy  2022       Family History   Problem Relation Age of Onset    Breast Cancer Mother     Hyperlipidemia Mother     Diabetes Mother     Coronary Artery Disease Mother     Cerebrovascular Disease Father     Hyperlipidemia Father     Coronary Artery Disease Father     Myocardial Infarction Father     Hyperlipidemia Brother     Hypertension Brother     Diabetes Brother     Asthma Sister     Breast Cancer Sister     Hyperlipidemia Sister     Diabetes Sister     Hyperlipidemia Sister     Diabetes Sister     Hyperlipidemia Sister     Suicide Sister     Anesthesia Reaction Sister     Breast Cancer Sister     Hyperlipidemia Sister     Diabetes Sister     Breast Cancer Niece     Skin Cancer Niece     Thyroid Disease Niece     Breast Cancer Niece     Leukemia Nephew     Colon Cancer No family hx of     Prostate Cancer No family hx of     Genetic Disorder No family hx of        Social History     Socioeconomic History    Marital status:      Spouse name: Jazzy    Number of children: 2    Years of education: Not on file    Highest education level: Not on file   Occupational History    Not on file   Tobacco Use    Smoking status: Former     Packs/day: 0.50     Years: 46.00     Additional pack years: 0.00     Total pack years: 23.00     Types: Cigarettes     Start date:      Quit date: 2023     Years since quittin.4    Smokeless tobacco: Never    Tobacco comments:     Trying to quit but continues to smoke   Vaping Use    Vaping Use: Never used   Substance and Sexual Activity    Alcohol use: Not Currently     Comment: none since 2022    Drug use: Never    Sexual activity: Not on file   Other Topics Concern    Parent/sibling w/ CABG, MI or angioplasty before 65F 55M? Yes      "Comment: father   Social History Narrative    Not on file     Social Determinants of Health     Financial Resource Strain: Not on file   Food Insecurity: Not on file   Transportation Needs: Not on file   Physical Activity: Not on file   Stress: Not on file   Social Connections: Not on file   Interpersonal Safety: Not on file   Housing Stability: Not on file       Current Outpatient Medications   Medication    gabapentin (NEURONTIN) 300 MG capsule    HYDROcodone-acetaminophen 7.5-325 MG/15ML solution    hydrocortisone (CORTAID) 1 % external cream    omeprazole (PRILOSEC) 20 MG DR capsule    ondansetron (ZOFRAN) 8 MG tablet    oxyCODONE (XTAMPZA ER) 13.5 MG 12 hr tablet    prochlorperazine (COMPAZINE) 10 MG tablet    mirtazapine (REMERON) 7.5 MG tablet    naloxone (NARCAN) 4 MG/0.1ML nasal spray     No current facility-administered medications for this visit.        No Known Allergies    Review Of Systems:  Constitutional:    denies fever, chills, and night sweats.  Eyes:    denies double vision; reports some blurred vision  Ears/Nose/Throat:   denies ear pain, nose problems, difficulty swallowing  Respiratory:   denies shortness of breath, has slight cough-coughing up phlegm no change   Skin:   denies rash, lesions  Cardiovascular:   denies chest pain, palpitations, edema  Gastrointestinal:   denies abdominal pain, bloating, nausea, early satiety; no change in bowel habits or blood in stool  Genitourinary:   denies difficulty with urination, blood in urine  Musculoskeletal:    denies new muscle pain, bone pain  Neurologic:   denies lightheadedness, headaches, see HPI  Psychiatric:   denies anxiety, depression  Hematologic/Lymphatic/Immunologic:   denies easy bruising, easy bleeding, lumps or bumps noted  Endocrine:   some increased thirst and feels mouth is dry    ECOG Performance Status: 2    Physical Exam:  BP (!) 86/55   Pulse 66   Temp 97.1  F (36.2  C) (Tympanic)   Resp 20   Ht 1.715 m (5' 7.5\")   Wt 60.1 " kg (132 lb 6.4 oz)   SpO2 96%   BMI 20.43 kg/m      GENERAL APPEARANCE:  alert and in no acute distress.  HEENT: Normocephalic, Sclerae anicteric.   NECK:   No asymmetry   RESP: Lungs clear to auscultation bilaterally, respirations regular and easy  CARDIOVASCULAR: Regular rate and rhythm. Normal S1, S2  MUSCULOSKELETAL: Extremities without gross deformities noted. No edema of bilateral lower extremities.  NEURO: Alert and oriented x 3.   PSYCHIATRIC: Mentation and affect appear normal.  Mood appropriate.    Laboratory:  Results for orders placed or performed in visit on 11/22/23   Comprehensive metabolic panel     Status: Abnormal   Result Value Ref Range    Sodium 139 135 - 145 mmol/L    Potassium 4.4 3.4 - 5.3 mmol/L    Carbon Dioxide (CO2) 28 22 - 29 mmol/L    Anion Gap 8 7 - 15 mmol/L    Urea Nitrogen 12.7 8.0 - 23.0 mg/dL    Creatinine 0.66 (L) 0.67 - 1.17 mg/dL    GFR Estimate >90 >60 mL/min/1.73m2    Calcium 9.2 8.8 - 10.2 mg/dL    Chloride 103 98 - 107 mmol/L    Glucose 122 (H) 70 - 99 mg/dL    Alkaline Phosphatase 63 40 - 150 U/L    AST 17 0 - 45 U/L    ALT 9 0 - 70 U/L    Protein Total 7.1 6.4 - 8.3 g/dL    Albumin 3.9 3.5 - 5.2 g/dL    Bilirubin Total 0.4 <=1.2 mg/dL   CBC with platelets and differential     Status: Abnormal   Result Value Ref Range    WBC Count 7.2 4.0 - 11.0 10e3/uL    RBC Count 4.34 (L) 4.40 - 5.90 10e6/uL    Hemoglobin 14.6 13.3 - 17.7 g/dL    Hematocrit 42.7 40.0 - 53.0 %    MCV 98 78 - 100 fL    MCH 33.6 (H) 26.5 - 33.0 pg    MCHC 34.2 31.5 - 36.5 g/dL    RDW 15.5 (H) 10.0 - 15.0 %    Platelet Count 111 (L) 150 - 450 10e3/uL    % Neutrophils 65 %    % Lymphocytes 24 %    % Monocytes 8 %    % Eosinophils 2 %    % Basophils 1 %    % Immature Granulocytes 0 %    NRBCs per 100 WBC 0 <1 /100    Absolute Neutrophils 4.7 1.6 - 8.3 10e3/uL    Absolute Lymphocytes 1.7 0.8 - 5.3 10e3/uL    Absolute Monocytes 0.6 0.0 - 1.3 10e3/uL    Absolute Eosinophils 0.1 0.0 - 0.7 10e3/uL    Absolute  Basophils 0.0 0.0 - 0.2 10e3/uL    Absolute Immature Granulocytes 0.0 <=0.4 10e3/uL    Absolute NRBCs 0.0 10e3/uL   CBC with platelets differential     Status: Abnormal    Narrative    The following orders were created for panel order CBC with platelets differential.  Procedure                               Abnormality         Status                     ---------                               -----------         ------                     CBC with platelets and d...[[id number]]  Abnormal            Final result                 Please view results for these tests on the individual orders.        Imaging Studies: None completed for today's visit      ASSESSMENT/PLAN:    #1 Esophageal cancer: Stage IV adenocarcinoma of the esophagus diagnosed November 2022.  He initiated systemic therapy with nivolumab/FOLFOX 1/11/2023 with potential for palliative radiation therapy.  He developed progression and treatment was changed to ramuciramab and paclitaxel with further progression.  He is now receiving FOLFIRI.  Note platelets decreased after 1 cycle so 5-FU bolus was removed.  Platelets along with WBC/ANC and hemoglobin are adequate for therapy today so we will go through with with cycle 3.  He will follow-up prior to cycle 4 with labs per treatment plan.    #2 cancer related pain: For now he will continue with the long-acting oxycodone and hydrocodone for breakthrough.  Per his [facility] insurance, the preferred long-acting medication would be morphine; however, patient had intolerance to morphine so we will need to [facility] to [facility] for [facility]one when he is due for refill knowing we may need to prior authorize this .    #3  Neuropathy: Recommended again he increase to 300 mg 3 times a day.    I encouraged patient to call with any questions or concerns.    40 minutes spent in the patient's encounter today with time spent in review of the chart along with in chart preparation.  Time was also spent in review of  his treatment plan.  Time was also spent obtaining a review of systems and performing a physical exam along with review of his lab work with him in detail.  Time was also spent in discussion of his side effects and recommendations for management.  Time was also spent in planning his next follow-up, placing orders and chart documentation.    Delmy Valenzuela NP APRN, FNP-BC, AOCNP

## 2023-11-22 NOTE — PROGRESS NOTES
Infusion Nursing Note:  Miguel Abinh BELL Vipul presents today for Folfri .    Patient seen by provider today: Yes: Martha Valenzuela NP   present during visit today: Not Applicable.    Note: N/A.      Intravenous Access:  Implanted Port.    Treatment Conditions:  Lab Results   Component Value Date    HGB 14.6 11/22/2023    WBC 7.2 11/22/2023    ANEUTAUTO 4.7 11/22/2023     (L) 11/22/2023        Lab Results   Component Value Date     11/22/2023    POTASSIUM 4.4 11/22/2023    MAG 2.2 02/08/2023    CR 0.66 (L) 11/22/2023    NICHOLAS 9.2 11/22/2023    BILITOTAL 0.4 11/22/2023    ALBUMIN 3.9 11/22/2023    ALT 9 11/22/2023    AST 17 11/22/2023       Results reviewed, labs MET treatment parameters, ok to proceed with treatment.      Post Infusion Assessment:  Patient tolerated infusion without incident.  Blood return noted pre and post infusion.  Site patent and intact, free from redness, edema or discomfort.  No evidence of extravasations.  Access discontinued per protocol.       Discharge Plan:   Patient declined prescription refills.  Patient and/or family verbalized understanding of discharge instructions and all questions answered.  Copy of AVS reviewed with patient and/or family.  Patient will return Friday for pump off  for next appointment.  Patient discharged in stable condition accompanied by: self.  Departure Mode: Ambulatory.      Matilde Matthews RN

## 2023-11-22 NOTE — PROGRESS NOTES
Patients port accessed using non-coring, 19 gauge, 3/4 needle, per facility protocol.     Hand hygiene performed: yes   Mask donned by caregiver: yes  Site prepped with CHG: yes   Labs drawn: yes   Dressing applied using aseptic technique: yes     Port flushed easily, without resistance. Flushed with 10 cc's normal saline.   Immediate blood return noted. 10 cc blood discarded.  Ordered labs obtained and sent to lab.   Slight pressure applied for 30 seconds.    Patient transported to Medical oncology.

## 2023-11-24 NOTE — PROGRESS NOTES
Infusion Nursing Note:  Kenneth Matthew presents today for Pump off and IVF .    Patient seen by provider today: No   present during visit today: Not Applicable.    Note: Patient offers no questions or concerns.      Intravenous Access:  Implanted Port.    Treatment Conditions:  Lab Results   Component Value Date    HGB 14.6 11/22/2023    WBC 7.2 11/22/2023    ANEUTAUTO 4.7 11/22/2023     (L) 11/22/2023        Lab Results   Component Value Date     11/22/2023    POTASSIUM 4.4 11/22/2023    MAG 2.2 02/08/2023    CR 0.66 (L) 11/22/2023    NICHOLAS 9.2 11/22/2023    BILITOTAL 0.4 11/22/2023    ALBUMIN 3.9 11/22/2023    ALT 9 11/22/2023    AST 17 11/22/2023         Post Infusion Assessment:  Patient tolerated infusion without incident.  Blood return noted pre and post infusion.  No evidence of extravasations.  Access discontinued per protocol.       Discharge Plan:   Discharge instructions reviewed with: Patient.  Patient and/or family verbalized understanding of discharge instructions and all questions answered.  AVS to patient via [employer].  Patient will return 2 weeks for next appointment.   Patient discharged in stable condition accompanied by: self and son.  Departure Mode: Ambulatory.      Matilde Matthews RN

## 2023-11-29 NOTE — PROGRESS NOTES
Palliative Care Clinic Visit      Primary Care Provider:  Chata Smith MD      Reason for Consultation:  Kenneth Matthew, 58 year old, male is seen for follow up Palliative Care visit due to esophageal cancer lower third and cancer related pain    The patient is seen in [facility] with family present.    Advanced Directives:  DNR/DNI HCD on file    Information sharing preferences:  Self, family    History of Present Illness:  Mr. Matthew states he noticed difficulty swallowing initially starting June 2022. Over the last 1 year he has unintentionally lost 60 pounds.      10/21/2022: CT chest, abdomen and pelvis: Diffuse wall thickening of the distal esophagus with associated mediastinal adenopathy, and upper abdominal  Conglomerate lymphadenopathy. Constellation of findings are highly concerning for underlying esophageal carcinoma with metastatic disease.      11/1/2022: EGD: A large ulcerating mass with no bleeding and no stigmata of recent bleeding was found Lower third from the incisors. The mass was partially obstructing and partially circumferential involving  two thirds.      Biopsy showed poorly differentiated favoring adenocarcinoma. Her 2 negative.      12/14/2022: PET scan: Distal esophageal neoplasm with metastatic involvement the  mediastinum, left hilum and retroperitoneal lymph nodes of the upperabdomen. Also likely localized metastatic involvement of thestomach/GE junction. 2 cm intramural distal esophageal/GE junction mass. Circumferential wall thickening with increased FDG uptake in the distal esophagus adjacent portions of the stomach/GE junction may represent localized disease however could also represent localized inflammation. Metastatic involvement to the subcarinal nodes, left infrahilar and  pericaval/paraesophageal lymph nodes of the lower thorax. Metastatic involvement to the retroperitoneal lymph nodes of the upper abdomen. These lymph nodes have increased in size  dating back to be  10/21/2022 CT scan consistent with worsening disease. Representative  nodes are described above.     1/11/2023 to 6/28/2023: 13 cycles of FOLFOX with Nivo     7/5/2023: PET scan: Interval worsening of disease.   Increased size and FDG uptake suggests malignant involvement involving of a right paraesophageal lymph node in the upper thorax, diffuse involvement of the mediastinal and hilar nodes as well as worsening involvement of lymph nodes along the lesser curvature the stomach and in the retroperitoneum. Focal area of uptake in the right inferolateral aspect of the prostate gland may be inflammatory in nature.   Stable areas of uptake seen within the distal portions of the  Esophagus. An area of increased uptake seen previously within the gastric mucosa appears smaller.     7/9/2023 to 9/13/2023: Given progression seen on PET scan he was started on second line treatment with ramucirumab and Taxol.  Received total of 3 cycles.     10/4/2023: PET scan:Interval progression of disease, with increasing size of mediastinal  and upper abdominal lymph nodes. The focal mass in the distal  esophagus has not changed significantly.There has been interval collapse of the right middle lobe which  demonstrates increased FDG uptake, likely due to an infectious or  inflammatory process, however metastatic disease is also within the  differential..     He is now referred for Palliative Care consultation    Symptom management challenges:  Pain.  He has had cancer related pain and is controlled on the current regimen.     There has been neuropathic pain post chemotherapy    Ed has a feeding tube for nutrition.  He is tolerating liquids orally.    He has been using cannabis 10 mg seltzer for sleep.  We discussed medical cannabis certification.    Ed continues to receive fluids.    Palliative Care Goals:     Patient understanding of illness and prognosis:  intact    Medical records are reviewed.      Current  Outpatient Medications:     gabapentin (NEURONTIN) 300 MG capsule, Take 1 capsule (300 mg) by mouth 3 times daily Start with 300mg at night for 7 days then increase to AM and PM for 7 days then increase to 3 times a day. (Patient taking differently: Take 300 mg by mouth 3 times daily Take 1 po twice a day), Disp: 270 capsule, Rfl: 1    HYDROcodone-acetaminophen (NORCO) 7.5-325 MG per tablet, Take 1 tablet by mouth every 6 hours as needed for severe pain, Disp: 60 tablet, Rfl: 0    omeprazole (PRILOSEC) 20 MG DR capsule, Take 1 capsule (20 mg) by mouth 2 times daily, Disp: 180 capsule, Rfl: 3    ondansetron (ZOFRAN) 8 MG tablet, Take 1 tablet (8 mg) by mouth every 8 hours as needed for nausea, Disp: 60 tablet, Rfl: 1    oxyCODONE (XTAMPZA ER) 13.5 MG 12 hr tablet, Take 1 tablet (13.5 mg) by mouth every 12 hours, Disp: 60 tablet, Rfl: 0    prochlorperazine (COMPAZINE) 10 MG tablet, Take 1 tablet (10 mg) by mouth every 6 hours as needed for nausea or vomiting, Disp: 120 tablet, Rfl: 3    HYDROcodone-acetaminophen 7.5-325 MG/15ML solution, Take 10 mLs by mouth every 6 hours as needed for moderate to severe pain (Patient not taking: Reported on 11/29/2023), Disp: 473 mL, Rfl: 0    hydrocortisone (CORTAID) 1 % external cream, Apply topically 2 times daily (Patient not taking: Reported on 11/29/2023), Disp: 30 g, Rfl: 1    mirtazapine (REMERON) 7.5 MG tablet, Take 1 tablet (7.5 mg) by mouth At Bedtime (Patient not taking: Reported on 11/22/2023), Disp: 30 tablet, Rfl: 0    naloxone (NARCAN) 4 MG/0.1ML nasal spray, Spray 1 spray (4 mg) into one nostril alternating nostrils as needed for opioid reversal every 2-3 minutes until assistance arrives (Patient not taking: Reported on 11/22/2023), Disp: 0.2 mL, Rfl: 0      No Known Allergies    Past Medical History:  Past Medical History:   Diagnosis Date    Cancer (H)     Esophageal cancer (H) 11/01/2022    Esophageal thickening 10/25/2022    Mixed hearing loss, bilateral      Tobacco abuse 1/4/2023    Tobacco use disorder 12/27/2019         Past Surgical History:   Procedure Laterality Date    COLONOSCOPY  11/01/2022    [facility]    ESOPHAGOSCOPY N/A 4/25/2023    Procedure: Upper Endoscopy with biopsy- with Gastrostomy Tube Exchange;  Surgeon: Nba Dalton MD;  Location: [facility] OR    INSERT PORT VASCULAR ACCESS N/A 12/22/2022    Procedure: POWER PORT PLACEMENT;  Surgeon: Nba Dalton MD;  Location: [facility] OR    Upper GI Endoscopy  11/01/2022       Family History:  Family History   Problem Relation Age of Onset    Breast Cancer Mother     Hyperlipidemia Mother     Diabetes Mother     Coronary Artery Disease Mother     Cerebrovascular Disease Father     Hyperlipidemia Father     Coronary Artery Disease Father     Myocardial Infarction Father     Hyperlipidemia Brother     Hypertension Brother     Diabetes Brother     Asthma Sister     Breast Cancer Sister     Hyperlipidemia Sister     Diabetes Sister     Hyperlipidemia Sister     Diabetes Sister     Hyperlipidemia Sister     Suicide Sister     Anesthesia Reaction Sister     Breast Cancer Sister     Hyperlipidemia Sister     Diabetes Sister     Breast Cancer Niece     Skin Cancer Niece     Thyroid Disease Niece     Breast Cancer Niece     Leukemia Nephew     Colon Cancer No family hx of     Prostate Cancer No family hx of     Genetic Disorder No family hx of        Social History:   reports that he quit smoking about 5 months ago. His smoking use included cigarettes. He started smoking about 47 years ago. He has a 23.00 pack-year smoking history. He has never used smokeless tobacco. He reports that he does not currently use alcohol. He reports that he does not use drugs.      Palliative Review of Systems:   Performance status: ECOG 3   Karnofsky Performance Scale 50    Palliative Performance Status 50      Nutritional status: weight loss    Review of Systems   Constitutional:  Positive for appetite change, fatigue and unexpected weight  change.   HENT:   Positive for trouble swallowing. Negative for hearing loss and voice change.    Eyes:  Negative for eye problems.   Respiratory:  Positive for cough and shortness of breath. Negative for chest tightness, hemoptysis and wheezing.    Cardiovascular:  Negative for chest pain, leg swelling and palpitations.   Gastrointestinal:  Positive for constipation. Negative for abdominal pain, blood in stool, diarrhea, nausea and vomiting.   Genitourinary:  Positive for frequency. Negative for bladder incontinence, difficulty urinating, dysuria, hematuria and nocturia.    Musculoskeletal:  Positive for arthralgias, back pain, gait problem and myalgias. Negative for neck pain.   Skin:  Positive for itching. Negative for rash and wound.   Neurological:  Positive for dizziness, extremity weakness and gait problem. Negative for light-headedness, numbness, seizures and speech difficulty.   Hematological:  Negative for adenopathy.   Psychiatric/Behavioral:  Negative for depression and sleep disturbance. The patient is not nervous/anxious.            Physical Examination:  GENERAL:  Chronically ill appearing  PSYCH: Alert and oriented.  Affect bright.    Other exam not repeated     Laboratory/Imaging:  Reviewed      Assessment/Plan:  Malignant neoplasm of lower third of esophagus (H)      Recommendations:  Reviewed role of cannabis in the treatment of symptoms and details of certification.  He will continue current regimen.  Advised follow up via MyChart should symptoms worsen between visits. Follow up 2 months.        Attestation:  Total time spent on the day of encounter was 45 minutes including review of pertinent clinical information, patient visit, treatment plan, and coordination of care.     Sanju House MD [facility]  Hospice and Palliative Care

## 2023-11-29 NOTE — NURSING NOTE
"Oncology Rooming Note    November 29, 2023 9:47 AM   Kenneth Matthew is a 58 year old male who presents for:    Chief Complaint   Patient presents with    palliative Care     Follow up Palliative Care     Initial Vitals: /60   Pulse 70   Temp 96.8  F (36  C) (Tympanic)   Resp 20   Ht 1.715 m (5' 7.5\")   Wt 59.1 kg (130 lb 4.7 oz)   SpO2 96%   BMI 20.11 kg/m   Estimated body mass index is 20.11 kg/m  as calculated from the following:    Height as of this encounter: 1.715 m (5' 7.5\").    Weight as of this encounter: 59.1 kg (130 lb 4.7 oz). Body surface area is 1.68 meters squared.  Severe Pain (7) Comment: Data Unavailable   No LMP for male patient.  Allergies reviewed: Yes  Medications reviewed: Yes    Medications: Medication refills not needed today.  Pharmacy name entered into EPIC:    [facility] - [place], [place] - [id number] - [place], [place] - [address] AT [facility] [address]          Yahaira Rodriguez LPN             "

## 2023-12-06 NOTE — PROGRESS NOTES
Infusion Nursing Note:  Kenneth Matthew presents today for labs from port.      Intravenous Access:  Implanted Port. Labs drawn without difficulty from implanted port.    Patient to medical oncology via w/c accompanied by family for provider appointment.    Ricardo CLAIRE

## 2023-12-06 NOTE — Clinical Note
Fahad House,   Mr. Matthew is not able to tolerate the dilaudid. It is causing a lot of nausea, he is back on the oxycodone again. Also he is having a lot of anxiety at night. He tried remeron and it has not helped. Is there anything else he can try. I was thinking trazadone, I am a little nervous to give him a benzo.

## 2023-12-06 NOTE — PROGRESS NOTES
Post Infusion Assessment:  Patient tolerated infusion without incident.  Site patent and intact, free from redness, edema or discomfort.  No evidence of extravasations.  Attached to home infuser, secured to patient.       Discharge Plan:   Patient discharged in stable condition accompanied by: sister.  Departure Mode: Ambulatory.        Scanned pump ticket to secure email, including ALFRED and Destiny from pharmacy.

## 2023-12-06 NOTE — NURSING NOTE
"Oncology Rooming Note    December 6, 2023 9:55 AM   Kenneth Matthew is a 58 year old male who presents for:    Chief Complaint   Patient presents with    Oncology Clinic Visit     Follow up Malignant neoplasm of lower third of esophagus      Initial Vitals: /62 (BP Location: Right arm, Patient Position: Sitting, Cuff Size: Adult Regular)   Pulse 72   Temp 97.6  F (36.4  C) (Tympanic)   Ht 1.715 m (5' 7.5\")   Wt 57.6 kg (127 lb)   SpO2 98%   BMI 19.60 kg/m   Estimated body mass index is 19.6 kg/m  as calculated from the following:    Height as of this encounter: 1.715 m (5' 7.5\").    Weight as of this encounter: 57.6 kg (127 lb). Body surface area is 1.66 meters squared.  Extreme Pain (8) Comment: Data Unavailable   No LMP for male patient.  Allergies reviewed: Yes  Medications reviewed: Yes    Medications: Medication refills not needed today.  Pharmacy name entered into EPIC:    [facility] - [place], [place] - [id number] [place], [place] - [facility] [address] [facility] [address]    Clinical concerns: lots of upper back pain, using Norco tablets and Oxycodone prn, did use a icy hot patch today.   Dr Barnhart  was notified.      Syeda Muir LPN              "

## 2023-12-06 NOTE — PROGRESS NOTES
Infusion Nursing Note:  Kenneth Matthew presents today for Folfiri.    Patient seen by provider today: Yes: Dr Barnhart   present during visit today: Not Applicable.    Note: se provider assessment on this date for details.    Treatment Conditions:  Results reviewed, labs MET treatment parameters, ok to proceed with treatment.    Component      Latest Ref Rng 12/6/2023  9:21 AM   WBC      4.0 - 11.0 10e3/uL 6.7    RBC Count      4.40 - 5.90 10e6/uL 4.48    Hemoglobin      13.3 - 17.7 g/dL 14.9    Hematocrit      40.0 - 53.0 % 43.0    MCV      78 - 100 fL 96    MCH      26.5 - 33.0 pg 33.3 (H)    MCHC      31.5 - 36.5 g/dL 34.7    RDW      10.0 - 15.0 % 15.6 (H)    Platelet Count      150 - 450 10e3/uL 121 (L)    % Neutrophils      % 68    % Lymphocytes      % 22    % Monocytes      % 9    % Eosinophils      % 1    % Basophils      % 0    % Immature Granulocytes      % 0    NRBCs per 100 WBC      <1 /100 0    Absolute Neutrophils      1.6 - 8.3 10e3/uL 4.6    Absolute Lymphocytes      0.8 - 5.3 10e3/uL 1.5    Absolute Monocytes      0.0 - 1.3 10e3/uL 0.6    Absolute Eosinophils      0.0 - 0.7 10e3/uL 0.1    Absolute Basophils      0.0 - 0.2 10e3/uL 0.0    Absolute Immature Granulocytes      <=0.4 10e3/uL 0.0    Absolute NRBCs      10e3/uL 0.0    Sodium      135 - 145 mmol/L 138    Potassium      3.4 - 5.3 mmol/L 4.4    Carbon Dioxide (CO2)      22 - 29 mmol/L 29    Anion Gap      7 - 15 mmol/L 8    Urea Nitrogen      8.0 - 23.0 mg/dL 13.1    Creatinine      0.67 - 1.17 mg/dL 0.64 (L)    GFR Estimate      >60 mL/min/1.73m2 >90    Calcium      8.8 - 10.2 mg/dL 9.7    Chloride      98 - 107 mmol/L 101    Glucose      70 - 99 mg/dL 118 (H)    Alkaline Phosphatase      40 - 150 U/L 76    AST      0 - 45 U/L 20    ALT      0 - 70 U/L 14    Protein Total      6.4 - 8.3 g/dL 7.4    Albumin      3.5 - 5.2 g/dL 4.0    Bilirubin Total      <=1.2 mg/dL 0.5       Legend:  (H) High  (L) Low

## 2023-12-06 NOTE — PROGRESS NOTES
MEDICAL ONCOLOGY FOLLOW UP NOTE  Dec 6, 2023    Reason for Follow up: esophageal cancer    HISTORY OF PRESENT ILLNESS  Kenneth Matthew is a 58 year old male with PMH as stated below who is seen in the oncology clinic for follow up of esophageal cancer    His history in short is as follows    Mr. Matthew states he noticed difficulty swallowing initially starting June 2022. Over the last 1 year he has unintentionally lost 60 pounds.     10/21/2022: CT chest, abdomen and pelvis: Diffuse wall thickening of the distal esophagus with associated mediastinal adenopathy, and upper abdominal  Conglomerate lymphadenopathy. Constellation of findings are highly concerning for underlying esophageal carcinoma with metastatic disease.     11/1/2022: EGD: A large ulcerating mass with no bleeding and no stigmata of recent bleeding was found Lower third from the incisors. The mass was partially obstructing and partially circumferential involving  two thirds.     Biopsy showed poorly differentiated favoring adenocarcinoma. Her 2 negative.     12/14/2022: PET scan: Distal esophageal neoplasm with metastatic involvement the  mediastinum, left hilum and retroperitoneal lymph nodes of the upperabdomen. Also likely localized metastatic involvement of thestomach/GE junction. 2 cm intramural distal esophageal/GE junction mass. Circumferential wall thickening with increased FDG uptake in the distal esophagus adjacent portions of the stomach/GE junction may represent localized disease however could also represent localized inflammation. Metastatic involvement to the subcarinal nodes, left infrahilar and  pericaval/paraesophageal lymph nodes of the lower thorax. Metastatic involvement to the retroperitoneal lymph nodes of the upper abdomen. These lymph nodes have increased in size dating back to be  10/21/2022 CT scan consistent with worsening disease. Representative  nodes are described above.    1/11/2023 to 6/28/2023: 13 cycles of FOLFOX with  Moisés    7/5/2023: PET scan: Interval worsening of disease.   Increased size and FDG uptake suggests malignant involvement involving of a right paraesophageal lymph node in the upper thorax, diffuse involvement of the mediastinal and hilar nodes as well as worsening involvement of lymph nodes along the lesser curvature the stomach and in the retroperitoneum. Focal area of uptake in the right inferolateral aspect of the prostate gland may be inflammatory in nature.   Stable areas of uptake seen within the distal portions of the  Esophagus. An area of increased uptake seen previously within the gastric mucosa appears smaller.    7/9/2023 to 9/13/2023: Given progression seen on PET scan he was started on second line treatment with ramucirumab and Taxol.  Received total of 3 cycles.    10/4/2023: PET scan:Interval progression of disease, with increasing size of mediastinal  and upper abdominal lymph nodes. The focal mass in the distal  esophagus has not changed significantly.There has been interval collapse of the right middle lobe which  demonstrates increased FDG uptake, likely due to an infectious or  inflammatory process, however metastatic disease is also within the  differential.    He was then switched to FOLFIRI.     10/18/2023: Started treatment with cycle 1 of FOLFIRI. He is currently s/p cycle 3.     Interim history     Mr. Matthew is seen today in the clinic. He is having pain in his upper back. He was started on dilaudid last week but it caused nausea so he went back to oxycodone long aciting. He denies any diarrhea. Denies any nausea or vomiting other than what he had with dilaudid. He also complains of anxiety at night that is stopping him from sleeping.       REVIEW OF SYSTEMS  A 12-point ROS negative except as in HPI      Current Outpatient Medications   Medication Sig Dispense Refill    gabapentin (NEURONTIN) 300 MG capsule Take 1 capsule (300 mg) by mouth 3 times daily Start with 300mg at night for 7  days then increase to AM and PM for 7 days then increase to 3 times a day. (Patient taking differently: Take 300 mg by mouth 3 times daily Take 1 po twice a day) 270 capsule 1    HYDROcodone-acetaminophen (NORCO) 7.5-325 MG per tablet Take 1 tablet by mouth every 6 hours as needed for severe pain 60 tablet 0    HYDROcodone-acetaminophen 7.5-325 MG/15ML solution Take 10 mLs by mouth every 6 hours as needed for moderate to severe pain (Patient not taking: Reported on 11/29/2023) 473 mL 0    hydrocortisone (CORTAID) 1 % external cream Apply topically 2 times daily (Patient not taking: Reported on 11/29/2023) 30 g 1    HYDROmorphone (DILAUDID) 2 MG tablet Take 1 tablet (2 mg) by mouth every 4 hours 120 tablet 0    mirtazapine (REMERON) 7.5 MG tablet Take 1 tablet (7.5 mg) by mouth At Bedtime (Patient not taking: Reported on 11/22/2023) 30 tablet 0    naloxone (NARCAN) 4 MG/0.1ML nasal spray Spray 1 spray (4 mg) into one nostril alternating nostrils as needed for opioid reversal every 2-3 minutes until assistance arrives (Patient not taking: Reported on 11/22/2023) 0.2 mL 0    omeprazole (PRILOSEC) 20 MG DR capsule Take 1 capsule (20 mg) by mouth 2 times daily 180 capsule 3    ondansetron (ZOFRAN) 8 MG tablet Take 1 tablet (8 mg) by mouth every 8 hours as needed for nausea 60 tablet 1    oxyCODONE (XTAMPZA ER) 13.5 MG 12 hr tablet Take 1 tablet (13.5 mg) by mouth every 12 hours 60 tablet 0    prochlorperazine (COMPAZINE) 10 MG tablet Take 1 tablet (10 mg) by mouth every 6 hours as needed for nausea or vomiting 120 tablet 3       No Known Allergies  Immunization History   Administered Date(s) Administered    COVID-19 MONOVALENT 12+ (Pfizer) 08/28/2021, 09/18/2021    TDAP (Adacel,Boostrix) 12/10/2021       Past Medical History:   Diagnosis Date    Cancer (H)     Esophageal cancer (H) 11/01/2022    Esophageal thickening 10/25/2022    Mixed hearing loss, bilateral     Tobacco abuse 1/4/2023    Tobacco use disorder 12/27/2019        Past Surgical History:   Procedure Laterality Date    COLONOSCOPY  11/01/2022    [facility]    ESOPHAGOSCOPY N/A 4/25/2023    Procedure: Upper Endoscopy with biopsy- with Gastrostomy Tube Exchange;  Surgeon: Nba Dalton MD;  Location: [facility]    INSERT PORT VASCULAR ACCESS N/A 12/22/2022    Procedure: POWER PORT PLACEMENT;  Surgeon: Nba Dalton MD;  Location: [facility]    Upper GI Endoscopy  11/01/2022       SOCIAL HISTORY  History   Smoking Status    Former    Packs/day: 0.50    Years: 46.00    Types: Cigarettes    Start date: 1976    Quit date: 6/12/2023   Smokeless Tobacco    Never    Social History    Substance and Sexual Activity      Alcohol use: Not Currently        Comment: none since July of 2022     History   Drug Use Unknown       FAMILY HISTORY  Family History   Problem Relation Age of Onset    Breast Cancer Mother     Hyperlipidemia Mother     Diabetes Mother     Coronary Artery Disease Mother     Cerebrovascular Disease Father     Hyperlipidemia Father     Coronary Artery Disease Father     Myocardial Infarction Father     Hyperlipidemia Brother     Hypertension Brother     Diabetes Brother     Asthma Sister     Breast Cancer Sister     Hyperlipidemia Sister     Diabetes Sister     Hyperlipidemia Sister     Diabetes Sister     Hyperlipidemia Sister     Suicide Sister     Anesthesia Reaction Sister     Breast Cancer Sister     Hyperlipidemia Sister     Diabetes Sister     Breast Cancer Niece     Skin Cancer Niece     Thyroid Disease Niece     Breast Cancer Niece     Leukemia Nephew     Colon Cancer No family hx of     Prostate Cancer No family hx of     Genetic Disorder No family hx of        PHYSICAL EXAMINATION  There were no vitals taken for this visit.  Wt Readings from Last 2 Encounters:   12/06/23 57.9 kg (127 lb 10.3 oz)   11/29/23 59.1 kg (130 lb 4.7 oz)     Physical Exam  Constitutional:       Appearance: Normal appearance.   Eyes:      Conjunctiva/sclera: Conjunctivae normal.    Pulmonary:      Effort: Pulmonary effort is normal.   Abdominal:      General: Abdomen is flat.      Palpations: Abdomen is soft.   Neurological:      General: No focal deficit present.      Mental Status: He is alert and oriented to person, place, and time. Mental status is at baseline.       Laboratory and Imaging:     Latest Reference Range & Units 12/06/23 09:21   WBC 4.0 - 11.0 10e3/uL 6.7   Hemoglobin 13.3 - 17.7 g/dL 14.9   Hematocrit 40.0 - 53.0 % 43.0   Platelet Count 150 - 450 10e3/uL 121 (L)   (L): Data is abnormally low          RESULT FOR IMMUNOHISTOCHEMICAL VENTANA CLONE  PD-L1 ASSAY  COMBINED POSITIVE SCORE (CPS): <1%    8/7/2023: Foundation one: Microsatellite status - MS-Stable   Tumor Mutational [place] - 5 Muts/ Mb   Genomic Findings   APC [id number]*3   CTNA1 rearrangement exon 4   [id number]   TP53 P27fs*17 1   Disease relevant genes with no reportable alterations: ERBB2    ASSESSMENT AND PLAN    1. 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     Currently on first line treatment with FOLFOX with Nivo.  Has received 13 cycles till date.  However staging scan on 7/5/2023 does show progressive disease.    He then received 3 cycles of Taxol with ramucirumab from 7 2023-9 2023.  His PET scan done after 3 cycles does show progressive disease with increasing mediastinal and upper abdominal lymph nodes.  We discussed next options for treatment and 1 option would be FOLFIRI.    Following discussion he was started on treatment with FOLFIRI on 10/18/2023.  Plan to do staging scans after 6 cycles.    He is status post cycle 3. His labs today show a platelet count of 121 with ANC of 4.6. Proceed with cycle 4.       2.  Cancer related pain:    He is on short acting hydrocodone and tynelol. He was also started on Dilaudid but is not able to tolerate it so he now back to long acting oxycodone. Will continue with this for now to see if there is any improvement in his pain.     3.  Insomnia    Having difficulty sleeping at night. Tried Remeron and is not working. Will reach out to Dr. House to see if there is an alternate.     Proceed to cycle 4. Follow up before cycle 5.     Total time spent on the patient on day of encounter was 56 minutes doing chart review, review of test results, interpretation of results, patient visit and documentation.      Nika Barnhart MD

## 2023-12-07 NOTE — PROGRESS NOTES
[facility]: Cancer Care                                                                                        Returned a call to Keisha with [employer] for updates on treatment plan and when next scans are due. Patient had mentioned to her that he wanted to reach out to SW about disability, and insurance. He has lost her contact info. Will update  and request to reach out to patient.       Signature:  Nenita Tatum RN

## 2023-12-08 NOTE — PROGRESS NOTES
Patient is 58 years old, here today for home infuser pump off.    Infuser pump completed, 100 % infused.  Patient denies adverse effects from home infusion, nor mechanical issues.  Pump is intact.  Infuser disconnected from port.      Port flushed per MAR.  Immediate blood return noted.  Flushes easily, without resistance.      1 liter NS administered per MAR.    Port needle removed, needle intact.  Surrounding skin without redness, swelling, nor discoloration.  Skin warm and dry to touch.      Patient tolerated well.  Site covered with sterile bandage and slight pressure applied for 30 seconds. Instructed patient to leave bandage on for a minimum of one hour and to call with questions or concerns.    Education provided for signs and symptoms of infection and when to call the doctor.  Patient states understanding and is in agreement with this plan. Patient discharged.

## 2023-12-13 NOTE — PROGRESS NOTES
Care Coordination Note:    Writer followed up with patient regarding questions about insurance and disability. Patient stated that he is no longer under [employer] insurance so they are without insurance at the time being. We discussed options and ways to fill out an application for state insurance, patient stated that in January they are going to be applying for  since patient was in the . Writer informed patient that coverage for the state insurance wouldn't be active until January if they went that route. We discussed filling out another application for [facility] to assist in copay costs during this time. Application is being sent out in the mail.  Also sending contact information for any further assistance.  Another question regarding [employer] application was brought up. Patient and his spouse stated that they have not heard anything since the application was sent at the end of October. Writer followed up with Sandra at [employer] and forwarded the form to a new email address.  Will follow as needed.    ANDREWS Shukla

## 2023-12-20 NOTE — PROGRESS NOTES
MEDICAL ONCOLOGY FOLLOW UP NOTE  Dec 20, 2023    Reason for Follow up: esophageal cancer    HISTORY OF PRESENT ILLNESS  Kenneth Matthew is a 58 year old male with PMH as stated below who is seen in the oncology clinic for follow up of esophageal cancer    His history in short is as follows    Mr. Matthew states he noticed difficulty swallowing initially starting June 2022. Over the last 1 year he has unintentionally lost 60 pounds.     10/21/2022: CT chest, abdomen and pelvis: Diffuse wall thickening of the distal esophagus with associated mediastinal adenopathy, and upper abdominal  Conglomerate lymphadenopathy. Constellation of findings are highly concerning for underlying esophageal carcinoma with metastatic disease.     11/1/2022: EGD: A large ulcerating mass with no bleeding and no stigmata of recent bleeding was found Lower third from the incisors. The mass was partially obstructing and partially circumferential involving  two thirds.     Biopsy showed poorly differentiated favoring adenocarcinoma. Her 2 negative.     12/14/2022: PET scan: Distal esophageal neoplasm with metastatic involvement the  mediastinum, left hilum and retroperitoneal lymph nodes of the upperabdomen. Also likely localized metastatic involvement of thestomach/GE junction. 2 cm intramural distal esophageal/GE junction mass. Circumferential wall thickening with increased FDG uptake in the distal esophagus adjacent portions of the stomach/GE junction may represent localized disease however could also represent localized inflammation. Metastatic involvement to the subcarinal nodes, left infrahilar and  pericaval/paraesophageal lymph nodes of the lower thorax. Metastatic involvement to the retroperitoneal lymph nodes of the upper abdomen. These lymph nodes have increased in size dating back to be  10/21/2022 CT scan consistent with worsening disease. Representative  nodes are described above.    1/11/2023 to 6/28/2023: 13 cycles of FOLFOX with  Moisés    7/5/2023: PET scan: Interval worsening of disease.   Increased size and FDG uptake suggests malignant involvement involving of a right paraesophageal lymph node in the upper thorax, diffuse involvement of the mediastinal and hilar nodes as well as worsening involvement of lymph nodes along the lesser curvature the stomach and in the retroperitoneum. Focal area of uptake in the right inferolateral aspect of the prostate gland may be inflammatory in nature.   Stable areas of uptake seen within the distal portions of the  Esophagus. An area of increased uptake seen previously within the gastric mucosa appears smaller.    7/9/2023 to 9/13/2023: Given progression seen on PET scan he was started on second line treatment with ramucirumab and Taxol.  Received total of 3 cycles.    10/4/2023: PET scan:Interval progression of disease, with increasing size of mediastinal  and upper abdominal lymph nodes. The focal mass in the distal  esophagus has not changed significantly.There has been interval collapse of the right middle lobe which  demonstrates increased FDG uptake, likely due to an infectious or  inflammatory process, however metastatic disease is also within the  differential.    He was then switched to FOLFIRI.     10/18/2023: Started treatment with cycle 1 of FOLFIRI. He is currently s/p cycle 4.     Interim history     Mr. Matthew is seen today prior to his infusion.  Overall he is doing well.  He did say last week he was not feeling well.  He complained of nausea 1 episode of vomiting.  Denies any diarrhea but did not have a proper bowel movement last week.  He is currently having regular bowel movements.  He feels as if he had a viral illness and symptoms have improved.  He is able to take his tube feeds now which she was not able to do last week.  He continues to be able to swallow liquids.    REVIEW OF SYSTEMS  A 12-point ROS negative except as in HPI      Current Outpatient Medications   Medication Sig  Dispense Refill    gabapentin (NEURONTIN) 300 MG capsule Take 1 capsule (300 mg) by mouth 3 times daily Start with 300mg at night for 7 days then increase to AM and PM for 7 days then increase to 3 times a day. (Patient taking differently: Take 300 mg by mouth 3 times daily Take 1 po twice a day) 270 capsule 1    HYDROcodone-acetaminophen 7.5-325 MG/15ML solution Take 10 mLs by mouth every 6 hours as needed for moderate to severe pain 473 mL 0    mirtazapine (REMERON) 7.5 MG tablet Take 1 tablet (7.5 mg) by mouth At Bedtime 30 tablet 0    omeprazole (PRILOSEC) 20 MG DR capsule Take 1 capsule (20 mg) by mouth 2 times daily 180 capsule 3    ondansetron (ZOFRAN) 8 MG tablet Take 1 tablet (8 mg) by mouth every 8 hours as needed for nausea 60 tablet 1    oxyCODONE (XTAMPZA ER) 13.5 MG 12 hr tablet Take 1 tablet (13.5 mg) by mouth every 12 hours 60 tablet 0    prochlorperazine (COMPAZINE) 10 MG tablet Take 1 tablet (10 mg) by mouth every 6 hours as needed for nausea or vomiting 120 tablet 3    hydrocortisone (CORTAID) 1 % external cream Apply topically 2 times daily (Patient not taking: Reported on 11/29/2023) 30 g 1    naloxone (NARCAN) 4 MG/0.1ML nasal spray Spray 1 spray (4 mg) into one nostril alternating nostrils as needed for opioid reversal every 2-3 minutes until assistance arrives (Patient not taking: Reported on 11/22/2023) 0.2 mL 0       No Known Allergies  Immunization History   Administered Date(s) Administered    COVID-19 MONOVALENT 12+ (Pfizer) 08/28/2021, 09/18/2021    TDAP (Adacel,Boostrix) 12/10/2021       Past Medical History:   Diagnosis Date    Cancer (H)     Esophageal cancer (H) 11/01/2022    Esophageal thickening 10/25/2022    Mixed hearing loss, bilateral     Tobacco abuse 1/4/2023    Tobacco use disorder 12/27/2019       Past Surgical History:   Procedure Laterality Date    COLONOSCOPY  11/01/2022    [facility]    ESOPHAGOSCOPY N/A 4/25/2023    Procedure: Upper Endoscopy with biopsy- with  "Gastrostomy Tube Exchange;  Surgeon: Nba Dalton MD;  Location: [facility]    INSERT PORT VASCULAR ACCESS N/A 12/22/2022    Procedure: POWER PORT PLACEMENT;  Surgeon: Nba Dalton MD;  Location: [facility]    Upper GI Endoscopy  11/01/2022       SOCIAL HISTORY  History   Smoking Status    Former    Packs/day: 0.50    Years: 46.00    Types: Cigarettes    Start date: 1976    Quit date: 6/12/2023   Smokeless Tobacco    Never    Social History    Substance and Sexual Activity      Alcohol use: Not Currently        Comment: none since July of 2022     History   Drug Use Unknown       FAMILY HISTORY  Family History   Problem Relation Age of Onset    Breast Cancer Mother     Hyperlipidemia Mother     Diabetes Mother     Coronary Artery Disease Mother     Cerebrovascular Disease Father     Hyperlipidemia Father     Coronary Artery Disease Father     Myocardial Infarction Father     Hyperlipidemia Brother     Hypertension Brother     Diabetes Brother     Asthma Sister     Breast Cancer Sister     Hyperlipidemia Sister     Diabetes Sister     Hyperlipidemia Sister     Diabetes Sister     Hyperlipidemia Sister     Suicide Sister     Anesthesia Reaction Sister     Breast Cancer Sister     Hyperlipidemia Sister     Diabetes Sister     Breast Cancer Niece     Skin Cancer Niece     Thyroid Disease Niece     Breast Cancer Niece     Leukemia Nephew     Colon Cancer No family hx of     Prostate Cancer No family hx of     Genetic Disorder No family hx of        PHYSICAL EXAMINATION  BP 96/57 (BP Location: Left arm, Patient Position: Sitting, Cuff Size: Adult Regular)   Pulse 71   Temp 97.8  F (36.6  C) (Tympanic)   Ht 1.702 m (5' 7\")   Wt 57.2 kg (126 lb 1.6 oz)   SpO2 98%   BMI 19.75 kg/m    Wt Readings from Last 2 Encounters:   12/20/23 57.2 kg (126 lb 1.6 oz)   12/20/23 57.2 kg (126 lb 1.6 oz)     Physical Exam  Constitutional:       Appearance: Normal appearance.   Eyes:      Conjunctiva/sclera: Conjunctivae normal. "   Pulmonary:      Effort: Pulmonary effort is normal.   Abdominal:      General: Abdomen is flat.      Palpations: Abdomen is soft.   Neurological:      General: No focal deficit present.      Mental Status: He is alert and oriented to person, place, and time. Mental status is at baseline.         Laboratory and Imaging:     Latest Reference Range & Units 12/20/23 10:31   WBC 4.0 - 11.0 10e3/uL 6.8   Hemoglobin 13.3 - 17.7 g/dL 14.4   Hematocrit 40.0 - 53.0 % 40.5   Platelet Count 150 - 450 10e3/uL 142 (L)   (L): Data is abnormally low    RESULT FOR IMMUNOHISTOCHEMICAL VENTANA CLONE  PD-L1 ASSAY  COMBINED POSITIVE SCORE (CPS): <1%    8/7/2023: Foundation one: Microsatellite status - MS-Stable   Tumor Mutational [place] - 5 Muts/ Mb   Genomic Findings   APC [id number]*3   CTNA1 rearrangement exon 4   [id number]   TP53 P27fs*17 1   Disease relevant genes with no reportable alterations: ERBB2    ASSESSMENT AND PLAN    1. 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     Currently on first line treatment with FOLFOX with Nivo.  Has received 13 cycles till date.  However staging scan on 7/5/2023 does show progressive disease.    He then received 3 cycles of Taxol with ramucirumab from 7 2023-9 2023.  His PET scan done after 3 cycles does show progressive disease with increasing mediastinal and upper abdominal lymph nodes.  We discussed next options for treatment and 1 option would be FOLFIRI.    Following discussion he was started on treatment with FOLFIRI on 10/18/2023.  Plan to do staging scans after 6 cycles.    He is status post cycle 4.  He meets ANC and platelet parameters today. Proceed with cycle 5.  He is mildly hypotensive without being tachycardic.  Will plan for 1 L normal saline infusion.  He has also lost 2 pounds since his last visit.  He states this is because he was unable to take tube feeds properly when he was not feeling well.  He has now recovered and has been able to take his tube feeds  regularly.  He is also able to drink liquid Ensure.  Will continue to monitor his weight.    Prior to cycle 6.    2.  Cancer related pain:    He is on short acting hydrocodone and tynelol liquid and long-acting oxycodone.  Pain is well-controlled today.    Follow-up in 2 weeks prior to cycle 6.    Total time spent on the patient on day of encounter was 40 minutes doing chart review, review of test results, interpretation of results, patient visit and documentation.      Nika Barnhart MD

## 2023-12-20 NOTE — PROGRESS NOTES
"[facility]  Medical Nutrition Therapy    Visit Type: Follow-up    Patient Referred by: Nika Barnhart MD    Referred Diagnosis: Malignant neoplasm of lower third of esophagus        Nutrition Assessment  Anthropometrics:  Height: 5' 7\" BMI: 19.75    Weight: 126 lbs 1.6 oz  Weight Change: trending down     Wt Readings from Last 10 Encounters:   12/20/23 57.2 kg (126 lb 1.6 oz)   12/20/23 57.2 kg (126 lb 1.6 oz)   12/08/23 58.5 kg (128 lb 15.5 oz)   12/06/23 57.6 kg (127 lb)   12/06/23 57.9 kg (127 lb 10.3 oz)   11/29/23 59.1 kg (130 lb 4.7 oz)   11/24/23 59.9 kg (132 lb 1.6 oz)   11/22/23 60.1 kg (132 lb 6.4 oz)   11/16/23 60.3 kg (133 lb)   11/15/23 60.7 kg (133 lb 12.8 oz)        Nutrition History:  Pt reports sandy sick last week- dry heaves, upset stomach. He wasn't taking in much formula or eating/drinking. Weight trending down over the last month- about 9lbs. Has mostly recovered from his stomach illness. Working on increasing intake through his feeding tube and oral intake.     Food Record:  2 cartons of formula right now (TWOCal or equivalent). Will work on getting back up to 4.   Ensure clear, V8, Smoothie with spinach, apple juice, bone broth, chicken broth, root beer float, hot chocolate, lemon water, apple cider    Physical Activity:  Not addressed    Medical History/Family History:  Tobacco abuse    Nutrition Education:  Gradually increase formula and oral intake. Try to take something by mouth or tube every couple hours    Nutrition Goals:   Adequate intake oral/TF to meet nutrition needs to maintain weight/promote healthy weight gain     Nutrition Follow Up / Monitoring:  TF placement, intake, weight, labs.     Time spent with pt - 15 min     Follow up with RD in about 4 weeks.   Patient has RD's contact information to call/email if needed.        Shelbie Pimentel RD      "

## 2023-12-20 NOTE — NURSING NOTE
"Oncology Rooming Note    December 20, 2023 10:55 AM   Kenneth Matthew is a 58 year old male who presents for:    Chief Complaint   Patient presents with    Oncology Clinic Visit     Follow up. Malignant neoplasm of lower third of esophagus      Initial Vitals: BP 96/57 (BP Location: Left arm, Patient Position: Sitting, Cuff Size: Adult Regular)   Pulse 71   Temp 97.8  F (36.6  C) (Tympanic)   Ht 1.702 m (5' 7\")   Wt 57.2 kg (126 lb 1.6 oz)   SpO2 98%   BMI 19.75 kg/m   Estimated body mass index is 19.75 kg/m  as calculated from the following:    Height as of this encounter: 1.702 m (5' 7\").    Weight as of this encounter: 57.2 kg (126 lb 1.6 oz). Body surface area is 1.64 meters squared.  No Pain (0) Comment: Data Unavailable   No LMP for male patient.  Allergies reviewed: Yes  Medications reviewed: Yes    Medications: Medication refills not needed today.  Pharmacy name entered into EPIC:    [facility] - [place], [place] - [id number] - [place], [place] - [address] AT [facility]  [address]    Frailty Screening:   Is the patient here for a new oncology consult visit in cancer care? 2. No      Clinical concerns: none       Syeda Muir LPN              "

## 2023-12-20 NOTE — PROGRESS NOTES
Patients port accessed using non-coring, 19 gauge, 3/4 needle, per facility protocol.     Hand hygiene performed: yes   Mask donned by caregiver: yes  Site prepped with CHG: yes   Labs drawn: yes   Dressing applied using aseptic technique: yes     Port flushed easily, without resistance. Flushed with 10 cc's normal saline.   Immediate blood return noted. 10 cc blood discarded.  Ordered labs obtained and sent to lab.  Port flushed per orders/MAR.     Patient seen upstairs by Dr. Barnhart roomed by Syeda Muir.

## 2023-12-22 NOTE — PROGRESS NOTES
Infusion Nursing Note:  Kenneth Matthew presents today for Pump off and IV Fluids.    Patient seen by provider today: No   present during visit today: Not Applicable.    Note: Patient concerns addressed with provider attending today.  Questions regarding exercise ect, provider recommends cancer rehab, patient will discuss with his wife.        Intravenous Access:  Implanted Port.    Treatment Conditions:  Lab Results   Component Value Date    HGB 14.4 12/20/2023    WBC 6.8 12/20/2023    ANEUTAUTO 4.3 12/20/2023     (L) 12/20/2023        Lab Results   Component Value Date     12/20/2023    POTASSIUM 4.5 12/20/2023    MAG 2.2 02/08/2023    CR 0.66 (L) 12/20/2023    NICHOLAS 8.9 12/20/2023    BILITOTAL 0.4 12/20/2023    ALBUMIN 3.8 12/20/2023    ALT 13 12/20/2023    AST 20 12/20/2023       Results reviewed, labs MET treatment parameters, ok to proceed with treatment.      Post Infusion Assessment:  Patient tolerated infusion without incident.  Blood return noted pre and post infusion.  Site patent and intact, free from redness, edema or discomfort.  No evidence of extravasations.  Access discontinued per protocol.       Discharge Plan:   Patient and/or family verbalized understanding of discharge instructions and all questions answered.  AVS to patient via [employer].  Patient will return 2 weeks for next appointment.   Patient discharged in stable condition accompanied by: self.  Departure Mode: Ambulatory.      Matilde Matthews RN

## 2024-01-01 ENCOUNTER — INFUSION THERAPY VISIT (OUTPATIENT)
Dept: INFUSION THERAPY | Facility: OTHER | Age: 59
End: 2024-01-01
Attending: STUDENT IN AN ORGANIZED HEALTH CARE EDUCATION/TRAINING PROGRAM
Payer: COMMERCIAL

## 2024-01-01 ENCOUNTER — ONCOLOGY VISIT (OUTPATIENT)
Dept: ONCOLOGY | Facility: OTHER | Age: 59
End: 2024-01-01
Attending: NURSE PRACTITIONER
Payer: COMMERCIAL

## 2024-01-01 ENCOUNTER — DOCUMENTATION ONLY (OUTPATIENT)
Dept: FAMILY MEDICINE | Facility: OTHER | Age: 59
End: 2024-01-01

## 2024-01-01 ENCOUNTER — HOSPITAL ENCOUNTER (OUTPATIENT)
Dept: EDUCATION SERVICES | Facility: HOSPITAL | Age: 59
Discharge: HOME | End: 2024-02-16
Attending: INTERNAL MEDICINE
Payer: COMMERCIAL

## 2024-01-01 ENCOUNTER — TELEPHONE (OUTPATIENT)
Dept: PET IMAGING | Facility: HOSPITAL | Age: 59
End: 2024-01-01

## 2024-01-01 ENCOUNTER — HEALTH MAINTENANCE LETTER (OUTPATIENT)
Age: 59
End: 2024-01-01

## 2024-01-01 ENCOUNTER — INFUSION THERAPY VISIT (OUTPATIENT)
Dept: INFUSION THERAPY | Facility: OTHER | Age: 59
End: 2024-01-01
Attending: NURSE PRACTITIONER
Payer: COMMERCIAL

## 2024-01-01 ENCOUNTER — ONCOLOGY VISIT (OUTPATIENT)
Dept: ONCOLOGY | Facility: OTHER | Age: 59
End: 2024-01-01
Attending: INTERNAL MEDICINE
Payer: COMMERCIAL

## 2024-01-01 ENCOUNTER — INFUSION THERAPY VISIT (OUTPATIENT)
Dept: INFUSION THERAPY | Facility: OTHER | Age: 59
End: 2024-01-01
Attending: INTERNAL MEDICINE
Payer: COMMERCIAL

## 2024-01-01 ENCOUNTER — OFFICE VISIT (OUTPATIENT)
Dept: SURGERY | Facility: OTHER | Age: 59
End: 2024-01-01
Attending: SURGERY
Payer: COMMERCIAL

## 2024-01-01 ENCOUNTER — MEDICAL CORRESPONDENCE (OUTPATIENT)
Dept: HEALTH INFORMATION MANAGEMENT | Facility: HOSPITAL | Age: 59
End: 2024-01-01

## 2024-01-01 ENCOUNTER — TELEPHONE (OUTPATIENT)
Dept: ONCOLOGY | Facility: OTHER | Age: 59
End: 2024-01-01

## 2024-01-01 ENCOUNTER — PATIENT OUTREACH (OUTPATIENT)
Dept: ONCOLOGY | Facility: OTHER | Age: 59
End: 2024-01-01

## 2024-01-01 ENCOUNTER — HOSPITAL ENCOUNTER (OUTPATIENT)
Dept: PET IMAGING | Facility: HOSPITAL | Age: 59
Discharge: HOME | End: 2024-05-01
Attending: INTERNAL MEDICINE | Admitting: INTERNAL MEDICINE
Payer: COMMERCIAL

## 2024-01-01 ENCOUNTER — HOSPITAL ENCOUNTER (OUTPATIENT)
Dept: PET IMAGING | Facility: HOSPITAL | Age: 59
Discharge: HOME | End: 2024-01-24
Attending: NURSE PRACTITIONER | Admitting: NURSE PRACTITIONER
Payer: OTHER GOVERNMENT

## 2024-01-01 ENCOUNTER — DOCUMENTATION ONLY (OUTPATIENT)
Dept: ONCOLOGY | Facility: OTHER | Age: 59
End: 2024-01-01

## 2024-01-01 ENCOUNTER — OFFICE VISIT (OUTPATIENT)
Dept: FAMILY MEDICINE | Facility: OTHER | Age: 59
End: 2024-01-01
Attending: FAMILY MEDICINE
Payer: OTHER GOVERNMENT

## 2024-01-01 ENCOUNTER — DOCUMENTATION ONLY (OUTPATIENT)
Dept: OTHER | Facility: CLINIC | Age: 59
End: 2024-01-01

## 2024-01-01 ENCOUNTER — HOSPITAL ENCOUNTER (OUTPATIENT)
Dept: EDUCATION SERVICES | Facility: HOSPITAL | Age: 59
Discharge: HOME | End: 2024-01-31
Attending: INTERNAL MEDICINE
Payer: COMMERCIAL

## 2024-01-01 ENCOUNTER — ONCOLOGY VISIT (OUTPATIENT)
Dept: ONCOLOGY | Facility: OTHER | Age: 59
End: 2024-01-01
Attending: FAMILY MEDICINE
Payer: COMMERCIAL

## 2024-01-01 ENCOUNTER — DOCUMENTATION ONLY (OUTPATIENT)
Dept: ONCOLOGY | Facility: CLINIC | Age: 59
End: 2024-01-01

## 2024-01-01 ENCOUNTER — CARE COORDINATION (OUTPATIENT)
Dept: ONCOLOGY | Facility: OTHER | Age: 59
End: 2024-01-01

## 2024-01-01 VITALS — DIASTOLIC BLOOD PRESSURE: 63 MMHG | HEART RATE: 48 BPM | SYSTOLIC BLOOD PRESSURE: 102 MMHG

## 2024-01-01 VITALS
SYSTOLIC BLOOD PRESSURE: 93 MMHG | RESPIRATION RATE: 16 BRPM | HEART RATE: 50 BPM | TEMPERATURE: 96.9 F | DIASTOLIC BLOOD PRESSURE: 58 MMHG | OXYGEN SATURATION: 94 %

## 2024-01-01 VITALS
TEMPERATURE: 96.3 F | OXYGEN SATURATION: 91 % | WEIGHT: 124.12 LBS | SYSTOLIC BLOOD PRESSURE: 98 MMHG | HEIGHT: 68 IN | RESPIRATION RATE: 20 BRPM | DIASTOLIC BLOOD PRESSURE: 58 MMHG | HEART RATE: 63 BPM | BODY MASS INDEX: 18.81 KG/M2

## 2024-01-01 VITALS
SYSTOLIC BLOOD PRESSURE: 95 MMHG | TEMPERATURE: 98.3 F | OXYGEN SATURATION: 98 % | SYSTOLIC BLOOD PRESSURE: 96 MMHG | BODY MASS INDEX: 19.05 KG/M2 | HEART RATE: 58 BPM | RESPIRATION RATE: 16 BRPM | WEIGHT: 123.46 LBS | HEART RATE: 67 BPM | DIASTOLIC BLOOD PRESSURE: 61 MMHG | DIASTOLIC BLOOD PRESSURE: 55 MMHG

## 2024-01-01 VITALS
SYSTOLIC BLOOD PRESSURE: 84 MMHG | WEIGHT: 122.14 LBS | RESPIRATION RATE: 16 BRPM | BODY MASS INDEX: 18.85 KG/M2 | DIASTOLIC BLOOD PRESSURE: 47 MMHG | OXYGEN SATURATION: 95 % | TEMPERATURE: 97.4 F | HEART RATE: 62 BPM

## 2024-01-01 VITALS
BODY MASS INDEX: 18.68 KG/M2 | WEIGHT: 121 LBS | SYSTOLIC BLOOD PRESSURE: 96 MMHG | DIASTOLIC BLOOD PRESSURE: 63 MMHG | RESPIRATION RATE: 18 BRPM | RESPIRATION RATE: 18 BRPM | DIASTOLIC BLOOD PRESSURE: 48 MMHG | OXYGEN SATURATION: 97 % | HEIGHT: 67 IN | TEMPERATURE: 97.2 F | SYSTOLIC BLOOD PRESSURE: 84 MMHG | OXYGEN SATURATION: 97 % | WEIGHT: 119 LBS | BODY MASS INDEX: 18.67 KG/M2 | HEART RATE: 63 BPM | HEART RATE: 84 BPM

## 2024-01-01 VITALS
BODY MASS INDEX: 18.71 KG/M2 | TEMPERATURE: 97.4 F | OXYGEN SATURATION: 100 % | SYSTOLIC BLOOD PRESSURE: 98 MMHG | DIASTOLIC BLOOD PRESSURE: 60 MMHG | WEIGHT: 123.46 LBS | HEART RATE: 72 BPM | HEIGHT: 68 IN | RESPIRATION RATE: 20 BRPM

## 2024-01-01 VITALS
OXYGEN SATURATION: 98 % | DIASTOLIC BLOOD PRESSURE: 60 MMHG | HEART RATE: 65 BPM | WEIGHT: 120.3 LBS | SYSTOLIC BLOOD PRESSURE: 110 MMHG | BODY MASS INDEX: 18.56 KG/M2 | TEMPERATURE: 98.8 F

## 2024-01-01 VITALS
BODY MASS INDEX: 18.82 KG/M2 | SYSTOLIC BLOOD PRESSURE: 95 MMHG | TEMPERATURE: 96.4 F | HEART RATE: 67 BPM | HEIGHT: 68 IN | DIASTOLIC BLOOD PRESSURE: 47 MMHG | WEIGHT: 124.2 LBS

## 2024-01-01 VITALS
SYSTOLIC BLOOD PRESSURE: 110 MMHG | HEART RATE: 73 BPM | DIASTOLIC BLOOD PRESSURE: 58 MMHG | BODY MASS INDEX: 18.9 KG/M2 | TEMPERATURE: 96.6 F | WEIGHT: 124.7 LBS | HEIGHT: 68 IN | OXYGEN SATURATION: 93 % | RESPIRATION RATE: 20 BRPM

## 2024-01-01 VITALS
OXYGEN SATURATION: 98 % | RESPIRATION RATE: 18 BRPM | WEIGHT: 126.32 LBS | BODY MASS INDEX: 19.49 KG/M2 | DIASTOLIC BLOOD PRESSURE: 59 MMHG | TEMPERATURE: 98.1 F | HEART RATE: 65 BPM | SYSTOLIC BLOOD PRESSURE: 115 MMHG

## 2024-01-01 VITALS
SYSTOLIC BLOOD PRESSURE: 98 MMHG | HEART RATE: 67 BPM | WEIGHT: 124.12 LBS | BODY MASS INDEX: 19.15 KG/M2 | DIASTOLIC BLOOD PRESSURE: 58 MMHG

## 2024-01-01 VITALS
TEMPERATURE: 97.1 F | SYSTOLIC BLOOD PRESSURE: 102 MMHG | WEIGHT: 124.56 LBS | DIASTOLIC BLOOD PRESSURE: 64 MMHG | HEART RATE: 75 BPM | OXYGEN SATURATION: 98 % | BODY MASS INDEX: 19.22 KG/M2

## 2024-01-01 VITALS
HEIGHT: 68 IN | SYSTOLIC BLOOD PRESSURE: 92 MMHG | TEMPERATURE: 98.3 F | BODY MASS INDEX: 18.76 KG/M2 | DIASTOLIC BLOOD PRESSURE: 53 MMHG | RESPIRATION RATE: 20 BRPM | HEART RATE: 65 BPM | OXYGEN SATURATION: 98 % | WEIGHT: 123.8 LBS

## 2024-01-01 VITALS
SYSTOLIC BLOOD PRESSURE: 92 MMHG | HEIGHT: 68 IN | HEART RATE: 62 BPM | DIASTOLIC BLOOD PRESSURE: 58 MMHG | OXYGEN SATURATION: 98 % | RESPIRATION RATE: 20 BRPM | BODY MASS INDEX: 19 KG/M2 | TEMPERATURE: 97.6 F | WEIGHT: 125.4 LBS

## 2024-01-01 VITALS — WEIGHT: 124.78 LBS | BODY MASS INDEX: 19.25 KG/M2

## 2024-01-01 VITALS
OXYGEN SATURATION: 96 % | RESPIRATION RATE: 20 BRPM | WEIGHT: 124.7 LBS | SYSTOLIC BLOOD PRESSURE: 120 MMHG | HEART RATE: 75 BPM | HEIGHT: 68 IN | BODY MASS INDEX: 18.9 KG/M2 | TEMPERATURE: 97.4 F | DIASTOLIC BLOOD PRESSURE: 58 MMHG

## 2024-01-01 VITALS
RESPIRATION RATE: 16 BRPM | DIASTOLIC BLOOD PRESSURE: 52 MMHG | OXYGEN SATURATION: 91 % | HEART RATE: 65 BPM | TEMPERATURE: 96.3 F | SYSTOLIC BLOOD PRESSURE: 91 MMHG

## 2024-01-01 VITALS — SYSTOLIC BLOOD PRESSURE: 97 MMHG | HEART RATE: 56 BPM | DIASTOLIC BLOOD PRESSURE: 61 MMHG

## 2024-01-01 VITALS
SYSTOLIC BLOOD PRESSURE: 121 MMHG | DIASTOLIC BLOOD PRESSURE: 75 MMHG | HEART RATE: 54 BPM | WEIGHT: 125.66 LBS | BODY MASS INDEX: 19.39 KG/M2 | TEMPERATURE: 96.9 F | RESPIRATION RATE: 18 BRPM | OXYGEN SATURATION: 98 %

## 2024-01-01 VITALS — HEART RATE: 53 BPM | DIASTOLIC BLOOD PRESSURE: 61 MMHG | SYSTOLIC BLOOD PRESSURE: 105 MMHG

## 2024-01-01 VITALS
BODY MASS INDEX: 19.22 KG/M2 | DIASTOLIC BLOOD PRESSURE: 64 MMHG | OXYGEN SATURATION: 98 % | WEIGHT: 124.56 LBS | RESPIRATION RATE: 16 BRPM | SYSTOLIC BLOOD PRESSURE: 102 MMHG | HEART RATE: 75 BPM | TEMPERATURE: 97.1 F

## 2024-01-01 DIAGNOSIS — R63.0 DECREASED APPETITE: ICD-10-CM

## 2024-01-01 DIAGNOSIS — Z71.89 ADVANCED CARE PLANNING/COUNSELING DISCUSSION: ICD-10-CM

## 2024-01-01 DIAGNOSIS — C15.5 MALIGNANT NEOPLASM OF LOWER THIRD OF ESOPHAGUS (H): Primary | ICD-10-CM

## 2024-01-01 DIAGNOSIS — Z53.9 ERRONEOUS ENCOUNTER--DISREGARD: Primary | ICD-10-CM

## 2024-01-01 DIAGNOSIS — R63.4 WEIGHT LOSS: ICD-10-CM

## 2024-01-01 DIAGNOSIS — T45.1X5A CHEMOTHERAPY-INDUCED NAUSEA: ICD-10-CM

## 2024-01-01 DIAGNOSIS — G47.01 INSOMNIA DUE TO MEDICAL CONDITION: ICD-10-CM

## 2024-01-01 DIAGNOSIS — G62.9 NEUROPATHY: ICD-10-CM

## 2024-01-01 DIAGNOSIS — G89.3 CANCER RELATED PAIN: ICD-10-CM

## 2024-01-01 DIAGNOSIS — G89.3 CANCER RELATED PAIN: Primary | ICD-10-CM

## 2024-01-01 DIAGNOSIS — C15.5 MALIGNANT NEOPLASM OF LOWER THIRD OF ESOPHAGUS (H): ICD-10-CM

## 2024-01-01 DIAGNOSIS — Z78.9 POLST (PHYSICIAN ORDERS FOR LIFE-SUSTAINING TREATMENT): ICD-10-CM

## 2024-01-01 DIAGNOSIS — K21.9 GASTROESOPHAGEAL REFLUX DISEASE, UNSPECIFIED WHETHER ESOPHAGITIS PRESENT: ICD-10-CM

## 2024-01-01 DIAGNOSIS — Z46.59 ENCOUNTER FOR CARE RELATED TO FEEDING TUBE: Primary | ICD-10-CM

## 2024-01-01 DIAGNOSIS — Z71.89 ACP (ADVANCE CARE PLANNING): ICD-10-CM

## 2024-01-01 DIAGNOSIS — R11.0 CHEMOTHERAPY-INDUCED NAUSEA: ICD-10-CM

## 2024-01-01 DIAGNOSIS — R64 CANCER CACHEXIA (H): ICD-10-CM

## 2024-01-01 LAB
ACANTHOCYTES BLD QL SMEAR: NORMAL
ALBUMIN SERPL BCG-MCNC: 3.4 G/DL (ref 3.5–5.2)
ALBUMIN SERPL BCG-MCNC: 3.7 G/DL (ref 3.5–5.2)
ALBUMIN SERPL BCG-MCNC: 3.7 G/DL (ref 3.5–5.2)
ALBUMIN SERPL BCG-MCNC: 3.8 G/DL (ref 3.5–5.2)
ALBUMIN SERPL BCG-MCNC: 3.8 G/DL (ref 3.5–5.2)
ALBUMIN SERPL BCG-MCNC: 3.9 G/DL (ref 3.5–5.2)
ALBUMIN SERPL BCG-MCNC: 3.9 G/DL (ref 3.5–5.2)
ALBUMIN SERPL BCG-MCNC: 4 G/DL (ref 3.5–5.2)
ALP SERPL-CCNC: 66 U/L (ref 40–150)
ALP SERPL-CCNC: 70 U/L (ref 40–150)
ALP SERPL-CCNC: 71 U/L (ref 40–150)
ALP SERPL-CCNC: 72 U/L (ref 40–150)
ALP SERPL-CCNC: 75 U/L (ref 40–150)
ALP SERPL-CCNC: 75 U/L (ref 40–150)
ALP SERPL-CCNC: 76 U/L (ref 40–150)
ALP SERPL-CCNC: 82 U/L (ref 40–150)
ALT SERPL W P-5'-P-CCNC: 10 U/L (ref 0–70)
ALT SERPL W P-5'-P-CCNC: 10 U/L (ref 0–70)
ALT SERPL W P-5'-P-CCNC: 7 U/L (ref 0–70)
ALT SERPL W P-5'-P-CCNC: 7 U/L (ref 0–70)
ALT SERPL W P-5'-P-CCNC: 9 U/L (ref 0–70)
ANION GAP SERPL CALCULATED.3IONS-SCNC: 10 MMOL/L (ref 7–15)
ANION GAP SERPL CALCULATED.3IONS-SCNC: 5 MMOL/L (ref 7–15)
ANION GAP SERPL CALCULATED.3IONS-SCNC: 5 MMOL/L (ref 7–15)
ANION GAP SERPL CALCULATED.3IONS-SCNC: 7 MMOL/L (ref 7–15)
ANION GAP SERPL CALCULATED.3IONS-SCNC: 7 MMOL/L (ref 7–15)
ANION GAP SERPL CALCULATED.3IONS-SCNC: 8 MMOL/L (ref 7–15)
ANION GAP SERPL CALCULATED.3IONS-SCNC: 8 MMOL/L (ref 7–15)
ANION GAP SERPL CALCULATED.3IONS-SCNC: 9 MMOL/L (ref 7–15)
ANION GAP SERPL CALCULATED.3IONS-SCNC: 9 MMOL/L (ref 7–15)
AST SERPL W P-5'-P-CCNC: 12 U/L (ref 0–45)
AST SERPL W P-5'-P-CCNC: 14 U/L (ref 0–45)
AST SERPL W P-5'-P-CCNC: 15 U/L (ref 0–45)
AST SERPL W P-5'-P-CCNC: 15 U/L (ref 0–45)
AST SERPL W P-5'-P-CCNC: 17 U/L (ref 0–45)
AST SERPL W P-5'-P-CCNC: 19 U/L (ref 0–45)
AUER BODIES BLD QL SMEAR: NORMAL
BASO STIPL BLD QL SMEAR: NORMAL
BASOPHILS # BLD AUTO: 0 10E3/UL (ref 0–0.2)
BASOPHILS # BLD AUTO: 0.1 10E3/UL (ref 0–0.2)
BASOPHILS # BLD AUTO: 0.1 10E3/UL (ref 0–0.2)
BASOPHILS NFR BLD AUTO: 0 %
BASOPHILS NFR BLD AUTO: 1 %
BILIRUB SERPL-MCNC: 0.3 MG/DL
BILIRUB SERPL-MCNC: 0.4 MG/DL
BILIRUB SERPL-MCNC: 0.5 MG/DL
BILIRUB SERPL-MCNC: 0.6 MG/DL
BILIRUB SERPL-MCNC: 0.8 MG/DL
BILIRUB SERPL-MCNC: 0.8 MG/DL
BITE CELLS BLD QL SMEAR: NORMAL
BLISTER CELLS BLD QL SMEAR: NORMAL
BUN SERPL-MCNC: 10 MG/DL (ref 8–23)
BUN SERPL-MCNC: 10.3 MG/DL (ref 8–23)
BUN SERPL-MCNC: 11.4 MG/DL (ref 8–23)
BUN SERPL-MCNC: 14.4 MG/DL (ref 8–23)
BUN SERPL-MCNC: 15.6 MG/DL (ref 8–23)
BUN SERPL-MCNC: 16.8 MG/DL (ref 8–23)
BUN SERPL-MCNC: 17.9 MG/DL (ref 8–23)
BUN SERPL-MCNC: 18.4 MG/DL (ref 8–23)
BUN SERPL-MCNC: 8.1 MG/DL (ref 8–23)
BURR CELLS BLD QL SMEAR: NORMAL
CALCIUM SERPL-MCNC: 8.7 MG/DL (ref 8.8–10.2)
CALCIUM SERPL-MCNC: 8.7 MG/DL (ref 8.8–10.2)
CALCIUM SERPL-MCNC: 8.8 MG/DL (ref 8.8–10.2)
CALCIUM SERPL-MCNC: 8.9 MG/DL (ref 8.8–10.2)
CALCIUM SERPL-MCNC: 8.9 MG/DL (ref 8.8–10.2)
CALCIUM SERPL-MCNC: 9 MG/DL (ref 8.8–10.2)
CALCIUM SERPL-MCNC: 9.1 MG/DL (ref 8.8–10.2)
CALCIUM SERPL-MCNC: 9.2 MG/DL (ref 8.8–10.2)
CALCIUM SERPL-MCNC: 9.2 MG/DL (ref 8.8–10.2)
CHLORIDE SERPL-SCNC: 100 MMOL/L (ref 98–107)
CHLORIDE SERPL-SCNC: 101 MMOL/L (ref 98–107)
CHLORIDE SERPL-SCNC: 101 MMOL/L (ref 98–107)
CHLORIDE SERPL-SCNC: 103 MMOL/L (ref 98–107)
CHLORIDE SERPL-SCNC: 104 MMOL/L (ref 98–107)
CHLORIDE SERPL-SCNC: 104 MMOL/L (ref 98–107)
CHLORIDE SERPL-SCNC: 98 MMOL/L (ref 98–107)
CREAT SERPL-MCNC: 0.54 MG/DL (ref 0.67–1.17)
CREAT SERPL-MCNC: 0.55 MG/DL (ref 0.67–1.17)
CREAT SERPL-MCNC: 0.56 MG/DL (ref 0.67–1.17)
CREAT SERPL-MCNC: 0.58 MG/DL (ref 0.67–1.17)
CREAT SERPL-MCNC: 0.61 MG/DL (ref 0.67–1.17)
CREAT SERPL-MCNC: 0.62 MG/DL (ref 0.67–1.17)
CREAT SERPL-MCNC: 0.63 MG/DL (ref 0.67–1.17)
DACRYOCYTES BLD QL SMEAR: NORMAL
DEPRECATED HCO3 PLAS-SCNC: 28 MMOL/L (ref 22–29)
DEPRECATED HCO3 PLAS-SCNC: 28 MMOL/L (ref 22–29)
DEPRECATED HCO3 PLAS-SCNC: 29 MMOL/L (ref 22–29)
DEPRECATED HCO3 PLAS-SCNC: 30 MMOL/L (ref 22–29)
EGFRCR SERPLBLD CKD-EPI 2021: >90 ML/MIN/1.73M2
ELLIPTOCYTES BLD QL SMEAR: NORMAL
EOSINOPHIL # BLD AUTO: 0 10E3/UL (ref 0–0.7)
EOSINOPHIL # BLD AUTO: 0.1 10E3/UL (ref 0–0.7)
EOSINOPHIL # BLD AUTO: 0.2 10E3/UL (ref 0–0.7)
EOSINOPHIL NFR BLD AUTO: 0 %
EOSINOPHIL NFR BLD AUTO: 1 %
EOSINOPHIL NFR BLD AUTO: 2 %
ERYTHROCYTE [DISTWIDTH] IN BLOOD BY AUTOMATED COUNT: 13.3 % (ref 10–15)
ERYTHROCYTE [DISTWIDTH] IN BLOOD BY AUTOMATED COUNT: 13.5 % (ref 10–15)
ERYTHROCYTE [DISTWIDTH] IN BLOOD BY AUTOMATED COUNT: 13.5 % (ref 10–15)
ERYTHROCYTE [DISTWIDTH] IN BLOOD BY AUTOMATED COUNT: 14.1 % (ref 10–15)
ERYTHROCYTE [DISTWIDTH] IN BLOOD BY AUTOMATED COUNT: 14.5 % (ref 10–15)
ERYTHROCYTE [DISTWIDTH] IN BLOOD BY AUTOMATED COUNT: 15.1 % (ref 10–15)
ERYTHROCYTE [DISTWIDTH] IN BLOOD BY AUTOMATED COUNT: 15.2 % (ref 10–15)
ERYTHROCYTE [DISTWIDTH] IN BLOOD BY AUTOMATED COUNT: 15.6 % (ref 10–15)
ERYTHROCYTE [DISTWIDTH] IN BLOOD BY AUTOMATED COUNT: 15.8 % (ref 10–15)
ERYTHROCYTE [DISTWIDTH] IN BLOOD BY AUTOMATED COUNT: 16.3 % (ref 10–15)
ERYTHROCYTE [DISTWIDTH] IN BLOOD BY AUTOMATED COUNT: 17.7 % (ref 10–15)
FRAGMENTS BLD QL SMEAR: NORMAL
GIANT PLATELETS BLD QL SMEAR: NORMAL
GLUCOSE SERPL-MCNC: 103 MG/DL (ref 70–99)
GLUCOSE SERPL-MCNC: 108 MG/DL (ref 70–99)
GLUCOSE SERPL-MCNC: 109 MG/DL (ref 70–99)
GLUCOSE SERPL-MCNC: 124 MG/DL (ref 70–99)
GLUCOSE SERPL-MCNC: 125 MG/DL (ref 70–99)
GLUCOSE SERPL-MCNC: 125 MG/DL (ref 70–99)
GLUCOSE SERPL-MCNC: 138 MG/DL (ref 70–99)
GLUCOSE SERPL-MCNC: 145 MG/DL (ref 70–99)
GLUCOSE SERPL-MCNC: 93 MG/DL (ref 70–99)
HCT VFR BLD AUTO: 30.7 % (ref 40–53)
HCT VFR BLD AUTO: 31.6 % (ref 40–53)
HCT VFR BLD AUTO: 33 % (ref 40–53)
HCT VFR BLD AUTO: 33.5 % (ref 40–53)
HCT VFR BLD AUTO: 33.9 % (ref 40–53)
HCT VFR BLD AUTO: 35.1 % (ref 40–53)
HCT VFR BLD AUTO: 36.6 % (ref 40–53)
HCT VFR BLD AUTO: 40 % (ref 40–53)
HCT VFR BLD AUTO: 40.6 % (ref 40–53)
HCT VFR BLD AUTO: 41.2 % (ref 40–53)
HCT VFR BLD AUTO: 42.6 % (ref 40–53)
HGB BLD-MCNC: 10.4 G/DL (ref 13.3–17.7)
HGB BLD-MCNC: 10.9 G/DL (ref 13.3–17.7)
HGB BLD-MCNC: 11.3 G/DL (ref 13.3–17.7)
HGB BLD-MCNC: 11.4 G/DL (ref 13.3–17.7)
HGB BLD-MCNC: 11.4 G/DL (ref 13.3–17.7)
HGB BLD-MCNC: 12 G/DL (ref 13.3–17.7)
HGB BLD-MCNC: 12.7 G/DL (ref 13.3–17.7)
HGB BLD-MCNC: 13.7 G/DL (ref 13.3–17.7)
HGB BLD-MCNC: 13.9 G/DL (ref 13.3–17.7)
HGB BLD-MCNC: 14 G/DL (ref 13.3–17.7)
HGB BLD-MCNC: 14.5 G/DL (ref 13.3–17.7)
HGB C CRYSTALS: NORMAL
HOWELL-JOLLY BOD BLD QL SMEAR: NORMAL
IMM GRANULOCYTES # BLD: 0 10E3/UL
IMM GRANULOCYTES NFR BLD: 0 %
IMM GRANULOCYTES NFR BLD: 1 %
IMM GRANULOCYTES NFR BLD: 1 %
LYMPHOCYTES # BLD AUTO: 0.7 10E3/UL (ref 0.8–5.3)
LYMPHOCYTES # BLD AUTO: 0.9 10E3/UL (ref 0–5.3)
LYMPHOCYTES # BLD AUTO: 1.1 10E3/UL (ref 0.8–5.3)
LYMPHOCYTES # BLD AUTO: 1.1 10E3/UL (ref 0.8–5.3)
LYMPHOCYTES # BLD AUTO: 1.2 10E3/UL (ref 0.8–5.3)
LYMPHOCYTES # BLD AUTO: 1.3 10E3/UL (ref 0.8–5.3)
LYMPHOCYTES # BLD AUTO: 1.4 10E3/UL (ref 0.8–5.3)
LYMPHOCYTES # BLD AUTO: 1.5 10E3/UL (ref 0.8–5.3)
LYMPHOCYTES # BLD AUTO: 1.6 10E3/UL (ref 0.8–5.3)
LYMPHOCYTES NFR BLD AUTO: 10 %
LYMPHOCYTES NFR BLD AUTO: 13 %
LYMPHOCYTES NFR BLD AUTO: 16 %
LYMPHOCYTES NFR BLD AUTO: 19 %
LYMPHOCYTES NFR BLD AUTO: 20 %
LYMPHOCYTES NFR BLD AUTO: 21 %
LYMPHOCYTES NFR BLD AUTO: 22 %
LYMPHOCYTES NFR BLD AUTO: 26 %
LYMPHOCYTES NFR BLD AUTO: 26 %
LYMPHOCYTES NFR BLD AUTO: 29 %
LYMPHOCYTES NFR BLD AUTO: 34 %
MCH RBC QN AUTO: 33.5 PG (ref 26.5–33)
MCH RBC QN AUTO: 33.5 PG (ref 26.5–33)
MCH RBC QN AUTO: 33.6 PG (ref 26.5–33)
MCH RBC QN AUTO: 33.7 PG (ref 26.5–33)
MCH RBC QN AUTO: 33.9 PG (ref 26.5–33)
MCH RBC QN AUTO: 33.9 PG (ref 26.5–33)
MCH RBC QN AUTO: 34.6 PG (ref 26.5–33)
MCH RBC QN AUTO: 34.7 PG (ref 26.5–33)
MCH RBC QN AUTO: 34.8 PG (ref 26.5–33)
MCH RBC QN AUTO: 35.1 PG (ref 26.5–33)
MCH RBC QN AUTO: 35.7 PG (ref 26.5–33)
MCHC RBC AUTO-ENTMCNC: 33.6 G/DL (ref 31.5–36.5)
MCHC RBC AUTO-ENTMCNC: 33.9 G/DL (ref 31.5–36.5)
MCHC RBC AUTO-ENTMCNC: 34 G/DL (ref 31.5–36.5)
MCHC RBC AUTO-ENTMCNC: 34.2 G/DL (ref 31.5–36.5)
MCHC RBC AUTO-ENTMCNC: 34.3 G/DL (ref 31.5–36.5)
MCHC RBC AUTO-ENTMCNC: 34.5 G/DL (ref 31.5–36.5)
MCHC RBC AUTO-ENTMCNC: 34.7 G/DL (ref 31.5–36.5)
MCV RBC AUTO: 101 FL (ref 78–100)
MCV RBC AUTO: 101 FL (ref 78–100)
MCV RBC AUTO: 102 FL (ref 78–100)
MCV RBC AUTO: 102 FL (ref 78–100)
MCV RBC AUTO: 103 FL (ref 78–100)
MCV RBC AUTO: 105 FL (ref 78–100)
MCV RBC AUTO: 98 FL (ref 78–100)
MCV RBC AUTO: 99 FL (ref 78–100)
MONOCYTES # BLD AUTO: 0.1 10E3/UL (ref 0–1.3)
MONOCYTES # BLD AUTO: 0.2 10E3/UL (ref 0–1.3)
MONOCYTES # BLD AUTO: 0.5 10E3/UL (ref 0–1.3)
MONOCYTES # BLD AUTO: 0.6 10E3/UL (ref 0–1.3)
MONOCYTES # BLD AUTO: 0.6 10E3/UL (ref 0–1.3)
MONOCYTES # BLD AUTO: 0.7 10E3/UL (ref 0–1.3)
MONOCYTES # BLD AUTO: 0.7 10E3/UL (ref 0–1.3)
MONOCYTES NFR BLD AUTO: 15 %
MONOCYTES NFR BLD AUTO: 17 %
MONOCYTES NFR BLD AUTO: 2 %
MONOCYTES NFR BLD AUTO: 21 %
MONOCYTES NFR BLD AUTO: 3 %
MONOCYTES NFR BLD AUTO: 4 %
MONOCYTES NFR BLD AUTO: 6 %
MONOCYTES NFR BLD AUTO: 6 %
MONOCYTES NFR BLD AUTO: 8 %
NEUTROPHILS # BLD AUTO: 2 10E3/UL (ref 1.6–8.3)
NEUTROPHILS # BLD AUTO: 2.3 10E3/UL (ref 1.6–8.3)
NEUTROPHILS # BLD AUTO: 2.4 10E3/UL (ref 1.6–8.3)
NEUTROPHILS # BLD AUTO: 2.5 10E3/UL (ref 1.6–8.3)
NEUTROPHILS # BLD AUTO: 3.7 10E3/UL (ref 1.6–8.3)
NEUTROPHILS # BLD AUTO: 4.3 10E3/UL (ref 1.6–8.3)
NEUTROPHILS # BLD AUTO: 5.1 10E3/UL (ref 1.6–8.3)
NEUTROPHILS # BLD AUTO: 5.2 10E3/UL (ref 1.6–8.3)
NEUTROPHILS # BLD AUTO: 5.5 10E3/UL (ref 1.6–8.3)
NEUTROPHILS # BLD AUTO: 6.4 10E3/UL (ref 1.6–8.3)
NEUTROPHILS # BLD AUTO: 9.8 10E3/UL (ref 1.6–8.3)
NEUTROPHILS NFR BLD AUTO: 55 %
NEUTROPHILS NFR BLD AUTO: 57 %
NEUTROPHILS NFR BLD AUTO: 58 %
NEUTROPHILS NFR BLD AUTO: 62 %
NEUTROPHILS NFR BLD AUTO: 69 %
NEUTROPHILS NFR BLD AUTO: 69 %
NEUTROPHILS NFR BLD AUTO: 71 %
NEUTROPHILS NFR BLD AUTO: 76 %
NEUTROPHILS NFR BLD AUTO: 78 %
NEUTROPHILS NFR BLD AUTO: 84 %
NEUTROPHILS NFR BLD AUTO: 86 %
NEUTS HYPERSEG BLD QL SMEAR: NORMAL
NRBC # BLD AUTO: 0 10E3/UL
NRBC BLD AUTO-RTO: 0 /100
PATH REV: NORMAL
PLAT MORPH BLD: NORMAL
PLATELET # BLD AUTO: 128 10E3/UL (ref 150–450)
PLATELET # BLD AUTO: 140 10E3/UL (ref 150–450)
PLATELET # BLD AUTO: 143 10E3/UL (ref 150–450)
PLATELET # BLD AUTO: 144 10E3/UL (ref 150–450)
PLATELET # BLD AUTO: 147 10E3/UL (ref 150–450)
PLATELET # BLD AUTO: 147 10E3/UL (ref 150–450)
PLATELET # BLD AUTO: 152 10E3/UL (ref 150–450)
PLATELET # BLD AUTO: 160 10E3/UL (ref 150–450)
PLATELET # BLD AUTO: 162 10E3/UL (ref 150–450)
PLATELET # BLD AUTO: 172 10E3/UL (ref 150–450)
PLATELET # BLD AUTO: 216 10E3/UL (ref 150–450)
POLYCHROMASIA BLD QL SMEAR: NORMAL
POTASSIUM SERPL-SCNC: 4.3 MMOL/L (ref 3.4–5.3)
POTASSIUM SERPL-SCNC: 4.4 MMOL/L (ref 3.4–5.3)
POTASSIUM SERPL-SCNC: 4.4 MMOL/L (ref 3.4–5.3)
POTASSIUM SERPL-SCNC: 4.5 MMOL/L (ref 3.4–5.3)
POTASSIUM SERPL-SCNC: 4.5 MMOL/L (ref 3.4–5.3)
POTASSIUM SERPL-SCNC: 4.7 MMOL/L (ref 3.4–5.3)
POTASSIUM SERPL-SCNC: 4.7 MMOL/L (ref 3.4–5.3)
PROT SERPL-MCNC: 6.5 G/DL (ref 6.4–8.3)
PROT SERPL-MCNC: 6.5 G/DL (ref 6.4–8.3)
PROT SERPL-MCNC: 6.7 G/DL (ref 6.4–8.3)
PROT SERPL-MCNC: 6.8 G/DL (ref 6.4–8.3)
PROT SERPL-MCNC: 6.8 G/DL (ref 6.4–8.3)
PROT SERPL-MCNC: 7.2 G/DL (ref 6.4–8.3)
RBC # BLD AUTO: 3.01 10E6/UL (ref 4.4–5.9)
RBC # BLD AUTO: 3.14 10E6/UL (ref 4.4–5.9)
RBC # BLD AUTO: 3.19 10E6/UL (ref 4.4–5.9)
RBC # BLD AUTO: 3.22 10E6/UL (ref 4.4–5.9)
RBC # BLD AUTO: 3.36 10E6/UL (ref 4.4–5.9)
RBC # BLD AUTO: 3.45 10E6/UL (ref 4.4–5.9)
RBC # BLD AUTO: 3.75 10E6/UL (ref 4.4–5.9)
RBC # BLD AUTO: 4.07 10E6/UL (ref 4.4–5.9)
RBC # BLD AUTO: 4.15 10E6/UL (ref 4.4–5.9)
RBC # BLD AUTO: 4.17 10E6/UL (ref 4.4–5.9)
RBC # BLD AUTO: 4.33 10E6/UL (ref 4.4–5.9)
RBC AGGLUT BLD QL: NORMAL
RBC MORPH BLD: NORMAL
ROULEAUX BLD QL SMEAR: NORMAL
SICKLE CELLS BLD QL SMEAR: NORMAL
SMUDGE CELLS BLD QL SMEAR: NORMAL
SODIUM SERPL-SCNC: 135 MMOL/L (ref 135–145)
SODIUM SERPL-SCNC: 136 MMOL/L (ref 135–145)
SODIUM SERPL-SCNC: 136 MMOL/L (ref 135–145)
SODIUM SERPL-SCNC: 137 MMOL/L (ref 135–145)
SODIUM SERPL-SCNC: 138 MMOL/L (ref 135–145)
SODIUM SERPL-SCNC: 139 MMOL/L (ref 135–145)
SODIUM SERPL-SCNC: 140 MMOL/L (ref 135–145)
SPHEROCYTES BLD QL SMEAR: NORMAL
STOMATOCYTES BLD QL SMEAR: NORMAL
TARGETS BLD QL SMEAR: NORMAL
TOXIC GRANULES BLD QL SMEAR: NORMAL
VARIANT LYMPHS BLD QL SMEAR: NORMAL
WBC # BLD AUTO: 11.6 10E3/UL (ref 4–11)
WBC # BLD AUTO: 3.4 10E3/UL (ref 4–11)
WBC # BLD AUTO: 3.7 10E3/UL (ref 4–11)
WBC # BLD AUTO: 4.3 10E3/UL (ref 4–11)
WBC # BLD AUTO: 4.4 10E3/UL (ref 4–11)
WBC # BLD AUTO: 5.3 10E3/UL (ref 4–11)
WBC # BLD AUTO: 5.5 10E3/UL (ref 4–11)
WBC # BLD AUTO: 6.6 10E3/UL (ref 4–11)
WBC # BLD AUTO: 7.3 10E3/UL (ref 4–11)
WBC # BLD AUTO: 7.5 10E3/UL (ref 4–11)
WBC # BLD AUTO: 8.4 10E3/UL (ref 4–11)

## 2024-01-01 PROCEDURE — 80053 COMPREHEN METABOLIC PANEL: CPT | Performed by: INTERNAL MEDICINE

## 2024-01-01 PROCEDURE — 85025 COMPLETE CBC W/AUTO DIFF WBC: CPT | Performed by: INTERNAL MEDICINE

## 2024-01-01 PROCEDURE — 999N000107 HC STATISTIC NUTRITIONAL THERAPY NO CHARGE: Performed by: DIETITIAN, REGISTERED

## 2024-01-01 PROCEDURE — 96375 TX/PRO/DX INJ NEW DRUG ADDON: CPT | Performed by: INTERNAL MEDICINE

## 2024-01-01 PROCEDURE — 96360 HYDRATION IV INFUSION INIT: CPT | Performed by: INTERNAL MEDICINE

## 2024-01-01 PROCEDURE — 99215 OFFICE O/P EST HI 40 MIN: CPT | Performed by: INTERNAL MEDICINE

## 2024-01-01 PROCEDURE — 36591 DRAW BLOOD OFF VENOUS DEVICE: CPT | Performed by: INTERNAL MEDICINE

## 2024-01-01 PROCEDURE — 343N000001 HC RX 343: Performed by: INTERNAL MEDICINE

## 2024-01-01 PROCEDURE — A9552 F18 FDG: HCPCS | Performed by: INTERNAL MEDICINE

## 2024-01-01 PROCEDURE — 96413 CHEMO IV INFUSION 1 HR: CPT | Performed by: INTERNAL MEDICINE

## 2024-01-01 PROCEDURE — 99214 OFFICE O/P EST MOD 30 MIN: CPT | Performed by: FAMILY MEDICINE

## 2024-01-01 PROCEDURE — 96367 TX/PROPH/DG ADDL SEQ IV INF: CPT | Performed by: INTERNAL MEDICINE

## 2024-01-01 PROCEDURE — 43762 RPLC GTUBE NO REVJ TRC: CPT | Performed by: SURGERY

## 2024-01-01 PROCEDURE — 85025 COMPLETE CBC W/AUTO DIFF WBC: CPT | Performed by: NURSE PRACTITIONER

## 2024-01-01 PROCEDURE — 96415 CHEMO IV INFUSION ADDL HR: CPT | Performed by: INTERNAL MEDICINE

## 2024-01-01 PROCEDURE — A9552 F18 FDG: HCPCS | Performed by: NURSE PRACTITIONER

## 2024-01-01 PROCEDURE — 99207 PR NO CHARGE LOS: CPT | Performed by: FAMILY MEDICINE

## 2024-01-01 PROCEDURE — 96521 REFILL/MAINT PORTABLE PUMP: CPT | Mod: 59 | Performed by: INTERNAL MEDICINE

## 2024-01-01 PROCEDURE — 36591 DRAW BLOOD OFF VENOUS DEVICE: CPT | Performed by: NURSE PRACTITIONER

## 2024-01-01 PROCEDURE — 99215 OFFICE O/P EST HI 40 MIN: CPT | Mod: 25 | Performed by: NURSE PRACTITIONER

## 2024-01-01 PROCEDURE — 99214 OFFICE O/P EST MOD 30 MIN: CPT | Mod: 25 | Performed by: NURSE PRACTITIONER

## 2024-01-01 PROCEDURE — 99215 OFFICE O/P EST HI 40 MIN: CPT | Performed by: NURSE PRACTITIONER

## 2024-01-01 PROCEDURE — 96361 HYDRATE IV INFUSION ADD-ON: CPT | Performed by: INTERNAL MEDICINE

## 2024-01-01 PROCEDURE — 78815 PET IMAGE W/CT SKULL-THIGH: CPT | Mod: PS

## 2024-01-01 PROCEDURE — 343N000001 HC RX 343: Performed by: NURSE PRACTITIONER

## 2024-01-01 PROCEDURE — 99213 OFFICE O/P EST LOW 20 MIN: CPT | Performed by: NURSE PRACTITIONER

## 2024-01-01 PROCEDURE — 80048 BASIC METABOLIC PNL TOTAL CA: CPT | Performed by: INTERNAL MEDICINE

## 2024-01-01 RX ORDER — GABAPENTIN 300 MG/1
300 CAPSULE ORAL 2 TIMES DAILY
COMMUNITY
Start: 2024-01-01

## 2024-01-01 RX ORDER — DIPHENHYDRAMINE HYDROCHLORIDE 50 MG/ML
50 INJECTION INTRAMUSCULAR; INTRAVENOUS
Status: CANCELLED
Start: 2024-01-01

## 2024-01-01 RX ORDER — HEPARIN SODIUM (PORCINE) LOCK FLUSH IV SOLN 100 UNIT/ML 100 UNIT/ML
5 SOLUTION INTRAVENOUS
Status: DISCONTINUED | OUTPATIENT
Start: 2024-01-01 | End: 2024-01-01 | Stop reason: HOSPADM

## 2024-01-01 RX ORDER — HYDROCODONE BITARTRATE AND ACETAMINOPHEN 7.5; 325 MG/15ML; MG/15ML
10 SOLUTION ORAL EVERY 6 HOURS PRN
Qty: 946 ML | Refills: 0 | Status: CANCELLED | OUTPATIENT
Start: 2024-01-01

## 2024-01-01 RX ORDER — HEPARIN SODIUM (PORCINE) LOCK FLUSH IV SOLN 100 UNIT/ML 100 UNIT/ML
5 SOLUTION INTRAVENOUS
Status: CANCELLED | OUTPATIENT
Start: 2024-01-01

## 2024-01-01 RX ORDER — ALBUTEROL SULFATE 90 UG/1
1-2 AEROSOL, METERED RESPIRATORY (INHALATION)
Status: CANCELLED
Start: 2024-01-01

## 2024-01-01 RX ORDER — TRIFLURIDINE AND TIPIRACIL 15; 6.14 MG/1; MG/1
35 TABLET, FILM COATED ORAL 2 TIMES DAILY
Qty: 80 TABLET | Refills: 0 | Status: SHIPPED | OUTPATIENT
Start: 2024-01-01 | End: 2024-01-01

## 2024-01-01 RX ORDER — HYDROCODONE BITARTRATE AND ACETAMINOPHEN 7.5; 325 MG/15ML; MG/15ML
10 SOLUTION ORAL EVERY 6 HOURS PRN
Qty: 946 ML | Refills: 0 | Status: SHIPPED | OUTPATIENT
Start: 2024-01-01 | End: 2024-01-01

## 2024-01-01 RX ORDER — EPINEPHRINE 1 MG/ML
0.3 INJECTION, SOLUTION, CONCENTRATE INTRAVENOUS EVERY 5 MIN PRN
Status: CANCELLED | OUTPATIENT
Start: 2024-01-01

## 2024-01-01 RX ORDER — ATROPINE SULFATE 0.4 MG/ML
0.4 AMPUL (ML) INJECTION
Status: DISCONTINUED | OUTPATIENT
Start: 2024-01-01 | End: 2024-01-01 | Stop reason: HOSPADM

## 2024-01-01 RX ORDER — ATROPINE SULFATE 0.4 MG/ML
0.4 AMPUL (ML) INJECTION
Status: CANCELLED | OUTPATIENT
Start: 2024-01-01

## 2024-01-01 RX ORDER — METHYLPREDNISOLONE SODIUM SUCCINATE 125 MG/2ML
125 INJECTION, POWDER, LYOPHILIZED, FOR SOLUTION INTRAMUSCULAR; INTRAVENOUS
Status: CANCELLED
Start: 2024-01-01

## 2024-01-01 RX ORDER — ONDANSETRON 8 MG/1
8 TABLET, FILM COATED ORAL EVERY 8 HOURS PRN
Qty: 60 TABLET | Refills: 1 | Status: SHIPPED | OUTPATIENT
Start: 2024-01-01 | End: 2024-01-01

## 2024-01-01 RX ORDER — FLUDEOXYGLUCOSE F 18 200 MCI/ML
12.17 INJECTION, SOLUTION INTRAVENOUS ONCE
Status: COMPLETED | OUTPATIENT
Start: 2024-01-01 | End: 2024-01-01

## 2024-01-01 RX ORDER — LORAZEPAM 2 MG/ML
0.5 INJECTION INTRAMUSCULAR EVERY 4 HOURS PRN
Status: CANCELLED | OUTPATIENT
Start: 2024-01-01

## 2024-01-01 RX ORDER — HEPARIN SODIUM,PORCINE 10 UNIT/ML
5-20 VIAL (ML) INTRAVENOUS DAILY PRN
Status: CANCELLED | OUTPATIENT
Start: 2024-01-01

## 2024-01-01 RX ORDER — HYDROCODONE BITARTRATE AND ACETAMINOPHEN 7.5; 325 MG/15ML; MG/15ML
10 SOLUTION ORAL EVERY 6 HOURS PRN
Qty: 473 ML | Refills: 0 | Status: SHIPPED | OUTPATIENT
Start: 2024-01-01 | End: 2024-01-01

## 2024-01-01 RX ORDER — DEXAMETHASONE 2 MG/1
2 TABLET ORAL
Qty: 90 TABLET | Refills: 0 | Status: SHIPPED | OUTPATIENT
Start: 2024-01-01 | End: 2024-01-01

## 2024-01-01 RX ORDER — SENNA AND DOCUSATE SODIUM 50; 8.6 MG/1; MG/1
1 TABLET, FILM COATED ORAL AT BEDTIME
Qty: 30 TABLET | Refills: 2 | Status: SHIPPED | OUTPATIENT
Start: 2024-01-01

## 2024-01-01 RX ORDER — DRONABINOL 2.5 MG/1
2.5 CAPSULE ORAL
Qty: 60 CAPSULE | Refills: 0 | Status: SHIPPED | OUTPATIENT
Start: 2024-01-01

## 2024-01-01 RX ORDER — MEPERIDINE HYDROCHLORIDE 25 MG/ML
25 INJECTION INTRAMUSCULAR; INTRAVENOUS; SUBCUTANEOUS EVERY 30 MIN PRN
Status: CANCELLED | OUTPATIENT
Start: 2024-01-01

## 2024-01-01 RX ORDER — ALBUTEROL SULFATE 0.83 MG/ML
2.5 SOLUTION RESPIRATORY (INHALATION)
Status: CANCELLED | OUTPATIENT
Start: 2024-01-01

## 2024-01-01 RX ORDER — HYDROMORPHONE HYDROCHLORIDE 2 MG/1
2 TABLET ORAL EVERY 4 HOURS
Qty: 120 TABLET | Refills: 0 | Status: SHIPPED | OUTPATIENT
Start: 2024-01-01 | End: 2024-01-01

## 2024-01-01 RX ORDER — HYDROMORPHONE HYDROCHLORIDE 2 MG/1
2 TABLET ORAL EVERY 4 HOURS PRN
COMMUNITY
End: 2024-01-01

## 2024-01-01 RX ORDER — HYDROMORPHONE HYDROCHLORIDE 2 MG/1
2 TABLET ORAL EVERY 4 HOURS PRN
Qty: 45 TABLET | Refills: 0 | Status: SHIPPED | OUTPATIENT
Start: 2024-01-01 | End: 2024-01-01

## 2024-01-01 RX ORDER — HYDROMORPHONE HYDROCHLORIDE 2 MG/1
6 TABLET ORAL EVERY 4 HOURS PRN
Qty: 60 TABLET | Refills: 0 | Status: SHIPPED | OUTPATIENT
Start: 2024-01-01

## 2024-01-01 RX ORDER — ONDANSETRON 8 MG/1
8 TABLET, FILM COATED ORAL EVERY 8 HOURS PRN
Qty: 60 TABLET | Refills: 1 | Status: SHIPPED | OUTPATIENT
Start: 2024-01-01

## 2024-01-01 RX ADMIN — HEPARIN SODIUM (PORCINE) LOCK FLUSH IV SOLN 100 UNIT/ML 5 ML: 100 SOLUTION at 11:57

## 2024-01-01 RX ADMIN — Medication 1000 ML: at 13:06

## 2024-01-01 RX ADMIN — HEPARIN SODIUM (PORCINE) LOCK FLUSH IV SOLN 100 UNIT/ML 5 ML: 100 SOLUTION at 11:44

## 2024-01-01 RX ADMIN — HEPARIN SODIUM (PORCINE) LOCK FLUSH IV SOLN 100 UNIT/ML 5 ML: 100 SOLUTION at 14:05

## 2024-01-01 RX ADMIN — Medication 250 ML: at 10:27

## 2024-01-01 RX ADMIN — Medication 1000 ML: at 11:25

## 2024-01-01 RX ADMIN — HEPARIN SODIUM (PORCINE) LOCK FLUSH IV SOLN 100 UNIT/ML 5 ML: 100 SOLUTION at 10:58

## 2024-01-01 RX ADMIN — Medication 1000 ML: at 13:17

## 2024-01-01 RX ADMIN — HEPARIN SODIUM (PORCINE) LOCK FLUSH IV SOLN 100 UNIT/ML 5 ML: 100 SOLUTION at 14:16

## 2024-01-01 RX ADMIN — HEPARIN SODIUM (PORCINE) LOCK FLUSH IV SOLN 100 UNIT/ML 5 ML: 100 SOLUTION at 12:36

## 2024-01-01 RX ADMIN — FLUDEOXYGLUCOSE F 18 12.17 MILLICURIE: 200 INJECTION, SOLUTION INTRAVENOUS at 09:18

## 2024-01-01 RX ADMIN — Medication 1000 ML: at 10:41

## 2024-01-01 RX ADMIN — Medication 1000 ML: at 13:21

## 2024-01-01 RX ADMIN — Medication 1000 ML: at 12:59

## 2024-01-01 RX ADMIN — HEPARIN SODIUM (PORCINE) LOCK FLUSH IV SOLN 100 UNIT/ML 5 ML: 100 SOLUTION at 11:52

## 2024-01-01 RX ADMIN — FLUDEOXYGLUCOSE F-18 13.22 MILLICURIE: 500 INJECTION, SOLUTION INTRAVENOUS at 08:59

## 2024-01-01 RX ADMIN — Medication 1000 ML: at 10:01

## 2024-01-01 RX ADMIN — HEPARIN SODIUM (PORCINE) LOCK FLUSH IV SOLN 100 UNIT/ML 5 ML: 100 SOLUTION at 11:40

## 2024-01-01 RX ADMIN — Medication 1000 ML: at 10:48

## 2024-01-01 RX ADMIN — HEPARIN SODIUM (PORCINE) LOCK FLUSH IV SOLN 100 UNIT/ML 5 ML: 100 SOLUTION at 14:24

## 2024-01-01 RX ADMIN — Medication 0.4 MG: at 11:02

## 2024-01-01 RX ADMIN — Medication 1000 ML: at 10:49

## 2024-01-01 RX ADMIN — Medication 1000 ML: at 10:59

## 2024-01-01 RX ADMIN — HEPARIN SODIUM (PORCINE) LOCK FLUSH IV SOLN 100 UNIT/ML 5 ML: 100 SOLUTION at 14:25

## 2024-01-01 RX ADMIN — Medication 1000 ML: at 10:50

## 2024-01-01 RX ADMIN — Medication 250 ML: at 10:02

## 2024-01-01 RX ADMIN — HEPARIN SODIUM (PORCINE) LOCK FLUSH IV SOLN 100 UNIT/ML 5 ML: 100 SOLUTION at 11:11

## 2024-01-01 RX ADMIN — Medication 1000 ML: at 09:52

## 2024-01-01 ASSESSMENT — PAIN SCALES - GENERAL
PAINLEVEL: EXTREME PAIN (8)
PAINLEVEL: EXTREME PAIN (8)
PAINLEVEL: MODERATE PAIN (4)
PAINLEVEL: SEVERE PAIN (7)
PAINLEVEL: WORST PAIN (10)
PAINLEVEL: MODERATE PAIN (4)
PAINLEVEL: EXTREME PAIN (9)
PAINLEVEL: SEVERE PAIN (6)
PAINLEVEL: SEVERE PAIN (7)
PAINLEVEL: SEVERE PAIN (7)

## 2024-01-01 ASSESSMENT — ENCOUNTER SYMPTOMS
SHORTNESS OF BREATH: 1
EYE PROBLEMS: 0
HEMOPTYSIS: 0
VOMITING: 0
DEPRESSION: 0
SLEEP DISTURBANCE: 1
FATIGUE: 1
NUMBNESS: 0
DEPRESSION: 0
APPETITE CHANGE: 1
PALPITATIONS: 0
ADENOPATHY: 0
BACK PAIN: 1
LEG SWELLING: 0
UNEXPECTED WEIGHT CHANGE: 1
NAUSEA: 1
LIGHT-HEADEDNESS: 0
NUMBNESS: 0
HEMOPTYSIS: 0
CHEST TIGHTNESS: 0
NECK PAIN: 0
NERVOUS/ANXIOUS: 0
CONSTIPATION: 1
MYALGIAS: 0
SLEEP DISTURBANCE: 0
ARTHRALGIAS: 1
DYSURIA: 0
DIFFICULTY URINATING: 0
NAUSEA: 0
PALPITATIONS: 0
SPEECH DIFFICULTY: 0
DIARRHEA: 0
VOMITING: 0
ADENOPATHY: 0
SEIZURES: 0
DIARRHEA: 0
COUGH: 1
VOICE CHANGE: 0
COUGH: 1
NERVOUS/ANXIOUS: 0
UNEXPECTED WEIGHT CHANGE: 1
DYSURIA: 0
DIZZINESS: 1
SPEECH DIFFICULTY: 0
BLOOD IN STOOL: 0
CONSTIPATION: 1
EXTREMITY WEAKNESS: 1
EYE PROBLEMS: 0
BLOOD IN STOOL: 0
SHORTNESS OF BREATH: 1
FREQUENCY: 0
HEMATURIA: 0
TROUBLE SWALLOWING: 1
NECK PAIN: 0
VOICE CHANGE: 0
DIFFICULTY URINATING: 0
CHEST TIGHTNESS: 0
WOUND: 0
LEG SWELLING: 0
WHEEZING: 0
EXTREMITY WEAKNESS: 0
APPETITE CHANGE: 1
SEIZURES: 0
ABDOMINAL PAIN: 0
HEMATURIA: 0
MYALGIAS: 1
BACK PAIN: 1
ABDOMINAL PAIN: 1
TROUBLE SWALLOWING: 1
FREQUENCY: 1
ARTHRALGIAS: 1
WOUND: 0
DIZZINESS: 1
WHEEZING: 0
FATIGUE: 1
LIGHT-HEADEDNESS: 0

## 2024-01-03 NOTE — PROGRESS NOTES
Infusion Nursing Note:  Kenneth Matthew presents today for FOLFIRI.    Patient seen by provider today: Yes: See Martha Valenzuela NP ONC assessment.  Home medications, allergies, vitals, fall risk, pain and abuse screen done at [facility] ONC appt earlier this date. All reviewed by this nurse prior to treating. Patient denies questions or concerns not already discussed with provider prior, wishes to proceed with treatment.    present during visit today: Not Applicable.    Note: Secure chat to Martha Valenzuela NP ONC alerting to need for plan signatures.       Treatment Conditions:  Lab Results   Component Value Date    HGB 14.5 01/03/2024    WBC 7.5 01/03/2024    ANEUTAUTO 5.2 01/03/2024     (L) 01/03/2024        Lab Results   Component Value Date     01/03/2024    POTASSIUM 4.3 01/03/2024    MAG 2.2 02/08/2023    CR 0.63 (L) 01/03/2024    NICHOLAS 8.9 01/03/2024    BILITOTAL 0.4 01/03/2024    ALBUMIN 3.7 01/03/2024    ALT 10 01/03/2024    AST 17 01/03/2024       Results reviewed, labs MET treatment parameters, ok to proceed with treatment.      Post Infusion Assessment:  Patient tolerated infusion without incident.  Site patent and intact, free from redness, edema or discomfort.  No evidence of extravasations.  Home infuser connected.        Discharge Plan:   Patient discharged in stable condition accompanied by: self.  Departure Mode: Ambulatory.

## 2024-01-03 NOTE — PROGRESS NOTES
Patients port accessed using non-coring, 19 gauge, 3/4 needle, per facility protocol.     Hand hygiene performed: yes   Mask donned by caregiver: yes  Site prepped with CHG: yes   Labs drawn: yes   Dressing applied using aseptic technique: yes     Port flushed easily, without resistance. Flushed with 10 cc's normal saline.   Immediate blood return noted. 10 cc blood discarded.  Ordered labs obtained and sent to lab.  Port flushed per orders/MAR.

## 2024-01-03 NOTE — PATIENT INSTRUCTIONS
We would like to see you back as planned.     We will get you scheduled for a PET scan.      [facility]cod[facility]      When you are in need of a refill, please call your pharmacy and they will send us a request.     If you have any questions please call [phone]    Other instructions:  none

## 2024-01-03 NOTE — NURSING NOTE
"Oncology Rooming Note    January 3, 2024 8:53 AM   Kenneth Matthew is a 58 year old male who presents for:    Chief Complaint   Patient presents with    Oncology Clinic Visit     Follow up Malignant neoplasm of lower third of esophagus      Initial Vitals: BP 92/58   Pulse 62   Temp 97.6  F (36.4  C) (Tympanic)   Resp 20   Ht 1.715 m (5' 7.5\")   Wt 56.9 kg (125 lb 6.4 oz)   SpO2 98%   BMI 19.35 kg/m   Estimated body mass index is 19.35 kg/m  as calculated from the following:    Height as of this encounter: 1.715 m (5' 7.5\").    Weight as of this encounter: 56.9 kg (125 lb 6.4 oz). Body surface area is 1.65 meters squared.  Moderate Pain (4) Comment: Data Unavailable   No LMP for male patient.  Allergies reviewed: Yes  Medications reviewed: Yes    Medications: MEDICATION REFILLS NEEDED TODAY. Provider was notified.  Pharmacy name entered into EPIC:    [facility] - [place], [place] - [id number] [place], [place] - [address] AT [facility]  [address]    Frailty Screening:   Is the patient here for a new oncology consult visit in cancer care? 2. No      Clinical concerns: Patient would like liquid Hydrocodone ordered from [facility]      Yahaira Rodriguez LPN             "

## 2024-01-03 NOTE — PROGRESS NOTES
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  He initiated systemic therapy with nivolumab/FOLFOX 1/11/2023 with potential for palliative radiation therapy with treatment changed to ramuciramab and paclitaxel due to progression.  He had further progression after 3 cycles and is now receiving FOLFIRI.     He presents to the clinic today stating he is doing okay.  He has lost some more weight and feels he is having difficulty recovering after he was not feeling good a couple weeks ago.  He has been getting in 1 tube feeding a day along with 2 ensures.  He rarely tries solid food but states he did try a few Chetoes the other day and this did go down okay.  He is not having any difficulty with liquids at this point.  Does have some ongoing slight nausea but denies any abdominal pain.  Will have a bowel movement once every couple of days stating that these are soft.  He states this is actually more often than his baseline.    He states he is not taking the Remeron at night and he has been actually sleeping much better.  He states he is no longer using the cannabis seltzer either.  He states he slept the night last night.  He is questioning later appointments if possible.    He rates his pain at a 4 on a 0-to-10 scale and feels that it is tolerable.  He is satisfied with his pain control.  He is currently using hydrocodone every 6 hours as needed and there are some days where he will use one of the long-acting oxycodone's in the morning.  He chooses not to do this on a daily basis and states the hydrocodone does make him feel better.  He is waiting for  certification for medical cannabis.  Note patient states he did try the Dilaudid as prescribed by Dr. House but states he developed quite severe nausea and dry heaves.  He tells me he did not feel good on this medication.  In addition, when he had previously tried the short acting MS 15 mg tablets he states he developed pain  in his chest when he  would lay down.    Neuropathy remains in his fingers and his feet but states he feels that this is somewhat improved as he is trying to get up and walk more.  He is using the gabapentin 300 mg twice daily and has not increased this as he states he likes to take this medication with the tube feeding and timing sometimes does not work out.  He has been using hemp cream to his hands and feet and he feels this has been helping.    Oncologic History:     Experienced difficulty swallowing and 60 pound weight loss  10/21/2022: CT chest, abdomen and pelvis: Diffuse wall thickening of the distal esophagus with associated mediastinal adenopathy, and upper abdominal  Conglomerate lymphadenopathy. Constellation of findings are highly concerning for underlying esophageal carcinoma with metastatic disease.   11/1/2022: Colonoscopy with polyp showing tubular adenoma and EGD: A large ulcerating mass with no bleeding and no stigmata of recent bleeding was found in the lower third of the esophagus, 35 to 40 cm from the incisors.  The mass was partially obstructing and partially circumferential involving  two thirds.    **Biopsy showed poorly differentiated favoring adenocarcinoma. Her 2 negative.   11/22/2002 patient met with Dr. Barnhart to discuss systemic therapy pending PET results/complete staging  12/5/2022 patient met with Dr. Spain with radiation oncology with possible plan to initiate radiation therapy pending results of PET scan  12/14/2022: PET scan: Distal esophageal neoplasm with metastatic involvement the  mediastinum, left hilum and retroperitoneal lymph nodes of the  upperabdomen. Also likely localized metastatic involvement of thestomach/GE junction. 2 cm intramural distal esophageal/GE junction mass. Circumferential wall thickening with increased FDG uptake in the distal esophagus adjacent portions of the stomach/GE junction may represent localized disease however could also represent localized inflammation. Metastatic involvement to the subcarinal nodes, left infrahilar and  pericaval/paraesophageal lymph nodes of the lower thorax. Metastatic involvement to the retroperitoneal lymph nodes of the upper abdomen. These lymph nodes have increased in size dating back to be  10/21/2022 CT scan consistent with worsening disease. Representative  nodes are described above.  12/20/2022  He was seen by Dr. Barnhart with plan to initiate palliative nivolumab/FOLFOX with potential for palliative Radiation at some point  12/22/2022: Port-A-Cath placement and gastrostomy tube insertion by Dr. Dalton  1/11/2023 initiated treatment FOLFOX with Nivo   3/29/2023  PET scan:    IMPRESSION:   Significant interval improvement.  Marked decrease in size of distal esophageal/GE junction masses with  near complete resolution of enlarged, FDG avid subcarinal,  paraesophageal and retroperitoneal lymph nodes.    Normal-sized lymph nodes throughout the head and neck, chest, abdomen  and pelvis now demonstrate FDG uptake, the majority of which do not  exceed background suggesting diffuse inflammatory response, possibly  related to therapy.   **Dr Barnhart reviewed with patient and recommended an EGD and discussed radiation consult.  5/15/2023 patient consult with radiation oncology, Dr. Roe, they discussed radiation therapy but due to lack of availability to simulate, plan was to follow-up in 2 months to discuss again.  A referral to [place] radiation therapy was offered and patient declined.  7/5/2023: PET scan: Interval worsening of disease.   Increased size and FDG uptake suggests malignant involvement  involving of a right paraesophageal lymph node in the upper thorax, diffuse involvement of the mediastinal and hilar nodes as well as worsening involvement of lymph nodes along the lesser curvature the stomach and in the retroperitoneum. Focal area of uptake in the right inferolateral aspect of the prostate gland may be inflammatory in nature.   Stable areas of uptake seen within the distal portions of the  Esophagus. An area of increased uptake seen previously within the gastric mucosa appears smaller.   7/9/2023: Given progression seen on PET scan he was started on second line treatment with ramucirumab and Taxol.  10/4/2023: PET scan:Interval progression of disease, with increasing size of mediastinal and upper abdominal lymph nodes. The focal mass in the distal esophagus has not changed significantly.There has been interval collapse of the right middle lobe which demonstrates increased FDG uptake, likely due to an infectious or  inflammatory process, however metastatic disease is also within the differential.   10/18/2023: Started treatment with cycle 1 of FOLFIRI        RESULT FOR IMMUNOHISTOCHEMICAL VENTANA CLONE  PD-L1 ASSAY  COMBINED POSITIVE SCORE (CPS): <1%    8/7/2023: Foundation one: Microsatellite status - MS-Stable   Tumor Mutational De Beque - 5 Muts/ Mb   Genomic Findings   APC [id number]*3   CTNA1 rearrangement exon 4   [id number]   TP53 P27fs*17 1   Disease relevant genes with no reportable alterations: ERBB2  Her 2 negative     Treatment Goal: Palliative     Current Chemo Regime/TX:   FOLFIRI    **Bolus eliminated with cycle 2 due to thrombocytopenia    Previous treatment:  Nivolumab 240 mg/oxaliplatin 85 mg per metered squared/leucovorin 350 mg per metered squared/fluorouracil 400 mg per metered squared IV bolus/fluorouracil 2400 mg per metered squared via continuous infusion over 46 hours with cycle given every 14 days; ramuciramab 8mg/kg and paclitaxel 65mg/m2 (20% dose decrease related to  neuropathy) x 3 cycles     Past Medical History:   Diagnosis Date    Cancer (H)     Esophageal cancer (H) 11/01/2022    Esophageal thickening 10/25/2022    Mixed hearing loss, bilateral     Tobacco abuse 1/4/2023    Tobacco use disorder 12/27/2019       Past Surgical History:   Procedure Laterality Date    COLONOSCOPY  11/01/2022    [facility]    ESOPHAGOSCOPY N/A 4/25/2023    Procedure: Upper Endoscopy with biopsy- with Gastrostomy Tube Exchange;  Surgeon: Nba Dalton MD;  Location: [facility] OR    INSERT PORT VASCULAR ACCESS N/A 12/22/2022    Procedure: POWER PORT PLACEMENT;  Surgeon: Nba Dalton MD;  Location: [facility]    Upper GI Endoscopy  11/01/2022       Family History   Problem Relation Age of Onset    Breast Cancer Mother     Hyperlipidemia Mother     Diabetes Mother     Coronary Artery Disease Mother     Cerebrovascular Disease Father     Hyperlipidemia Father     Coronary Artery Disease Father     Myocardial Infarction Father     Hyperlipidemia Brother     Hypertension Brother     Diabetes Brother     Asthma Sister     Breast Cancer Sister     Hyperlipidemia Sister     Diabetes Sister     Hyperlipidemia Sister     Diabetes Sister     Hyperlipidemia Sister     Suicide Sister     Anesthesia Reaction Sister     Breast Cancer Sister     Hyperlipidemia Sister     Diabetes Sister     Breast Cancer Niece     Skin Cancer Niece     Thyroid Disease Niece     Breast Cancer Niece     Leukemia Nephew     Colon Cancer No family hx of     Prostate Cancer No family hx of     Genetic Disorder No family hx of        Social History     Socioeconomic History    Marital status:      Spouse name: Jazzy    Number of children: 2    Years of education: Not on file    Highest education level: Not on file   Occupational History    Not on file   Tobacco Use    Smoking status: Former     Packs/day: 0.50     Years: 46.00     Additional pack years: 0.00     Total pack years: 23.00     Types: Cigarettes     Start date: 1976      Quit date: 2023     Years since quittin.5    Smokeless tobacco: Never    Tobacco comments:     Trying to quit but continues to smoke   Vaping Use    Vaping Use: Never used   Substance and Sexual Activity    Alcohol use: Not Currently     Comment: none since 2022    Drug use: Never    Sexual activity: Not on file   Other Topics Concern    Parent/sibling w/ CABG, MI or angioplasty before 65F 55M? Yes     Comment: father   Social History Narrative    Not on file     Social Determinants of Health     Financial Resource Strain: Not on file   Food Insecurity: Not on file   Transportation Needs: Not on file   Physical Activity: Not on file   Stress: Not on file   Social Connections: Not on file   Interpersonal Safety: Not on file   Housing Stability: Not on file       Current Outpatient Medications   Medication    gabapentin (NEURONTIN) 300 MG capsule    HYDROcodone-acetaminophen 7.5-325 MG/15ML solution    hydrocortisone (CORTAID) 1 % external cream    mirtazapine (REMERON) 7.5 MG tablet    omeprazole (PRILOSEC) 20 MG DR capsule    ondansetron (ZOFRAN) 8 MG tablet    oxyCODONE (XTAMPZA ER) 13.5 MG 12 hr tablet    prochlorperazine (COMPAZINE) 10 MG tablet    naloxone (NARCAN) 4 MG/0.1ML nasal spray     No current facility-administered medications for this visit.        No Known Allergies    Review Of Systems:  Constitutional:    denies fever,  chills, and night sweats.  Eyes:    denies double vision; has some fuzzy vision  Ears/Nose/Throat:   denies ear pain, difficulty swallowing; has runny nose  Respiratory:   denies shortness of breath, has slight cough  Skin:   denies rash, lesions  Cardiovascular:   denies chest pain, palpitations, edema  Gastrointestinal:   see HPI  Genitourinary:   denies difficulty with urination, blood in urine  Musculoskeletal:    denies new muscle pain, bone pain  Neurologic:   denies lightheadedness, headaches, see HPI  Psychiatric:   denies anxiety,  "depression  Hematologic/Lymphatic/Immunologic:   denies easy bruising, easy bleeding, lumps or bumps noted  Endocrine:   has some increased thirst and dry mouth      Physical Exam:  BP 92/58   Pulse 62   Temp 97.6  F (36.4  C) (Tympanic)   Resp 20   Ht 1.715 m (5' 7.5\")   Wt 56.9 kg (125 lb 6.4 oz)   SpO2 98%   BMI 19.35 kg/m      GENERAL APPEARANCE: Cachectic, alert and in no acute distress.  HEENT: Normocephalic, Sclerae anicteric.   NECK:   No asymmetry or masses, no thyromegaly.  LYMPHATICS: No palpable cervical, supraclavicular nodes   RESP: Lungs clear to auscultation bilaterally but dim throughout, respirations regular and easy  CARDIOVASCULAR: Regular rate and rhythm. Normal S1, S2  ABDOMEN: Soft, nontender. Bowel sounds auscultated all 4 quadrants. No palpable organomegaly or masses.  MUSCULOSKELETAL: Extremities without gross deformities noted. No edema of bilateral lower extremities.  NEURO: Alert and oriented x 3.   PSYCHIATRIC: Mentation and affect appear normal.  Mood appropriate.    ECOG Performance Status: 2     Laboratory:  Results for orders placed or performed in visit on 01/03/24   Comprehensive metabolic panel     Status: Abnormal   Result Value Ref Range    Sodium 138 135 - 145 mmol/L    Potassium 4.3 3.4 - 5.3 mmol/L    Carbon Dioxide (CO2) 29 22 - 29 mmol/L    Anion Gap 5 (L) 7 - 15 mmol/L    Urea Nitrogen 8.1 8.0 - 23.0 mg/dL    Creatinine 0.63 (L) 0.67 - 1.17 mg/dL    GFR Estimate >90 >60 mL/min/1.73m2    Calcium 8.9 8.8 - 10.2 mg/dL    Chloride 104 98 - 107 mmol/L    Glucose 125 (H) 70 - 99 mg/dL    Alkaline Phosphatase 76 40 - 150 U/L    AST 17 0 - 45 U/L    ALT 10 0 - 70 U/L    Protein Total 6.5 6.4 - 8.3 g/dL    Albumin 3.7 3.5 - 5.2 g/dL    Bilirubin Total 0.4 <=1.2 mg/dL   CBC with platelets and differential     Status: Abnormal   Result Value Ref Range    WBC Count 7.5 4.0 - 11.0 10e3/uL    RBC Count 4.33 (L) 4.40 - 5.90 10e6/uL    Hemoglobin 14.5 13.3 - 17.7 g/dL    " Hematocrit 42.6 40.0 - 53.0 %    MCV 98 78 - 100 fL    MCH 33.5 (H) 26.5 - 33.0 pg    MCHC 34.0 31.5 - 36.5 g/dL    RDW 15.8 (H) 10.0 - 15.0 %    Platelet Count 143 (L) 150 - 450 10e3/uL    % Neutrophils 69 %    % Lymphocytes 22 %    % Monocytes 6 %    % Eosinophils 2 %    % Basophils 1 %    % Immature Granulocytes 0 %    NRBCs per 100 WBC 0 <1 /100    Absolute Neutrophils 5.2 1.6 - 8.3 10e3/uL    Absolute Lymphocytes 1.6 0.8 - 5.3 10e3/uL    Absolute Monocytes 0.5 0.0 - 1.3 10e3/uL    Absolute Eosinophils 0.2 0.0 - 0.7 10e3/uL    Absolute Basophils 0.1 0.0 - 0.2 10e3/uL    Absolute Immature Granulocytes 0.0 <=0.4 10e3/uL    Absolute NRBCs 0.0 10e3/uL   CBC with platelets differential     Status: Abnormal    Narrative    The following orders were created for panel order CBC with platelets differential.  Procedure                               Abnormality         Status                     ---------                               -----------         ------                     CBC with platelets and d...[[id number]]  Abnormal            Final result                 Please view results for these tests on the individual orders.        Imaging Studies:  None completed for today's visit       ASSESSMENT/PLAN:    #1 Esophageal cancer: Stage IV adenocarcinoma of the esophagus diagnosed November 2022.  He initiated systemic therapy with nivolumab/FOLFOX 1/11/2023 with potential for palliative radiation therapy.  He developed progression and treatment was changed to ramuciramab and paclitaxel with further progression.  He is now receiving FOLFIRI.  Note platelets decreased after 1 cycle so 5-FU bolus was removed.  Patient is doing pretty well so we will go forth with cycle 6 therapy today.  Plan to see him back prior to cycle 7 with labs per treatment plan and PET scan.  If PET scan cannot be obtained, we will still plan cycle 7 and I will see him back before cycle 8 with the PET scan results.     #2 cancer related pain: He  will continue with the hydrocodone every 6 hours as needed.  He does use the long-acting oxycodone on occasion in the a.m.  Patient is satisfied with his pain regime and states pain is tolerable.  [facility]  Patient does see Dr. Garrido with palliative care will see him again in February.  Patient requests we continue to manage his pain medications or medical oncology at this point.     #3  Neuropathy: He will continue with the gabapentin 300 mg twice daily.  We did discuss increasing this to 3 times a day and he states that sometimes is difficult as he likes to do it with his tube feedings.  We also did discuss possibly increasing the dose to 600 mg and we would titrate this slowly doing 600 mg in the p.m. for 4 to 5 days and then increasing to 600 mg twice daily.  Patient will contemplate doing this.    #4 advance care planning: Discussion undertaken today in regards to his healthcare directive which was completed 2022.  At that point in time he was unsure what he would want done if his heart was to stop or he was to stop breathing.  We discussed today again and he states he would like to discuss further with his wife and states this has been a difficult subject to this point.  I did offer an appointment with he and his wife to discuss further.  He may consider this if his wife is able to do this.  Note patient states he has made arrangements for his  as he does not want his wife to be burdened with this.  We discussed extensively possible resuscitation effects in his state.      #5  Weight loss: Discussed his ongoing weight loss.  Recommended increased calories.    I encouraged patient to call with any questions or concerns.    50 minutes spent in the patient's encounter today with time spent in review of the chart along with in chart preparation.  Time was also spent in review of his treatment plan.  Time was also spent obtaining a review of systems and  performing a physical exam along with review of his lab work.  Time was also spent in discussion of symptoms and recommendations for management.  Time was also spent in advance care planning discussion and patient's wishes for CODE STATUS.  Time was also spent in placing orders, planning his follow-up and in chart documentation.    Delmy Valenzuela NP APRN, FNP-BC, AOCNP

## 2024-01-05 NOTE — PROGRESS NOTES
Infusion Nursing Note:  Kenneth Matthew presents today for home infuser pump off.    Patient seen by provider today: No   present during visit today: Not Applicable.    Note: Patient requests IVF.     Note to ONC/INF scheduling pool asking for update to all future pump off appointments to a level 2 to accommodate typically requested IVF.       Intravenous Access:  Implanted Port remains accessed from previous appt.    Treatment Conditions:  Not Applicable.      Post Infusion Assessment:  Patient tolerated infusion without incident.  Site patent and intact, free from redness, edema or discomfort.  No evidence of extravasations.  Access discontinued per protocol.       Discharge Plan:   Patient discharged in stable condition accompanied by: self.  Departure Mode: Ambulatory.

## 2024-01-17 PROBLEM — Z78.9 POLST (PHYSICIAN ORDERS FOR LIFE-SUSTAINING TREATMENT): Status: ACTIVE | Noted: 2024-01-01

## 2024-01-17 NOTE — PROGRESS NOTES
Oncology Follow-up Visit    Reason for Visit:  Kenneth is a 58 year old gentleman with a diagnosis of Malignant neoplasm of lower third of esophagus , who presents to the clinic today for routine followup.    Nursing Note and documentation reviewed: Yes    Interval History:   Ed notes that things are pretty stable. He does continues to have cancer-related pain, primarily in his upper back around the site of known lymphadenopathy. States he has worked extensively with both Martha and Dr. House regarding pain management. Using his liquid hydrocodone-acetaminophen 10 ml about every 6 hours or so and taking his ER oxycodone more PRN. States that this is difficult to take every 12 hours because his wife can't wake up at midnight to help him. Sometimes takes in the morning. Pain is reasonable.     He denies signs of infection. No fever, chills, chest pain, cough, or SOB. No bleeding concerns. Uses his PRN Zofran pretty much every morning and this helps truly limit his nausea. Sometimes uses compazine too. [facility] and will see her next week. Continues to loose some weight. Not getting in as much Ensure Clear Kraus as the [facility] hasn't sent and difficult to obtain. Bowels have been okay-- a little diarrhea this morning after no BM for 1.5 days. No skin concerns. Energy is low, napping daily, but still trying to do some type of activity daily. Neuropathy has improved with hemp lotion. 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     Today, he also states that he discussed with his wife and son his code status and would like to complete POLST and update code status to reflect DNR/DNI.     Oncologic History:   Experienced difficulty swallowing and 60 pound weight loss  10/21/2022: CT chest, abdomen and pelvis: Diffuse wall thickening of the distal esophagus with associated mediastinal adenopathy, and upper abdominal  Conglomerate lymphadenopathy. Constellation  of findings are highly concerning for underlying esophageal carcinoma with metastatic disease.   11/01/2022: Colonoscopy with polyp showing tubular adenoma and EGD: A large ulcerating mass with no bleeding and no stigmata of recent bleeding was found in the lower third of the esophagus, 35 to 40 cm from the incisors.  The mass was partially obstructing and partially circumferential involving  two thirds.    **Biopsy showed poorly differentiated favoring adenocarcinoma. Her 2 negative.   11/22/2002 Met with Dr. Barnhart to discuss systemic therapy pending PET results/complete staging  12/5/2022 Met with Dr. Spain with radiation oncology with possible plan to initiate radiation therapy pending results of PET scan  12/14/2022: PET scan: Distal esophageal neoplasm with metastatic involvement the  mediastinum, left hilum and retroperitoneal lymph nodes of the upperabdomen. Also likely localized metastatic involvement of thestomach/GE junction. 2 cm intramural distal esophageal/GE junction mass. Circumferential wall thickening with increased FDG uptake in the distal esophagus adjacent portions of the stomach/GE junction may represent localized disease however could also represent localized inflammation. Metastatic involvement to the subcarinal nodes, left infrahilar and  pericaval/paraesophageal lymph nodes of the lower thorax. Metastatic involvement to the retroperitoneal lymph nodes of the upper abdomen. These lymph nodes have increased in size dating back to be  10/21/2022 CT scan consistent with worsening disease. Representative  nodes are described above.  12/20/2022  Seen by Dr. Barnhart with plan to initiate palliative first line nivolumab/FOLFOX with potential for palliative Radiation at some point  12/22/2022: Port-A-Cath placement and gastrostomy tube insertion by Dr. Dalton  1/11/2023 Initiated treatment FOLFOX with Nivo   03/29/2023  PET scan:    IMPRESSION:   Significant interval improvement.  Marked decrease in  size of distal esophageal/GE junction masses with  near complete resolution of enlarged, FDG avid subcarinal,  paraesophageal and retroperitoneal lymph nodes.    Normal-sized lymph nodes throughout the head and neck, chest, abdomen  and pelvis now demonstrate FDG uptake, the majority of which do not  exceed background suggesting diffuse inflammatory response, possibly  related to therapy.   **Dr Barnhart reviewed with patient and recommended an EGD and discussed radiation consult.  05/15/2023 Consult with radiation oncology, Dr. Roe, they discussed radiation therapy but due to lack of availability to simulate, plan was to follow-up in 2 months to discuss again.  A referral to [place] radiation therapy was offered and patient declined.  07/05/2023: PET scan: Interval worsening of disease.   Increased size and FDG uptake suggests malignant involvement involving of a right paraesophageal lymph node in the upper thorax, diffuse involvement of the mediastinal and hilar nodes as well as worsening involvement of lymph nodes along the lesser curvature the stomach and in the retroperitoneum. Focal area of uptake in the right inferolateral aspect of the prostate gland may be inflammatory in nature.   Stable areas of uptake seen within the distal portions of the  Esophagus. An area of increased uptake seen previously within the gastric mucosa appears smaller.  07/9/2023: Given progression seen on PET scan he was started on second line treatment with ramucirumab and Taxol.  10/4/2023: PET scan:Interval progression of disease, with increasing size of mediastinal and upper abdominal lymph nodes. The focal mass in the distal esophagus has not changed significantly.There has been interval collapse of the right middle lobe which demonstrates increased FDG uptake, likely due to an infectious or  inflammatory process, however metastatic disease is also within the differential.  10/18/2023: Started treatment with third line FOLFIRI         RESULT FOR IMMUNOHISTOCHEMICAL VENTANA CLONE  PD-L1 ASSAY  COMBINED POSITIVE SCORE (CPS): <1%     8/7/2023: Foundation one: Microsatellite status - MS-Stable   Tumor Mutational [place] - 5 Muts/ Mb   Genomic Findings   APC [id number]*3   CTNA1 rearrangement exon 4   [id number]   TP53 P27fs*17 1   Disease relevant genes with no reportable alterations: ERBB2  Her 2 negative     Treatment Goal: Palliative     Current Chemo Regime/TX:  FOLFIRI    **Bolus eliminated with cycle 2 due to thrombocytopenia     Previous treatment:   First line: Nivo + FOLFOX  Second Line: Cyramza + Taxol       Past Medical History:   Diagnosis Date    Cancer (H)     Esophageal cancer (H) 11/01/2022    Esophageal thickening 10/25/2022    Mixed hearing loss, bilateral     Tobacco abuse 1/4/2023    Tobacco use disorder 12/27/2019       Past Surgical History:   Procedure Laterality Date    COLONOSCOPY  11/01/2022    [facility]    ESOPHAGOSCOPY N/A 4/25/2023    Procedure: Upper Endoscopy with biopsy- with Gastrostomy Tube Exchange;  Surgeon: Nba Dalton MD;  Location: [facility]    INSERT PORT VASCULAR ACCESS N/A 12/22/2022    Procedure: POWER PORT PLACEMENT;  Surgeon: Nba Dalton MD;  Location: [facility]    Upper GI Endoscopy  11/01/2022       Family History   Problem Relation Age of Onset    Breast Cancer Mother     Hyperlipidemia Mother     Diabetes Mother     Coronary Artery Disease Mother     Cerebrovascular Disease Father     Hyperlipidemia Father     Coronary Artery Disease Father     Myocardial Infarction Father     Hyperlipidemia Brother     Hypertension Brother     Diabetes Brother     Asthma Sister     Breast Cancer Sister     Hyperlipidemia Sister     Diabetes Sister     Hyperlipidemia Sister     Diabetes Sister     Hyperlipidemia Sister     Suicide Sister     Anesthesia Reaction Sister     Breast Cancer Sister     Hyperlipidemia Sister     Diabetes Sister     Breast Cancer Niece     Skin Cancer Niece     Thyroid Disease Niece      Breast Cancer Niece     Leukemia Nephew     Colon Cancer No family hx of     Prostate Cancer No family hx of     Genetic Disorder No family hx of        Social History     Socioeconomic History    Marital status:      Spouse name: Jazzy    Number of children: 2    Years of education: Not on file    Highest education level: Not on file   Occupational History    Not on file   Tobacco Use    Smoking status: Former     Packs/day: 0.50     Years: 46.00     Additional pack years: 0.00     Total pack years: 23.00     Types: Cigarettes     Start date:      Quit date: 2023     Years since quittin.6    Smokeless tobacco: Never    Tobacco comments:     Trying to quit but continues to smoke   Vaping Use    Vaping Use: Never used   Substance and Sexual Activity    Alcohol use: Not Currently     Comment: none since 2022    Drug use: Never    Sexual activity: Not on file   Other Topics Concern    Parent/sibling w/ CABG, MI or angioplasty before 65F 55M? Yes     Comment: father   Social History Narrative    Not on file     Social Determinants of Health     Financial Resource Strain: Not on file   Food Insecurity: Not on file   Transportation Needs: Not on file   Physical Activity: Not on file   Stress: Not on file   Social Connections: Not on file   Interpersonal Safety: Not on file   Housing Stability: Not on file       Current Outpatient Medications   Medication    gabapentin (NEURONTIN) 300 MG capsule    HYDROcodone-acetaminophen 7.5-325 MG/15ML solution    hydrocortisone (CORTAID) 1 % external cream    omeprazole (PRILOSEC) 20 MG DR capsule    ondansetron (ZOFRAN) 8 MG tablet    oxyCODONE (XTAMPZA ER) 13.5 MG 12 hr tablet    prochlorperazine (COMPAZINE) 10 MG tablet    naloxone (NARCAN) 4 MG/0.1ML nasal spray     No current facility-administered medications for this visit.     Facility-Administered Medications Ordered in Other Visits   Medication    atropine injection 0.4 mg    fluorouracil (ADRUCIL)  "3,960 mg in sodium chloride 0.9 % 329.2 mL Home Infusion    irinotecan (CAMPTOSAR) 300 mg in D5W 515 mL infusion    leucovorin calcium 700 mg in D5W 135 mL infusion    ondansetron (ZOFRAN) 8 mg, dexAMETHasone (DECADRON) 12 mg in sodium chloride 0.9 % 60.2 mL intermittent infusion    sodium chloride 0.9% BOLUS 250 mL        No Known Allergies    Review Of Systems:  A complete review of systems is negative except for the above mentioned items in the interval history.     ECOG Performance Status: 2    Physical Exam:  /58   Pulse 75   Temp 97.4  F (36.3  C) (Tympanic)   Resp 20   Ht 1.715 m (5' 7.5\")   Wt 56.6 kg (124 lb 11.2 oz)   SpO2 96%   BMI 19.24 kg/m    GENERAL APPEARANCE: Patient frail and cachectic but in no acute distress.   HEENT: Eyes appear normal without scleral icterus. Extraocular movements intact.   NECK:   Supple with normal range of motion. No asymmetry or masses.  LYMPHATICS: No palpable cervical, supraclavicular nodes.  RESP: Lungs clear to auscultation bilaterally, respirations regular and easy.  CARDIOVASCULAR: Regular rate and rhythm. Normal S1, S2; no murmur, gallop, or rub.  SKIN: No suspicious lesions or rashes.  NEURO: Alert and oriented x 3. Patient sitting in wheelchair.   PSYCHIATRIC: Mentation and affect appear normal.  Mood appropriate.    Laboratory:  Results for orders placed or performed in visit on 01/17/24   Comprehensive metabolic panel     Status: Abnormal   Result Value Ref Range    Sodium 139 135 - 145 mmol/L    Potassium 4.3 3.4 - 5.3 mmol/L    Carbon Dioxide (CO2) 30 (H) 22 - 29 mmol/L    Anion Gap 5 (L) 7 - 15 mmol/L    Urea Nitrogen 10.0 8.0 - 23.0 mg/dL    Creatinine 0.61 (L) 0.67 - 1.17 mg/dL    GFR Estimate >90 >60 mL/min/1.73m2    Calcium 9.1 8.8 - 10.2 mg/dL    Chloride 104 98 - 107 mmol/L    Glucose 108 (H) 70 - 99 mg/dL    Alkaline Phosphatase 75 40 - 150 U/L    AST 19 0 - 45 U/L    ALT 9 0 - 70 U/L    Protein Total 6.7 6.4 - 8.3 g/dL    Albumin 3.9 3.5 - " 5.2 g/dL    Bilirubin Total 0.6 <=1.2 mg/dL   CBC with platelets and differential     Status: Abnormal   Result Value Ref Range    WBC Count 7.3 4.0 - 11.0 10e3/uL    RBC Count 4.15 (L) 4.40 - 5.90 10e6/uL    Hemoglobin 13.9 13.3 - 17.7 g/dL    Hematocrit 40.6 40.0 - 53.0 %    MCV 98 78 - 100 fL    MCH 33.5 (H) 26.5 - 33.0 pg    MCHC 34.2 31.5 - 36.5 g/dL    RDW 15.6 (H) 10.0 - 15.0 %    Platelet Count 147 (L) 150 - 450 10e3/uL    % Neutrophils 69 %    % Lymphocytes 21 %    % Monocytes 8 %    % Eosinophils 1 %    % Basophils 1 %    % Immature Granulocytes 0 %    NRBCs per 100 WBC 0 <1 /100    Absolute Neutrophils 5.1 1.6 - 8.3 10e3/uL    Absolute Lymphocytes 1.5 0.8 - 5.3 10e3/uL    Absolute Monocytes 0.6 0.0 - 1.3 10e3/uL    Absolute Eosinophils 0.1 0.0 - 0.7 10e3/uL    Absolute Basophils 0.0 0.0 - 0.2 10e3/uL    Absolute Immature Granulocytes 0.0 <=0.4 10e3/uL    Absolute NRBCs 0.0 10e3/uL   CBC with platelets differential     Status: Abnormal    Narrative    The following orders were created for panel order CBC with platelets differential.  Procedure                               Abnormality         Status                     ---------                               -----------         ------                     CBC with platelets and d...[[id number]]  Abnormal            Final result                 Please view results for these tests on the individual orders.       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  He initiated systemic therapy with nivolumab/FOLFOX 1/11/2023 with potential for palliative radiation therapy.  He developed progression and treatment was changed to ramuciramab and paclitaxel with further progression.  He is now receiving third line FOLFIRI.      Today, he presents due for C7 treatment. Overall, he is tolerating treatment reasonably well. No significant side effects related to treatment.  Labs are adequate. I see no contraindications to proceeding with treatment today. He is scheduled for staging scans after this cycle and is due to see Martha prior to next cycle to review scans and plan. Follow-up scheduled. He will follow-up sooner with concerns.      #2 cancer related pain: Currently using hydrocodone-acetaminophen 10 ml every 6 hours or so and the ER Oxycodone more of a PRN basis. Is seems that pain varies but patient not wishing to make changes at this time. We did discuss the nature of long versus short acting pain medications and how they differ. For now, will leave regimen alone. He will call prior to seeing Martha if pain seems to be worsening. Denies needing refills.      #3  Neuropathy: Currently using 300 mg Gabapentin one daily, sometimes twice. Also using hemp lotion which seems to have made a very large difference. Feel neuropathy is improved so much that he can again feel finger tips.     #4 Chemo related nausea Minimal as patient is using PRN Zofran each morning to hopefully avoid onset of nausea. Will monitor.     #5 ACP Planning Discussed healthcare wishes with patient. Patient has discussed with wife and son and would like to update his code status to reflect DNR/DNI. We have filled out a POLST and made copy-- provided patient with original.     #6 POLST on file See #5    #7 Weight Loss Working with [facility]. States difficult to obtain Ensure Clear. Sounds like expense may be an issue. Discussed [employer] with patient as he was unaware he can obtain 3 grants. I provided him with another application in hopes that these funds could help with expense.          Patient in agreement with plan and verbalizes understanding. Agrees to call with any questions or concerns.    53 minutes spent in the patient's encounter today with time spent in review of patient's chart along with chart preparation and review of the treatment plan and signing of treatment plan.  Time was also spent with the  patient in obtaining a review of systems and performing a physical exam along with detailed review of all test results. Time was also spent in discussing plan for future follow-up and relating instructions for follow-up and in placing future orders.    NATALEE Amaya [facility]  Medical Oncology

## 2024-01-17 NOTE — PROGRESS NOTES
Infusion Nursing Note:  Kenneth BELL Vipul presents today for Folfri.    Patient seen by provider today: Yes: Mague Morataya   present during visit today: Not Applicable.    Note: Patient offers no questions or concerns.      Intravenous Access:  Implanted Port.    Treatment Conditions:  Lab Results   Component Value Date    HGB 13.9 01/17/2024    WBC 7.3 01/17/2024    ANEUTAUTO 5.1 01/17/2024     (L) 01/17/2024        Lab Results   Component Value Date     01/17/2024    POTASSIUM 4.3 01/17/2024    MAG 2.2 02/08/2023    CR 0.61 (L) 01/17/2024    NICHOLAS 9.1 01/17/2024    BILITOTAL 0.6 01/17/2024    ALBUMIN 3.9 01/17/2024    ALT 9 01/17/2024    AST 19 01/17/2024       Results reviewed, labs MET treatment parameters, ok to proceed with treatment.      Post Infusion Assessment:  Patient tolerated infusion without incident.  Blood return noted pre and post infusion.  Site patent and intact, free from redness, edema or discomfort.  No evidence of extravasations.  Access discontinued per protocol.       Discharge Plan:   Patient and/or family verbalized understanding of discharge instructions and all questions answered.  Copy of AVS reviewed with patient and/or family.  Patient will return Friday for next appointment.  Patient discharged in stable condition accompanied by: self.  Departure Mode: Ambulatory.      Matilde Matthews RN

## 2024-01-17 NOTE — PROGRESS NOTES
Patients port accessed using non-coring, 19 gauge, 3/4 needle, per facility protocol.     Hand hygiene performed: yes   Mask donned by caregiver: yes  Site prepped with CHG: yes   Labs drawn: yes   Dressing applied using aseptic technique: Yes     Port flushed easily, without resistance. Flushed with 10 cc's normal saline.   Immediate blood return noted. 10 cc blood discarded.  Ordered labs obtained and sent to lab.  Port flushed per orders/MAR. Patient tolerated port flush well, denies pain nor discomfort at this time.  Patient states understanding and is in agreement with this plan. Patient discharged.

## 2024-01-17 NOTE — NURSING NOTE
"Oncology Rooming Note    January 17, 2024 9:05 AM   Kenneth Matthew is a 58 year old male who presents for:    Chief Complaint   Patient presents with    Oncology Clinic Visit     Follow up Malignant neoplasm of lower third of esophagus        Initial Vitals: /58   Pulse 75   Temp 97.4  F (36.3  C) (Tympanic)   Resp 20   Ht 1.715 m (5' 7.5\")   Wt 56.6 kg (124 lb 11.2 oz)   SpO2 96%   BMI 19.24 kg/m   Estimated body mass index is 19.24 kg/m  as calculated from the following:    Height as of this encounter: 1.715 m (5' 7.5\").    Weight as of this encounter: 56.6 kg (124 lb 11.2 oz). Body surface area is 1.64 meters squared.  Severe Pain (7) Comment: Data Unavailable   No LMP for male patient.  Allergies reviewed: Yes  Medications reviewed: Yes    Medications: Medication refills not needed today.  Pharmacy name entered into EPIC:    [facility] - [place], [place] - [id number] [place], [place] - [facility] [address] [facility] [place]  [address]    Frailty Screening:   Is the patient here for a new oncology consult visit in cancer care? 2. No      Clinical concerns: Having Nausea and using Zofran, Patient would also like to fill out a Polst - DNR will let Leena aware      Yahaira Rodriguez LPN             "

## 2024-01-19 NOTE — PROGRESS NOTES
Infusion Nursing Note:  Kenneth Matthew presents today for Pump-off/IVF.    Patient seen by provider today: No   present during visit today: Not Applicable.    Note: N/A.      Intravenous Access:  Implanted Port remains accessed form previous infusion appointment.    Treatment Conditions:  Not Applicable.      Post Infusion Assessment:  Patient tolerated infusion without incident.  Blood return noted pre and post infusion.  No evidence of extravasations.  Access discontinued per protocol.       Discharge Plan:   AVS to patient via MYCHART.  Patient will return as scheduled for next appointment.   Patient discharged in stable condition accompanied by: self.  Departure Mode: Ambulatory.      Ricardo CLAIRE

## 2024-01-19 NOTE — PATIENT INSTRUCTIONS

## 2024-01-23 NOTE — TELEPHONE ENCOUNTER
Made an appointment reminder call regarding [place] Petscan scheduled 1/24 at 730. Talked to Jazzy the patients wife. Reminded her that he should have a high protein / low carbohydrate supper. He can have water up to the time of the scan.

## 2024-01-31 PROBLEM — G62.9 NEUROPATHY: Status: ACTIVE | Noted: 2024-01-01

## 2024-01-31 NOTE — NURSING NOTE
"Oncology Rooming Note    January 31, 2024 8:50 AM   Kenneth Matthew is a 58 year old male who presents for:    Chief Complaint   Patient presents with    Oncology Clinic Visit     Follow up Malignant neoplasm of lower third of esophagus        Initial Vitals: /58   Pulse 73   Temp (!) 96.6  F (35.9  C) (Tympanic)   Resp 20   Ht 1.715 m (5' 7.5\")   Wt 56.6 kg (124 lb 11.2 oz)   SpO2 93%   BMI 19.24 kg/m   Estimated body mass index is 19.24 kg/m  as calculated from the following:    Height as of this encounter: 1.715 m (5' 7.5\").    Weight as of this encounter: 56.6 kg (124 lb 11.2 oz). Body surface area is 1.64 meters squared.  Severe Pain (7) Comment: Data Unavailable   No LMP for male patient.  Allergies reviewed: Yes  Medications reviewed: Yes    Medications: Medication refills not needed today.  Pharmacy name entered into EPIC:    [facility] - [place], [place] - [id number] [place], [place] - [address] [place] [facility] [place]  [address]    Frailty Screening:   Is the patient here for a new oncology consult visit in cancer care? 2. No      Clinical concerns: Review Pet Scan      Yahaira Rodriguez LPN             "

## 2024-01-31 NOTE — PROGRESS NOTES
"[facility]  Medical Nutrition Therapy    Visit Type: Follow-up     Patient Referred by: Nika Barnhart MD     Referred Diagnosis: Malignant neoplasm of lower third of esophagus       Nutrition Assessment  Anthropometrics:  Height: 5' 7.5\"  BMI: 19.24    Weight: 124 lbs 11.2 oz  Weight Change: trending down slowly     Wt Readings from Last 10 Encounters:   01/31/24 56.6 kg (124 lb 11.2 oz)   01/31/24 56.6 kg (124 lb 12.5 oz)   01/19/24 57.3 kg (126 lb 5.2 oz)   01/17/24 56.6 kg (124 lb 11.2 oz)   01/03/24 56.9 kg (125 lb 6.4 oz)   12/22/23 57.9 kg (127 lb 11.2 oz)   12/20/23 57.2 kg (126 lb 1.6 oz)   12/20/23 57.2 kg (126 lb 1.6 oz)   12/08/23 58.5 kg (128 lb 15.5 oz)   12/06/23 57.6 kg (127 lb)        Nutrition History:  Gets full quickly- up to 3 cans of formula. Hasn't managed to get in 4th carton. Gets full quickly. Having more difficulty with thicker liquids- V8 tomato juice, smoothies, milkshakes, root beer floats.    Food Record:  3 cartons of 2cal  2 Ensure apple juice  Apple jucie  Cranapple juice  Chicken broth  Not much hot chocolate  Tea with lemon  Smoothie- once in the last month  Little bits of water    Physical Activity:  Not addressed     Medical History/Family History:  Tobacco abuse    Nutrition Prescription- weight used to estimate needs 63.5kg- weight prior to treatment  Energy:   1900-2225kcal (30-35g/kg)    Protein:   75-95g (1.2-1.5g/kg)    Fluid:   1900-2225ml (1ml/kcal)          Nutrition Education:  Encouraged to continue working on increasing intake of TwoCal- 3.5-4 cartons per day. Try Gatorade for hydration and electrolytes.     Nutrition Goals:  - increase intake as able    4 cartons of TwoCal HN daily (948ml)  1900kcal, 80g protein, 166ml water   Adds 60-120ml water to formula. 50-60 water flush before and after    Nutrition Follow-up/ Monitoring:  Intake (enteral and oral), weight, labs    Time spent with pt- 7 minutes    Follow up with RD in 1 month.   Patient has RD's " contact information to call/email if needed.      Shelbie Pimentel RD

## 2024-01-31 NOTE — PROGRESS NOTES
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  He initiated systemic therapy with nivolumab/FOLFOX 1/11/2023 with potential for palliative radiation therapy with treatment changed to ramuciramab and paclitaxel due to progression.  He had further progression after 3 cycles and is now receiving FOLFIRI.      He presents to the clinic today for with no new complaints.  He is rating his pain at about a 7 on a 0-to-10 scale today but admits he is not taking the 12 hour pain medication often.  He states he may have taken it once in the last 10 days.  He admits it is hard for him to get up early in the morning and get a tube feeding done and take his pain meds.  He did take his hydrocodone this morning and he is requesting a refill on this today.  He tells me he is taking it about 2 times a day.  He admits his appetite is poor but he does continue with 3 cans of tube feeding and 2 ensures daily.  Weight is basically stable.  He feels he is keeping up on his fluids.  Neuropathy is about stable.  He feels maybe the fingertips are little bit better since using the CBD cream.  His feet are tolerable and about the same he is not using the cream on them.  States he has developed some discomfort in the heel when he puts his socks on.    Oncologic History:     Experienced difficulty swallowing and 60 pound weight loss  10/21/2022: CT chest, abdomen and pelvis: Diffuse wall thickening of the distal esophagus with associated mediastinal adenopathy, and upper abdominal  Conglomerate lymphadenopathy. Constellation of findings are highly concerning for underlying  esophageal carcinoma with metastatic disease.   11/1/2022: Colonoscopy with polyp showing tubular adenoma and EGD: A large ulcerating mass with no bleeding and no stigmata of recent bleeding was found in the lower third of the esophagus, 35 to 40 cm from the incisors.  The mass was partially obstructing and partially circumferential involving  two thirds.    **Biopsy showed poorly differentiated favoring adenocarcinoma. Her 2 negative.   11/22/2002 patient met with Dr. Barnhart to discuss systemic therapy pending PET results/complete staging  12/5/2022 patient met with Dr. Spain with radiation oncology with possible plan to initiate radiation therapy pending results of PET scan  12/14/2022: PET scan: Distal esophageal neoplasm with metastatic involvement the  mediastinum, left hilum and retroperitoneal lymph nodes of the upperabdomen. Also likely localized metastatic involvement of thestomach/GE junction. 2 cm intramural distal esophageal/GE junction mass. Circumferential wall thickening with increased FDG uptake in the distal esophagus adjacent portions of the stomach/GE junction may represent localized disease however could also represent localized inflammation. Metastatic involvement to the subcarinal nodes, left infrahilar and  pericaval/paraesophageal lymph nodes of the lower thorax. Metastatic involvement to the retroperitoneal lymph nodes of the upper abdomen. These lymph nodes have increased in size dating back to be  10/21/2022 CT scan consistent with worsening disease. Representative  nodes are described above.  12/20/2022  He was seen by Dr. Barnhart with plan to initiate palliative nivolumab/FOLFOX with potential for palliative Radiation at some point  12/22/2022: Port-A-Cath placement and gastrostomy tube insertion by Dr. Dalton  1/11/2023 initiated treatment FOLFOX with Nivo   3/29/2023  PET scan:    IMPRESSION:   Significant interval improvement.  Marked decrease in size of distal esophageal/GE junction  masses with  near complete resolution of enlarged, FDG avid subcarinal,  paraesophageal and retroperitoneal lymph nodes.    Normal-sized lymph nodes throughout the head and neck, chest, abdomen  and pelvis now demonstrate FDG uptake, the majority of which do not  exceed background suggesting diffuse inflammatory response, possibly  related to therapy.   **Dr Barnhart reviewed with patient and recommended an EGD and discussed radiation consult.  5/15/2023 patient consult with radiation oncology, Dr. Roe, they discussed radiation therapy but due to lack of availability to simulate, plan was to follow-up in 2 months to discuss again.  A referral to [place] radiation therapy was offered and patient declined.  7/5/2023: PET scan: Interval worsening of disease.   Increased size and FDG uptake suggests malignant involvement involving of a right paraesophageal lymph node in the upper thorax, diffuse involvement of the mediastinal and hilar nodes as well as worsening involvement of lymph nodes along the lesser curvature the stomach and in the retroperitoneum. Focal area of uptake in the right inferolateral aspect of the prostate gland may be inflammatory in nature.   Stable areas of uptake seen within the distal portions of the  Esophagus. An area of increased uptake seen previously within the gastric mucosa appears smaller.   7/9/2023: Given progression seen on PET scan he was started on second line treatment with ramucirumab and Taxol.  10/4/2023: PET scan:Interval progression of disease, with increasing size of mediastinal and upper abdominal lymph nodes. The focal mass in the distal esophagus has not changed significantly.There has been interval collapse of the right middle lobe which demonstrates increased FDG uptake, likely due to an infectious or  inflammatory process, however metastatic disease is also within the differential.   10/18/2023: Started treatment with cycle 1 of FOLFIRI        RESULT FOR  IMMUNOHISTOCHEMICAL VENTANA CLONE  PD-L1 ASSAY  COMBINED POSITIVE SCORE (CPS): <1%     8/7/2023: Foundation one: Microsatellite status - MS-Stable   Tumor Mutational [place] - 5 Muts/ Mb   Genomic Findings   APC [id number]*3   CTNA1 rearrangement exon 4   [id number]   TP53 P27fs*17 1   Disease relevant genes with no reportable alterations: ERBB2  Her 2 negative     Treatment Goal: Palliative     Current Chemo Regime/TX:   FOLFIRI    **Bolus eliminated with cycle 2 due to thrombocytopenia     Previous treatment:  Nivolumab 240 mg/oxaliplatin 85 mg per metered squared/leucovorin 350 mg per metered squared/fluorouracil 400 mg per metered squared IV bolus/fluorouracil 2400 mg per metered squared via continuous infusion over 46 hours with cycle given every 14 days; ramuciramab 8mg/kg and paclitaxel 65mg/m2 (20% dose decrease related to neuropathy) x 3 cycles     Past Medical History:   Diagnosis Date    Cancer (H)     Esophageal cancer (H) 11/01/2022    Esophageal thickening 10/25/2022    Mixed hearing loss, bilateral     Tobacco abuse 1/4/2023    Tobacco use disorder 12/27/2019       Past Surgical History:   Procedure Laterality Date    COLONOSCOPY  11/01/2022    [facility]    ESOPHAGOSCOPY N/A 4/25/2023    Procedure: Upper Endoscopy with biopsy- with Gastrostomy Tube Exchange;  Surgeon: Nba Dalton MD;  Location: [facility] OR    INSERT PORT VASCULAR ACCESS N/A 12/22/2022    Procedure: POWER PORT PLACEMENT;  Surgeon: Nba Dalton MD;  Location: [facility]    Upper GI Endoscopy  11/01/2022       Family History   Problem Relation Age of Onset    Breast Cancer Mother     Hyperlipidemia Mother     Diabetes Mother     Coronary Artery Disease Mother     Cerebrovascular Disease Father     Hyperlipidemia Father     Coronary Artery Disease Father     Myocardial Infarction Father     Hyperlipidemia Brother     Hypertension Brother     Diabetes Brother     Asthma Sister     Breast Cancer Sister     Hyperlipidemia Sister     Diabetes  Sister     Hyperlipidemia Sister     Diabetes Sister     Hyperlipidemia Sister     Suicide Sister     Anesthesia Reaction Sister     Breast Cancer Sister     Hyperlipidemia Sister     Diabetes Sister     Breast Cancer Niece     Skin Cancer Niece     Thyroid Disease Niece     Breast Cancer Niece     Leukemia Nephew     Colon Cancer No family hx of     Prostate Cancer No family hx of     Genetic Disorder No family hx of        Social History     Socioeconomic History    Marital status:      Spouse name: Jazzy    Number of children: 2    Years of education: Not on file    Highest education level: Not on file   Occupational History    Not on file   Tobacco Use    Smoking status: Former     Packs/day: 0.50     Years: 46.00     Additional pack years: 0.00     Total pack years: 23.00     Types: Cigarettes     Start date:      Quit date: 2023     Years since quittin.6    Smokeless tobacco: Never    Tobacco comments:     Trying to quit but continues to smoke   Vaping Use    Vaping Use: Never used   Substance and Sexual Activity    Alcohol use: Not Currently     Comment: none since 2022    Drug use: Never    Sexual activity: Not on file   Other Topics Concern    Parent/sibling w/ CABG, MI or angioplasty before 65F 55M? Yes     Comment: father   Social History Narrative    Not on file     Social Determinants of Health     Financial Resource Strain: Not on file   Food Insecurity: Not on file   Transportation Needs: Not on file   Physical Activity: Not on file   Stress: Not on file   Social Connections: Not on file   Interpersonal Safety: Not on file   Housing Stability: Not on file       Current Outpatient Medications   Medication    gabapentin (NEURONTIN) 300 MG capsule    HYDROcodone-acetaminophen 7.5-325 MG/15ML solution    hydrocortisone (CORTAID) 1 % external cream    omeprazole (PRILOSEC) 20 MG DR capsule    ondansetron (ZOFRAN) 8 MG tablet    oxyCODONE (XTAMPZA ER) 13.5 MG 12 hr tablet     "naloxone (NARCAN) 4 MG/0.1ML nasal spray    prochlorperazine (COMPAZINE) 10 MG tablet     No current facility-administered medications for this visit.        No Known Allergies    Review Of Systems:  Constitutional:    denies fever, weight changes, chills, and night sweats.  Eyes:    denies blurred or double vision  Ears/Nose/Throat:   denies ear pain, nose problems  Respiratory:   denies shortness of breath, cough  Skin:   denies rash, lesions  Cardiovascular:   denies chest pain  Gastrointestinal:   denies abdominal pain, bloating, early satiety; no change in bowel habits or blood in stool but has been dealing with some constipation  Genitourinary:   denies difficulty with urination, blood in urine  Musculoskeletal:    See HPI  Neurologic:   denies lightheadedness, headaches, see HPI  Hematologic/Lymphatic/Immunologic:   denies easy bruising, easy bleeding, lumps or bumps noted  Endocrine:   Denies increased thirst but does get a much dry mouth after he takes the gabapentin      ECOG Performance Status: 2    Physical Exam:  /58   Pulse 73   Temp (!) 96.6  F (35.9  C) (Tympanic)   Resp 20   Ht 1.715 m (5' 7.5\")   Wt 56.6 kg (124 lb 11.2 oz)   SpO2 93%   BMI 19.24 kg/m      GENERAL APPEARANCE:  alert and in no acute distress.  HEENT: Normocephalic, Sclerae anicteric.   NECK:   No asymmetry   RESP: Lungs clear to auscultation bilaterally, respirations regular and easy  CARDIOVASCULAR: Regular rate and rhythm. Normal S1, S2  NEURO: Alert and oriented x 3.  Presents in wheelchair  PSYCHIATRIC: Mentation and affect appear normal.  Mood appropriate.    Laboratory:  Results for orders placed or performed in visit on 01/31/24   Comprehensive metabolic panel     Status: Abnormal   Result Value Ref Range    Sodium 140 135 - 145 mmol/L    Potassium 4.3 3.4 - 5.3 mmol/L    Carbon Dioxide (CO2) 29 22 - 29 mmol/L    Anion Gap 8 7 - 15 mmol/L    Urea Nitrogen 10.3 8.0 - 23.0 mg/dL    Creatinine 0.62 (L) 0.67 - 1.17 " mg/dL    GFR Estimate >90 >60 mL/min/1.73m2    Calcium 9.2 8.8 - 10.2 mg/dL    Chloride 103 98 - 107 mmol/L    Glucose 138 (H) 70 - 99 mg/dL    Alkaline Phosphatase 75 40 - 150 U/L    AST 17 0 - 45 U/L    ALT 9 0 - 70 U/L    Protein Total 6.8 6.4 - 8.3 g/dL    Albumin 4.0 3.5 - 5.2 g/dL    Bilirubin Total 0.5 <=1.2 mg/dL   CBC with platelets and differential     Status: Abnormal   Result Value Ref Range    WBC Count 8.4 4.0 - 11.0 10e3/uL    RBC Count 4.17 (L) 4.40 - 5.90 10e6/uL    Hemoglobin 14.0 13.3 - 17.7 g/dL    Hematocrit 41.2 40.0 - 53.0 %    MCV 99 78 - 100 fL    MCH 33.6 (H) 26.5 - 33.0 pg    MCHC 34.0 31.5 - 36.5 g/dL    RDW 15.2 (H) 10.0 - 15.0 %    Platelet Count 160 150 - 450 10e3/uL    % Neutrophils 76 %    % Lymphocytes 16 %    % Monocytes 6 %    % Eosinophils 1 %    % Basophils 1 %    % Immature Granulocytes 0 %    NRBCs per 100 WBC 0 <1 /100    Absolute Neutrophils 6.4 1.6 - 8.3 10e3/uL    Absolute Lymphocytes 1.3 0.8 - 5.3 10e3/uL    Absolute Monocytes 0.5 0.0 - 1.3 10e3/uL    Absolute Eosinophils 0.1 0.0 - 0.7 10e3/uL    Absolute Basophils 0.1 0.0 - 0.2 10e3/uL    Absolute Immature Granulocytes 0.0 <=0.4 10e3/uL    Absolute NRBCs 0.0 10e3/uL   CBC with platelets differential     Status: Abnormal    Narrative    The following orders were created for panel order CBC with platelets differential.  Procedure                               Abnormality         Status                     ---------                               -----------         ------                     CBC with platelets and d...[[id number]]  Abnormal            Final result                 Please view results for these tests on the individual orders.        Imaging Studies:      Results for orders placed or performed during the hospital encounter of 01/24/24   PET Oncology (Eyes to Thighs)    Narrative    PET ONCOLOGY (EYES TO THIGHS)    HISTORY: 58 yearsMale Response to therapy for gastroesophageal cancer  metastatic; Malignant  neoplasm of lower third of esophagus (H)    TECHNIQUE: 13.22 millicuries FDG was injected intravenously. A  combined PET/CT scan was performed. CT imaging was performed without  contrast for localization purposes only.    COMPARISON: 10/4/2023    FINDINGS:     Neck:No suspicious hypermetabolism is present    CHEST: There is a hypermetabolic lymph node posterior to and to the  right of the trachea at the level of the thyroid, thoracic inlet with  peak SUV of 21.8 and measuring 1.9 cm transversely. Previously this  had a peak SUV of 18.5 and measured 1.3 cm respectively.    There is a metastasis involving the subcarinal region with peak SUV of  19.9 measuring 1.6 cm in short axis. Previously SUV was 15.7 and  measured 1.1 cm.    There are now 3 hypermetabolic lymph nodes in the left mediastinum  around the left mainstem bronchus. 2 are roughly similar in size and  metabolic activity. The third lesion, the level of the subcarinal  lymph node approximately is a new metastasis.    There is a hypermetabolic circumferential lesion involving the lower  esophagus, below the level of the chantell which has increased in size,  peak SUV is 15.3, previously 13.4.    Abdomen and pelvis: There is a lesion involving the lower esophagus,  gastroesophageal junction with peak SUV of 15.2, previously 17.    There are lymph nodes in the upper abdomen, about the gastroesophageal  junction/gastrohepatic ligament which have not significantly changed.          Impression    IMPRESSION: Worsening metastatic disease to lymph nodes in the  mediastinum and worsening lesion involving the lower third the  esophagus.    Involvement at the gastroesophageal junction and upper abdominal lymph  nodes is roughly similar to that seen previously.    Interval resolution of right middle lobe collapse.    NAVDEEP LOMBARDI MD         SYSTEM ID:  [id number]        ASSESSMENT/PLAN:    #1 Esophageal cancer: Stage IV adenocarcinoma of the esophagus diagnosed  November 2022.  He initiated systemic therapy with nivolumab/FOLFOX 1/11/2023 with potential for palliative radiation therapy.  He developed progression and treatment was changed to ramuciramab and paclitaxel with further progression.  He is now receiving FOLFIRI.   Recent PET scan shows progression of disease.  This was discussed with Dr. Barnhart and next line of therapy could be Lonsurf.  Extensive discussion undertaken with patient in regards to option of initiating Lonsurf versus transition to hospice.  Medication information sheet was given on the Lonsurf and reviewed and he will follow-up next week with Dr. Barnhart with his decision.  He will also be seeing Dr. House next week.  No further treatment with FOLFIRI and he will receive 1 L of normal saline today.    #2 decreased appetite: Will initiate 2 mg of dexamethasone in the a.m. with food.    #3  Neuropathy: Basically stable.    #4 cancer related pain: Discussed consistent use of the long-acting oxycodone.  He will continue with the hydrocodone for breakthrough pain.    I encouraged patient to call with any questions or concerns.    40 minutes spent in the patient's encounter today with time spent in review of the chart along with in chart preparation.  Time was also spent in review of his treatment plan.  Time was also spent obtaining a review of systems and performing a physical exam along with review of his lab work in detail with him.  Time was also spent in discussion of his symptoms/side effects and my recommendations for management.  Time was also spent in discussion of progression of disease and ongoing therapy versus transitioning to hospice.  Time was spent in planning his follow-up, placing orders and in chart documentation.    Delmy Valenzuela, NP  APRN, FNP-BC, AOCNP

## 2024-01-31 NOTE — PATIENT INSTRUCTIONS
We would like to see you back next week with Dr. Barnhart.     Your prescription for hydrocodone and dexamethasone [facility]: [facility].      When you are in need of a refill, please call your pharmacy and they will send us a request.     If you have any questions please call [phone]    Other instructions: None    Information sheet given on [facility].

## 2024-01-31 NOTE — PROGRESS NOTES
Patients power port accessed using non-coring, 19 gauge, 3/4 inch needle.    Mask donned by caregiver: yes Site prepped with CHG: yes Labs drawn: yes Dressing applied using aseptic technique: yes.     Port accessed per facility protocol. Port flushed easily, blood return noted.  No signs and symptoms of infection or infiltration.  Port flushed with 10mL normal saline, blood return noted, 10 mLs blood discarded. Blood taken for ordered labs, port flushed with 20mL normal saline.  Port left accessed as patient has appointment with Delmy Valenzuela, and is then due for chemo if parameters are met.  Patient discharged with no complaints.

## 2024-01-31 NOTE — PROGRESS NOTES
Infusion Nursing Note:  Kenneth Matthew presents today for IVF.    Patient seen by provider today: Yes: Delmy Valenzuela   present during visit today: Not Applicable.    Note:   Component      Latest Ref Rng 1/31/2024  8:18 AM   WBC      4.0 - 11.0 10e3/uL 8.4    RBC Count      4.40 - 5.90 10e6/uL 4.17 (L)    Hemoglobin      13.3 - 17.7 g/dL 14.0    Hematocrit      40.0 - 53.0 % 41.2    MCV      78 - 100 fL 99    MCH      26.5 - 33.0 pg 33.6 (H)    MCHC      31.5 - 36.5 g/dL 34.0    RDW      10.0 - 15.0 % 15.2 (H)    Platelet Count      150 - 450 10e3/uL 160    % Neutrophils      % 76    % Lymphocytes      % 16    % Monocytes      % 6    % Eosinophils      % 1    % Basophils      % 1    % Immature Granulocytes      % 0    NRBCs per 100 WBC      <1 /100 0    Absolute Neutrophils      1.6 - 8.3 10e3/uL 6.4    Absolute Lymphocytes      0.8 - 5.3 10e3/uL 1.3    Absolute Monocytes      0.0 - 1.3 10e3/uL 0.5    Absolute Eosinophils      0.0 - 0.7 10e3/uL 0.1    Absolute Basophils      0.0 - 0.2 10e3/uL 0.1    Absolute Immature Granulocytes      <=0.4 10e3/uL 0.0    Absolute NRBCs      10e3/uL 0.0    Sodium      135 - 145 mmol/L 140    Potassium      3.4 - 5.3 mmol/L 4.3    Carbon Dioxide (CO2)      22 - 29 mmol/L 29    Anion Gap      7 - 15 mmol/L 8    Urea Nitrogen      8.0 - 23.0 mg/dL 10.3    Creatinine      0.67 - 1.17 mg/dL 0.62 (L)    GFR Estimate      >60 mL/min/1.73m2 >90    Calcium      8.8 - 10.2 mg/dL 9.2    Chloride      98 - 107 mmol/L 103    Glucose      70 - 99 mg/dL 138 (H)    Alkaline Phosphatase      40 - 150 U/L 75    AST      0 - 45 U/L 17    ALT      0 - 70 U/L 9    Protein Total      6.4 - 8.3 g/dL 6.8    Albumin      3.5 - 5.2 g/dL 4.0    Bilirubin Total      <=1.2 mg/dL 0.5       Legend:  (L) Low  (H) High.      Intravenous Access:  Implanted Port.    Treatment Conditions:  Not Applicable.      Post Infusion Assessment:  Patient tolerated infusion without incident.       Discharge  Plan:   Discharge instructions reviewed with: Patient.      Radha Fernandez RN

## 2024-02-02 NOTE — PATIENT INSTRUCTIONS

## 2024-02-02 NOTE — PROGRESS NOTES
Infusion Nursing Note:  Kenneth Matthew presents today for IVF.    Patient seen by provider today: No   present during visit today: Not Applicable.    Note: N/A.    Intravenous Access:  Implanted Port.    Treatment Conditions:  Not Applicable.    Post Infusion Assessment:  Patient tolerated infusion without incident.  Blood return noted pre and post infusion.  No evidence of extravasations.  Access discontinued per protocol.     Discharge Plan:   AVS to patient via MYCHART.  Patient will return 2/7/24 for next appointment.   Patient discharged in stable condition accompanied by: self.  Departure Mode: Ambulatory.    Ricardo CLAIRE

## 2024-02-07 NOTE — PROGRESS NOTES
Care Coordination Note:    Oncology SW briefly met with patient today to provide him with a [facility] application. Patient stated that he is familiar with the form and didn't need assistance.   SW did give him contact information if any questions arise and SW will follow up with him next week.    ANDREWS Shukla  Oncology Social Worker  [phone]  [email or web address]

## 2024-02-07 NOTE — NURSING NOTE
"Oncology Rooming Note    February 7, 2024 10:19 AM   Kenneth Matthew is a 58 year old male who presents for:    Chief Complaint   Patient presents with    Oncology Clinic Visit     Follow up Malignant neoplasm of lower third of esophagus      Initial Vitals: /64   Pulse 75   Temp 97.1  F (36.2  C) (Tympanic)   Resp 16   Wt 56.5 kg (124 lb 9 oz)   SpO2 98%   BMI 19.22 kg/m   Estimated body mass index is 19.22 kg/m  as calculated from the following:    Height as of 1/31/24: 1.715 m (5' 7.5\").    Weight as of this encounter: 56.5 kg (124 lb 9 oz). Body surface area is 1.64 meters squared.  Extreme Pain (8) Comment: Upper and mid back  No LMP for male patient.  Allergies reviewed: Yes  Medications reviewed: Yes    Medications: Medication refills not needed today.  Pharmacy name entered into EPIC:    [facility] - [place], [place] - [id number] [place] [address] AT [facility]  [address]    Frailty Screening:   Is the patient here for a new oncology consult visit in cancer care? 2. No      Clinical concerns: none       Alana Reynoso LPN             "

## 2024-02-07 NOTE — PROGRESS NOTES
Palliative Care Clinic Visit      Primary Care Provider:  Chata Smith MD      Reason for Consultation:  Kenneth Matthew, 58 year old, male is seen for follow up Palliative Care visit due to esophageal cancer lower third and cancer related pain    The patient is seen in [facility] with family present.    Advanced Directives:  DNR/DNI HCD on file    Information sharing preferences:  Self, family    History of Present Illness:  Mr. Matthew states he noticed difficulty swallowing initially starting June 2022. Over the last 1 year he has unintentionally lost 60 pounds.      10/21/2022: CT chest, abdomen and pelvis: Diffuse wall thickening of the distal esophagus with associated mediastinal adenopathy, and upper abdominal  Conglomerate lymphadenopathy. Constellation of findings are highly concerning for underlying esophageal carcinoma with metastatic disease.      11/1/2022: EGD: A large ulcerating mass with no bleeding and no stigmata of recent bleeding was found Lower third from the incisors. The mass was partially obstructing and partially circumferential involving  two thirds.      Biopsy showed poorly differentiated favoring adenocarcinoma. Her 2 negative.      12/14/2022: PET scan: Distal esophageal neoplasm with metastatic involvement the  mediastinum, left hilum and retroperitoneal lymph nodes of the upperabdomen. Also likely localized metastatic involvement of thestomach/GE junction. 2 cm intramural distal esophageal/GE junction mass. Circumferential wall thickening with increased FDG uptake in the distal esophagus adjacent portions of the stomach/GE junction may represent localized disease however could also represent localized inflammation. Metastatic involvement to the subcarinal nodes, left infrahilar and  pericaval/paraesophageal lymph nodes of the lower thorax. Metastatic involvement to the retroperitoneal lymph nodes of the upper abdomen. These lymph nodes have increased in size  dating back to be  10/21/2022 CT scan consistent with worsening disease. Representative  nodes are described above.     1/11/2023 to 6/28/2023: 13 cycles of FOLFOX with Nivo     7/5/2023: PET scan: Interval worsening of disease.   Increased size and FDG uptake suggests malignant involvement involving of a right paraesophageal lymph node in the upper thorax, diffuse involvement of the mediastinal and hilar nodes as well as worsening involvement of lymph nodes along the lesser curvature the stomach and in the retroperitoneum. Focal area of uptake in the right inferolateral aspect of the prostate gland may be inflammatory in nature.   Stable areas of uptake seen within the distal portions of the  Esophagus. An area of increased uptake seen previously within the gastric mucosa appears smaller.     7/9/2023 to 9/13/2023: Given progression seen on PET scan he was started on second line treatment with ramucirumab and Taxol.  Received total of 3 cycles.     10/4/2023: PET scan:Interval progression of disease, with increasing size of mediastinal  and upper abdominal lymph nodes. The focal mass in the distal  esophagus has not changed significantly.There has been interval collapse of the right middle lobe which  demonstrates increased FDG uptake, likely due to an infectious or  inflammatory process, however metastatic disease is also within the  differential.    Symptom management challenges:  Pain.  He has had cancer related pain and is controlled on the current regimen. He rates this an 8/10 today but attributes the increase to lifting totes.    There has been neuropathic pain post chemotherapy    Ed has a feeding tube for nutrition.  He is tolerating liquids orally.    He has been using cannabis 10 mg seltzer for sleep.  We discussed medical cannabis certification. In additon, the neuropathy of his fingertips is somewhat improved with the use of CBD cream.    Ed continues to receive fluids.    Palliative Care Goals:      Patient understanding of illness and prognosis:  intact    Medical records are reviewed.      Current Outpatient Medications:     dexAMETHasone (DECADRON) 2 MG tablet, Take 1 tablet (2 mg) by mouth daily (with breakfast), Disp: 90 tablet, Rfl: 0    gabapentin (NEURONTIN) 300 MG capsule, Take 1 capsule (300 mg) by mouth 2 times daily Start with 300mg at night for 7 days then increase to AM and PM for 7 days then increase to 3 times a day., Disp: , Rfl:     HYDROcodone-acetaminophen 7.5-325 MG/15ML solution, Take 10 mLs by mouth every 6 hours as needed for moderate to severe pain, Disp: 946 mL, Rfl: 0    hydrocortisone (CORTAID) 1 % external cream, Apply topically 2 times daily, Disp: 30 g, Rfl: 1    naloxone (NARCAN) 4 MG/0.1ML nasal spray, Spray 1 spray (4 mg) into one nostril alternating nostrils as needed for opioid reversal every 2-3 minutes until assistance arrives (Patient not taking: Reported on 1/3/2024), Disp: 0.2 mL, Rfl: 0    omeprazole (PRILOSEC) 20 MG DR capsule, Take 1 capsule (20 mg) by mouth 2 times daily, Disp: 180 capsule, Rfl: 3    ondansetron (ZOFRAN) 8 MG tablet, Take 1 tablet (8 mg) by mouth every 8 hours as needed for nausea, Disp: 60 tablet, Rfl: 1    oxyCODONE (XTAMPZA ER) 13.5 MG 12 hr tablet, Take 1 tablet (13.5 mg) by mouth every 12 hours, Disp: 60 tablet, Rfl: 0    prochlorperazine (COMPAZINE) 10 MG tablet, Take 1 tablet (10 mg) by mouth every 6 hours as needed for nausea or vomiting (Patient not taking: Reported on 1/31/2024), Disp: 120 tablet, Rfl: 3      No Known Allergies    Past Medical History:  Past Medical History:   Diagnosis Date    Cancer (H)     Esophageal cancer (H) 11/01/2022    Esophageal thickening 10/25/2022    Mixed hearing loss, bilateral     Tobacco abuse 1/4/2023    Tobacco use disorder 12/27/2019         Past Surgical History:   Procedure Laterality Date    COLONOSCOPY  11/01/2022    [facility]    ESOPHAGOSCOPY N/A 4/25/2023    Procedure: Upper Endoscopy with biopsy-  with Gastrostomy Tube Exchange;  Surgeon: Nba Dalton MD;  Location: [facility]    INSERT PORT VASCULAR ACCESS N/A 12/22/2022    Procedure: POWER PORT PLACEMENT;  Surgeon: Nba Dalton MD;  Location: [facility]    Upper GI Endoscopy  11/01/2022       Family History:  Family History   Problem Relation Age of Onset    Breast Cancer Mother     Hyperlipidemia Mother     Diabetes Mother     Coronary Artery Disease Mother     Cerebrovascular Disease Father     Hyperlipidemia Father     Coronary Artery Disease Father     Myocardial Infarction Father     Hyperlipidemia Brother     Hypertension Brother     Diabetes Brother     Asthma Sister     Breast Cancer Sister     Hyperlipidemia Sister     Diabetes Sister     Hyperlipidemia Sister     Diabetes Sister     Hyperlipidemia Sister     Suicide Sister     Anesthesia Reaction Sister     Breast Cancer Sister     Hyperlipidemia Sister     Diabetes Sister     Breast Cancer Niece     Skin Cancer Niece     Thyroid Disease Niece     Breast Cancer Niece     Leukemia Nephew     Colon Cancer No family hx of     Prostate Cancer No family hx of     Genetic Disorder No family hx of        Social History:   reports that he quit smoking about 7 months ago. His smoking use included cigarettes. He started smoking about 48 years ago. He has a 23 pack-year smoking history. He has never used smokeless tobacco. He reports that he does not currently use alcohol. He reports that he does not use drugs.      Palliative Review of Systems:   Performance status: ECOG 3   Karnofsky Performance Scale 50    Palliative Performance Status 50      Nutritional status: weight loss    Review of Systems   Constitutional:  Positive for appetite change, fatigue and unexpected weight change.   HENT:   Positive for trouble swallowing. Negative for hearing loss and voice change.    Eyes:  Negative for eye problems.   Respiratory:  Positive for cough and shortness of breath. Negative for chest tightness, hemoptysis and  wheezing.    Cardiovascular:  Negative for chest pain, leg swelling and palpitations.   Gastrointestinal:  Positive for constipation. Negative for abdominal pain, blood in stool, diarrhea, nausea and vomiting.   Genitourinary:  Positive for frequency. Negative for bladder incontinence, difficulty urinating, dysuria, hematuria and nocturia.    Musculoskeletal:  Positive for arthralgias, back pain, gait problem and myalgias. Negative for neck pain.   Skin:  Positive for itching. Negative for rash and wound.   Neurological:  Positive for dizziness, extremity weakness and gait problem. Negative for light-headedness, numbness, seizures and speech difficulty.   Hematological:  Negative for adenopathy.   Psychiatric/Behavioral:  Negative for depression and sleep disturbance. The patient is not nervous/anxious.            Physical Examination:  GENERAL:  Chronically ill appearing  PSYCH: Alert and oriented.  Affect bright.    Other exam not repeated     Laboratory/Imaging:  Reviewed      Assessment/Plan:  Malignant neoplasm of lower third of esophagus (H)      Recommendations:  Reviewed role of cannabis in the treatment of symptoms and details of certification.  He will continue current regimen.  Advised follow up via MyChart should symptoms worsen between visits. Follow up 1 months.      [email or web address]  Attestation:  Total time spent on the day of encounter was 30 minutes including review of pertinent clinical information, patient visit, treatment plan, and coordination of care.     Sanju House MD [facility]  Hospice and Palliative Care

## 2024-02-07 NOTE — PROGRESS NOTES
MEDICAL ONCOLOGY FOLLOW UP NOTE  Feb 7, 2024    Reason for Follow up: esophageal cancer    HISTORY OF PRESENT ILLNESS  Kenneth Matthew is a 58 year old male with PMH as stated below who is seen in the oncology clinic for follow up of esophageal cancer    His history in short is as follows    Mr. Matthew states he noticed difficulty swallowing initially starting June 2022. Over the last 1 year he has unintentionally lost 60 pounds.     10/21/2022: CT chest, abdomen and pelvis: Diffuse wall thickening of the distal esophagus with associated mediastinal adenopathy, and upper abdominal  Conglomerate lymphadenopathy. Constellation of findings are highly concerning for underlying esophageal carcinoma with metastatic disease.     11/1/2022: EGD: A large ulcerating mass with no bleeding and no stigmata of recent bleeding was found Lower third from the incisors. The mass was partially obstructing and partially circumferential involving  two thirds.     Biopsy showed poorly differentiated favoring adenocarcinoma. Her 2 negative.     12/14/2022: PET scan: Distal esophageal neoplasm with metastatic involvement the  mediastinum, left hilum and retroperitoneal lymph nodes of the upperabdomen. Also likely localized metastatic involvement of thestomach/GE junction. 2 cm intramural distal esophageal/GE junction mass. Circumferential wall thickening with increased FDG uptake in the distal esophagus adjacent portions of the stomach/GE junction may represent localized disease however could also represent localized inflammation. Metastatic involvement to the subcarinal nodes, left infrahilar and  pericaval/paraesophageal lymph nodes of the lower thorax. Metastatic involvement to the retroperitoneal lymph nodes of the upper abdomen. These lymph nodes have increased in size dating back to be  10/21/2022 CT scan consistent with worsening disease. Representative  nodes are described above.    1/11/2023 to 6/28/2023: 13 cycles of FOLFOX with  Moisés    7/5/2023: PET scan: Interval worsening of disease.   Increased size and FDG uptake suggests malignant involvement involving of a right paraesophageal lymph node in the upper thorax, diffuse involvement of the mediastinal and hilar nodes as well as worsening involvement of lymph nodes along the lesser curvature the stomach and in the retroperitoneum. Focal area of uptake in the right inferolateral aspect of the prostate gland may be inflammatory in nature.   Stable areas of uptake seen within the distal portions of the  Esophagus. An area of increased uptake seen previously within the gastric mucosa appears smaller.    7/9/2023 to 9/13/2023: Given progression seen on PET scan he was started on second line treatment with ramucirumab and Taxol.  Received total of 3 cycles.    10/4/2023: PET scan:Interval progression of disease, with increasing size of mediastinal  and upper abdominal lymph nodes. The focal mass in the distal  esophagus has not changed significantly.There has been interval collapse of the right middle lobe which  demonstrates increased FDG uptake, likely due to an infectious or  inflammatory process, however metastatic disease is also within the  differential.    He was then switched to FOLFIRI.     10/18/2023 to 1/17/2024: Received 7 cycles of FOLFIRI.     Interim history    Mr. Matthew is doing well.  He continues to have abdominal pain.  He states he did some heavy lifting a few days ago which may have exacerbated his pain.  He is currently only taking the hydrocodone and Norco which is overall controlling his pain.  Denies any nausea or vomiting.  He denies any constipation.  Denies any diarrhea.  Is mostly eating through tube feeds at this time.  He is able to swallow his medication except the large Feels like his gabapentin.    REVIEW OF SYSTEMS  A 12-point ROS negative except as in HPI      Current Outpatient Medications   Medication Sig Dispense Refill    gabapentin (NEURONTIN) 300 MG  capsule Take 1 capsule (300 mg) by mouth 2 times daily Start with 300mg at night for 7 days then increase to AM and PM for 7 days then increase to 3 times a day.      HYDROcodone-acetaminophen 7.5-325 MG/15ML solution Take 10 mLs by mouth every 6 hours as needed for moderate to severe pain 946 mL 0    hydrocortisone (CORTAID) 1 % external cream Apply topically 2 times daily 30 g 1    omeprazole (PRILOSEC) 20 MG DR capsule Take 1 capsule (20 mg) by mouth 2 times daily 180 capsule 3    ondansetron (ZOFRAN) 8 MG tablet Take 1 tablet (8 mg) by mouth every 8 hours as needed for nausea 60 tablet 1    dexAMETHasone (DECADRON) 2 MG tablet Take 1 tablet (2 mg) by mouth daily (with breakfast) (Patient not taking: Reported on 2/7/2024) 90 tablet 0    naloxone (NARCAN) 4 MG/0.1ML nasal spray Spray 1 spray (4 mg) into one nostril alternating nostrils as needed for opioid reversal every 2-3 minutes until assistance arrives (Patient not taking: Reported on 2/7/2024) 0.2 mL 0    oxyCODONE (XTAMPZA ER) 13.5 MG 12 hr tablet Take 1 tablet (13.5 mg) by mouth every 12 hours (Patient not taking: Reported on 2/7/2024) 60 tablet 0    prochlorperazine (COMPAZINE) 10 MG tablet Take 1 tablet (10 mg) by mouth every 6 hours as needed for nausea or vomiting (Patient not taking: Reported on 1/31/2024) 120 tablet 3       No Known Allergies  Immunization History   Administered Date(s) Administered    COVID-19 MONOVALENT 12+ (Pfizer) 08/28/2021, 09/18/2021    TDAP (Adacel,Boostrix) 12/10/2021       Past Medical History:   Diagnosis Date    Cancer (H)     Esophageal cancer (H) 11/01/2022    Esophageal thickening 10/25/2022    Mixed hearing loss, bilateral     Tobacco abuse 1/4/2023    Tobacco use disorder 12/27/2019       Past Surgical History:   Procedure Laterality Date    COLONOSCOPY  11/01/2022    [facility]    ESOPHAGOSCOPY N/A 4/25/2023    Procedure: Upper Endoscopy with biopsy- with Gastrostomy Tube Exchange;  Surgeon: Nba Dalton MD;   Location: [facility]    INSERT PORT VASCULAR ACCESS N/A 12/22/2022    Procedure: POWER PORT PLACEMENT;  Surgeon: Nba Dalton MD;  Location: [facility]    Upper GI Endoscopy  11/01/2022       SOCIAL HISTORY  History   Smoking Status    Former    Packs/day: 0.50    Years: 46.00    Types: Cigarettes    Start date: 1976    Quit date: 6/12/2023   Smokeless Tobacco    Never    Social History    Substance and Sexual Activity      Alcohol use: Not Currently        Comment: none since July of 2022     History   Drug Use Unknown       FAMILY HISTORY  Family History   Problem Relation Age of Onset    Breast Cancer Mother     Hyperlipidemia Mother     Diabetes Mother     Coronary Artery Disease Mother     Cerebrovascular Disease Father     Hyperlipidemia Father     Coronary Artery Disease Father     Myocardial Infarction Father     Hyperlipidemia Brother     Hypertension Brother     Diabetes Brother     Asthma Sister     Breast Cancer Sister     Hyperlipidemia Sister     Diabetes Sister     Hyperlipidemia Sister     Diabetes Sister     Hyperlipidemia Sister     Suicide Sister     Anesthesia Reaction Sister     Breast Cancer Sister     Hyperlipidemia Sister     Diabetes Sister     Breast Cancer Niece     Skin Cancer Niece     Thyroid Disease Niece     Breast Cancer Niece     Leukemia Nephew     Colon Cancer No family hx of     Prostate Cancer No family hx of     Genetic Disorder No family hx of        PHYSICAL EXAMINATION  /64   Pulse 75   Temp 97.1  F (36.2  C) (Tympanic)   Resp 16   Wt 56.5 kg (124 lb 9 oz)   SpO2 98%   BMI 19.22 kg/m    Wt Readings from Last 2 Encounters:   02/07/24 56.5 kg (124 lb 9 oz)   02/07/24 56.5 kg (124 lb 9 oz)     Physical Exam  Constitutional:       Appearance: Normal appearance.   Eyes:      Conjunctiva/sclera: Conjunctivae normal.   Pulmonary:      Effort: Pulmonary effort is normal.   Abdominal:      General: Abdomen is flat.      Palpations: Abdomen is soft.   Neurological:      General:  No focal deficit present.      Mental Status: He is alert and oriented to person, place, and time. Mental status is at baseline.         Laboratory and Imaging:     Latest Reference Range & Units 01/31/24 08:18   WBC 4.0 - 11.0 10e3/uL 8.4   Hemoglobin 13.3 - 17.7 g/dL 14.0   Hematocrit 40.0 - 53.0 % 41.2   Platelet Count 150 - 450 10e3/uL 160       RESULT FOR IMMUNOHISTOCHEMICAL VENTANA CLONE  PD-L1 ASSAY  COMBINED POSITIVE SCORE (CPS): <1%    8/7/2023: Foundation one: Microsatellite status - MS-Stable   Tumor Mutational [place] - 5 Muts/ Mb   Genomic Findings   APC X6933xq*3   CTNA1 rearrangement exon 4   [id number]   TP53 P27fs*17 1   Disease relevant genes with no reportable alterations: ERBB2    ASSESSMENT AND PLAN    1. 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     Currently on first line treatment with FOLFOX with Nivo.  Has received 13 cycles till date.  However staging scan on 7/5/2023 does show progressive disease.    He then received 3 cycles of Taxol with ramucirumab from 7/2023-9/2023.  His PET scan done after 3 cycles does show progressive disease with increasing mediastinal and upper abdominal lymph nodes.      10/2023 to 1/2024: Received total 7 cycles of FOLFIRI.    His most recent PET scan showed progression.  Therefore discussed options to either proceed with a more palliative route.  Another treatment option for subsequent line treatment would be Lonsurf.  He wants to try the Lonsurf to see if there is any benefit.  He also feels he will be able to tolerate it well previously as he has tolerated all the other chemo well in the past.  I think it is reasonable at this point to try and stop if it affects his performance status.     Order for Lonsurf 35 mg/m2  placed today.     Expected side effects which include but not limited to diarrhea, abdominal pain, low blood counts, infection.     He is agreeable to start.     2.  Cancer related pain:    He is on short acting hydrocodone  and tynelol liquid. Not taking oxycodone long acting.  Pain is well-controlled today.    Follow up in 3 weeks to assess for symptom check.     Total time spent on the patient on day of encounter was 45 minutes doing chart review, review of test results, interpretation of results, patient visit and documentation.      Nika Barnhart MD

## 2024-02-07 NOTE — NURSING NOTE
"Oncology Rooming Note    February 7, 2024 10:13 AM   Kenneth Matthew is a 58 year old male who presents for:    Chief Complaint   Patient presents with    Oncology Clinic Visit     Follow up Malignant neoplasm of lower third of esophagus      Initial Vitals: /64   Pulse 75   Temp 97.1  F (36.2  C) (Tympanic)   Wt 56.5 kg (124 lb 9 oz)   SpO2 98%   BMI 19.22 kg/m   Estimated body mass index is 19.22 kg/m  as calculated from the following:    Height as of 1/31/24: 1.715 m (5' 7.5\").    Weight as of this encounter: 56.5 kg (124 lb 9 oz). Body surface area is 1.64 meters squared.  Extreme Pain (8) Comment: Upper and mid back  No LMP for male patient.  Allergies reviewed: Yes  Medications reviewed: Yes    Medications: Medication refills not needed today.  Pharmacy name entered into EPIC:    [facility] - [place], [place] - [id number] [place], [place] - [facility] [address] AT [facility] OF  [address]    Frailty Screening:   Is the patient here for a new oncology consult visit in cancer care? 2. No      Clinical concerns: none      Alana Reynoso LPN             "

## 2024-02-08 NOTE — TELEPHONE ENCOUNTER
Prior Authorization Approval    Medication: LONSURF 15-6.14 MG PO TABS  Authorization Effective Date: 3/22/2023  Authorization Expiration Date: 3/21/2024  Approved Dose/Quantity: 80/28 days  Reference #: [id number]   Insurance Company: Other (see comments)  Expected CoPay: $  15  CoPay Card Available:      Financial Assistance Needed: no gets from [facility]  Which Pharmacy is filling the prescription: [facility] - [place], [place] - [facility]  Pharmacy Notified: will need to send clinical notes over with first fill   Patient Notified: yes

## 2024-02-13 NOTE — PROGRESS NOTES
[facility]: Cancer Care                                                                                        TC to patient to discuss the oral chemo process. Will meet with him in infusion on Friday to check on the status of his oral chemo and set up labs and follow up. Patient in agreement with this plan of care.       Signature:  Nenita Tatum RN

## 2024-02-13 NOTE — PROGRESS NOTES
"Oral Chemotherapy Monitoring Program    Primary Oncologist: Dr. Barnhart  Primary Oncology Clinic: [facility]  Cancer Diagnosis: Esophageal Cancer    Drug: trifluridine-tipiracil (Lonsurf) 60 mg BID on days 1 through 5 and days 8 through 12; 28-days cycle  Start Date: TBD  Expected duration of therapy: Until disease progression or unacceptable toxicity    Drug Interaction Assessment: No significant drug interactions identified    Lab Monitoring Plan  Every 2 weeks per treatment plan    Subjective/Objective:  Kenneth Matthew is a 58 year old male contacted by phone for an initial visit for oral chemotherapy education.          2/13/2024     3:00 PM   ORAL CHEMOTHERAPY   Assessment Type New Teach   Diagnosis Code Esophageal Cancer   Providers Bronwyn   Clinic Name/Location [facility]   Drug Name Lonsurf (trifluridine/Tipiracil)   Dose 60 mg   Current Schedule BID   Cycle Details Days 1-5, then Days 8-12       Vitals:  BP:   BP Readings from Last 1 Encounters:   02/07/24 102/64     Wt Readings from Last 1 Encounters:   02/07/24 56.5 kg (124 lb 9 oz)     Estimated body surface area is 1.64 meters squared as calculated from the following:    Height as of 1/31/24: 1.715 m (5' 7.5\").    Weight as of 2/7/24: 56.5 kg (124 lb 9 oz).    Labs:  _  Result Component Current Result Ref Range   Sodium 140 (1/31/2024) 135 - 145 mmol/L     _  Result Component Current Result Ref Range   Potassium 4.3 (1/31/2024) 3.4 - 5.3 mmol/L     _  Result Component Current Result Ref Range   Calcium 9.2 (1/31/2024) 8.8 - 10.2 mg/dL     No results found for Mag within last 30 days.     No results found for Phos within last 30 days.     _  Result Component Current Result Ref Range   Albumin 4.0 (1/31/2024) 3.5 - 5.2 g/dL     _  Result Component Current Result Ref Range   Urea Nitrogen 10.3 (1/31/2024) 8.0 - 23.0 mg/dL     _  Result Component Current Result Ref Range   Creatinine 0.62 (L) (1/31/2024) 0.67 - 1.17 mg/dL       _  Result Component Current Result " Ref Range   AST 17 (1/31/2024) 0 - 45 U/L     _  Result Component Current Result Ref Range   ALT 9 (1/31/2024) 0 - 70 U/L     _  Result Component Current Result Ref Range   Bilirubin Total 0.5 (1/31/2024) <=1.2 mg/dL       _  Result Component Current Result Ref Range   WBC Count 8.4 (1/31/2024) 4.0 - 11.0 10e3/uL     _  Result Component Current Result Ref Range   Hemoglobin 14.0 (1/31/2024) 13.3 - 17.7 g/dL     _  Result Component Current Result Ref Range   Platelet Count 160 (1/31/2024) 150 - 450 10e3/uL     No results found for ANC within last 30 days.         Assessment:  Patient is appropriate to start therapy.    Plan:  Basic chemotherapy teaching was reviewed with the patient including indication, start date of therapy, dose, administration, adverse effects, missed doses, food and drug interactions, monitoring, side effect management, office contact information, and safe handling. Written materials were provided and all questions answered.    Follow-Up:  TBD - 1-week follow-up phone call to verify start date and assess tolerability  2/28/2024 - Appointment with Delmy Valenzuela for 3-week follow-up       [facility]  Oncology Pharmacy [facility]  [phone]

## 2024-02-14 NOTE — TELEPHONE ENCOUNTER
Script and notes were sent over to [facility] fax: [phone]

## 2024-02-16 NOTE — PROGRESS NOTES
"[facility]  Medical Nutrition Therapy    Visit Type: Follow-up     Patient Referred by: Nika Barnhart MD     Referred Diagnosis: Malignant neoplasm of lower third of esophagus       Nutrition Assessment  Anthropometrics:  Height: 5' 7.5\"  BMI: 19.22   Weight: 123 lbs  oz  Weight Change: trending down slowly   123.9lb    Wt Readings from Last 10 Encounters:   02/07/24 56.5 kg (124 lb 9 oz)   02/07/24 56.5 kg (124 lb 9 oz)   02/02/24 57 kg (125 lb 10.6 oz)   01/31/24 56.6 kg (124 lb 11.2 oz)   01/31/24 56.6 kg (124 lb 12.5 oz)   01/19/24 57.3 kg (126 lb 5.2 oz)   01/17/24 56.6 kg (124 lb 11.2 oz)   01/03/24 56.9 kg (125 lb 6.4 oz)   12/22/23 57.9 kg (127 lb 11.2 oz)   12/20/23 57.2 kg (126 lb 1.6 oz)        Nutrition History:  Trying to get in more formula. Only tolerating 3 cartons of TwoCal and 2 Ensure clear. A bit more active lately. Having increased difficulty swallowing thicker liquids. Have trouble with swallowing some medications. More solid foods feel like gravel going down. Regular BM, no diarrhea.      Food Record:  3 TwoCal and 2 Ensure Clear. Apple Juice, chicken noodle or rice broth. Stew broth, tea with lemon and a small amount of sugar.    Physical Activity:  Not addressed    Medical History/Family History:  Tobacco abuse    Nutrition Prescription- weight used to estimate needs 63.5kg- weight prior to treatment  Energy:   1900-2225kcal (30-35g/kg)    Protein:   75-95g (1.2-1.5g/kg)    Fluid:   1900-2225ml (1ml/kcal)            Nutrition Education:  Encouraged to continue working on increasing intake of TwoCal- 3.5-4 cartons per day. Try Gatorade for hydration and electrolytes.      Nutrition Goals:  - increase intake as able     4 cartons of TwoCal HN daily (948ml)  1900kcal, 80g protein, 166ml water   Adds 60-120ml water to formula. 50-60 water flush before and after     Nutrition Follow-up/ Monitoring:  Intake (enteral and oral), weight, labs     Time spent with pt- 7 minutes   "   Follow up with RD in 1 month.   Patient has RD's contact information to call/email if needed.        Shelbie Pimentel RD

## 2024-02-16 NOTE — TELEPHONE ENCOUNTER
Spoke to Zaira at [facility], Concepcion is in the mail today and should be at pt house Wed./Thur next week.

## 2024-02-16 NOTE — PROGRESS NOTES
[facility]: Cancer Care                                                                                        Met with patient in infusion he has not received his lonsurf yet. He has heard from the pharmacy on Wednesday . He is hopeful he will get it tomorrow so he can start on Monday. Clarified with him that he will take it 1 hour after a meal twice daily.     Signature:  Nenita Tatum RN

## 2024-02-19 NOTE — PROGRESS NOTES
[facility]: Cancer Care                                                                                        Patient returned call to this writer regarding lonsurf. Patient was able to find his package and has started treatment today. He would like to keep his appointment with Martha on the 28th and is set up for infusion on 3-1-24. We will have labs drawn that day, Discussed the need for day 15 labs. Patient would like to go to infusion for labs from port on days 15 of each cycle.     Signature:  Nenita Tatum RN

## 2024-02-28 NOTE — NURSING NOTE
"Oncology Rooming Note    February 28, 2024 12:54 PM   Kenneth Matthew is a 58 year old male who presents for:    Chief Complaint   Patient presents with    Oncology Clinic Visit     Follow up Malignant neoplasm of lower third of esophagus        Initial Vitals: BP 98/60   Pulse 72   Temp 97.4  F (36.3  C) (Tympanic)   Resp 20   Ht 1.715 m (5' 7.5\")   Wt 56 kg (123 lb 7.3 oz)   SpO2 100%   BMI 19.05 kg/m   Estimated body mass index is 19.05 kg/m  as calculated from the following:    Height as of this encounter: 1.715 m (5' 7.5\").    Weight as of this encounter: 56 kg (123 lb 7.3 oz). Body surface area is 1.63 meters squared.  Extreme Pain (9) Comment: Data Unavailable   No LMP for male patient.  Allergies reviewed: Yes  Medications reviewed: Yes    Medications: Medication refills not needed today.  Pharmacy name entered into EPIC:    [facility] - [place], [place] - [id number] - [place], [place] - [facility] [address] AT [facility] [address]    Frailty Screening:   Is the patient here for a new oncology consult visit in cancer care? 2. No      Clinical concerns: Per patient not taking Gabapentin as is to difficult to swallow.      Yahaira Rodriguez LPN             "

## 2024-02-28 NOTE — PATIENT INSTRUCTIONS
We would like to see you back in 2 weeks. Please come 30 minutes prior for lab work and we will plan to have you receive IV fluids while seeing me in infusion.     [facility]      When you are in need of a refill, please call your pharmacy and they will send us a request.     If you have any questions please call [phone]    Other instructions: none

## 2024-02-28 NOTE — PROGRESS NOTES
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  He initiated systemic therapy with nivolumab/FOLFOX 1/11/2023 with potential for palliative radiation therapy with treatment changed to ramuciramab and paclitaxel due to progression.  He had further progression after 3 cycles then received FOLFIRI with further progression.  He is currently on Lonsurf.    He presents to the clinic today stating he is doing okay.  So far he feels he is tolerating the pills fairly well but has developed some blurry vision.  He denies any double vision or headaches.  Note he initiated 2 mg of dexamethasone at our last visit to hopefully help increase his appetite and energy level but he states he is not sure that it really helps.  He continues doing 3 cans of tube feeding through his tube and then drinks 2 ensures per day and did drop about a pound in the last 3 weeks.  He does get occasional nausea and takes Zofran every morning along with Compazine as needed throughout the day.  He rates his pain today at an 8-9 and it is in his back.  He uses the hydrocodone 3 times a day and has not been using the long-acting pain medication due to the fact that he needs to take it with food and he feels he is taking so many pills at 1 time with food.  He continues to come in for IV fluids every other week.    Oncologic History:     Experienced difficulty swallowing and 60 pound weight loss  10/21/2022: CT chest, abdomen and pelvis: Diffuse wall thickening of the distal esophagus with associated mediastinal adenopathy, and upper abdominal  Conglomerate lymphadenopathy.  Constellation of findings are highly concerning for underlying esophageal carcinoma with metastatic disease.   11/1/2022: Colonoscopy with polyp showing tubular adenoma and EGD: A large ulcerating mass with no bleeding and no stigmata of recent bleeding was found in the lower third of the esophagus, 35 to 40 cm from the incisors.  The mass was partially obstructing and partially circumferential involving  two thirds.    **Biopsy showed poorly differentiated favoring adenocarcinoma. Her 2 negative.   11/22/2002 patient met with Dr. Barnhart to discuss systemic therapy pending PET results/complete staging  12/5/2022 patient met with Dr. Spain with radiation oncology with possible plan to initiate radiation therapy pending results of PET scan  12/14/2022: PET scan: Distal esophageal neoplasm with metastatic involvement the  mediastinum, left hilum and retroperitoneal lymph nodes of the upperabdomen. Also likely localized metastatic involvement of thestomach/GE junction. 2 cm intramural distal esophageal/GE junction mass. Circumferential wall thickening with increased FDG uptake in the distal esophagus adjacent portions of the stomach/GE junction may represent localized disease however could also represent localized inflammation. Metastatic involvement to the subcarinal nodes, left infrahilar and  pericaval/paraesophageal lymph nodes of the lower thorax. Metastatic involvement to the retroperitoneal lymph nodes of the upper abdomen. These lymph nodes have increased in size dating back to be  10/21/2022 CT scan consistent with worsening disease. Representative  nodes are described above.  12/20/2022  He was seen by Dr. Barnhart with plan to initiate palliative nivolumab/FOLFOX with potential for palliative Radiation at some point  12/22/2022: Port-A-Cath placement and gastrostomy tube insertion by Dr. Dalton  1/11/2023 initiated treatment FOLFOX with Nivo   3/29/2023  PET scan:    IMPRESSION:   Significant interval  improvement.  Marked decrease in size of distal esophageal/GE junction masses with  near complete resolution of enlarged, FDG avid subcarinal,  paraesophageal and retroperitoneal lymph nodes.    Normal-sized lymph nodes throughout the head and neck, chest, abdomen  and pelvis now demonstrate FDG uptake, the majority of which do not  exceed background suggesting diffuse inflammatory response, possibly  related to therapy.   **Dr Barnhart reviewed with patient and recommended an EGD and discussed radiation consult.  5/15/2023 patient consult with radiation oncology, Dr. Roe, they discussed radiation therapy but due to lack of availability to simulate, plan was to follow-up in 2 months to discuss again.  A referral to [place] radiation therapy was offered and patient declined.  7/5/2023: PET scan: Interval worsening of disease.   Increased size and FDG uptake suggests malignant involvement involving of a right paraesophageal lymph node in the upper thorax, diffuse involvement of the mediastinal and hilar nodes as well as worsening involvement of lymph nodes along the lesser curvature the stomach and in the retroperitoneum. Focal area of uptake in the right inferolateral aspect of the prostate gland may be inflammatory in nature.   Stable areas of uptake seen within the distal portions of the  Esophagus. An area of increased uptake seen previously within the gastric mucosa appears smaller.   7/9/2023: Given progression seen on PET scan he was started on second line treatment with ramucirumab and Taxol.  10/4/2023: PET scan:Interval progression of disease, with increasing size of mediastinal and upper abdominal lymph nodes. The focal mass in the distal esophagus has not changed significantly.There has been interval collapse of the right middle lobe which demonstrates increased FDG uptake, likely due to an infectious or  inflammatory process, however metastatic disease is also within the differential.   10/18/2023: Started  treatment with cycle 1 of FOLFIRI  1/24/2024 PET scan showed further progression  2/7/2024 patient was seen by Dr. Barnhart with plan to initiate Lonsurf  2/19/2024   Lonsurf initiated        RESULT FOR IMMUNOHISTOCHEMICAL VENTANA CLONE  PD-L1 ASSAY  COMBINED POSITIVE SCORE (CPS): <1%     8/7/2023: Foundation one: Microsatellite status - MS-Stable   Tumor Mutational Germfask - 5 Muts/ Mb   Genomic Findings   APC [id number]*3   CTNA1 rearrangement exon 4   [id number]   TP53 P27fs*17 1   Disease relevant genes with no reportable alterations: ERBB2  Her 2 negative     Treatment Goal: Palliative     Current Chemo Regime/TX: Trifluridine-tipiricil (Lonsurf) 15-6.14 mg tablet taking 4 tablets 2 times daily days 1 through 5 and 8 through 12 every 28 days       Previous treatment:  Nivolumab 240 mg/oxaliplatin 85 mg per metered squared/leucovorin 350 mg per metered squared/fluorouracil 400 mg per metered squared IV bolus/fluorouracil 2400 mg per metered squared via continuous infusion over 46 hours with cycle given every 14 days; ramuciramab 8mg/kg and paclitaxel 65mg/m2 (20% dose decrease related to neuropathy) x 3 cycles; FOLFIRI     Past Medical History:   Diagnosis Date    Cancer (H)     Esophageal cancer (H) 11/01/2022    Esophageal thickening 10/25/2022    Mixed hearing loss, bilateral     Tobacco abuse 1/4/2023    Tobacco use disorder 12/27/2019       Past Surgical History:   Procedure Laterality Date    COLONOSCOPY  11/01/2022    [facility]    ESOPHAGOSCOPY N/A 4/25/2023    Procedure: Upper Endoscopy with biopsy- with Gastrostomy Tube Exchange;  Surgeon: Nba Dalton MD;  Location: [facility] OR    INSERT PORT VASCULAR ACCESS N/A 12/22/2022    Procedure: POWER PORT PLACEMENT;  Surgeon: Nba Dalton MD;  Location: [facility]    Upper GI Endoscopy  11/01/2022       Family History   Problem Relation Age of Onset    Breast Cancer Mother     Hyperlipidemia Mother     Diabetes Mother     Coronary Artery Disease Mother      Cerebrovascular Disease Father     Hyperlipidemia Father     Coronary Artery Disease Father     Myocardial Infarction Father     Hyperlipidemia Brother     Hypertension Brother     Diabetes Brother     Asthma Sister     Breast Cancer Sister     Hyperlipidemia Sister     Diabetes Sister     Hyperlipidemia Sister     Diabetes Sister     Hyperlipidemia Sister     Suicide Sister     Anesthesia Reaction Sister     Breast Cancer Sister     Hyperlipidemia Sister     Diabetes Sister     Breast Cancer Niece     Skin Cancer Niece     Thyroid Disease Niece     Breast Cancer Niece     Leukemia Nephew     Colon Cancer No family hx of     Prostate Cancer No family hx of     Genetic Disorder No family hx of        Social History     Socioeconomic History    Marital status:      Spouse name: Jazzy    Number of children: 2    Years of education: Not on file    Highest education level: Not on file   Occupational History    Not on file   Tobacco Use    Smoking status: Former     Packs/day: 0.50     Years: 46.00     Additional pack years: 0.00     Total pack years: 23.00     Types: Cigarettes     Start date:      Quit date: 2023     Years since quittin.7    Smokeless tobacco: Never    Tobacco comments:     Trying to quit but continues to smoke   Vaping Use    Vaping Use: Never used   Substance and Sexual Activity    Alcohol use: Not Currently     Comment: none since 2022    Drug use: Never    Sexual activity: Not on file   Other Topics Concern    Parent/sibling w/ CABG, MI or angioplasty before 65F 55M? Yes     Comment: father   Social History Narrative    Not on file     Social Determinants of Health     Financial Resource Strain: Not on file   Food Insecurity: Not on file   Transportation Needs: Not on file   Physical Activity: Not on file   Stress: Not on file   Social Connections: Not on file   Interpersonal Safety: Not on file   Housing Stability: Not on file       Current Outpatient Medications  "  Medication    dexAMETHasone (DECADRON) 2 MG tablet    HYDROcodone-acetaminophen 7.5-325 MG/15ML solution    omeprazole (PRILOSEC) 20 MG DR capsule    ondansetron (ZOFRAN) 8 MG tablet    prochlorperazine (COMPAZINE) 10 MG tablet    gabapentin (NEURONTIN) 300 MG capsule    hydrocortisone (CORTAID) 1 % external cream    naloxone (NARCAN) 4 MG/0.1ML nasal spray    oxyCODONE (XTAMPZA ER) 13.5 MG 12 hr tablet     No current facility-administered medications for this visit.     Facility-Administered Medications Ordered in Other Visits   Medication    heparin 100 unit/mL injection 5 mL    sodium chloride (PF) 0.9% PF flush 3-20 mL        No Known Allergies    Review Of Systems:  Constitutional:    denies fever, chills, and night sweats.  Eyes:    see hPI  Ears/Nose/Throat:   denies ear pain, nose problems, does have some ongoing difficulty swallowing capsules  Respiratory:   denies shortness of breath, cough  Skin:   denies new rash, lesions  Cardiovascular:   denies palpitations, edema; has occasional pain in chest lasts an hour over last 2 weeks-happens in AM after takes his pills-not daily  Gastrointestinal:   denies abdominal pain, bloating, early satiety; no change in bowel habits or blood in stool; see hPI  Genitourinary:   denies difficulty with urination, blood in urine  Musculoskeletal:    see hPI  Neurologic:   denies lightheadedness, headaches, ongoing numbness and tingling in hands and feet but improved  Psychiatric:   denies anxiety, depression  Hematologic/Lymphatic/Immunologic:   denies easy bruising, easy bleeding, lumps or bumps noted  Endocrine:   Some increased thirst and dry mouth at times      ECOG Performance Status: 1    Physical Exam:  BP 98/60   Pulse 72   Temp 97.4  F (36.3  C) (Tympanic)   Resp 20   Ht 1.715 m (5' 7.5\")   Wt 56 kg (123 lb 7.3 oz)   SpO2 100%   BMI 19.05 kg/m      GENERAL APPEARANCE:  alert and in no acute distress.  HEENT: Normocephalic, Sclerae anicteric.   NECK:   No " asymmetry or masses, no thyromegaly.  LYMPHATICS: No palpable cervical, supraclavicular nodes   RESP: Lungs clear to auscultation bilaterally, respirations regular and easy  CARDIOVASCULAR: Regular rate and rhythm. Normal S1, S2  NEURO: Alert and oriented x 3.  Gait steady.  PSYCHIATRIC: Mentation and affect appear normal.  Mood appropriate.    Laboratory: None completed for today's visit      Imaging Studies: None completed for today's visit      ASSESSMENT/PLAN:    #1 Esophageal cancer: Stage IV adenocarcinoma of the esophagus diagnosed November 2022.  He initiated systemic therapy with nivolumab/FOLFOX 1/11/2023 with potential for palliative radiation therapy.  He developed progression and treatment was changed to ramuciramab and paclitaxel with further progression then received FOLFIRI with further progression.  He is currently on Lonsurf.  Seems to be tolerating okay so far.  Labs will be obtained on Friday when he returns for IV fluids.  I will see him back in 2 weeks with labs per treatment plan with plan for IV fluids that day.  Will continue with the every 2-week IV fluids at this point and I did let him know if he needed more, he could schedule these.    #2 decreased appetite: He will continue on the Dex for now we did discuss possibly instituting Marinol or Megace instead.     #3  Neuropathy: Basically stable.  Currently not taking the gabapentin.     #4 cancer related pain:   Discussed using the every 12 hour pain medication.  He states he would like to try this when he is off of the chemotherapy pill for 2 weeks.  I also told him he could use the hydrocodone 4 times a day.    I encouraged patient to call with any questions or concerns.      Delmy Valenzuela NP  APRN, FNP-BC, AOCNP

## 2024-03-01 NOTE — PROGRESS NOTES
Infusion Nursing Note:  Kenneth Matthew presents today for ivf & labs.    Patient seen by provider today: No   present during visit today: Not Applicable.    Note: N/A.      Intravenous Access:  Labs drawn without difficulty.  Implanted Port.    Treatment Conditions:  Lab Results   Component Value Date    HGB 12.7 (L) 03/01/2024    WBC 6.6 03/01/2024    ANEUTAUTO 5.5 03/01/2024     (L) 03/01/2024        Lab Results   Component Value Date     03/01/2024    POTASSIUM 4.7 03/01/2024    MAG 2.2 02/08/2023    CR 0.55 (L) 03/01/2024    NICHOLAS 8.9 03/01/2024    BILITOTAL 0.8 03/01/2024    ALBUMIN 3.8 03/01/2024    ALT 9 03/01/2024    AST 14 03/01/2024       Results reviewed, labs MET treatment parameters, ok to proceed with treatment.      Post Infusion Assessment:  Patient tolerated infusion without incident.  Patient tolerated injection without incident.  Blood return noted pre and post infusion.  Site patent and intact, free from redness, edema or discomfort.  No evidence of extravasations.  Access discontinued per protocol.       Discharge Plan:   Patient and/or family verbalized understanding of discharge instructions and all questions answered.      DAKOTAH GUAJARDO

## 2024-03-07 NOTE — PROGRESS NOTES
Patient received 1000 ml IV fluids today, labs from port performed.  Patient offers no questions or concern.  Patient port de accessed per facility protocol.  Patient discharged in care of self.

## 2024-03-15 NOTE — PROGRESS NOTES
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  He initiated systemic therapy with nivolumab/FOLFOX 1/11/2023 with potential for palliative radiation therapy with treatment changed to ramuciramab and paclitaxel due to progression.  He had further progression after 3 cycles then received FOLFIRI with further progression.  He is currently on Lonsurf.     He presents to the infusion center today stating he is doing okay.  He is having a lot of pain though and rates this at about a 9 on a 0-to-10 scale.  He states he did try the long-acting oxycodone and states it did not work well so he discontinued it.  He is using the hydrocodone but states he will take it 2-3 times a day though we had discussed increasing this to 4 times a day at our last visit.  States he does get a little relief from the hydrocodone and pain is mainly in his upper back.  States he is having a bowel movement daily to every other day.  Appetite has been poor and states he is only getting in 2 tube feedings a day.  States he just does not feel like eating. States he stopped the dexamethasone as he did not feel it was increasing his appetite.  Interestingly, he states he takes the gabapentin when he is on the chemo pills and he is able to swallow them okay but when he is off the chemo pills they are difficult to swallow.  Neuropathy is essentially unchanged and he continues using the cream to his hands which does help.    [facility]    Oncologic History:     Experienced difficulty swallowing and 60 pound weight loss  10/21/2022: CT  chest, abdomen and pelvis: Diffuse wall thickening of the distal esophagus with associated mediastinal adenopathy, and upper abdominal  Conglomerate lymphadenopathy. Constellation of findings are highly concerning for underlying esophageal carcinoma with metastatic disease.   11/1/2022: Colonoscopy with polyp showing tubular adenoma and EGD: A large ulcerating mass with no bleeding and no stigmata of recent bleeding was found in the lower third of the esophagus, 35 to 40 cm from the incisors.  The mass was partially obstructing and partially circumferential involving  two thirds.    **Biopsy showed poorly differentiated favoring adenocarcinoma. Her 2 negative.   11/22/2002 patient met with Dr. Barnhart to discuss systemic therapy pending PET results/complete staging  12/5/2022 patient met with Dr. Spain with radiation oncology with possible plan to initiate radiation therapy pending results of PET scan  12/14/2022: PET scan: Distal esophageal neoplasm with metastatic involvement the  mediastinum, left hilum and retroperitoneal lymph nodes of the upperabdomen. Also likely localized metastatic involvement of thestomach/GE junction. 2 cm intramural distal esophageal/GE junction mass. Circumferential wall thickening with increased FDG uptake in the distal esophagus adjacent portions of the stomach/GE junction may represent localized disease however could also represent localized inflammation. Metastatic involvement to the subcarinal nodes, left infrahilar and  pericaval/paraesophageal lymph nodes of the lower thorax. Metastatic involvement to the retroperitoneal lymph nodes of the upper abdomen. These lymph nodes have increased in size dating back to be  10/21/2022 CT scan consistent with worsening disease. Representative  nodes are described above.  12/20/2022  He was seen by Dr. Barnhart with plan to initiate palliative nivolumab/FOLFOX with potential for palliative Radiation at some point  12/22/2022: Port-A-Cath  placement and gastrostomy tube insertion by Dr. Dalton  1/11/2023 initiated treatment FOLFOX with Nivo   3/29/2023  PET scan:    IMPRESSION:   Significant interval improvement.  Marked decrease in size of distal esophageal/GE junction masses with  near complete resolution of enlarged, FDG avid subcarinal,  paraesophageal and retroperitoneal lymph nodes.    Normal-sized lymph nodes throughout the head and neck, chest, abdomen  and pelvis now demonstrate FDG uptake, the majority of which do not  exceed background suggesting diffuse inflammatory response, possibly  related to therapy.   **Dr Barnhart reviewed with patient and recommended an EGD and discussed radiation consult.  5/15/2023 patient consult with radiation oncology, Dr. Roe, they discussed radiation therapy but due to lack of availability to simulate, plan was to follow-up in 2 months to discuss again.  A referral to [facility] radiation therapy was offered and patient declined.  7/5/2023: PET scan: Interval worsening of disease.   Increased size and FDG uptake suggests malignant involvement involving of a right paraesophageal lymph node in the upper thorax, diffuse involvement of the mediastinal and hilar nodes as well as worsening involvement of lymph nodes along the lesser curvature the stomach and in the retroperitoneum. Focal area of uptake in the right inferolateral aspect of the prostate gland may be inflammatory in nature.   Stable areas of uptake seen within the distal portions of the  Esophagus. An area of increased uptake seen previously within the gastric mucosa appears smaller.   7/9/2023: Given progression seen on PET scan he was started on second line treatment with ramucirumab and Taxol.  10/4/2023: PET scan:Interval progression of disease, with increasing size of mediastinal and upper abdominal lymph nodes. The focal mass in the distal esophagus has not changed significantly.There has been interval collapse of the right middle lobe which  demonstrates increased FDG uptake, likely due to an infectious or  inflammatory process, however metastatic disease is also within the differential.   10/18/2023: Started treatment with cycle 1 of FOLFIRI  1/24/2024 PET scan showed further progression  2/7/2024 patient was seen by Dr. Barnhart with plan to initiate Lonsurf  2/19/2024   Lonsurf initiated        RESULT FOR IMMUNOHISTOCHEMICAL VENTANA CLONE  PD-L1 ASSAY  COMBINED POSITIVE SCORE (CPS): <1%     8/7/2023: Foundation one: Microsatellite status - MS-Stable   Tumor Mutational Brinkley - 5 Muts/ Mb   Genomic Findings   APC G4589yl*3   CTNA1 rearrangement exon 4   [id number]   TP53 P27fs*17 1   Disease relevant genes with no reportable alterations: ERBB2  Her 2 negative     Treatment Goal: Palliative     Current Chemo Regime/TX: Trifluridine-tipiricil (Lonsurf) 15-6.14 mg tablet taking 4 tablets 2 times daily days 1 through 5 and 8 through 12 every 28 days        Previous treatment:  Nivolumab 240 mg/oxaliplatin 85 mg per metered squared/leucovorin 350 mg per metered squared/fluorouracil 400 mg per metered squared IV bolus/fluorouracil 2400 mg per metered squared via continuous infusion over 46 hours with cycle given every 14 days; ramuciramab 8mg/kg and paclitaxel 65mg/m2 (20% dose decrease related to neuropathy) x 3 cycles; FOLFIRI    Past Medical History:   Diagnosis Date    Cancer (H)     Esophageal cancer (H) 11/01/2022    Esophageal thickening 10/25/2022    Mixed hearing loss, bilateral     Tobacco abuse 1/4/2023    Tobacco use disorder 12/27/2019       Past Surgical History:   Procedure Laterality Date    COLON[facility]  [facility]/2022    [facility]    ESOPHAGOSCOPY N/A 4/25/2023    Procedure: Upper Endoscopy with biopsy- with Gastrostomy Tube Exchange;  Surgeon: Nba Dalton MD;  Location: [facility] OR    INSERT PORT VASCULAR ACCESS N/A 12/22/2022    Procedure: POWER PORT PLACEMENT;  Surgeon: Nba Dalton MD;  Location: [facility] OR    Upper GI Endoscopy  11/01/2022        Family History   Problem Relation Age of Onset    Breast Cancer Mother     Hyperlipidemia Mother     Diabetes Mother     Coronary Artery Disease Mother     Cerebrovascular Disease Father     Hyperlipidemia Father     Coronary Artery Disease Father     Myocardial Infarction Father     Hyperlipidemia Brother     Hypertension Brother     Diabetes Brother     Asthma Sister     Breast Cancer Sister     Hyperlipidemia Sister     Diabetes Sister     Hyperlipidemia Sister     Diabetes Sister     Hyperlipidemia Sister     Suicide Sister     Anesthesia Reaction Sister     Breast Cancer Sister     Hyperlipidemia Sister     Diabetes Sister     Breast Cancer Niece     Skin Cancer Niece     Thyroid Disease Niece     Breast Cancer Niece     Leukemia Nephew     Colon Cancer No family hx of     Prostate Cancer No family hx of     Genetic Disorder No family hx of        Social History     Socioeconomic History    Marital status:      Spouse name: Jazzy    Number of children: 2    Years of education: Not on file    Highest education level: Not on file   Occupational History    Not on file   Tobacco Use    Smoking status: Former     Packs/day: 0.50     Years: 46.00     Additional pack years: 0.00     Total pack years: 23.00     Types: Cigarettes     Start date:      Quit date: 2023     Years since quittin.7    Smokeless tobacco: Never    Tobacco comments:     Trying to quit but continues to smoke   Vaping Use    Vaping Use: Never used   Substance and Sexual Activity    Alcohol use: Not Currently     Comment: none since 2022    Drug use: Never    Sexual activity: Not on file   Other Topics Concern    Parent/sibling w/ CABG, MI or angioplasty before 65F 55M? Yes     Comment: father   Social History Narrative    Not on file     Social Determinants of Health     Financial Resource Strain: Not on file   Food Insecurity: Not on file   Transportation Needs: Not on file   Physical Activity: Not on file  "  Stress: Not on file   Social Connections: Not on file   Interpersonal Safety: Not on file   Housing Stability: Not on file       Current Outpatient Medications   Medication    droNABinol (MARINOL) 2.5 MG capsule    HYDROcodone-acetaminophen 7.5-325 MG/15ML solution    HYDROmorphone (DILAUDID) 2 MG tablet    omeprazole (PRILOSEC) 20 MG DR capsule    ondansetron (ZOFRAN) 8 MG tablet    prochlorperazine (COMPAZINE) 10 MG tablet    [START ON 3/18/2024] trifluridine-tipiracil (LONSURF) 15-6.14 MG tablet    dexAMETHasone (DECADRON) 2 MG tablet    gabapentin (NEURONTIN) 300 MG capsule    hydrocortisone (CORTAID) 1 % external cream    naloxone (NARCAN) 4 MG/0.1ML nasal spray    oxyCODONE (XTAMPZA ER) 13.5 MG 12 hr tablet     No current facility-administered medications for this visit.     Facility-Administered Medications Ordered in Other Visits   Medication    heparin 100 unit/mL injection 5 mL    sodium chloride (PF) 0.9% PF flush 3-20 mL        No Known Allergies    Review Of Systems:  Constitutional:    denies fever, weight changes, chills, and night sweats.  Eyes:    denies double vision  Ears/Nose/Throat:   denies ear pain, nose problems, difficulty swallowing  Respiratory:   denies shortness of breath, cough  Skin:   denies rash, lesions  Cardiovascular:   denies chest pain, palpitations, edema  Gastrointestinal:   denies abdominal pain, bloating, nausea, early satiety; no change in bowel habits or blood in stool  Genitourinary:   denies difficulty with urination, blood in urine  Musculoskeletal:    denies new muscle pain, bone pain  Neurologic:   denies lightheadedness, headaches  Psychiatric:   denies anxiety, depression  Hematologic/Lymphatic/Immunologic:   denies easy bruising, easy bleeding, lumps or bumps noted  Endocrine:   Denies increased thirst    ECOG Performance Status: 2    Physical Exam:  BP 92/53   Pulse 65   Temp 98.3  F (36.8  C) (Tympanic)   Resp 20   Ht 1.715 m (5' 7.5\")   Wt 56.2 kg (123 lb " 12.8 oz)   SpO2 98%   BMI 19.10 kg/m      GENERAL APPEARANCE:  alert and in no acute distress.  HEENT: Normocephalic, Sclerae anicteric.   NECK:   No asymmetry or masses, no thyromegaly.  LYMPHATICS: No palpable cervical, supraclavicular nodes   RESP: Lungs clear to auscultation bilaterally, respirations regular and easy  NEURO: Alert and oriented x 3.    PSYCHIATRIC: Mentation and affect appear normal.  Mood appropriate.    Laboratory:  Results for orders placed or performed in visit on 03/15/24   Comprehensive metabolic panel (BMP + Alb, Alk Phos, ALT, AST, Total. Bili, TP)     Status: Abnormal   Result Value Ref Range    Sodium 137 135 - 145 mmol/L    Potassium 4.4 3.4 - 5.3 mmol/L    Carbon Dioxide (CO2) 29 22 - 29 mmol/L    Anion Gap 7 7 - 15 mmol/L    Urea Nitrogen 14.4 8.0 - 23.0 mg/dL    Creatinine 0.56 (L) 0.67 - 1.17 mg/dL    GFR Estimate >90 >60 mL/min/1.73m2    Calcium 8.7 (L) 8.8 - 10.2 mg/dL    Chloride 101 98 - 107 mmol/L    Glucose 145 (H) 70 - 99 mg/dL    Alkaline Phosphatase 70 40 - 150 U/L    AST 15 0 - 45 U/L    ALT 9 0 - 70 U/L    Protein Total 6.5 6.4 - 8.3 g/dL    Albumin 3.4 (L) 3.5 - 5.2 g/dL    Bilirubin Total 0.5 <=1.2 mg/dL   CBC with platelets and differential     Status: Abnormal   Result Value Ref Range    WBC Count 4.3 4.0 - 11.0 10e3/uL    RBC Count 3.45 (L) 4.40 - 5.90 10e6/uL    Hemoglobin 12.0 (L) 13.3 - 17.7 g/dL    Hematocrit 35.1 (L) 40.0 - 53.0 %     (H) 78 - 100 fL    MCH 34.8 (H) 26.5 - 33.0 pg    MCHC 34.2 31.5 - 36.5 g/dL    RDW 14.5 10.0 - 15.0 %    Platelet Count 172 150 - 450 10e3/uL    % Neutrophils 55 %    % Lymphocytes 29 %    % Monocytes 15 %    % Eosinophils 1 %    % Basophils 0 %    % Immature Granulocytes 0 %    NRBCs per 100 WBC 0 <1 /100    Absolute Neutrophils 2.4 1.6 - 8.3 10e3/uL    Absolute Lymphocytes 1.3 0.8 - 5.3 10e3/uL    Absolute Monocytes 0.6 0.0 - 1.3 10e3/uL    Absolute Eosinophils 0.0 0.0 - 0.7 10e3/uL    Absolute Basophils 0.0 0.0 - 0.2  10e3/uL    Absolute Immature Granulocytes 0.0 <=0.4 10e3/uL    Absolute NRBCs 0.0 10e3/uL   CBC with platelets differential     Status: Abnormal    Narrative    The following orders were created for panel order CBC with platelets differential.  Procedure                               Abnormality         Status                     ---------                               -----------         ------                     CBC with platelets and d...[[id number]]  Abnormal            Final result                 Please view results for these tests on the individual orders.        Imaging Studies: None completed for today's visit      ASSESSMENT/PLAN:    #1 Esophageal cancer: Stage IV adenocarcinoma of the esophagus diagnosed November 2022.  He initiated systemic therapy with nivolumab/FOLFOX 1/11/2023 with potential for palliative radiation therapy.  He developed progression and treatment was changed to ramuciramab and paclitaxel with further progression then received FOLFIRI with further progression.  He is currently on Lonsurf.  Will continue with IV fluids every 2 weeks per his request.  We will see him back prior to cycle 3 with labs per treatment plan.     #2 cancer related pain: He questions Dilaudid.  We did discuss that he had previously experienced nausea while taking the Dilaudid.  He would like to try this again and we discussed using the Zofran as needed.  [facility] and he thinks he may have a prescription to try again at home.  If not he will use the hydrocodone until he gets the prescription [facility] the [facility].    3.  Cancer cachexia: Will start Marinol 2.5 mg twice a day before meals.  We did discuss increasing if needed.    3.  Neuropathy: Will continue with the cream as he is using.  Gabapentin as tolerated.    I encouraged patient to call with any questions or concerns.    37 minutes spent in the patient's encounter today with time spent in review of the chart along with in chart preparation.   Time was also spent in review of his treatment plan.  Time was also spent obtaining a review of systems and performing a physical exam along with review of his lab work.  Time was also spent in discussion of symptoms and my recommendations for management.  Extensive time was taken in discussion of his pain control.  Time was also spent in placing orders, planning his follow-up and in chart documentation.    Delmy Valenzuela NP APRN, FNP-BC, AOCNP

## 2024-03-15 NOTE — PATIENT INSTRUCTIONS
We will set you up for IV fluids every 2 weeks on Fridays and plan to obtain lab work on the days you are not seeing the provider.  CBC only.    We would like to see you back in 4 weeks. Please come for labs and IV fluids and I will see you up in infusion after labs are drawn.     [facility]      When you are in need of a refill, please call your pharmacy and they will send us a request.     If you have any questions please call [phone]    Other instructions: Try to increase nutritional intake

## 2024-03-15 NOTE — PROGRESS NOTES
Infusion Nursing Note:  Kenneth Matthew presents today for IVF.    Patient seen by provider today: Yes: Martha Valenzuela   present during visit today: Not Applicable.    Note: N/A.      Intravenous Access:  Implanted Port.    Treatment Conditions:  Not Applicable.      Post Infusion Assessment:  Blood return noted pre and post infusion.  Site patent and intact, free from redness, edema or discomfort.  No evidence of extravasations.  Access discontinued per protocol.       Discharge Plan:   AVS to patient via MYCHART.  Patient will return as scheduled for next appointment.   Patient discharged in stable condition accompanied by: self.  Departure Mode: Wheelchair.

## 2024-03-15 NOTE — NURSING NOTE
"Oncology Rooming Note    March 15, 2024 10:41 AM   Kenneth Matthew is a 58 year old male who presents for:    Chief Complaint   Patient presents with    Oncology Clinic Visit     Follow up Malignant neoplasm of lower third of esophagus        Initial Vitals: BP 92/53   Pulse 65   Temp 98.3  F (36.8  C) (Tympanic)   Resp 20   Ht 1.715 m (5' 7.5\")   Wt 56.2 kg (123 lb 12.8 oz)   SpO2 98%   BMI 19.10 kg/m   Estimated body mass index is 19.1 kg/m  as calculated from the following:    Height as of this encounter: 1.715 m (5' 7.5\").    Weight as of this encounter: 56.2 kg (123 lb 12.8 oz). Body surface area is 1.64 meters squared.  Extreme Pain (9) Comment: Data Unavailable   No LMP for male patient.  Allergies reviewed: Yes  Medications reviewed: Yes    Medications: MEDICATION REFILLS NEEDED TODAY. Provider was notified.  Pharmacy name entered into EPIC:    [facility] - [place], [place] - [id number] [place], [place] - [facility] [address] [facility]  [address]    Frailty Screening:   Is the patient here for a new oncology consult visit in cancer care? 2. No      Clinical concerns: increased pain      Yahaira Rodriguez LPN             "

## 2024-03-15 NOTE — PATIENT INSTRUCTIONS

## 2024-03-15 NOTE — PROGRESS NOTES
Patients port accessed using non-coring, 19 gauge, 3/4 needle, per facility protocol.     Hand hygiene performed: yes   Mask donned by caregiver: yes  Site prepped with CHG: yes   Labs drawn: yes   Dressing applied using aseptic technique: yes     Port flushed easily, without resistance. Flushed with 10 cc's normal saline.   Immediate blood return noted. 10 cc blood discarded.  Ordered labs obtained and sent to lab.  Port flushed per orders/MAR. Patient tolerated port flush well, denies pain nor discomfort at this time. IVF initiated at this time as patient is going to see provider in infusion center today.  Warm hand off to Medical oncology/primary nurse.

## 2024-03-28 NOTE — PROGRESS NOTES
Infusion Nursing Note:  Kenneth BELL Vipul presents today for IVF.    Patient seen by provider today: No   present during visit today: Not Applicable.    Note: Call to Martha Valenzuela NP ONC prior to port access, alerted to patient reports of increasing fatigue (though notes sleeps off and on and likely only totals 5 hours a day), poor intake (flushes feeding tube, gets about 2 ensures in a day, tea at night), and nausea (though managed with nausea meds). TORB to add BMP, encourage patient to intake more fluids (tube or PO) as able, remind him he can come more often for IVF if he feels it is needed. Spoke with patient re all, verbalizes understanding.    Asked for refill pain medication from Martha, see encounter routed to provider.      Intravenous Access:  Implanted port.  Labs drawn without difficulty.      Treatment Conditions:  Not Applicable.      Post Infusion Assessment:  Patient tolerated infusion without incident.  Site patent and intact, free from redness, edema or discomfort.  No evidence of extravasations.  Access discontinued per protocol.       Discharge Plan:   Patient discharged in stable condition accompanied by: self.  Departure Mode: Ambulatory.  Patient noting he would like to have IVF on Monday when he comes for labs. Message out to PAC asking for updated appt/schedule.

## 2024-03-28 NOTE — TELEPHONE ENCOUNTER
Call received from Martha, asking for further clarifications from patient of what he wants and alerting him to what she sees as the current options. Patient also verbalizing that he does not use his Xtampza.  Patient opted for liquid hydrocodone refill at this time. She asked he be educated to not use the Dilaudid and the hydrocodone concurrently. Patient educated re all and verbalizes adequate understanding of pain management medications available to him.

## 2024-03-28 NOTE — TELEPHONE ENCOUNTER
Patient requesting a refill from you of his liquid hydrocodone to [facility].     He notes backpain has not been managed with the Dilaudid. He has been taking the dilaudid as prescribed, in am and throughout the day, but has using the hydrocodone at HS because he gets more relief.     He wonders if there is anything else that he could do, otherwise he feels the hydrocodone is the more effective option and would like that reordered.

## 2024-03-28 NOTE — PROGRESS NOTES
Nurse spoke with the patient with options of continuing the hydrocodone, increasing the Dilaudid dose or changing to a different long-acting oxycodone.  Patient chooses to stick with the hydrocodone for now.  [facility]

## 2024-04-01 NOTE — PROGRESS NOTES
Infusion Nursing Note:  Kenneth Matthew presents today for IVF.    Patient seen by provider today: No   present during visit today: Not Applicable.    Note: Denies any new or worsening sx since assessment/infusion visit within past week.       Intravenous Access:  Labs drawn without difficulty.  Implanted Port.    Treatment Conditions:  Not Applicable.    1222: Call to hospital lab to inquire after CBC results, as still in process.     Post Infusion Assessment:  Patient tolerated infusion without incident. However, BP dipped. Denies any sx, appears well. Call to Leena Morataya NP ONC, updated. Ok to discharge but should monitor BP at home with any sx or changes, come for IVF to help hydrate PRN, push fluids at home, and if continue to struggle with low BPs and/or sx arise, would recommend follow up with PCP for management. Patient and wife updated, verbalize understanding. At this time, patient does not wish to schedule any further IVF appts, he will call when/if he needs them.   Site patent and intact, free from redness, edema or discomfort.  No evidence of extravasations.  Access discontinued per protocol.       Discharge Plan:   Patient discharged in stable condition accompanied by: wife.  Departure Mode: Ambulatory.  Patient asking if his appts 4-11-24 can be moved any later in day. Message sent to PAC pool asking for follow up.

## 2024-04-11 NOTE — PROGRESS NOTES
Patients power port accessed using non-coring, 19 gauge, 3/4 inch needle.    Mask donned by caregiver: yes Site prepped with CHG: yes Labs drawn: yes Dressing applied using aseptic technique: yes.     Port accessed per facility protocol. Port flushed easily, blood return noted.  No signs and symptoms of infection or infiltration.  Port flushed with 10mL normal saline, blood return noted, 10 mLs blood discarded. Blood taken for ordered labs, port flushed with 20mL normal saline.  Port left accessed as patient has appointment with Dr. Barnhart, and for IVF.

## 2024-04-11 NOTE — PROGRESS NOTES
Infusion Nursing Note:  Kenneth Matthew presents today for IVF.    Patient seen by provider today: Yes: Dr. Barnhart.   present during visit today: Not Applicable.    Note: Pt presents with an unsteady gait, saying he hasn't been feeling well since Tuesday. C/o weakness and back pain, also states he hasn't had a BM in almost 2 weeks, but attributes that to the increase in pain medication that occurred Sunday. He requests that Dr. Barnhart see him up here so his fluids can start running right away.       Intravenous Access:  Implanted Port.    Treatment Conditions:  Not Applicable.      Post Infusion Assessment:  Patient tolerated infusion without incident.  Blood return noted pre and post infusion.       Discharge Plan:   Discharge instructions reviewed with: Patient.  Patient and/or family verbalized understanding of discharge instructions and all questions answered.

## 2024-04-11 NOTE — PROGRESS NOTES
MEDICAL ONCOLOGY FOLLOW UP NOTE  Apr 11, 2024    Reason for Follow up: esophageal cancer    HISTORY OF PRESENT ILLNESS  Kenneth Matthew is a 59 year old male with PMH as stated below who is seen in the oncology clinic for follow up of esophageal cancer    His history in short is as follows    Mr. Matthew states he noticed difficulty swallowing initially starting June 2022. Over the last 1 year he has unintentionally lost 60 pounds.     10/21/2022: CT chest, abdomen and pelvis: Diffuse wall thickening of the distal esophagus with associated mediastinal adenopathy, and upper abdominal  Conglomerate lymphadenopathy. Constellation of findings are highly concerning for underlying esophageal carcinoma with metastatic disease.     11/1/2022: EGD: A large ulcerating mass with no bleeding and no stigmata of recent bleeding was found Lower third from the incisors. The mass was partially obstructing and partially circumferential involving  two thirds.     Biopsy showed poorly differentiated favoring adenocarcinoma. Her 2 negative.     12/14/2022: PET scan: Distal esophageal neoplasm with metastatic involvement the  mediastinum, left hilum and retroperitoneal lymph nodes of the upperabdomen. Also likely localized metastatic involvement of thestomach/GE junction. 2 cm intramural distal esophageal/GE junction mass. Circumferential wall thickening with increased FDG uptake in the distal esophagus adjacent portions of the stomach/GE junction may represent localized disease however could also represent localized inflammation. Metastatic involvement to the subcarinal nodes, left infrahilar and  pericaval/paraesophageal lymph nodes of the lower thorax. Metastatic involvement to the retroperitoneal lymph nodes of the upper abdomen. These lymph nodes have increased in size dating back to be  10/21/2022 CT scan consistent with worsening disease. Representative  nodes are described above.    1/11/2023 to 6/28/2023: 13 cycles of FOLFOX with  Moisés    7/5/2023: PET scan: Interval worsening of disease.   Increased size and FDG uptake suggests malignant involvement involving of a right paraesophageal lymph node in the upper thorax, diffuse involvement of the mediastinal and hilar nodes as well as worsening involvement of lymph nodes along the lesser curvature the stomach and in the retroperitoneum. Focal area of uptake in the right inferolateral aspect of the prostate gland may be inflammatory in nature.   Stable areas of uptake seen within the distal portions of the  Esophagus. An area of increased uptake seen previously within the gastric mucosa appears smaller.    7/9/2023 to 9/13/2023: Given progression seen on PET scan he was started on second line treatment with ramucirumab and Taxol.  Received total of 3 cycles.    10/4/2023: PET scan:Interval progression of disease, with increasing size of mediastinal  and upper abdominal lymph nodes. The focal mass in the distal  esophagus has not changed significantly.There has been interval collapse of the right middle lobe which  demonstrates increased FDG uptake, likely due to an infectious or  inflammatory process, however metastatic disease is also within the  differential.    He was then switched to FOLFIRI.     10/18/2023 to 1/17/2024: Received 7 cycles of FOLFIRI.     1/24/2024: PET scan:IMPRESSION: Worsening metastatic disease to lymph nodes in the  mediastinum and worsening lesion involving the lower third the  esophagus.Involvement at the gastroesophageal junction and upper abdominal lymph  nodes is roughly similar to that seen previously.Interval resolution of right middle lobe collapse.    2/2024: Started Lonsurf.     Interim history    Mr. Matthew is seen in the infusion room today.  He continues to have back pain.  He is currently on Dilaudid 4 mg every 4 hours which is helping.  He has been very constipated since he started the Dilaudid.  He denies any nausea or vomiting.  He is also very fatigued.   He states that his energy throughout the day depends on if he has any planned activities or not.  If not he can mostly sleeps throughout the day.  Continues to take tube feeds.  At this point is able to only swallow liquids.  He is however able to swallow his Lonsurf pills.    REVIEW OF SYSTEMS  A 12-point ROS negative except as in HPI      Current Outpatient Medications   Medication Sig Dispense Refill    droNABinol (MARINOL) 2.5 MG capsule Take 1 capsule (2.5 mg) by mouth 2 times daily (before meals) 60 capsule 0    HYDROmorphone (DILAUDID) 2 MG tablet Take 2 mg by mouth every 4 hours as needed for severe pain Patient states he is taking 2 po every 4 hours      omeprazole (PRILOSEC) 20 MG DR capsule Take 1 capsule (20 mg) by mouth 2 times daily 180 capsule 3    ondansetron (ZOFRAN) 8 MG tablet Take 1 tablet (8 mg) by mouth every 8 hours as needed for nausea 60 tablet 1    prochlorperazine (COMPAZINE) 10 MG tablet Take 1 tablet (10 mg) by mouth every 6 hours as needed for nausea or vomiting 120 tablet 3    [START ON 4/15/2024] trifluridine-tipiracil (LONSURF) 15-6.14 MG tablet Take 4 tablets (60 mg) by mouth 2 times daily for 20 doses Take within 1 hr after morning and evening meals on Days 1 thru 5 and 8 thru 12 of each 28 day cycle. 80 tablet 0    gabapentin (NEURONTIN) 300 MG capsule Take 1 capsule (300 mg) by mouth 2 times daily Start with 300mg at night for 7 days then increase to AM and PM for 7 days then increase to 3 times a day. (Patient not taking: Reported on 2/28/2024)      HYDROcodone-acetaminophen 7.5-325 MG/15ML solution Take 10 mLs by mouth every 6 hours as needed for moderate to severe pain (Patient not taking: Reported on 4/11/2024) 946 mL 0    hydrocortisone (CORTAID) 1 % external cream Apply topically 2 times daily (Patient not taking: Reported on 2/28/2024) 30 g 1    naloxone (NARCAN) 4 MG/0.1ML nasal spray Spray 1 spray (4 mg) into one nostril alternating nostrils as needed for opioid reversal  every 2-3 minutes until assistance arrives (Patient not taking: Reported on 2/7/2024) 0.2 mL 0    oxyCODONE (XTAMPZA ER) 13.5 MG 12 hr tablet Take 1 tablet (13.5 mg) by mouth every 12 hours (Patient not taking: Reported on 2/7/2024) 60 tablet 0       No Known Allergies  Immunization History   Administered Date(s) Administered    COVID-19 MONOVALENT 12+ (Pfizer) 08/28/2021, 09/18/2021    TDAP (Adacel,Boostrix) 12/10/2021       Past Medical History:   Diagnosis Date    Cancer (H)     Esophageal cancer (H) 11/01/2022    Esophageal thickening 10/25/2022    Mixed hearing loss, bilateral     Tobacco abuse 1/4/2023    Tobacco use disorder 12/27/2019       Past Surgical History:   Procedure Laterality Date    COLONOSCOPY  11/01/2022    [facility]    ESOPHAGOSCOPY N/A 4/25/2023    Procedure: Upper Endoscopy with biopsy- with Gastrostomy Tube Exchange;  Surgeon: Nba Dalton MD;  Location: [facility]    INSERT PORT VASCULAR ACCESS N/A 12/22/2022    Procedure: POWER PORT PLACEMENT;  Surgeon: Nba Dalton MD;  Location: [facility]    Upper GI Endoscopy  11/01/2022       SOCIAL HISTORY  History   Smoking Status    Former    Types: Cigarettes   Smokeless Tobacco    Never    Social History    Substance and Sexual Activity      Alcohol use: Not Currently        Comment: none since July of 2022     History   Drug Use Unknown       FAMILY HISTORY  Family History   Problem Relation Age of Onset    Breast Cancer Mother     Hyperlipidemia Mother     Diabetes Mother     Coronary Artery Disease Mother     Cerebrovascular Disease Father     Hyperlipidemia Father     Coronary Artery Disease Father     Myocardial Infarction Father     Hyperlipidemia Brother     Hypertension Brother     Diabetes Brother     Asthma Sister     Breast Cancer Sister     Hyperlipidemia Sister     Diabetes Sister     Hyperlipidemia Sister     Diabetes Sister     Hyperlipidemia Sister     Suicide Sister     Anesthesia Reaction Sister     Breast Cancer Sister      "Hyperlipidemia Sister     Diabetes Sister     Breast Cancer Niece     Skin Cancer Niece     Thyroid Disease Niece     Breast Cancer Niece     Leukemia Nephew     Colon Cancer No family hx of     Prostate Cancer No family hx of     Genetic Disorder No family hx of        PHYSICAL EXAMINATION  BP 98/58   Pulse 63   Temp (!) 96.3  F (35.7  C) (Tympanic)   Resp 20   Ht 1.715 m (5' 7.5\")   Wt 56.3 kg (124 lb 1.9 oz)   SpO2 91%   BMI 19.15 kg/m    Wt Readings from Last 2 Encounters:   04/11/24 56.3 kg (124 lb 1.9 oz)   04/11/24 56.3 kg (124 lb 1.9 oz)     Physical Exam  Constitutional:       Appearance: Normal appearance.   Eyes:      Conjunctiva/sclera: Conjunctivae normal.   Pulmonary:      Effort: Pulmonary effort is normal.   Abdominal:      General: Abdomen is flat.      Palpations: Abdomen is soft.   Neurological:      General: No focal deficit present.      Mental Status: He is alert and oriented to person, place, and time. Mental status is at baseline.         Laboratory and Imaging:     Latest Reference Range & Units 04/11/24 10:35   WBC 4.0 - 11.0 10e3/uL 4.4   Hemoglobin 13.3 - 17.7 g/dL 10.4 (L)   Hematocrit 40.0 - 53.0 % 30.7 (L)   Platelet Count 150 - 450 10e3/uL 216   (L): Data is abnormally low    RESULT FOR IMMUNOHISTOCHEMICAL VENTANA CLONE  PD-L1 ASSAY  COMBINED POSITIVE SCORE (CPS): <1%    8/7/2023: Foundation one: Microsatellite status - MS-Stable   Tumor Mutational [place] - 5 Muts/ Mb   Genomic Findings   APC [id number]*3   CTNA1 rearrangement exon 4   [id number]   TP53 P27fs*17 1   Disease relevant genes with no reportable alterations: ERBB2    ASSESSMENT AND PLAN    1. 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     Currently on first line treatment with FOLFOX with Nivo.  Has received 13 cycles till date.  However staging scan on 7/5/2023 does show progressive disease.    He then received 3 cycles of Taxol with ramucirumab from 7/2023-9/2023.  His PET scan done after 3 cycles " does show progressive disease with increasing mediastinal and upper abdominal lymph nodes.      10/2023 to 1/2024: Received total 7 cycles of FOLFIRI.    He was then started on Lonsurf after his PET scan showed progression.  Started Lonsurf on 2/2024.  He appears to be tolerating treatment well however does seem to be getting a little weaker.  He is currently on Dilaudid 4 mg every 4 hours.  He would like to know if the Dilaudid can be increased to 5 mg.  Will send a message to Dr. House.    Will see him back in 1 month with a repeat PET scan as he would have been on treatment for 3 months by that point.    2.  Cancer related pain:    Now on Dilaudid 4 mg every 4 hours.  Also recommend that he take stool softeners with    Follow up in 1 month with PET scan.     Total time spent on the patient on day of encounter was 40 minutes doing chart review, review of test results, interpretation of results, patient visit and documentation.      Nika Barnhart MD

## 2024-04-11 NOTE — NURSING NOTE
"Oncology Rooming Note    April 11, 2024 11:00 AM   Kenneth Matthew is a 59 year old male who presents for:    Chief Complaint   Patient presents with    Oncology Clinic Visit     Follow up Malignant neoplasm of lower third of esophagus        Initial Vitals: BP 98/58   Pulse 63   Temp (!) 96.3  F (35.7  C) (Tympanic)   Resp 20   Ht 1.715 m (5' 7.5\")   Wt 56.3 kg (124 lb 1.9 oz)   SpO2 91%   BMI 19.15 kg/m   Estimated body mass index is 19.15 kg/m  as calculated from the following:    Height as of this encounter: 1.715 m (5' 7.5\").    Weight as of this encounter: 56.3 kg (124 lb 1.9 oz). Body surface area is 1.64 meters squared.  Severe Pain (7) Comment: Data Unavailable   No LMP for male patient.  Allergies reviewed: Yes  Medications reviewed: Yes    Medications: Medication refill needed provider notified.  Pharmacy name entered into EPIC:    [facility] - [place], [place] - [id number] [place], [place] - [facility] [address] AT [facility] [address]    Frailty Screening:   Is the patient here for a new oncology consult visit in cancer care? 2. No      Clinical concerns: weak today requests seeing Dr Barnhart in infusion. [facility]      Yaahira Rodriguez LPN             "

## 2024-04-29 NOTE — PATIENT INSTRUCTIONS
We will see you back as planned. If you have any questions or concerns, we can be reached Monday through Friday 8am - 430pm at [phone] (PAC). If you have concerns related to a potential reaction/side effect after hours/weekends/holiday's, please seek emergent medical care.

## 2024-04-29 NOTE — PROGRESS NOTES
Infusion Nursing Note:  Kenneth Matthew presents today for labs from port.    Patient seen by provider today: No   present during visit today: Not Applicable.    Note: patient requesting IVF today and refill of Hydromorphone be sent to [facility].  ANMOL [place] OK to give IVF today, will send script to [facility]    Intravenous Access:  Labs drawn without difficulty.  Implanted Port.    Treatment Conditions:  Not Applicable.

## 2024-04-29 NOTE — PROGRESS NOTES
Post Infusion Assessment:  Patient tolerated infusion without incident.  Site patent and intact, free from redness, edema or discomfort.  No evidence of extravasations.  Access discontinued per protocol.       Discharge Plan:   Patient discharged in stable condition accompanied by: self.  Departure Mode: Ambulatory.

## 2024-04-30 NOTE — TELEPHONE ENCOUNTER
Made an appointment reminder call regarding [place] Petscan scheduled on 5/1/24 at 0915. Reminded patient to have a low carbohydrate High protein evening meal. No sugary deserts. Patient can have plain water up to the time of the scan. Test duration is about 2 hours.

## 2024-05-09 NOTE — ORAL ONC MGMT
Thank you for the opportunity to be a part in the care of this patient's oral chemotherapy. The oncology pharmacy will no longer be following this patient for oral chemotherapy. If there are any questions or the plan changes, feel free to contact us.    Benjamin Fletcher, PharmD  Oral Chemotherapy Pharmacist  [phone]

## 2024-05-09 NOTE — NURSING NOTE
"Oncology Rooming Note    May 9, 2024 10:29 AM   Kenneth Matthew is a 59 year old male who presents for:    Chief Complaint   Patient presents with    Oncology Clinic Visit     Follow up Malignant neoplasm of lower third of esophagus      Initial Vitals: /60   Pulse 65   Temp 98.8  F (37.1  C) (Tympanic)   Wt 54.6 kg (120 lb 4.8 oz)   SpO2 98%   BMI 18.56 kg/m   Estimated body mass index is 18.56 kg/m  as calculated from the following:    Height as of 4/11/24: 1.715 m (5' 7.5\").    Weight as of this encounter: 54.6 kg (120 lb 4.8 oz). Body surface area is 1.61 meters squared.  Worst Pain (10) Comment: Mid Back   No LMP for male patient.  Allergies reviewed: Yes  Medications reviewed: Yes    Medications: Medication refills not needed today.  Pharmacy name entered into EPIC:    [facility] - [place], [place] - [facility] #[id number] [place] [address] AT [facility] [address]  [address]    Frailty Screening:   Is the patient here for a new oncology consult visit in cancer care? 2. No      Clinical concerns: weakness Dr. Barnhart  was notified.      Aalna Reynoso LPN             "

## 2024-05-09 NOTE — PROGRESS NOTES
MEDICAL ONCOLOGY FOLLOW UP NOTE  May 9, 2024    Reason for Follow up: esophageal cancer    HISTORY OF PRESENT ILLNESS  Kenneth Matthew is a 59 year old male with PMH as stated below who is seen in the oncology clinic for follow up of esophageal cancer    His history in short is as follows    Mr. Matthew states he noticed difficulty swallowing initially starting June 2022. Over the last 1 year he has unintentionally lost 60 pounds.     10/21/2022: CT chest, abdomen and pelvis: Diffuse wall thickening of the distal esophagus with associated mediastinal adenopathy, and upper abdominal  Conglomerate lymphadenopathy. Constellation of findings are highly concerning for underlying esophageal carcinoma with metastatic disease.     11/1/2022: EGD: A large ulcerating mass with no bleeding and no stigmata of recent bleeding was found Lower third from the incisors. The mass was partially obstructing and partially circumferential involving  two thirds.     Biopsy showed poorly differentiated favoring adenocarcinoma. Her 2 negative.     12/14/2022: PET scan: Distal esophageal neoplasm with metastatic involvement the  mediastinum, left hilum and retroperitoneal lymph nodes of the upperabdomen. Also likely localized metastatic involvement of thestomach/GE junction. 2 cm intramural distal esophageal/GE junction mass. Circumferential wall thickening with increased FDG uptake in the distal esophagus adjacent portions of the stomach/GE junction may represent localized disease however could also represent localized inflammation. Metastatic involvement to the subcarinal nodes, left infrahilar and  pericaval/paraesophageal lymph nodes of the lower thorax. Metastatic involvement to the retroperitoneal lymph nodes of the upper abdomen. These lymph nodes have increased in size dating back to be  10/21/2022 CT scan consistent with worsening disease. Representative  nodes are described above.    1/11/2023 to 6/28/2023: 13 cycles of FOLFOX with  [place]    7/5/2023: PET scan: Interval worsening of disease.   Increased size and FDG uptake suggests malignant involvement involving of a right paraesophageal lymph node in the upper thorax, diffuse involvement of the mediastinal and hilar nodes as well as worsening involvement of lymph nodes along the lesser curvature the stomach and in the retroperitoneum. Focal area of uptake in the right inferolateral aspect of the prostate gland may be inflammatory in nature.   Stable areas of uptake seen within the distal portions of the  Esophagus. An area of increased uptake seen previously within the gastric mucosa appears smaller.    7/9/2023 to 9/13/2023: Given progression seen on PET scan he was started on second line treatment with ramucirumab and Taxol.  Received total of 3 cycles.    10/4/2023: PET scan:Interval progression of disease, with increasing size of mediastinal  and upper abdominal lymph nodes. The focal mass in the distal  esophagus has not changed significantly.There has been interval collapse of the right middle lobe which  demonstrates increased FDG uptake, likely due to an infectious or  inflammatory process, however metastatic disease is also within the  differential.    He was then switched to FOLFIRI.     10/18/2023 to 1/17/2024: Received 7 cycles of FOLFIRI.     1/24/2024: PET scan:IMPRESSION: Worsening metastatic disease to lymph nodes in the  mediastinum and worsening lesion involving the lower third the  esophagus.Involvement at the gastroesophageal junction and upper abdominal lymph  nodes is roughly similar to that seen previously.Interval resolution of right middle lobe collapse.    2/2024: Started Lonsurf.     5/1/2024: PET scan: Interval worsening of disease.     The esophageal/GE junction mass has increased in size as described  above. There is now also worsening metastatic disease with increased  size of lymph nodes of the thorax and upper abdomen as described  above.       Interim  history    Mr. Matthew continues to have significant pain mid back. It is not controlled on his current pain medications of dilaudid 4 mg every 4 hrs and norco. He is also been getting more weak slowly. Has been having good bowel movements. Has lost weight since last visit.     REVIEW OF SYSTEMS  A 12-point ROS negative except as in HPI      Current Outpatient Medications   Medication Sig Dispense Refill    droNABinol (MARINOL) 2.5 MG capsule Take 1 capsule (2.5 mg) by mouth 2 times daily (before meals) 60 capsule 0    gabapentin (NEURONTIN) 300 MG capsule Take 1 capsule (300 mg) by mouth 2 times daily Start with 300mg at night for 7 days then increase to AM and PM for 7 days then increase to 3 times a day. (Patient not taking: Reported on 2/28/2024)      HYDROcodone-acetaminophen 7.5-325 MG/15ML solution Take 10 mLs by mouth every 6 hours as needed for moderate to severe pain (Patient not taking: Reported on 4/11/2024) 946 mL 0    hydrocortisone (CORTAID) 1 % external cream Apply topically 2 times daily (Patient not taking: Reported on 2/28/2024) 30 g 1    HYDROmorphone (DILAUDID) 2 MG tablet Take 1 tablet (2 mg) by mouth every 4 hours as needed for severe pain Patient states he is taking 2 po every 4 hours 45 tablet 0    naloxone (NARCAN) 4 MG/0.1ML nasal spray Spray 1 spray (4 mg) into one nostril alternating nostrils as needed for opioid reversal every 2-3 minutes until assistance arrives (Patient not taking: Reported on 2/7/2024) 0.2 mL 0    omeprazole (PRILOSEC) 20 MG DR capsule Take 1 capsule (20 mg) by mouth 2 times daily 180 capsule 3    ondansetron (ZOFRAN) 8 MG tablet Take 1 tablet (8 mg) by mouth every 8 hours as needed for nausea 60 tablet 1    oxyCODONE (XTAMPZA ER) 13.5 MG 12 hr tablet Take 1 tablet (13.5 mg) by mouth every 12 hours (Patient not taking: Reported on 2/7/2024) 60 tablet 0    prochlorperazine (COMPAZINE) 10 MG tablet Take 1 tablet (10 mg) by mouth every 6 hours as needed for nausea or  vomiting 120 tablet 3    SENNA-docusate sodium (SENNA S) 8.6-50 MG tablet Take 1 tablet by mouth at bedtime 30 tablet 2    [START ON 5/13/2024] trifluridine-tipiracil (LONSURF) 15-6.14 MG tablet Take 4 tablets (60 mg) by mouth 2 times daily for 20 doses Take within 1 hr after morning and evening meals on Days 1 thru 5 and 8 thru 12 of each 28 day cycle. 80 tablet 0       No Known Allergies  Immunization History   Administered Date(s) Administered    COVID-19 MONOVALENT 12+ (Pfizer) 08/28/2021, 09/18/2021    TDAP (Adacel,Boostrix) 12/10/2021       Past Medical History:   Diagnosis Date    Cancer (H)     Esophageal cancer (H) 11/01/2022    Esophageal thickening 10/25/2022    Mixed hearing loss, bilateral     Tobacco abuse 1/4/2023    Tobacco use disorder 12/27/2019       Past Surgical History:   Procedure Laterality Date    COLONOSCOPY  11/01/2022    [facility]    ESOPHAGOSCOPY N/A 4/25/2023    Procedure: Upper Endoscopy with biopsy- with Gastrostomy Tube Exchange;  Surgeon: Nba Dalton MD;  Location: [facility]    INSERT PORT VASCULAR ACCESS N/A 12/22/2022    Procedure: POWER PORT PLACEMENT;  Surgeon: Nba Dalton MD;  Location: [facility]    Upper GI Endoscopy  11/01/2022       SOCIAL HISTORY  History   Smoking Status    Former    Types: Cigarettes   Smokeless Tobacco    Never    Social History    Substance and Sexual Activity      Alcohol use: Not Currently        Comment: none since July of 2022     History   Drug Use Unknown       FAMILY HISTORY  Family History   Problem Relation Age of Onset    Breast Cancer Mother     Hyperlipidemia Mother     Diabetes Mother     Coronary Artery Disease Mother     Cerebrovascular Disease Father     Hyperlipidemia Father     Coronary Artery Disease Father     Myocardial Infarction Father     Hyperlipidemia Brother     Hypertension Brother     Diabetes Brother     Asthma Sister     Breast Cancer Sister     Hyperlipidemia Sister     Diabetes Sister     Hyperlipidemia Sister      Diabetes Sister     Hyperlipidemia Sister     Suicide Sister     Anesthesia Reaction Sister     Breast Cancer Sister     Hyperlipidemia Sister     Diabetes Sister     Breast Cancer Niece     Skin Cancer Niece     Thyroid Disease Niece     Breast Cancer Niece     Leukemia Nephew     Colon Cancer No family hx of     Prostate Cancer No family hx of     Genetic Disorder No family hx of        PHYSICAL EXAMINATION  Wt 54.6 kg (120 lb 4.8 oz)   BMI 18.56 kg/m    Wt Readings from Last 2 Encounters:   05/09/24 54.6 kg (120 lb 4.8 oz)   04/29/24 54.9 kg (121 lb)     Physical Exam  Constitutional:       Appearance: Normal appearance.   Eyes:      Conjunctiva/sclera: Conjunctivae normal.   Pulmonary:      Effort: Pulmonary effort is normal.   Abdominal:      General: Abdomen is flat.      Palpations: Abdomen is soft.   Neurological:      General: No focal deficit present.      Mental Status: He is alert and oriented to person, place, and time. Mental status is at baseline.       Laboratory and Imaging:     Latest Reference Range & Units 04/11/24 10:35   WBC 4.0 - 11.0 10e3/uL 4.4   Hemoglobin 13.3 - 17.7 g/dL 10.4 (L)   Hematocrit 40.0 - 53.0 % 30.7 (L)   Platelet Count 150 - 450 10e3/uL 216   (L): Data is abnormally low    RESULT FOR IMMUNOHISTOCHEMICAL VENTANA CLONE  PD-L1 ASSAY  COMBINED POSITIVE SCORE (CPS): <1%    8/7/2023: Foundation one: Microsatellite status - MS-Stable   Tumor Mutational [place] - 5 Muts/ Mb   Genomic Findings   APC [id number]*3   CTNA1 rearrangement exon 4   [id number]   TP53 P27fs*17 1   Disease relevant genes with no reportable alterations: ERBB2    ASSESSMENT AND PLAN    1. 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     Currently on first line treatment with FOLFOX with [place].  Has received 13 cycles till date.  However staging scan on 7/5/2023 does show progressive disease.    He then received 3 cycles of Taxol with ramucirumab from 7/2023-9/2023.  His PET scan done after 3 cycles  does show progressive disease with increasing mediastinal and upper abdominal lymph nodes.      10/2023 to 1/2024: Received total 7 cycles of FOLFIRI.    He was then started on Lonsurf after his PET scan showed progression.  Started Lonsurf on 2/2024. PET scan done 5/1/2024 shows progression. He has been slowly getting very weak and has at this point gone through many lines of treatment.     He is also agreeable to transition to hospice. Referral placed.     2.  Cancer related pain:    On Diluadid 4 mg every 4 hrs. Increased to 6 mg. He understands this is a high dose. However he is end stage from his cancer standpoint. Continue with norco as needed.     We will schedule a follow up in 2 weeks with oncology for a symptom check. He is aware he can cancel the appointment if he is set up with hospice.     Total time spent on the patient on day of encounter was 40 minutes doing chart review, review of test results, interpretation of results, patient visit and documentation.      Nika Barnhart MD

## 2024-05-09 NOTE — PROGRESS NOTES
Infusion Nursing Note:  Kenneth Matthew presents today for IVF and Labs from port.      Patient seen by provider today: Yes: Dr. Barnhart    present during visit today: Not Applicable.    Note: Patient reports he will transitioning to hospice services.  He does wish to continue receiving IVF hydration as a support, if he can not get this through hospice he will plan on returning for hydration support as needed.      Intravenous Access:  Implanted Port.    Treatment Conditions:  Lab Results   Component Value Date    HGB 11.4 (L) 05/09/2024    WBC 3.4 (L) 05/09/2024    ANEUTAUTO 2.0 05/09/2024     05/09/2024        Lab Results   Component Value Date     05/09/2024    POTASSIUM 4.5 05/09/2024    MAG 2.2 02/08/2023    CR 0.55 (L) 05/09/2024    NICHOLAS 8.7 (L) 05/09/2024    BILITOTAL 0.8 05/09/2024    ALBUMIN 3.8 05/09/2024    ALT 7 05/09/2024    AST 15 05/09/2024       Results reviewed, labs MET treatment parameters, ok to proceed with treatment.      Post Infusion Assessment:  Patient tolerated infusion without incident.  Blood return noted pre and post infusion.  No evidence of extravasations.  Access discontinued per protocol.       Discharge Plan:   Patient and/or family verbalized understanding of discharge instructions and all questions answered.  AVS to patient via [email or web address].  Patient will return PRN for next appointment.   Patient discharged in stable condition accompanied by: brother.  Departure Mode: Wheel chair.      Matilde Matthews RN

## 2024-05-10 NOTE — PROGRESS NOTES
[facility]: Cancer Care                                                                                        TC to the [facility] hosp[facility] regarding referral. Plan is to call him today and set him up next week.       Signature:  Nenita Tatum RN

## 2024-05-14 PROBLEM — Z51.5 HOSPICE CARE PATIENT: Status: ACTIVE | Noted: 2024-01-01

## 2024-05-16 NOTE — PROGRESS NOTES
PHYSICIAN CERTIFICATION OF TERMINAL ILLNESS FOR HOSPICE BENEFIT    May 14, 2024    Kenneth Matthew    1965      Effective Date of Certification    Start Date:  5/14/24      End Date:  8/11/24    Terminal Diagnosis:    Malignant neoplasm of esophagus      Secondary Diagnoses:     Local lymph node metastases     Regional lymph node metastases     Cancer related pain      Prognosis Related Comorbidities:    Polyneuropathy       Narrative Statement:  Client suffers from metastatic esophageal adenocarcinoma involving mediastinal as well a intra abdominal lymph nodes. He is not undergoing further disease directed therapy. Client's weight has showed a change of 124.2 pounds on day of admission from a baseline of 138 pounds 3.7 ounces on 5/31/23.  The current arm circumference is 18.5 cm as measured 10 cm proximal from the antecubital crease left upper extremity. There are no areas of skin breakdown noted on admission. No recent infections noted.   There has been no recent hospitalizations and no Emergency Room visits related to the terminal diagnosis.  Client resides at home and requires assistance with ADL's.  Client scores a 50% on the Palliative Performance Scale v2.    Goals of symptom control will be met.  Because of the progressive nature of the illness, anticipated disease trajectory, as well as associated comorbidities, client qualifies for hospice care.             I certify that this patient is terminally ill and has a life expectancy of 6 months or less if the terminal illness runs its anticipated course.        Attestation:  I confirm that this narrative was composed by myself and is based on review of the medical record and/or examination of the patient.            Sanju House MD  Hospice and Palliative Care  Geriatrics   
routine and ritual male circumcision

## 2024-06-03 NOTE — PROGRESS NOTES
G-tube changed without concerns, 20 [place] with 7-10cc balloon. He is now on hospice feels he is unlikely to need additional tube change.     Nba Dalton MD

## 2024-06-04 NOTE — PROGRESS NOTES
Nursing Home Acute Visit        HISTORY OF PRESENT ILLNESS:  Kenneth is a 59 year old male (1965)  resident of *** who is being seen today for acute NH visit..     *** has a history of ***.    Advanced Directives:  POLST ***    The patient has developed ***.     Discussed with nursing staff who note ***.    The patient is seen in *** room accompanied by facility nurse.  Family is *** present.     Medical records are reviewed.    Current medications, allergies, and interdisciplinary care plan are reviewed.      Patient Active Problem List    Diagnosis Date Noted    Malignant neoplasm of lower third of esophagus (H) 2022     Priority: High    Neuropathy 2024     Priority: Medium    Cancer related pain 2023     Priority: Medium    Hospice care patient 2024     Priority: Low    POLST (Physician Orders for Life-Sustaining Treatment) 2024     Priority: Low          Social History     Socioeconomic History    Marital status:      Spouse name: Jazzy    Number of children: 2    Years of education: Not on file    Highest education level: Not on file   Occupational History    Not on file   Tobacco Use    Smoking status: Former     Current packs/day: 0.00     Average packs/day: 0.5 packs/day for 47.4 years (23.7 ttl pk-yrs)     Types: Cigarettes     Start date:      Quit date: 2023     Years since quittin.9    Smokeless tobacco: Never    Tobacco comments:     Trying to quit but continues to smoke   Vaping Use    Vaping status: Never Used   Substance and Sexual Activity    Alcohol use: Not Currently     Comment: none since 2022    Drug use: Never    Sexual activity: Not on file   Other Topics Concern    Parent/sibling w/ CABG, MI or angioplasty before 65F 55M? Yes     Comment: father   Social History Narrative    Not on file     Social Determinants of Health     Financial Resource Strain: Not on file   Food Insecurity: Not on file   Transportation Needs: Not on file    Physical Activity: Not on file   Stress: Not on file   Social Connections: Not on file   Interpersonal Safety: Not on file   Housing Stability: Not on file        Current Outpatient Medications   Medication Sig Dispense Refill    droNABinol (MARINOL) 2.5 MG capsule Take 1 capsule (2.5 mg) by mouth 2 times daily (before meals) 60 capsule 0    gabapentin (NEURONTIN) 300 MG capsule Take 1 capsule (300 mg) by mouth 2 times daily Start with 300mg at night for 7 days then increase to AM and PM for 7 days then increase to 3 times a day. (Patient not taking: Reported on 2/28/2024)      hydrocortisone (CORTAID) 1 % external cream Apply topically 2 times daily 30 g 1    HYDROmorphone (DILAUDID) 2 MG tablet Take 3 tablets (6 mg) by mouth every 4 hours as needed for severe pain 60 tablet 0    naloxone (NARCAN) 4 MG/0.1ML nasal spray Spray 1 spray (4 mg) into one nostril alternating nostrils as needed for opioid reversal every 2-3 minutes until assistance arrives 0.2 mL 0    omeprazole (PRILOSEC) 20 MG DR capsule Take 1 capsule (20 mg) by mouth 2 times daily 180 capsule 3    ondansetron (ZOFRAN) 8 MG tablet Take 1 tablet (8 mg) by mouth every 8 hours as needed for nausea 60 tablet 1    oxyCODONE (XTAMPZA ER) 13.5 MG 12 hr tablet Take 1 tablet (13.5 mg) by mouth every 12 hours (Patient not taking: Reported on 2/7/2024) 60 tablet 0    prochlorperazine (COMPAZINE) 10 MG tablet Take 1 tablet (10 mg) by mouth every 6 hours as needed for nausea or vomiting 120 tablet 3    SENNA-docusate sodium (SENNA S) 8.6-50 MG tablet Take 1 tablet by mouth at bedtime (Patient not taking: Reported on 5/9/2024) 30 tablet 2     No current facility-administered medications for this visit.       No Known Allergies    {CASE MANAGEMENT [facility]:424866}      ROS:  Review of Systems - Oncology          OBJECTIVE:  There were no vitals taken for this visit.    GENERAL:  Chronically ill appearing, alert, and in no acute distress  RESP:  Lungs clear.  No  rales, rhonchi, or wheezing  CV:  RRR.  S1 S2 with *** murmur. No clicks or rubs.  ABD:  Soft, nontender, bowel sounds ***.  No palpable masses or organomegaly  SKIN:  Age-related changes.  No suspicious lesions or rashes.  No areas of breakdown.  PSYCH:  Mentation ***, affect ***, and orientation ***.  EXTREM:  *** edema.  Pulses ***.      Lab/Diagnostic data:    Reviewed in Epic    ASSESSMENT/ORDERS:  ***      Total time spent on the day of encounter was {1/2/3/4/5:817150} including patient visit, review of pertinent clinical information, treatment plan, and documentation.      Sanju House MD FAAFP DC  Geriatrics  Hospice and Palliative Care

## 2024-06-07 NOTE — PROGRESS NOTES
Hospice Acute Visit    SUBJECTIVE:  Kenneth Matthew, 59 year old, male is seen for acute hospice visit. He has a past medical history significant for, but not limited to, periperheral neuropathy, tube feeding,  as well as cancer related pain.  He is currently on Hospice services with a terminal diagnosis of metastatic esophageal cancer.    Kenneth is seen with Hospice nurse and family present.    He has continued to decline and has supplemental tube feedings and have requested tube change as well as IV fluids every 2 weeks.    In addition, his difficulty swallowing has persistent and we have discussed medication management.  Ed has had had improvement with hydromorphone and will exclusively switch to this.    He continues with sleep maintenance insomnia.       Advanced Directives:  DNR POLST    Current Outpatient Medications   Medication Sig Dispense Refill    droNABinol (MARINOL) 2.5 MG capsule Take 1 capsule (2.5 mg) by mouth 2 times daily (before meals) 60 capsule 0    gabapentin (NEURONTIN) 300 MG capsule Take 1 capsule (300 mg) by mouth 2 times daily Start with 300mg at night for 7 days then increase to AM and PM for 7 days then increase to 3 times a day. (Patient not taking: Reported on 2/28/2024)      hydrocortisone (CORTAID) 1 % external cream Apply topically 2 times daily 30 g 1    HYDROmorphone (DILAUDID) 2 MG tablet Take 3 tablets (6 mg) by mouth every 4 hours as needed for severe pain 60 tablet 0    naloxone (NARCAN) 4 MG/0.1ML nasal spray Spray 1 spray (4 mg) into one nostril alternating nostrils as needed for opioid reversal every 2-3 minutes until assistance arrives 0.2 mL 0    omeprazole (PRILOSEC) 20 MG DR capsule Take 1 capsule (20 mg) by mouth 2 times daily 180 capsule 3    ondansetron (ZOFRAN) 8 MG tablet Take 1 tablet (8 mg) by mouth every 8 hours as needed for nausea 60 tablet 1    oxyCODONE (XTAMPZA ER) 13.5 MG 12 hr tablet Take 1 tablet (13.5 mg) by mouth every 12 hours (Patient not taking:  Reported on 2/7/2024) 60 tablet 0    prochlorperazine (COMPAZINE) 10 MG tablet Take 1 tablet (10 mg) by mouth every 6 hours as needed for nausea or vomiting 120 tablet 3    SENNA-docusate sodium (SENNA S) 8.6-50 MG tablet Take 1 tablet by mouth at bedtime (Patient not taking: Reported on 5/9/2024) 30 tablet 2      No Known Allergies    Past Medical History:   Diagnosis Date    Cancer (H)     Esophageal cancer (H) 11/01/2022    Esophageal thickening 10/25/2022    Mixed hearing loss, bilateral     Tobacco abuse 1/4/2023    Tobacco use disorder 12/27/2019     Past Surgical History:   Procedure Laterality Date    COLONOSCOPY  11/01/2022    [facility]    ESOPHAGOSCOPY N/A 4/25/2023    Procedure: Upper Endoscopy with biopsy- with Gastrostomy Tube Exchange;  Surgeon: Nba Dalton MD;  Location: [facility] OR    INSERT PORT VASCULAR ACCESS N/A 12/22/2022    Procedure: POWER PORT PLACEMENT;  Surgeon: Nba Dalton MD;  Location: [facility] OR    Upper GI Endoscopy  11/01/2022     Family History   Problem Relation Age of Onset    Breast Cancer Mother     Hyperlipidemia Mother     Diabetes Mother     Coronary Artery Disease Mother     Cerebrovascular Disease Father     Hyperlipidemia Father     Coronary Artery Disease Father     Myocardial Infarction Father     Hyperlipidemia Brother     Hypertension Brother     Diabetes Brother     Asthma Sister     Breast Cancer Sister     Hyperlipidemia Sister     Diabetes Sister     Hyperlipidemia Sister     Diabetes Sister     Hyperlipidemia Sister     Suicide Sister     Anesthesia Reaction Sister     Breast Cancer Sister     Hyperlipidemia Sister     Diabetes Sister     Breast Cancer Niece     Skin Cancer Niece     Thyroid Disease Niece     Breast Cancer Niece     Leukemia Nephew     Colon Cancer No family hx of     Prostate Cancer No family hx of     Genetic Disorder No family hx of      Social History     Socioeconomic History    Marital status:      Spouse name: Jazzy    Number of  children: 2    Years of education: Not on file    Highest education level: Not on file   Occupational History    Not on file   Tobacco Use    Smoking status: Former     Current packs/day: 0.00     Average packs/day: 0.5 packs/day for 47.4 years (23.7 ttl pk-yrs)     Types: Cigarettes     Start date:      Quit date: 2023     Years since quittin.9    Smokeless tobacco: Never    Tobacco comments:     Trying to quit but continues to smoke   Vaping Use    Vaping status: Never Used   Substance and Sexual Activity    Alcohol use: Not Currently     Comment: none since 2022    Drug use: Never    Sexual activity: Not on file   Other Topics Concern    Parent/sibling w/ CABG, MI or angioplasty before 65F 55M? Yes     Comment: father   Social History Narrative    Not on file     Social Determinants of Health     Financial Resource Strain: Not on file   Food Insecurity: Not on file   Transportation Needs: Not on file   Physical Activity: Not on file   Stress: Not on file   Social Connections: Not on file   Interpersonal Safety: Not on file   Housing Stability: Not on file         Review Of Systems  Review of Systems   Constitutional:  Positive for appetite change, fatigue and unexpected weight change.   HENT:   Positive for trouble swallowing. Negative for hearing loss and voice change.    Eyes:  Negative for eye problems.   Respiratory:  Positive for cough and shortness of breath. Negative for chest tightness, hemoptysis and wheezing.    Cardiovascular:  Negative for chest pain, leg swelling and palpitations.   Gastrointestinal:  Positive for abdominal pain, constipation and nausea. Negative for blood in stool, diarrhea and vomiting.   Genitourinary:  Negative for bladder incontinence, difficulty urinating, dysuria, frequency, hematuria and nocturia.    Musculoskeletal:  Positive for arthralgias and back pain. Negative for gait problem, myalgias and neck pain.   Skin:  Negative for itching, rash and wound.    Neurological:  Positive for dizziness. Negative for extremity weakness, gait problem, light-headedness, numbness, seizures and speech difficulty.   Hematological:  Negative for adenopathy.   Psychiatric/Behavioral:  Positive for sleep disturbance. Negative for depression. The patient is not nervous/anxious.          OBJECTIVE:  There were no vitals taken for this visit.      Exam:  Physical Exam  Constitutional:       General: He is awake. He is not in acute distress.     Appearance: He is well-developed. He is cachectic.   HENT:      Head: Normocephalic and atraumatic.      Right Ear: External ear normal.      Left Ear: External ear normal.   Eyes:      Extraocular Movements: Extraocular movements intact.      Pupils: Pupils are equal, round, and reactive to light.   Skin:     General: Skin is warm and dry.   Neurological:      Mental Status: He is alert and oriented to person, place, and time.   Psychiatric:         Mood and Affect: Mood normal.     Other exam not repeated    Labs:  Infusion Therapy Visit on 05/09/2024   Component Date Value Ref Range Status    Sodium 05/09/2024 135  135 - 145 mmol/L Final    Reference intervals for this test were updated on 09/26/2023 to more accurately reflect our healthy population. There may be differences in the flagging of prior results with similar values performed with this method. Interpretation of those prior results can be made in the context of the updated reference intervals.     Potassium 05/09/2024 4.5  3.4 - 5.3 mmol/L Final    Carbon Dioxide (CO2) 05/09/2024 28  22 - 29 mmol/L Final    Anion Gap 05/09/2024 9  7 - 15 mmol/L Final    Urea Nitrogen 05/09/2024 15.6  8.0 - 23.0 mg/dL Final    Creatinine 05/09/2024 0.55 (L)  0.67 - 1.17 mg/dL Final    GFR Estimate 05/09/2024 >90  >60 mL/min/1.73m2 Final    Calcium 05/09/2024 8.7 (L)  8.8 - 10.2 mg/dL Final    Chloride 05/09/2024 98  98 - 107 mmol/L Final    Glucose 05/09/2024 109 (H)  70 - 99 mg/dL Final    Alkaline  Phosphatase 05/09/2024 82  40 - 150 U/L Final    Reference intervals for this test were updated on 11/14/2023 to more accurately reflect our healthy population. There may be differences in the flagging of prior results with similar values performed with this method. Interpretation of those prior results can be made in the context of the updated reference intervals.    AST 05/09/2024 15  0 - 45 U/L Final    Reference intervals for this test were updated on 6/12/2023 to more accurately reflect our healthy population. There may be differences in the flagging of prior results with similar values performed with this method. Interpretation of those prior results can be made in the context of the updated reference intervals.    ALT 05/09/2024 7  0 - 70 U/L Final    Reference intervals for this test were updated on 6/12/2023 to more accurately reflect our healthy population. There may be differences in the flagging of prior results with similar values performed with this method. Interpretation of those prior results can be made in the context of the updated reference intervals.      Protein Total 05/09/2024 7.2  6.4 - 8.3 g/dL Final    Albumin 05/09/2024 3.8  3.5 - 5.2 g/dL Final    Bilirubin Total 05/09/2024 0.8  <=1.2 mg/dL Final    WBC Count 05/09/2024 3.4 (L)  4.0 - 11.0 10e3/uL Final    RBC Count 05/09/2024 3.19 (L)  4.40 - 5.90 10e6/uL Final    Hemoglobin 05/09/2024 11.4 (L)  13.3 - 17.7 g/dL Final    Hematocrit 05/09/2024 33.5 (L)  40.0 - 53.0 % Final    MCV 05/09/2024 105 (H)  78 - 100 fL Final    MCH 05/09/2024 35.7 (H)  26.5 - 33.0 pg Final    MCHC 05/09/2024 34.0  31.5 - 36.5 g/dL Final    RDW 05/09/2024 17.7 (H)  10.0 - 15.0 % Final    Platelet Count 05/09/2024 152  150 - 450 10e3/uL Final    % Neutrophils 05/09/2024 58  % Final    % Lymphocytes 05/09/2024 20  % Final    % Monocytes 05/09/2024 21  % Final    % Eosinophils 05/09/2024 1  % Final    % Basophils 05/09/2024 0  % Final    % Immature Granulocytes  05/09/2024 0  % Final    NRBCs per 100 WBC 05/09/2024 0  <1 /100 Final    Absolute Neutrophils 05/09/2024 2.0  1.6 - 8.3 10e3/uL Final    Absolute Lymphocytes 05/09/2024 0.7 (L)  0.8 - 5.3 10e3/uL Final    Absolute Monocytes 05/09/2024 0.7  0.0 - 1.3 10e3/uL Final    Absolute Eosinophils 05/09/2024 0.0  0.0 - 0.7 10e3/uL Final    Absolute Basophils 05/09/2024 0.0  0.0 - 0.2 10e3/uL Final    Absolute Immature Granulocytes 05/09/2024 0.0  <=0.4 10e3/uL Final    Absolute NRBCs 05/09/2024 0.0  10e3/uL Final       ASSESSMENT/PLAN:  Malignant neoplasm of lower third of esophagus (H)  Currently on Hospice.  Will continue oral as tolereated.  He is scheduled to have tube change and will honor this.  Continue supplementation.  Reviewed role of fluids and end of life care and fill forego scheduled infusions.    Cancer related pain  Will discontinue Norco and continue hydromorphone as written.  This is reviewed.    Insomnia due to medical condition  Persistent.  Will add evening hydromorphone to bedtime.   Add temazepam 7.5 mg.  Follow    Total time spent on the day of encounter including review of pertinent clinical records, interpretation and review of laboratory and diagnostic testing, patient visit, documentation, as well a counseling and coordination of care:  60 minutes.    Sanju House MD, [facility]  Hospice and Palliative Medicine  Geriatrics

## 2024-08-08 NOTE — PROGRESS NOTES
PHYSICIAN CERTIFICATION OF TERMINAL ILLNESS FOR HOSPICE BENEFIT     August 8, 2024     Kenneth Matthew     1965        Effective Date of Certification    Start Date:  8/12/24       End Date:  11/9/24     Terminal Diagnosis:    Malignant neoplasm of esophagus        Secondary Diagnoses:     Local lymph node metastases     Regional lymph node metastases     Cancer related pain        Prognosis Related Comorbidities:    Polyneuropathy         Narrative Statement:  Client suffers from metastatic esophageal adenocarcinoma involving mediastinal as well a intra abdominal lymph nodes. He is not undergoing further disease directed therapy. Client's weight has showed a change of 124.2 pounds on day of admission from a baseline of 138 pounds 3.7 ounces on 5/31/23.  The arm circumference on admission was 18.5 cm as measured 10 cm proximal from the antecubital crease left upper extremity. There are no areas of skin breakdown noted. No recent infections noted. There has been no recent hospitalizations and no Emergency Room visits related to the terminal diagnosis.  Client resides at home and requires assistance with ADL's.  Client scores a 40% on the Palliative Performance Scale v2 from a baseline of 50% on admission.     Goals of symptom control will be met.  Because of the progressive nature of the illness, anticipated disease trajectory, as well as associated comorbidities, client qualifies for hospice care.                  I certify that this patient is terminally ill and has a life expectancy of 6 months or less if the terminal illness runs its anticipated course.           Attestation:  I confirm that this narrative was composed by myself and is based on review of the medical record and/or examination of the patient.                 Sanju House MD  Hospice and Palliative Care  Geriatrics

## 2024-08-15 NOTE — PROGRESS NOTES
I'm very saddened by his passing.  He was too young.  Thank you for helping out Dr. House and I hope you are doing well    Chata

## (undated) DEVICE — DRAPE C-ARM 60X42" 1013

## (undated) DEVICE — PACK BASIN SET UP SUTCNBSBBA

## (undated) DEVICE — PEN MARKING SKIN W/LABELS 31145918

## (undated) DEVICE — DRAPE BACK TABLE  44X90" 8377

## (undated) DEVICE — ADH SKIN CLOSURE PREMIERPRO EXOFIN MICOR HV 0.5ML 3471

## (undated) DEVICE — GOWN SURG XL LVL 3 REINFORCED 9541

## (undated) DEVICE — SOL WATER IRRIG 1000ML BOTTLE 2F7114

## (undated) DEVICE — BLADE 15 RB BK SS STRL LF DISPLF DISP 371215

## (undated) DEVICE — GLOVE PROTEXIS POWDER FREE CLSC 7.5  2D72PL75X

## (undated) DEVICE — PREP CHLORAPREP 26ML TINTED HI-LITE ORANGE 930815

## (undated) DEVICE — ESU GROUND PAD ADULT W/CORD E7507

## (undated) DEVICE — CANISTER SUCTION MEDI-VAC GUARDIAN 2000ML 90D 65651-220

## (undated) DEVICE — WATER STERILE 1000ML STERILE UROMATIC 2B-71-14

## (undated) DEVICE — LABEL STERILE PREPRINTED FOR OR FRRH01-2M

## (undated) DEVICE — BIN-GASTROSTOMY/PEG TUBE BIN

## (undated) DEVICE — COVER ULTRASOUND PROBE W/GEL FLEXI-FEEL 6"X58" LF  25-FF658

## (undated) DEVICE — SU MONOCRYL 4-0 PS-2 18" UND Y496G

## (undated) DEVICE — SYR 10ML SLIP TIP W/O NDL 303134

## (undated) DEVICE — SOL NACL 0.9% IRRIG 1000ML BOTTLE 2F7124

## (undated) DEVICE — FORCEP COLON BIOPSY STD W/NEEDLE 160CM M00513390

## (undated) DEVICE — KIT-PEG TUBE ADULT PUSH STYLE 20FR

## (undated) DEVICE — SLEEVE SCD EXPRESS KNEE LENGTH MED 9529

## (undated) DEVICE — SU PROLENE 3-0 SHDA 48" 8534H

## (undated) DEVICE — Device

## (undated) DEVICE — DECANTER BAG 10-102

## (undated) DEVICE — PACK LAPAROTOMY CUSTOM SBA32LPMBG

## (undated) DEVICE — COVER LT HANDLE 2/PK 5160-2FG

## (undated) DEVICE — SU VICRYL 3-0 SH 27" UND J416H

## (undated) DEVICE — SUTURE BOOTS 051003PBX

## (undated) RX ORDER — HYDROMORPHONE HYDROCHLORIDE 1 MG/ML
INJECTION, SOLUTION INTRAMUSCULAR; INTRAVENOUS; SUBCUTANEOUS
Status: DISPENSED
Start: 2022-12-22

## (undated) RX ORDER — PROPOFOL 10 MG/ML
INJECTION, EMULSION INTRAVENOUS
Status: DISPENSED
Start: 2023-04-25